# Patient Record
Sex: MALE | Race: WHITE | Employment: UNEMPLOYED | URBAN - METROPOLITAN AREA
[De-identification: names, ages, dates, MRNs, and addresses within clinical notes are randomized per-mention and may not be internally consistent; named-entity substitution may affect disease eponyms.]

---

## 2017-01-03 PROBLEM — G89.29 CHRONIC LEFT-SIDED LOW BACK PAIN: Status: ACTIVE | Noted: 2017-01-03

## 2017-01-03 PROBLEM — M54.50 CHRONIC LEFT-SIDED LOW BACK PAIN: Status: ACTIVE | Noted: 2017-01-03

## 2017-01-09 ENCOUNTER — HOSPITAL ENCOUNTER (OUTPATIENT)
Dept: ULTRASOUND IMAGING | Age: 57
Discharge: HOME | End: 2017-01-09
Attending: INTERNAL MEDICINE
Payer: MEDICAID

## 2017-01-09 DIAGNOSIS — I73.9 CLAUDICATION (HCC): ICD-10-CM

## 2017-01-09 PROCEDURE — 93926 LOWER EXTREMITY STUDY: CPT

## 2017-03-03 PROBLEM — I70.0 OCCLUSION OF AORTA (HCC): Status: ACTIVE | Noted: 2017-03-03

## 2017-04-26 ENCOUNTER — HOSPITAL ENCOUNTER (OUTPATIENT)
Dept: CT IMAGING | Age: 57
Discharge: HOME | End: 2017-04-26
Attending: SURGERY
Payer: MEDICAID

## 2017-04-26 VITALS — HEIGHT: 66 IN | BODY MASS INDEX: 29.89 KG/M2 | WEIGHT: 186 LBS

## 2017-04-26 DIAGNOSIS — I73.9 PVD (PERIPHERAL VASCULAR DISEASE) WITH CLAUDICATION (HCC): ICD-10-CM

## 2017-04-26 LAB — CREAT BLD-MCNC: 1.8 MG/DL (ref 0.6–1)

## 2017-04-26 PROCEDURE — 82565 ASSAY OF CREATININE: CPT

## 2017-04-26 PROCEDURE — 74011000258 HC RX REV CODE- 258: Performed by: SURGERY

## 2017-04-26 PROCEDURE — 75635 CT ANGIO ABDOMINAL ARTERIES: CPT

## 2017-04-26 PROCEDURE — 74011636320 HC RX REV CODE- 636/320: Performed by: SURGERY

## 2017-04-26 PROCEDURE — 74011250636 HC RX REV CODE- 250/636: Performed by: SURGERY

## 2017-04-26 RX ORDER — SODIUM CHLORIDE 9 MG/ML
1 INJECTION, SOLUTION INTRAVENOUS ONCE
Status: COMPLETED | OUTPATIENT
Start: 2017-04-26 | End: 2017-04-26

## 2017-04-26 RX ORDER — SODIUM CHLORIDE 0.9 % (FLUSH) 0.9 %
10 SYRINGE (ML) INJECTION
Status: COMPLETED | OUTPATIENT
Start: 2017-04-26 | End: 2017-04-26

## 2017-04-26 RX ADMIN — SODIUM CHLORIDE 1 ML/KG/HR: 900 INJECTION, SOLUTION INTRAVENOUS at 08:35

## 2017-04-26 RX ADMIN — SODIUM CHLORIDE 100 ML: 900 INJECTION, SOLUTION INTRAVENOUS at 11:49

## 2017-04-26 RX ADMIN — IOPAMIDOL 119 ML: 755 INJECTION, SOLUTION INTRAVENOUS at 11:49

## 2017-04-26 RX ADMIN — Medication 10 ML: at 11:49

## 2017-04-26 NOTE — IP AVS SNAPSHOT
[address] 
[phone] Patient: Mena Jarquin MRN: [id number] You are allergic to the following No active allergies Recent Documentation Height Weight BMI Smoking Status 1.676 m 84.4 kg 30.02 kg/m2 Former Smoker Emergency Contacts Name Discharge Info Relation Home Work Mobile Elpidio Holt  Daughter [21] [phone] About your hospitalization You were admitted on:  April 26, 2017 You last received care in the:  D RAD[facility] CT SCAN You were discharged on:  April 26, 2017 Unit phone number:  [phone] Why you were hospitalized Your primary diagnosis was:  Not on File Providers Seen During Your Hospitalizations Provider Role Specialty Primary office phone Malia Parson MD Attending Provider Vascular Surgery [phone] Your Primary Care Physician (PCP) Primary Care Physician Office Phone Office Fax Patti Zabala [address] [place] [address] Follow-up Information None Your Appointments Friday April 28, 2017 10:00 AM EDT  
ESTABLISHED PATIENT with Malia Parson MD  
[facility] [address] [place] [id number] [phone] Friday May 05, 2017 10:00 AM EDT Office Visit with Sid Castañeda MD  
[facility] ([address] [place] [address]) [address] [place] [address] Leighann Friends [address] [phone] Current Discharge Medication List  
  
ASK your doctor about these medications Dose & Instructions Dispensing Information Comments Morning Noon Evening Bedtime  
 aspirin delayed-release 81 mg tablet Your last dose was: Your next dose is:    
   
   
 Dose:  81 mg Take 1 Tab by mouth daily. Refills:  0 clopidogrel 75 mg Tab Commonly known as:  PLAVIX Your last dose was: Your next dose is:    
   
   
 Dose:  75 mg Take 1 Tab by mouth daily. Quantity:  30 Tab Refills:  11  
     
   
   
   
  
 COREG 12.5 mg tablet Generic drug:  carvedilol Your last dose was: Your next dose is:    
   
   
 Dose:  6.25 mg Take 6.25 mg by mouth two (2) times daily (with meals). Refills:  0 HYDROcodone-acetaminophen  mg tablet Commonly known as:  Bebo Nigh Your last dose was: Your next dose is:    
   
   
 Dose:  1 Tab Take 1 Tab by mouth every eight (8) hours as needed for Pain. Max Daily Amount: 3 Tabs. Quantity:  90 Tab Refills:  0  
     
   
   
   
  
 lisinopril 20 mg tablet Commonly known as:  Miguel Leys Your last dose was: Your next dose is:    
   
   
 Dose:  20 mg Take 1 Tab by mouth daily. Quantity:  30 Tab Refills:  6  
     
   
   
   
  
 nitroglycerin 0.4 mg SL tablet Commonly known as:  NITROSTAT Your last dose was: Your next dose is:    
   
   
 Dose:  0.4 mg  
1 Tab by SubLINGual route every five (5) minutes as needed for Chest Pain. Quantity:  2 Bottle Refills:  4  
     
   
   
   
  
 raNITIdine 150 mg tablet Commonly known as:  ZANTAC Your last dose was: Your next dose is:    
   
   
 Dose:  150 mg Take 1 Tab by mouth two (2) times daily as needed for Indigestion. Quantity:  1 Tab Refills:  0 Discharge Instructions None Discharge Orders None Introducing [facility]! Adrianna Murray introduces [employer] patient portal. Now you can access parts of your medical record, email your doctor's office, and request medication refills online. 1. In your internet browser, go to [email or web address]. Work [employer] 2. Click on the First Time User? Click Here link in the Sign In box.  You will see the New Member Sign Up page. 3. Enter your [employer] Access Code exactly as it appears below. You will not need to use this code after youve completed the sign-up process. If you do not sign up before the expiration date, you must request a new code. · [employer] Access Code: [email or web address] Expires: 6/7/2017  1:43 PM 
 
4. Enter the last four digits of your Social Security Number (xxxx) and Date of Birth (mm/dd/yyyy) as indicated and click Submit. You will be taken to the next sign-up page. 5. Create a [employer] ID. This will be your [employer] login ID and cannot be changed, so think of one that is secure and easy to remember. 6. Create a [employer] password. You can change your password at any time. 7. Enter your Password Reset Question and Answer. This can be used at a later time if you forget your password. 8. Enter your e-mail address. You will receive e-mail notification when new information is available in [address] 9. Click Sign Up. You can now view and download portions of your medical record. 10. Click the Download Summary menu link to download a portable copy of your medical information. If you have questions, please visit the Frequently Asked Questions section of the [employer] website. Remember, [employer] is NOT to be used for urgent needs. For medical emergencies, dial 911. Now available from your iPhone and Android! General Information Please provide this summary of care documentation to your next provider. Patient Signature:  ____________________________________________________________ Date:  ____________________________________________________________  
  
Kay Tao Provider Signature:  ____________________________________________________________ Date:  ____________________________________________________________

## 2017-04-26 NOTE — PROGRESS NOTES
Patient tolerated IV fluids well. Ate lunch, appetite good. Ambulated several times to bathroom to void. Instructed to force oral fluids today and notify physician with any problems. Patient verbalizes understanding. Peripheral IV removed intact site clear. Discharged ambulatory in stable condition.

## 2017-04-26 NOTE — IP AVS SNAPSHOT
Current Discharge Medication List  
  
ASK your doctor about these medications Dose & Instructions Dispensing Information Comments Morning Noon Evening Bedtime  
 aspirin delayed-release 81 mg tablet Your last dose was: Your next dose is:    
   
   
 Dose:  81 mg Take 1 Tab by mouth daily. Refills:  0  
     
   
   
   
  
 clopidogrel 75 mg Tab Commonly known as:  PLAVIX Your last dose was: Your next dose is:    
   
   
 Dose:  75 mg Take 1 Tab by mouth daily. Quantity:  30 Tab Refills:  11  
     
   
   
   
  
 COREG 12.5 mg tablet Generic drug:  carvedilol Your last dose was: Your next dose is:    
   
   
 Dose:  6.25 mg Take 6.25 mg by mouth two (2) times daily (with meals). Refills:  0 HYDROcodone-acetaminophen  mg tablet Commonly known as:  Vikram Littler Your last dose was: Your next dose is:    
   
   
 Dose:  1 Tab Take 1 Tab by mouth every eight (8) hours as needed for Pain. Max Daily Amount: 3 Tabs. Quantity:  90 Tab Refills:  0  
     
   
   
   
  
 lisinopril 20 mg tablet Commonly known as:  Natalie Gaster Your last dose was: Your next dose is:    
   
   
 Dose:  20 mg Take 1 Tab by mouth daily. Quantity:  30 Tab Refills:  6  
     
   
   
   
  
 nitroglycerin 0.4 mg SL tablet Commonly known as:  NITROSTAT Your last dose was: Your next dose is:    
   
   
 Dose:  0.4 mg  
1 Tab by SubLINGual route every five (5) minutes as needed for Chest Pain. Quantity:  2 Bottle Refills:  4  
     
   
   
   
  
 raNITIdine 150 mg tablet Commonly known as:  ZANTAC Your last dose was: Your next dose is:    
   
   
 Dose:  150 mg Take 1 Tab by mouth two (2) times daily as needed for Indigestion. Quantity:  1 Tab Refills:  0

## 2017-05-11 ENCOUNTER — HOSPITAL ENCOUNTER (OUTPATIENT)
Dept: SURGERY | Age: 57
Discharge: HOME | End: 2017-05-11
Payer: MEDICAID

## 2017-05-11 VITALS
BODY MASS INDEX: 31.66 KG/M2 | HEART RATE: 97 BPM | DIASTOLIC BLOOD PRESSURE: 81 MMHG | RESPIRATION RATE: 16 BRPM | WEIGHT: 197 LBS | SYSTOLIC BLOOD PRESSURE: 160 MMHG | OXYGEN SATURATION: 71 % | HEIGHT: 66 IN | TEMPERATURE: 97.9 F

## 2017-05-11 LAB
ANION GAP BLD CALC-SCNC: 6 MMOL/L (ref 7–16)
BASOPHILS # BLD AUTO: 0 K/UL (ref 0–0.2)
BASOPHILS # BLD: 0 % (ref 0–2)
BUN SERPL-MCNC: 23 MG/DL (ref 6–23)
CALCIUM SERPL-MCNC: 9.1 MG/DL (ref 8.3–10.4)
CHLORIDE SERPL-SCNC: 110 MMOL/L (ref 98–107)
CO2 SERPL-SCNC: 27 MMOL/L (ref 21–32)
CREAT SERPL-MCNC: 1.87 MG/DL (ref 0.8–1.5)
DIFFERENTIAL METHOD BLD: ABNORMAL
EOSINOPHIL # BLD: 0.3 K/UL (ref 0–0.8)
EOSINOPHIL NFR BLD: 3 % (ref 0.5–7.8)
ERYTHROCYTE [DISTWIDTH] IN BLOOD BY AUTOMATED COUNT: 12.2 % (ref 11.9–14.6)
GLUCOSE SERPL-MCNC: 98 MG/DL (ref 65–100)
HCT VFR BLD AUTO: 41.8 % (ref 41.1–50.3)
HGB BLD-MCNC: 14.4 G/DL (ref 13.6–17.2)
IMM GRANULOCYTES # BLD: 0 K/UL (ref 0–0.5)
IMM GRANULOCYTES NFR BLD AUTO: 0.1 % (ref 0–5)
LYMPHOCYTES # BLD AUTO: 24 % (ref 13–44)
LYMPHOCYTES # BLD: 2 K/UL (ref 0.5–4.6)
MCH RBC QN AUTO: 31.9 PG (ref 26.1–32.9)
MCHC RBC AUTO-ENTMCNC: 34.4 G/DL (ref 31.4–35)
MCV RBC AUTO: 92.7 FL (ref 79.6–97.8)
MONOCYTES # BLD: 0.6 K/UL (ref 0.1–1.3)
MONOCYTES NFR BLD AUTO: 7 % (ref 4–12)
NEUTS SEG # BLD: 5.6 K/UL (ref 1.7–8.2)
NEUTS SEG NFR BLD AUTO: 66 % (ref 43–78)
PLATELET # BLD AUTO: 281 K/UL (ref 150–450)
PMV BLD AUTO: 10.3 FL (ref 10.8–14.1)
POTASSIUM SERPL-SCNC: 4.9 MMOL/L (ref 3.5–5.1)
RBC # BLD AUTO: 4.51 M/UL (ref 4.23–5.67)
SODIUM SERPL-SCNC: 143 MMOL/L (ref 136–145)
WBC # BLD AUTO: 8.5 K/UL (ref 4.3–11.1)

## 2017-05-11 PROCEDURE — 85025 COMPLETE CBC W/AUTO DIFF WBC: CPT | Performed by: SURGERY

## 2017-05-11 PROCEDURE — 80048 BASIC METABOLIC PNL TOTAL CA: CPT | Performed by: SURGERY

## 2017-05-11 NOTE — PERIOP NOTES
Permission from Dr Tilman Seip to hold plavix printed and placed on chart, per dr Darby Mcdaniel request.

## 2017-05-11 NOTE — PERIOP NOTES
Patient verified name, , and surgery as listed in [facility]. TYPE  CASE:lll  Orders per surgeon: were Received  Labs per surgeon:cbc, bmp  And T&S on arrival  Labs per anesthesia protocol: no additional   EKG  :  ekg from 5/3/2017 and echo from 2016 and cath from 2016 and office note from 5/3/2016 all reviewed and cleared by Dr Malcolm Duong. Dr Malcolm Duong and Dr Thomas Geronimo told patient to hold plavix per Dr Thomas Geronimo order, however Dr Malcolm Duong request clearance for patient to hold plavix from Dr Shawna Ortiz at [facility], awaiting a response (message left with his nurse)      Patient provided with handouts including guide to surgery , transfusions, pain management and hand hygiene for the family and community. Pt verbalizes understanding of all pre-op instructions . Instructed that family must be present in building at all times. Nothing to eat or drink after midnight the night prior to surgery. Hibiclens and instructions given per hospital policy. Instructed patient to continue  previous medications as prescribed prior to surgery and  to take the following medications the day of surgery according to anesthesia guidelines : asa 81 mg, carvedilol, hydrocodone, and zantac       Original medication prescription bottles were not  visualized during patient appointment. Continue all previous medications unless otherwise directed. Instructed patient to hold  the following medications prior to surgery: plavix unless called and told otherwise, patient verbalized understanding      Patient verbalized understanding of all instructions and provided all medical/health information to the best of their ability.

## 2017-06-21 ENCOUNTER — ANESTHESIA EVENT (OUTPATIENT)
Dept: ENDOSCOPY | Age: 57
End: 2017-06-21
Payer: MEDICAID

## 2017-06-21 RX ORDER — SODIUM CHLORIDE 0.9 % (FLUSH) 0.9 %
5-10 SYRINGE (ML) INJECTION AS NEEDED
Status: CANCELLED | OUTPATIENT
Start: 2017-06-21

## 2017-06-21 RX ORDER — SODIUM CHLORIDE, SODIUM LACTATE, POTASSIUM CHLORIDE, CALCIUM CHLORIDE 600; 310; 30; 20 MG/100ML; MG/100ML; MG/100ML; MG/100ML
100 INJECTION, SOLUTION INTRAVENOUS CONTINUOUS
Status: CANCELLED | OUTPATIENT
Start: 2017-06-21

## 2017-06-22 ENCOUNTER — ANESTHESIA (OUTPATIENT)
Dept: ENDOSCOPY | Age: 57
End: 2017-06-22
Payer: MEDICAID

## 2017-06-22 ENCOUNTER — HOSPITAL ENCOUNTER (OUTPATIENT)
Age: 57
Setting detail: OUTPATIENT SURGERY
Discharge: HOME | End: 2017-06-22
Attending: INTERNAL MEDICINE | Admitting: INTERNAL MEDICINE
Payer: MEDICAID

## 2017-06-22 VITALS
DIASTOLIC BLOOD PRESSURE: 60 MMHG | RESPIRATION RATE: 16 BRPM | OXYGEN SATURATION: 100 % | HEART RATE: 89 BPM | SYSTOLIC BLOOD PRESSURE: 142 MMHG

## 2017-06-22 LAB — GLUCOSE BLD STRIP.AUTO-MCNC: 161 MG/DL (ref 65–100)

## 2017-06-22 PROCEDURE — 82962 GLUCOSE BLOOD TEST: CPT

## 2017-06-22 PROCEDURE — 76060000032 HC ANESTHESIA 0.5 TO 1 HR: Performed by: INTERNAL MEDICINE

## 2017-06-22 PROCEDURE — 74011000250 HC RX REV CODE- 250

## 2017-06-22 PROCEDURE — 77030009426 HC FCPS BIOP ENDOSC BSC -B: Performed by: INTERNAL MEDICINE

## 2017-06-22 PROCEDURE — 74011250636 HC RX REV CODE- 250/636: Performed by: ANESTHESIOLOGY

## 2017-06-22 PROCEDURE — 74011250636 HC RX REV CODE- 250/636

## 2017-06-22 PROCEDURE — 88305 TISSUE EXAM BY PATHOLOGIST: CPT | Performed by: INTERNAL MEDICINE

## 2017-06-22 PROCEDURE — 76040000025: Performed by: INTERNAL MEDICINE

## 2017-06-22 RX ORDER — PROPOFOL 10 MG/ML
INJECTION, EMULSION INTRAVENOUS AS NEEDED
Status: DISCONTINUED | OUTPATIENT
Start: 2017-06-22 | End: 2017-06-22 | Stop reason: HOSPADM

## 2017-06-22 RX ORDER — PROPOFOL 10 MG/ML
INJECTION, EMULSION INTRAVENOUS
Status: DISCONTINUED | OUTPATIENT
Start: 2017-06-22 | End: 2017-06-22 | Stop reason: HOSPADM

## 2017-06-22 RX ORDER — ONDANSETRON 2 MG/ML
4 INJECTION INTRAMUSCULAR; INTRAVENOUS ONCE
Status: COMPLETED | OUTPATIENT
Start: 2017-06-22 | End: 2017-06-22

## 2017-06-22 RX ORDER — SODIUM CHLORIDE, SODIUM LACTATE, POTASSIUM CHLORIDE, CALCIUM CHLORIDE 600; 310; 30; 20 MG/100ML; MG/100ML; MG/100ML; MG/100ML
INJECTION, SOLUTION INTRAVENOUS
Status: DISCONTINUED | OUTPATIENT
Start: 2017-06-22 | End: 2017-06-22 | Stop reason: HOSPADM

## 2017-06-22 RX ADMIN — PROPOFOL 50 MG: 10 INJECTION, EMULSION INTRAVENOUS at 07:19

## 2017-06-22 RX ADMIN — ONDANSETRON 4 MG: 2 INJECTION, SOLUTION INTRAMUSCULAR; INTRAVENOUS at 07:00

## 2017-06-22 RX ADMIN — SODIUM CHLORIDE, SODIUM LACTATE, POTASSIUM CHLORIDE, CALCIUM CHLORIDE: 600; 310; 30; 20 INJECTION, SOLUTION INTRAVENOUS at 07:06

## 2017-06-22 RX ADMIN — PROPOFOL 100 MCG/KG/MIN: 10 INJECTION, EMULSION INTRAVENOUS at 07:22

## 2017-06-22 RX ADMIN — PROPOFOL 20 MG: 10 INJECTION, EMULSION INTRAVENOUS at 07:22

## 2017-06-22 NOTE — ANESTHESIA PREPROCEDURE EVALUATION
Anesthetic History               Review of Systems / Medical History  Patient summary reviewed, nursing notes reviewed and pertinent labs reviewed    Pulmonary          Smoker (Stopped 11/2016)         Neuro/Psych       CVA: no residual symptoms       Cardiovascular    Hypertension  Valvular problems/murmurs (mild-mod): mitral insufficiency        Past MI (12/2016), CAD and PAD (aortic occlusion)    Exercise tolerance: >4 METS  Comments: EF 50-55%, cath 12/2016, RCA stenosis with collaterals, medical therapy   GI/Hepatic/Renal     GERD: well controlled    Renal disease: CRI       Endo/Other        Obesity     Other Findings              Physical Exam    Airway  Mallampati: III    Neck ROM: normal range of motion, short neck   Mouth opening: Diminished (comment)     Cardiovascular  Regular rate and rhythm,  S1 and S2 normal,  no murmur, click, rub, or gallop             Dental    Dentition: Poor dentition     Pulmonary  Breath sounds clear to auscultation               Abdominal         Other Findings            Anesthetic Plan    ASA: 3  Anesthesia type: total IV anesthesia            Anesthetic plan and risks discussed with: Patient

## 2017-06-22 NOTE — H&P
[facility] Consult Note           Referring Physician:     Consult Date: 6/22/2017    Reason for Consult: Hematochezia    History of Present Illness:  Patient is a 61 y.o. male who is seen in consultation for hematochezia. Past Medical History:   Diagnosis Date    Acute diastolic (congestive) heart failure (HCC) 12/4/2016    LVEF 55-55% 12/4/16    Arthritis     OA    CAD (coronary artery disease)     in one vessel, unable to stent 12/2016- \"collateral vessels\" per heart cath    Chronic kidney disease     stage lll, last creatinine 2.2 in 3/2016    Chronic pain     low back and bilateral lower extremeties    Ex-smoker     QUIT 11/2016    1 pk/day x 40 yrs    GERD (gastroesophageal reflux disease)     takes an occassional zantac    Hypercholesteremia     Hypertension     managed with meds    Stroke ([facility] [address]     2 mild strokes, patient states it was \"yrs ago\" , no residual weakness      Past Surgical History:   Procedure Laterality Date    HX HEART CATHETERIZATION  12/2016    unable to stent- 1 ves blockage    HX ORTHOPAEDIC Left     L knee surgery x 2      Family History   Problem Relation Age of Onset    Stroke Mother      multiple    No Known Problems Father     Diabetes Sister     Hypertension Brother     Hypertension Sister     Heart Disease Sister      Social History     Occupational History    Not on file. Social History Main Topics    Smoking status: Former Smoker     Packs/day: 0.50     Years: 42.00     Quit date: 11/25/2016    Smokeless tobacco: Never Used    Alcohol use No    Drug use: No      Comment: none since prior to 2007    Sexual activity: Not Currently       Hospital Medications:  No current facility-administered medications for this encounter. Allergies:  No Known Allergies    Review of Systems:  A comprehensive review of systems was negative except for that written in the History of Present Illness.     Objective:     Physical Exam:  Vitals:  Visit Vitals    /51    Pulse 67    Resp 18    SpO2 96%       General: No acute distress. Skin:  Extremities and face reveal no rashes. No estrella erythema. No telangiectasias on the chest wall. HEENT: Sclerae anicteric. No oral ulcers. No abnormal pigmentation of the lips. The neck is supple. Cardiovascular: Regular rate and rhythm. No murmurs, gallops, or rubs. Respiratory:  Comfortable breathing  With no accessory muscle use. Clear breath sounds with no wheezes, rales, or rhonchi. GI:  Abdomen nondistended, soft, and nontender. Normal active bowel sounds. No enlargement of the liver or spleen. No masses palpable. Musculoskeletal:  No pitting edema of the lower legs. Extremities have good range of motion. Neurological:  Gross memory appears intact. Patient is alert and oriented. Psychiatric:  Mood appears appropriate with judgement intact. Lymphatic:  No cervical or supraclavicular adenopathy. Laboratory:    No results for input(s): WBC, RBC, HGB, HCT, PLT, HGBEXT, HCTEXT, PLTEXT in the last 72 hours. No results for input(s): GLU, NA, K, CL, CO2, BUN, CREA, CA in the last 72 hours. No results for input(s): PTP, INR, APTT in the last 72 hours. No lab exists for component: INREXT  No results for input(s): TBIL, CBIL, SGOT, GPT, AP, ALB, TP, AML, LPSE in the last 72 hours.     Assessment:       A 61 y.o. male with hematochezia      Plan:       EGD and colonoscopy      Signed By: Sudeep Morley MD     June 22, 2017

## 2017-06-22 NOTE — DISCHARGE INSTRUCTIONS
Gastrointestinal Esophagogastroduodenoscopy (EGD) - Upper Exam Discharge Instructions    1. Call Dr. Merlin Harman at [phone] for any problems or questions. 2. Contact the doctor's office for follow up appointment as directed. 3. Medication may cause drowsiness for several hours, therefore, do not drive or                  operate machinery for remainder of the day. 4. No alcohol today. 5. Ordinarily, you may resume regular diet and activity after exam unless otherwise              specified by your physician. 6. For mild soreness in your throat you may use Cepacol throat lozenges or warm               salt-water gargles as needed. Any additional instructions: Instructions given to Cora Brian and other family members. Instructions given by:  Mateo Isaac RN    Gastrointestinal Colonoscopy/Flexible Sigmoidoscopy - Lower Exam Discharge Instructions  1. Because of air put into your colon during exam, you may experience some abdominal distension, relieved by the passage of gas, for several hours. 2. Contact your physician if you have any of the following:  a. Excessive amount of bleeding - large amount when having a bowel movement. Occasional specks of blood with bowel movement would not be unusual.  b. Severe abdominal pain  c. Fever or Chills  Any additional instructions:  Repeat colon in 5 years      Instructions given to Cora Brian and other family members.   Instructions given by:  Mateo Isaac RN

## 2017-06-22 NOTE — PROCEDURES
Colonoscopy Procedure Note    Indications: Hematochezia    Anesthesia/Sedation: MAC IV     Pre-Procedure Physical:  No current facility-administered medications for this encounter. Facility-Administered Medications Ordered in Other Encounters   Medication Dose Route Frequency    lactated Ringers infusion    CONTINUOUS    propofol (DIPRIVAN) 10 mg/mL injection    PRN      Review of patient's allergies indicates no known allergies. Patient Vitals for the past 8 hrs:   BP Pulse Resp SpO2   06/22/17 0719 138/67 (!) 51 18 99 %   06/22/17 0657 111/51 67 18 96 %       Exam:    Airway: clear   Heart: normal S1and S2    Lungs: clear bilateral  Abdomen: soft, nontender, bowel sounds present and normal in all quads   Mental Status: awake, alert and oriented to person, place and time        Procedure Details      Informed consent was obtained for the procedure, including sedation. Risks of perforation, hemorrhage, adverse drug reaction and aspiration were discussed. The patient was placed in the left lateral decubitus position. Based on the pre-procedure assessment, including review of the patient's medical history, medications, allergies, and review of systems, he had been deemed to be an appropriate candidate for conscious sedation; he was therefore sedated with the medications listed below. The patient was monitored continuously with ECG tracing, pulse oximetry, blood pressure monitoring, and direct observations. A rectal examination was performed. The colonoscope was inserted into the rectum and advanced under direct vision to the cecum. The quality of the colonic preparation was good. A careful inspection was made as the colonoscope was withdrawn, including a retroflexed view of the rectum; findings and interventions are described below. Appropriate photodocumentation was obtained. Findings:   Small 5 mm sessile polyp removed by forceps from rectum.     Specimens: Polyp    Estimated Blood Loss: 0 cc Complications: None; patient tolerated the procedure well. Attending Attestation: I performed the procedure. Impression:    Small 5 mm sessile polyp removed by forceps from rectum. Recommendations:   Next colonoscopy in 5 years.

## 2017-06-22 NOTE — ANESTHESIA POSTPROCEDURE EVALUATION
Post-Anesthesia Evaluation and Assessment    Patient: Cora Brian MRN: [id number]  SSN: [id number]    YOB: 1960  Age: 61 y.o. Sex: male       Cardiovascular Function/Vital Signs  Visit Vitals    /47    Pulse 61    Resp 16    SpO2 99%       Patient is status post total IV anesthesia anesthesia for Procedure(s):  ESOPHAGOGASTRODUODENOSCOPY (EGD)  COLONOSCOPY  BMI 31  ENDOSCOPIC POLYPECTOMY. Nausea/Vomiting: None    Postoperative hydration reviewed and adequate. Pain:  Pain Scale 1: Numeric (0 - 10) (06/22/17 0803)  Pain Intensity 1: 9 (06/22/17 3580)   Managed    Neurological Status: At baseline    Mental Status and Level of Consciousness: Alert and oriented     Pulmonary Status:   O2 Device: Room air (06/22/17 0803)   Adequate oxygenation and airway patent    Complications related to anesthesia: None    Post-anesthesia assessment completed.  No concerns    Signed By: Trent Starr MD     June 22, 2017

## 2017-06-22 NOTE — PROGRESS NOTES
Tamika Cooney and Dr. Rosenda Batres notified on how pt was diaphoretic and feeling faint. Blood sugar obtained and was 161. Vitals were obtained and stable. IV was placed and fluids started. Zofran 4 mg IV was ordered and given. ekg strip obtained in prep and shown to Dr. Huber Treviño. Ok to proceed with procedure per both physicians.  500 ml of LR of bolus was given per Dr. Huber Treviño request.

## 2017-06-22 NOTE — PROCEDURES
Endoscopic Gastroduodenoscopy Procedure Note    Indications: GI bleeding    Anesthesia/Sedation: MAC IV     Pre-Procedure Physical:    No current facility-administered medications for this encounter. Review of patient's allergies indicates no known allergies. Patient Vitals for the past 8 hrs:   BP Pulse Resp SpO2   06/22/17 0657 111/51 67 18 96 %       Exam      Airway: clear   Heart: normal S1and S2    Lungs: clear bilateral  Abdomen: soft, nontender, bowel sounds present and normal in all quads   Mental Status: awake, alert and oriented to person, place and time          Procedure Details     Informed consent was obtained for the procedure, including conscious sedation. Risks of pancreatitis, infection, perforation, hemorrhage, adverse drug reaction and aspiration were discussed. The patient was placed in the left lateral decubitus position. Based on the pre-procedure assessment, including review of the patient's medical history, medications, allergies, and review of systems, he had been deemed to be an appropriate candidate for conscious sedation; he was therefore sedated with the medications listed below. He was monitored continuously with ECG tracing, pulse oximetry, blood pressure monitoring, and direct observation. The EGD gastroscope was inserted into the mouth and advanced under direct vision to the second portion of the duodenum. A careful inspection was made as the gastroscope was withdrawn, including a retroflexed view of the proximal stomach; findings and interventions are described below. Appropriate photodocumentation was obtained. Findings:   Esophagus- Normal.  Stomach- Normal.  Duodenum- Normal.    Therapies: None    Specimens: None    Estimated Blood Loss: 0 cc           Complications:   None; patient tolerated the procedure well. Attending Attestation:  I performed the procedure. Impression:    Normal EGD. Recommendations:  Colonoscopy to follow.

## 2017-06-22 NOTE — PROGRESS NOTES
Pt taking po fluids well. Passing flatus. c/o pain in right leg, this is chronic and was hurting before procedure. Discharged home via wheelchair with family.

## 2017-08-25 ENCOUNTER — ANESTHESIA EVENT (OUTPATIENT)
Dept: SURGERY | Age: 57
DRG: 169 | End: 2017-08-25
Payer: MEDICAID

## 2017-08-25 ENCOUNTER — HOSPITAL ENCOUNTER (OUTPATIENT)
Dept: SURGERY | Age: 57
Discharge: HOME | End: 2017-08-25
Payer: MEDICAID

## 2017-08-25 VITALS
OXYGEN SATURATION: 96 % | DIASTOLIC BLOOD PRESSURE: 75 MMHG | BODY MASS INDEX: 33.35 KG/M2 | RESPIRATION RATE: 16 BRPM | SYSTOLIC BLOOD PRESSURE: 162 MMHG | HEIGHT: 66 IN | TEMPERATURE: 97.9 F | HEART RATE: 70 BPM | WEIGHT: 207.5 LBS

## 2017-08-25 LAB
ANION GAP SERPL CALC-SCNC: 8 MMOL/L (ref 7–16)
BUN SERPL-MCNC: 25 MG/DL (ref 6–23)
CALCIUM SERPL-MCNC: 9.1 MG/DL (ref 8.3–10.4)
CHLORIDE SERPL-SCNC: 109 MMOL/L (ref 98–107)
CO2 SERPL-SCNC: 25 MMOL/L (ref 21–32)
CREAT SERPL-MCNC: 2.3 MG/DL (ref 0.8–1.5)
ERYTHROCYTE [DISTWIDTH] IN BLOOD BY AUTOMATED COUNT: 12.9 % (ref 11.9–14.6)
GLUCOSE SERPL-MCNC: 142 MG/DL (ref 65–100)
HCT VFR BLD AUTO: 38.8 % (ref 41.1–50.3)
HGB BLD-MCNC: 12.9 G/DL (ref 13.6–17.2)
MCH RBC QN AUTO: 30.5 PG (ref 26.1–32.9)
MCHC RBC AUTO-ENTMCNC: 33.2 G/DL (ref 31.4–35)
MCV RBC AUTO: 91.7 FL (ref 79.6–97.8)
PLATELET # BLD AUTO: 269 K/UL (ref 150–450)
PMV BLD AUTO: 10.2 FL (ref 10.8–14.1)
POTASSIUM SERPL-SCNC: 4.3 MMOL/L (ref 3.5–5.1)
RBC # BLD AUTO: 4.23 M/UL (ref 4.23–5.67)
SODIUM SERPL-SCNC: 142 MMOL/L (ref 136–145)
WBC # BLD AUTO: 7.2 K/UL (ref 4.3–11.1)

## 2017-08-25 PROCEDURE — 85027 COMPLETE CBC AUTOMATED: CPT | Performed by: ANESTHESIOLOGY

## 2017-08-25 PROCEDURE — 80048 BASIC METABOLIC PNL TOTAL CA: CPT | Performed by: ANESTHESIOLOGY

## 2017-08-25 RX ORDER — DOCUSATE SODIUM 100 MG/1
100 CAPSULE, LIQUID FILLED ORAL AS NEEDED
COMMUNITY
End: 2018-01-10

## 2017-08-25 RX ORDER — RANITIDINE 150 MG/1
150 TABLET, FILM COATED ORAL AS NEEDED
COMMUNITY
End: 2018-02-01

## 2017-08-25 NOTE — PERIOP NOTES
Patient verified name, , and surgery as listed in [facility]. Patient provided medical/health information and PTA medications to the best of their ability. TYPE  CASE:3  Orders per surgeon:  were Received  Labs per surgeon: none . Labs per anesthesia protocol: CBC,BMP and Type and Screen. EKG  :  EKG 17, Cath 16, Echo 16. Patient provided with and instructed on education handouts including Guide to Surgery, blood transfusions, pain management, and hand hygiene for the family and community, and [facility] brochure. Mariola Mist and Hibiclens and instructions given per hospital policy. Instructed patient to continue previous medications as prescribed prior to surgery unless otherwise directed and to take the following medications the day of surgery according to anesthesia guidelines : Aspirin, Carvediolol . Instructed patient to hold  the following medications: none. Original medication prescription bottles were visualized during patient appointment. Patient teach back successful and patient demonstrates knowledge of instruction.

## 2017-08-25 NOTE — ANESTHESIA PREPROCEDURE EVALUATION
Anesthetic History               Review of Systems / Medical History  Patient summary reviewed, nursing notes reviewed and pertinent labs reviewed    Pulmonary          Smoker (Stopped 11/2016)         Neuro/Psych       CVA: no residual symptoms       Cardiovascular    Hypertension  Valvular problems/murmurs (mild-mod): mitral insufficiency        Past MI (12/2016), CAD and PAD (aortic occlusion)    Exercise tolerance: <4 METS  Comments: EF 50-55%, cath 12/2016, RCA stenosis with collaterals, medical therapy  Denies changes in functional status   GI/Hepatic/Renal     GERD: well controlled    Renal disease: CRI       Endo/Other        Obesity and arthritis     Other Findings              Physical Exam    Airway  Mallampati: II  TM Distance: 4 - 6 cm  Neck ROM: normal range of motion, short neck   Mouth opening: Diminished (comment)     Cardiovascular  Regular rate and rhythm,  S1 and S2 normal,  no murmur, click, rub, or gallop  Rhythm: regular  Rate: normal         Dental    Dentition: Poor dentition     Pulmonary  Breath sounds clear to auscultation               Abdominal  GI exam deferred       Other Findings            Anesthetic Plan    ASA: 3  Anesthesia type: general    Monitoring Plan: Arterial line and CVP  Post-op pain plan if not by surgeon: indwelling epidural catheter  Post procedure ventilation   Induction: Intravenous  Anesthetic plan and risks discussed with: Patient

## 2017-09-07 ENCOUNTER — APPOINTMENT (OUTPATIENT)
Dept: GENERAL RADIOLOGY | Age: 57
DRG: 169 | End: 2017-09-07
Attending: SURGERY
Payer: MEDICAID

## 2017-09-07 ENCOUNTER — ANESTHESIA (OUTPATIENT)
Dept: SURGERY | Age: 57
DRG: 169 | End: 2017-09-07
Payer: MEDICAID

## 2017-09-07 ENCOUNTER — ANESTHESIA EVENT (OUTPATIENT)
Dept: SURGERY | Age: 57
DRG: 169 | End: 2017-09-07
Payer: MEDICAID

## 2017-09-07 ENCOUNTER — APPOINTMENT (OUTPATIENT)
Dept: ULTRASOUND IMAGING | Age: 57
DRG: 169 | End: 2017-09-07
Attending: SURGERY
Payer: MEDICAID

## 2017-09-07 ENCOUNTER — APPOINTMENT (OUTPATIENT)
Dept: CT IMAGING | Age: 57
DRG: 169 | End: 2017-09-07
Attending: SURGERY
Payer: MEDICAID

## 2017-09-07 ENCOUNTER — APPOINTMENT (OUTPATIENT)
Dept: INTERVENTIONAL RADIOLOGY/VASCULAR | Age: 57
DRG: 169 | End: 2017-09-07
Attending: SURGERY
Payer: MEDICAID

## 2017-09-07 ENCOUNTER — HOSPITAL ENCOUNTER (INPATIENT)
Age: 57
LOS: 22 days | Discharge: HOME HEALTH | DRG: 169 | End: 2017-09-29
Attending: SURGERY | Admitting: SURGERY
Payer: MEDICAID

## 2017-09-07 DIAGNOSIS — I99.8 ISCHEMIA OF LEFT LOWER EXTREMITY: ICD-10-CM

## 2017-09-07 DIAGNOSIS — I70.229 ATHEROSCLEROSIS OF ARTERY OF EXTREMITY WITH REST PAIN (HCC): ICD-10-CM

## 2017-09-07 DIAGNOSIS — I10 ESSENTIAL HYPERTENSION: ICD-10-CM

## 2017-09-07 DIAGNOSIS — G89.29 CHRONIC LEFT-SIDED LOW BACK PAIN WITH LEFT-SIDED SCIATICA: ICD-10-CM

## 2017-09-07 DIAGNOSIS — M54.42 CHRONIC LEFT-SIDED LOW BACK PAIN WITH LEFT-SIDED SCIATICA: ICD-10-CM

## 2017-09-07 LAB
ACT BLD: 109 SECS (ref 70–128)
ANION GAP SERPL CALC-SCNC: 9 MMOL/L (ref 7–16)
BASE DEFICIT BLD-SCNC: 3 MMOL/L
BASE DEFICIT BLD-SCNC: 5 MMOL/L
BASE DEFICIT BLD-SCNC: 5 MMOL/L
BASE DEFICIT BLD-SCNC: 6 MMOL/L
BUN SERPL-MCNC: 23 MG/DL (ref 6–23)
CA-I BLD-MCNC: 1.09 MMOL/L (ref 1.12–1.32)
CA-I BLD-MCNC: 1.1 MMOL/L (ref 1.12–1.32)
CA-I BLD-MCNC: 1.12 MMOL/L (ref 1.12–1.32)
CA-I BLD-MCNC: 1.18 MMOL/L (ref 1.12–1.32)
CALCIUM SERPL-MCNC: 7.1 MG/DL (ref 8.3–10.4)
CHLORIDE SERPL-SCNC: 113 MMOL/L (ref 98–107)
CO2 SERPL-SCNC: 21 MMOL/L (ref 21–32)
CREAT SERPL-MCNC: 2.02 MG/DL (ref 0.8–1.5)
ERYTHROCYTE [DISTWIDTH] IN BLOOD BY AUTOMATED COUNT: 13.4 % (ref 11.9–14.6)
GLUCOSE BLD STRIP.AUTO-MCNC: 118 MG/DL (ref 65–100)
GLUCOSE BLD STRIP.AUTO-MCNC: 131 MG/DL (ref 65–100)
GLUCOSE BLD STRIP.AUTO-MCNC: 134 MG/DL (ref 65–100)
GLUCOSE BLD STRIP.AUTO-MCNC: 138 MG/DL (ref 65–100)
GLUCOSE SERPL-MCNC: 142 MG/DL (ref 65–100)
HCO3 BLD-SCNC: 19.7 MMOL/L (ref 22–26)
HCO3 BLD-SCNC: 21.3 MMOL/L (ref 22–26)
HCO3 BLD-SCNC: 21.4 MMOL/L (ref 22–26)
HCO3 BLD-SCNC: 22.4 MMOL/L (ref 22–26)
HCT VFR BLD AUTO: 32.4 % (ref 41.1–50.3)
HGB BLD-MCNC: 11.2 G/DL (ref 13.6–17.2)
MCH RBC QN AUTO: 31.2 PG (ref 26.1–32.9)
MCHC RBC AUTO-ENTMCNC: 34.6 G/DL (ref 31.4–35)
MCV RBC AUTO: 90.3 FL (ref 79.6–97.8)
PCO2 BLD: 38.4 MMHG (ref 35–45)
PCO2 BLD: 39.2 MMHG (ref 35–45)
PCO2 BLD: 40.9 MMHG (ref 35–45)
PCO2 BLD: 45.3 MMHG (ref 35–45)
PH BLD: 7.28 [PH] (ref 7.35–7.45)
PH BLD: 7.32 [PH] (ref 7.35–7.45)
PH BLD: 7.32 [PH] (ref 7.35–7.45)
PH BLD: 7.36 [PH] (ref 7.35–7.45)
PLATELET # BLD AUTO: 202 K/UL (ref 150–450)
PMV BLD AUTO: 9.9 FL (ref 10.8–14.1)
PO2 BLD: 143 MMHG (ref 80–105)
PO2 BLD: 153 MMHG (ref 80–105)
PO2 BLD: 250 MMHG (ref 80–105)
PO2 BLD: 487 MMHG (ref 80–105)
POTASSIUM BLD-SCNC: 4.9 MMOL/L (ref 3.5–5.1)
POTASSIUM BLD-SCNC: 5.2 MMOL/L (ref 3.5–5.1)
POTASSIUM BLD-SCNC: 5.3 MMOL/L (ref 3.5–5.1)
POTASSIUM BLD-SCNC: 5.7 MMOL/L (ref 3.5–5.1)
POTASSIUM SERPL-SCNC: 5.4 MMOL/L (ref 3.5–5.1)
RBC # BLD AUTO: 3.59 M/UL (ref 4.23–5.67)
SAO2 % BLD: 100 % (ref 95–98)
SAO2 % BLD: 100 % (ref 95–98)
SAO2 % BLD: 99 % (ref 95–98)
SAO2 % BLD: 99 % (ref 95–98)
SODIUM BLD-SCNC: 137 MMOL/L (ref 136–145)
SODIUM BLD-SCNC: 139 MMOL/L (ref 136–145)
SODIUM BLD-SCNC: 139 MMOL/L (ref 136–145)
SODIUM BLD-SCNC: 140 MMOL/L (ref 136–145)
SODIUM SERPL-SCNC: 143 MMOL/L (ref 136–145)
WBC # BLD AUTO: 14.7 K/UL (ref 4.3–11.1)

## 2017-09-07 PROCEDURE — 85014 HEMATOCRIT: CPT

## 2017-09-07 PROCEDURE — 77030011264 HC ELECTRD BLD EXT COVD -A: Performed by: SURGERY

## 2017-09-07 PROCEDURE — 77030031642 HC BOOT FT ROOKE HFS OSBM -B

## 2017-09-07 PROCEDURE — 77030002933 HC SUT MCRYL J&J -A: Performed by: SURGERY

## 2017-09-07 PROCEDURE — 77030034850: Performed by: SURGERY

## 2017-09-07 PROCEDURE — C1894 INTRO/SHEATH, NON-LASER: HCPCS | Performed by: SURGERY

## 2017-09-07 PROCEDURE — 76000 FLUOROSCOPY <1 HR PHYS/QHP: CPT

## 2017-09-07 PROCEDURE — 77030012317 HC CATH URET INT COVD -A: Performed by: SURGERY

## 2017-09-07 PROCEDURE — 06LB0ZZ OCCLUSION OF LEFT RENAL VEIN, OPEN APPROACH: ICD-10-PCS | Performed by: SURGERY

## 2017-09-07 PROCEDURE — 77030008771 HC TU NG SALEM SUMP -A: Performed by: ANESTHESIOLOGY

## 2017-09-07 PROCEDURE — 77030020788: Performed by: SURGERY

## 2017-09-07 PROCEDURE — 74011000258 HC RX REV CODE- 258: Performed by: SURGERY

## 2017-09-07 PROCEDURE — 74011250636 HC RX REV CODE- 250/636: Performed by: SURGERY

## 2017-09-07 PROCEDURE — 71010 XR CHEST SNGL V: CPT

## 2017-09-07 PROCEDURE — 77030008477 HC STYL SATN SLP COVD -A: Performed by: ANESTHESIOLOGY

## 2017-09-07 PROCEDURE — 76060000046 HC ANESTHESIA 7.5 TO 8 HR: Performed by: SURGERY

## 2017-09-07 PROCEDURE — 85347 COAGULATION TIME ACTIVATED: CPT

## 2017-09-07 PROCEDURE — 77030020407 HC IV BLD WRMR ST 3M -A: Performed by: ANESTHESIOLOGY

## 2017-09-07 PROCEDURE — 77030013794 HC KT TRNSDUC BLD EDWD -B: Performed by: ANESTHESIOLOGY

## 2017-09-07 PROCEDURE — 74011000302 HC RX REV CODE- 302: Performed by: SURGERY

## 2017-09-07 PROCEDURE — 74011000250 HC RX REV CODE- 250

## 2017-09-07 PROCEDURE — 77030013292 HC BOWL MX PRSM J&J -A: Performed by: ANESTHESIOLOGY

## 2017-09-07 PROCEDURE — 86923 COMPATIBILITY TEST ELECTRIC: CPT | Performed by: ANESTHESIOLOGY

## 2017-09-07 PROCEDURE — 77030010507 HC ADH SKN DERMBND J&J -B: Performed by: SURGERY

## 2017-09-07 PROCEDURE — 74011636320 HC RX REV CODE- 636/320: Performed by: SURGERY

## 2017-09-07 PROCEDURE — 04100JK BYPASS ABDOMINAL AORTA TO BILATERAL FEMORAL ARTERIES WITH SYNTHETIC SUBSTITUTE, OPEN APPROACH: ICD-10-PCS | Performed by: SURGERY

## 2017-09-07 PROCEDURE — 74011250636 HC RX REV CODE- 250/636

## 2017-09-07 PROCEDURE — 74011250636 HC RX REV CODE- 250/636: Performed by: ANESTHESIOLOGY

## 2017-09-07 PROCEDURE — 75635 CT ANGIO ABDOMINAL ARTERIES: CPT

## 2017-09-07 PROCEDURE — 76010000162 HC OR TIME 1.5 TO 2 HR INTENSV-TIER 1: Performed by: SURGERY

## 2017-09-07 PROCEDURE — C1751 CATH, INF, PER/CENT/MIDLINE: HCPCS | Performed by: ANESTHESIOLOGY

## 2017-09-07 PROCEDURE — 77030008703 HC TU ET UNCUF COVD -A: Performed by: ANESTHESIOLOGY

## 2017-09-07 PROCEDURE — 77030020782 HC GWN BAIR PAWS FLX 3M -B: Performed by: ANESTHESIOLOGY

## 2017-09-07 PROCEDURE — 77030011640 HC PAD GRND REM COVD -A: Performed by: SURGERY

## 2017-09-07 PROCEDURE — 77030019908 HC STETH ESOPH SIMS -A: Performed by: ANESTHESIOLOGY

## 2017-09-07 PROCEDURE — 77030008467 HC STPLR SKN COVD -B: Performed by: SURGERY

## 2017-09-07 PROCEDURE — 36600 WITHDRAWAL OF ARTERIAL BLOOD: CPT

## 2017-09-07 PROCEDURE — 77030010514 HC APPL CLP LIG COVD -B: Performed by: SURGERY

## 2017-09-07 PROCEDURE — 77030018673: Performed by: SURGERY

## 2017-09-07 PROCEDURE — 77030003212: Performed by: SURGERY

## 2017-09-07 PROCEDURE — 76010000223 HC CV SURG 7 TO 7.5 HR INTENSV-TIER 1: Performed by: SURGERY

## 2017-09-07 PROCEDURE — 86580 TB INTRADERMAL TEST: CPT | Performed by: SURGERY

## 2017-09-07 PROCEDURE — 77030031139 HC SUT VCRL2 J&J -A: Performed by: SURGERY

## 2017-09-07 PROCEDURE — 77030027138 HC INCENT SPIROMETER -A

## 2017-09-07 PROCEDURE — 77030002996 HC SUT SLK J&J -A: Performed by: SURGERY

## 2017-09-07 PROCEDURE — 77030032490 HC SLV COMPR SCD KNE COVD -B

## 2017-09-07 PROCEDURE — 82947 ASSAY GLUCOSE BLOOD QUANT: CPT

## 2017-09-07 PROCEDURE — C1769 GUIDE WIRE: HCPCS | Performed by: SURGERY

## 2017-09-07 PROCEDURE — 77030002970 HC SUT PLEDG TELE -A: Performed by: SURGERY

## 2017-09-07 PROCEDURE — C1757 CATH, THROMBECTOMY/EMBOLECT: HCPCS | Performed by: SURGERY

## 2017-09-07 PROCEDURE — 77030014008 HC SPNG HEMSTAT J&J -C: Performed by: SURGERY

## 2017-09-07 PROCEDURE — 77030002987 HC SUT PROL J&J -B: Performed by: SURGERY

## 2017-09-07 PROCEDURE — 93926 LOWER EXTREMITY STUDY: CPT

## 2017-09-07 PROCEDURE — 77030005401 HC CATH RAD ARRO -A: Performed by: ANESTHESIOLOGY

## 2017-09-07 PROCEDURE — 80048 BASIC METABOLIC PNL TOTAL CA: CPT | Performed by: SURGERY

## 2017-09-07 PROCEDURE — 77030022198 HC TU ASPIR ASSMB TERU -A: Performed by: SURGERY

## 2017-09-07 PROCEDURE — 65610000006 HC RM INTENSIVE CARE

## 2017-09-07 PROCEDURE — 77030011943: Performed by: SURGERY

## 2017-09-07 PROCEDURE — 77030002986 HC SUT PROL J&J -A: Performed by: SURGERY

## 2017-09-07 PROCEDURE — 77030008542 HC TBNG MON PRSS EDWD -A: Performed by: ANESTHESIOLOGY

## 2017-09-07 PROCEDURE — 85027 COMPLETE CBC AUTOMATED: CPT | Performed by: SURGERY

## 2017-09-07 PROCEDURE — 77030005515 HC CATH URETH FOL14 BARD -B: Performed by: SURGERY

## 2017-09-07 PROCEDURE — 76060000035 HC ANESTHESIA 2 TO 2.5 HR: Performed by: SURGERY

## 2017-09-07 PROCEDURE — 86900 BLOOD TYPING SEROLOGIC ABO: CPT | Performed by: ANESTHESIOLOGY

## 2017-09-07 PROCEDURE — 74011250637 HC RX REV CODE- 250/637: Performed by: ANESTHESIOLOGY

## 2017-09-07 PROCEDURE — 36415 COLL VENOUS BLD VENIPUNCTURE: CPT | Performed by: SURGERY

## 2017-09-07 PROCEDURE — 82803 BLOOD GASES ANY COMBINATION: CPT

## 2017-09-07 PROCEDURE — 77030014125 HC TY EPDRL BBMI -B: Performed by: ANESTHESIOLOGY

## 2017-09-07 PROCEDURE — 77030002966 HC SUT PDS J&J -A: Performed by: SURGERY

## 2017-09-07 PROCEDURE — 77030019940 HC BLNKT HYPOTHRM STRY -B: Performed by: ANESTHESIOLOGY

## 2017-09-07 PROCEDURE — P9045 ALBUMIN (HUMAN), 5%, 250 ML: HCPCS

## 2017-09-07 PROCEDURE — C1768 GRAFT, VASCULAR: HCPCS | Performed by: SURGERY

## 2017-09-07 DEVICE — IMPLANTABLE DEVICE: Type: IMPLANTABLE DEVICE | Site: AORTA | Status: FUNCTIONAL

## 2017-09-07 RX ORDER — SODIUM CHLORIDE 0.9 % (FLUSH) 0.9 %
5-10 SYRINGE (ML) INJECTION EVERY 8 HOURS
Status: DISCONTINUED | OUTPATIENT
Start: 2017-09-07 | End: 2017-09-29 | Stop reason: HOSPADM

## 2017-09-07 RX ORDER — SODIUM CHLORIDE 0.9 % (FLUSH) 0.9 %
10 SYRINGE (ML) INJECTION
Status: COMPLETED | OUTPATIENT
Start: 2017-09-07 | End: 2017-09-07

## 2017-09-07 RX ORDER — NEOSTIGMINE METHYLSULFATE 1 MG/ML
INJECTION INTRAVENOUS AS NEEDED
Status: DISCONTINUED | OUTPATIENT
Start: 2017-09-07 | End: 2017-09-07 | Stop reason: HOSPADM

## 2017-09-07 RX ORDER — DIPHENHYDRAMINE HYDROCHLORIDE 50 MG/ML
12.5 INJECTION, SOLUTION INTRAMUSCULAR; INTRAVENOUS
Status: DISCONTINUED | OUTPATIENT
Start: 2017-09-07 | End: 2017-09-29 | Stop reason: HOSPADM

## 2017-09-07 RX ORDER — ONDANSETRON 2 MG/ML
4 INJECTION INTRAMUSCULAR; INTRAVENOUS
Status: DISCONTINUED | OUTPATIENT
Start: 2017-09-07 | End: 2017-09-29 | Stop reason: HOSPADM

## 2017-09-07 RX ORDER — ALBUMIN HUMAN 50 G/1000ML
SOLUTION INTRAVENOUS AS NEEDED
Status: DISCONTINUED | OUTPATIENT
Start: 2017-09-07 | End: 2017-09-07 | Stop reason: HOSPADM

## 2017-09-07 RX ORDER — HEPARIN SODIUM 200 [USP'U]/100ML
INJECTION, SOLUTION INTRAVENOUS AS NEEDED
Status: DISCONTINUED | OUTPATIENT
Start: 2017-09-07 | End: 2017-09-08 | Stop reason: HOSPADM

## 2017-09-07 RX ORDER — FENTANYL CITRATE 50 UG/ML
100 INJECTION, SOLUTION INTRAMUSCULAR; INTRAVENOUS ONCE
Status: COMPLETED | OUTPATIENT
Start: 2017-09-07 | End: 2017-09-07

## 2017-09-07 RX ORDER — SODIUM CHLORIDE, SODIUM LACTATE, POTASSIUM CHLORIDE, CALCIUM CHLORIDE 600; 310; 30; 20 MG/100ML; MG/100ML; MG/100ML; MG/100ML
INJECTION, SOLUTION INTRAVENOUS
Status: DISCONTINUED | OUTPATIENT
Start: 2017-09-07 | End: 2017-09-07 | Stop reason: HOSPADM

## 2017-09-07 RX ORDER — SODIUM CHLORIDE 0.9 % (FLUSH) 0.9 %
5-10 SYRINGE (ML) INJECTION AS NEEDED
Status: DISCONTINUED | OUTPATIENT
Start: 2017-09-07 | End: 2017-09-07 | Stop reason: HOSPADM

## 2017-09-07 RX ORDER — LIDOCAINE HYDROCHLORIDE 20 MG/ML
INJECTION, SOLUTION EPIDURAL; INFILTRATION; INTRACAUDAL; PERINEURAL AS NEEDED
Status: DISCONTINUED | OUTPATIENT
Start: 2017-09-07 | End: 2017-09-07 | Stop reason: HOSPADM

## 2017-09-07 RX ORDER — HEPARIN SODIUM 1000 [USP'U]/ML
INJECTION, SOLUTION INTRAVENOUS; SUBCUTANEOUS AS NEEDED
Status: DISCONTINUED | OUTPATIENT
Start: 2017-09-07 | End: 2017-09-07 | Stop reason: HOSPADM

## 2017-09-07 RX ORDER — OXYCODONE HYDROCHLORIDE 5 MG/1
5 TABLET ORAL
Status: ACTIVE | OUTPATIENT
Start: 2017-09-07 | End: 2017-09-08

## 2017-09-07 RX ORDER — FENTANYL CITRATE 50 UG/ML
INJECTION, SOLUTION INTRAMUSCULAR; INTRAVENOUS AS NEEDED
Status: DISCONTINUED | OUTPATIENT
Start: 2017-09-07 | End: 2017-09-08 | Stop reason: HOSPADM

## 2017-09-07 RX ORDER — ASPIRIN 81 MG/1
81 TABLET ORAL
Status: DISCONTINUED | OUTPATIENT
Start: 2017-09-08 | End: 2017-09-29 | Stop reason: HOSPADM

## 2017-09-07 RX ORDER — SODIUM CHLORIDE 9 MG/ML
INJECTION, SOLUTION INTRAVENOUS
Status: DISCONTINUED | OUTPATIENT
Start: 2017-09-07 | End: 2017-09-07 | Stop reason: HOSPADM

## 2017-09-07 RX ORDER — HYDROCODONE BITARTRATE AND ACETAMINOPHEN 5; 325 MG/1; MG/1
1 TABLET ORAL AS NEEDED
Status: DISCONTINUED | OUTPATIENT
Start: 2017-09-07 | End: 2017-09-07

## 2017-09-07 RX ORDER — NITROGLYCERIN 20 MG/100ML
INJECTION INTRAVENOUS
Status: DISCONTINUED | OUTPATIENT
Start: 2017-09-07 | End: 2017-09-07 | Stop reason: HOSPADM

## 2017-09-07 RX ORDER — LIDOCAINE HYDROCHLORIDE 10 MG/ML
0.1 INJECTION INFILTRATION; PERINEURAL AS NEEDED
Status: DISCONTINUED | OUTPATIENT
Start: 2017-09-07 | End: 2017-09-07 | Stop reason: HOSPADM

## 2017-09-07 RX ORDER — ROCURONIUM BROMIDE 10 MG/ML
INJECTION, SOLUTION INTRAVENOUS AS NEEDED
Status: DISCONTINUED | OUTPATIENT
Start: 2017-09-07 | End: 2017-09-07 | Stop reason: HOSPADM

## 2017-09-07 RX ORDER — ACETAMINOPHEN 500 MG
1000 TABLET ORAL
Status: DISCONTINUED | OUTPATIENT
Start: 2017-09-07 | End: 2017-09-08

## 2017-09-07 RX ORDER — PROPOFOL 10 MG/ML
INJECTION, EMULSION INTRAVENOUS AS NEEDED
Status: DISCONTINUED | OUTPATIENT
Start: 2017-09-07 | End: 2017-09-08 | Stop reason: HOSPADM

## 2017-09-07 RX ORDER — HYDROMORPHONE HYDROCHLORIDE 1 MG/ML
1 INJECTION, SOLUTION INTRAMUSCULAR; INTRAVENOUS; SUBCUTANEOUS
Status: ACTIVE | OUTPATIENT
Start: 2017-09-07 | End: 2017-09-08

## 2017-09-07 RX ORDER — CEFAZOLIN SODIUM IN 0.9 % NACL 2 G/50 ML
2 INTRAVENOUS SOLUTION, PIGGYBACK (ML) INTRAVENOUS ONCE
Status: COMPLETED | OUTPATIENT
Start: 2017-09-07 | End: 2017-09-07

## 2017-09-07 RX ORDER — CHLORHEXIDINE GLUCONATE 1.2 MG/ML
15 RINSE ORAL AS NEEDED
Status: DISCONTINUED | OUTPATIENT
Start: 2017-09-07 | End: 2017-09-29 | Stop reason: HOSPADM

## 2017-09-07 RX ORDER — NALBUPHINE HYDROCHLORIDE 10 MG/ML
5 INJECTION, SOLUTION INTRAMUSCULAR; INTRAVENOUS; SUBCUTANEOUS
Status: DISCONTINUED | OUTPATIENT
Start: 2017-09-07 | End: 2017-09-29 | Stop reason: HOSPADM

## 2017-09-07 RX ORDER — MIDAZOLAM HYDROCHLORIDE 1 MG/ML
2 INJECTION, SOLUTION INTRAMUSCULAR; INTRAVENOUS
Status: DISCONTINUED | OUTPATIENT
Start: 2017-09-07 | End: 2017-09-07 | Stop reason: HOSPADM

## 2017-09-07 RX ORDER — GLYCOPYRROLATE 0.2 MG/ML
INJECTION INTRAMUSCULAR; INTRAVENOUS AS NEEDED
Status: DISCONTINUED | OUTPATIENT
Start: 2017-09-07 | End: 2017-09-07 | Stop reason: HOSPADM

## 2017-09-07 RX ORDER — CEFAZOLIN SODIUM IN 0.9 % NACL 2 G/50 ML
2 INTRAVENOUS SOLUTION, PIGGYBACK (ML) INTRAVENOUS EVERY 8 HOURS
Status: COMPLETED | OUTPATIENT
Start: 2017-09-07 | End: 2017-09-08

## 2017-09-07 RX ORDER — SODIUM CHLORIDE 0.9 % (FLUSH) 0.9 %
5-10 SYRINGE (ML) INJECTION EVERY 8 HOURS
Status: DISCONTINUED | OUTPATIENT
Start: 2017-09-07 | End: 2017-09-07 | Stop reason: HOSPADM

## 2017-09-07 RX ORDER — NITROGLYCERIN 20 MG/100ML
INJECTION INTRAVENOUS
Status: DISCONTINUED | OUTPATIENT
Start: 2017-09-07 | End: 2017-09-07

## 2017-09-07 RX ORDER — PROTAMINE SULFATE 10 MG/ML
INJECTION, SOLUTION INTRAVENOUS AS NEEDED
Status: DISCONTINUED | OUTPATIENT
Start: 2017-09-07 | End: 2017-09-07 | Stop reason: HOSPADM

## 2017-09-07 RX ORDER — OXYCODONE HYDROCHLORIDE 5 MG/1
10 TABLET ORAL
Status: ACTIVE | OUTPATIENT
Start: 2017-09-07 | End: 2017-09-08

## 2017-09-07 RX ORDER — ROCURONIUM BROMIDE 10 MG/ML
INJECTION, SOLUTION INTRAVENOUS AS NEEDED
Status: DISCONTINUED | OUTPATIENT
Start: 2017-09-07 | End: 2017-09-08 | Stop reason: HOSPADM

## 2017-09-07 RX ORDER — FENTANYL CITRATE 50 UG/ML
INJECTION, SOLUTION INTRAMUSCULAR; INTRAVENOUS AS NEEDED
Status: DISCONTINUED | OUTPATIENT
Start: 2017-09-07 | End: 2017-09-07 | Stop reason: HOSPADM

## 2017-09-07 RX ORDER — SODIUM CHLORIDE 9 MG/ML
50 INJECTION, SOLUTION INTRAVENOUS CONTINUOUS
Status: DISCONTINUED | OUTPATIENT
Start: 2017-09-07 | End: 2017-09-07

## 2017-09-07 RX ORDER — HEPARIN SODIUM 5000 [USP'U]/ML
INJECTION, SOLUTION INTRAVENOUS; SUBCUTANEOUS AS NEEDED
Status: DISCONTINUED | OUTPATIENT
Start: 2017-09-07 | End: 2017-09-08 | Stop reason: HOSPADM

## 2017-09-07 RX ORDER — SODIUM CHLORIDE, SODIUM LACTATE, POTASSIUM CHLORIDE, CALCIUM CHLORIDE 600; 310; 30; 20 MG/100ML; MG/100ML; MG/100ML; MG/100ML
150 INJECTION, SOLUTION INTRAVENOUS CONTINUOUS
Status: DISCONTINUED | OUTPATIENT
Start: 2017-09-07 | End: 2017-09-07 | Stop reason: HOSPADM

## 2017-09-07 RX ORDER — SODIUM CHLORIDE 0.9 % (FLUSH) 0.9 %
5-10 SYRINGE (ML) INJECTION AS NEEDED
Status: DISCONTINUED | OUTPATIENT
Start: 2017-09-07 | End: 2017-09-08

## 2017-09-07 RX ORDER — MORPHINE SULFATE 2 MG/ML
2 INJECTION, SOLUTION INTRAMUSCULAR; INTRAVENOUS
Status: DISCONTINUED | OUTPATIENT
Start: 2017-09-07 | End: 2017-09-25

## 2017-09-07 RX ORDER — LIDOCAINE HYDROCHLORIDE 20 MG/ML
INJECTION, SOLUTION EPIDURAL; INFILTRATION; INTRACAUDAL; PERINEURAL AS NEEDED
Status: DISCONTINUED | OUTPATIENT
Start: 2017-09-07 | End: 2017-09-08 | Stop reason: HOSPADM

## 2017-09-07 RX ORDER — SODIUM CHLORIDE 9 MG/ML
250 INJECTION, SOLUTION INTRAVENOUS AS NEEDED
Status: DISCONTINUED | OUTPATIENT
Start: 2017-09-07 | End: 2017-09-07 | Stop reason: HOSPADM

## 2017-09-07 RX ORDER — MANNITOL 250 MG/ML
INJECTION, SOLUTION INTRAVENOUS AS NEEDED
Status: DISCONTINUED | OUTPATIENT
Start: 2017-09-07 | End: 2017-09-07 | Stop reason: HOSPADM

## 2017-09-07 RX ORDER — HYDROMORPHONE HYDROCHLORIDE 2 MG/ML
0.5 INJECTION, SOLUTION INTRAMUSCULAR; INTRAVENOUS; SUBCUTANEOUS
Status: DISCONTINUED | OUTPATIENT
Start: 2017-09-07 | End: 2017-09-07

## 2017-09-07 RX ORDER — SODIUM CHLORIDE 0.9 % (FLUSH) 0.9 %
5-10 SYRINGE (ML) INJECTION EVERY 8 HOURS
Status: DISCONTINUED | OUTPATIENT
Start: 2017-09-07 | End: 2017-09-07

## 2017-09-07 RX ORDER — SODIUM BICARBONATE 1 MEQ/ML
SYRINGE (ML) INTRAVENOUS AS NEEDED
Status: DISCONTINUED | OUTPATIENT
Start: 2017-09-07 | End: 2017-09-07 | Stop reason: HOSPADM

## 2017-09-07 RX ORDER — HEPARIN SODIUM 1000 [USP'U]/ML
INJECTION, SOLUTION INTRAVENOUS; SUBCUTANEOUS AS NEEDED
Status: DISCONTINUED | OUTPATIENT
Start: 2017-09-07 | End: 2017-09-08 | Stop reason: HOSPADM

## 2017-09-07 RX ORDER — SODIUM CHLORIDE 0.9 % (FLUSH) 0.9 %
5-10 SYRINGE (ML) INJECTION AS NEEDED
Status: DISCONTINUED | OUTPATIENT
Start: 2017-09-07 | End: 2017-09-29 | Stop reason: HOSPADM

## 2017-09-07 RX ORDER — HEPARIN SODIUM 5000 [USP'U]/ML
6000 INJECTION, SOLUTION INTRAVENOUS; SUBCUTANEOUS ONCE
Status: COMPLETED | OUTPATIENT
Start: 2017-09-07 | End: 2017-09-07

## 2017-09-07 RX ORDER — PROPOFOL 10 MG/ML
INJECTION, EMULSION INTRAVENOUS AS NEEDED
Status: DISCONTINUED | OUTPATIENT
Start: 2017-09-07 | End: 2017-09-07 | Stop reason: HOSPADM

## 2017-09-07 RX ORDER — SODIUM CHLORIDE, SODIUM LACTATE, POTASSIUM CHLORIDE, CALCIUM CHLORIDE 600; 310; 30; 20 MG/100ML; MG/100ML; MG/100ML; MG/100ML
150 INJECTION, SOLUTION INTRAVENOUS CONTINUOUS
Status: DISCONTINUED | OUTPATIENT
Start: 2017-09-07 | End: 2017-09-07

## 2017-09-07 RX ORDER — FAMOTIDINE 20 MG/1
20 TABLET, FILM COATED ORAL ONCE
Status: COMPLETED | OUTPATIENT
Start: 2017-09-07 | End: 2017-09-07

## 2017-09-07 RX ORDER — NALOXONE HYDROCHLORIDE 0.4 MG/ML
0.1 INJECTION, SOLUTION INTRAMUSCULAR; INTRAVENOUS; SUBCUTANEOUS
Status: ACTIVE | OUTPATIENT
Start: 2017-09-07 | End: 2017-09-08

## 2017-09-07 RX ORDER — ONDANSETRON 2 MG/ML
4 INJECTION INTRAMUSCULAR; INTRAVENOUS AS NEEDED
Status: DISCONTINUED | OUTPATIENT
Start: 2017-09-07 | End: 2017-09-25

## 2017-09-07 RX ORDER — SODIUM CHLORIDE 9 MG/ML
150 INJECTION, SOLUTION INTRAVENOUS CONTINUOUS
Status: DISCONTINUED | OUTPATIENT
Start: 2017-09-07 | End: 2017-09-10

## 2017-09-07 RX ORDER — NITROGLYCERIN 0.4 MG/1
0.4 TABLET SUBLINGUAL
Status: DISCONTINUED | OUTPATIENT
Start: 2017-09-07 | End: 2017-09-29 | Stop reason: HOSPADM

## 2017-09-07 RX ORDER — VECURONIUM BROMIDE FOR INJECTION 1 MG/ML
INJECTION, POWDER, LYOPHILIZED, FOR SOLUTION INTRAVENOUS AS NEEDED
Status: DISCONTINUED | OUTPATIENT
Start: 2017-09-07 | End: 2017-09-07 | Stop reason: HOSPADM

## 2017-09-07 RX ORDER — ONDANSETRON 2 MG/ML
INJECTION INTRAMUSCULAR; INTRAVENOUS AS NEEDED
Status: DISCONTINUED | OUTPATIENT
Start: 2017-09-07 | End: 2017-09-07 | Stop reason: HOSPADM

## 2017-09-07 RX ADMIN — ALBUMIN HUMAN 250 ML: 50 SOLUTION INTRAVENOUS at 12:25

## 2017-09-07 RX ADMIN — FENTANYL CITRATE 100 MCG: 50 INJECTION, SOLUTION INTRAMUSCULAR; INTRAVENOUS at 07:28

## 2017-09-07 RX ADMIN — ROCURONIUM BROMIDE 20 MG: 10 INJECTION, SOLUTION INTRAVENOUS at 08:48

## 2017-09-07 RX ADMIN — MIDAZOLAM HYDROCHLORIDE 2 MG: 1 INJECTION, SOLUTION INTRAMUSCULAR; INTRAVENOUS at 07:28

## 2017-09-07 RX ADMIN — FENTANYL CITRATE 100 MCG: 50 INJECTION, SOLUTION INTRAMUSCULAR; INTRAVENOUS at 09:18

## 2017-09-07 RX ADMIN — IOPAMIDOL 75 ML: 755 INJECTION, SOLUTION INTRAVENOUS at 22:10

## 2017-09-07 RX ADMIN — VECURONIUM BROMIDE FOR INJECTION 1 MG: 1 INJECTION, POWDER, LYOPHILIZED, FOR SOLUTION INTRAVENOUS at 10:15

## 2017-09-07 RX ADMIN — MANNITOL 12.5 G: 250 INJECTION, SOLUTION INTRAVENOUS at 11:00

## 2017-09-07 RX ADMIN — LIDOCAINE HYDROCHLORIDE 100 MG: 20 INJECTION, SOLUTION EPIDURAL; INFILTRATION; INTRACAUDAL; PERINEURAL at 23:03

## 2017-09-07 RX ADMIN — MANNITOL 12.5 G: 250 INJECTION, SOLUTION INTRAVENOUS at 09:25

## 2017-09-07 RX ADMIN — PROTAMINE SULFATE 50 MG: 10 INJECTION, SOLUTION INTRAVENOUS at 13:10

## 2017-09-07 RX ADMIN — Medication 25 MEQ: at 12:43

## 2017-09-07 RX ADMIN — ALBUMIN HUMAN 250 ML: 50 SOLUTION INTRAVENOUS at 12:32

## 2017-09-07 RX ADMIN — VECURONIUM BROMIDE FOR INJECTION 2 MG: 1 INJECTION, POWDER, LYOPHILIZED, FOR SOLUTION INTRAVENOUS at 12:25

## 2017-09-07 RX ADMIN — VECURONIUM BROMIDE FOR INJECTION 2 MG: 1 INJECTION, POWDER, LYOPHILIZED, FOR SOLUTION INTRAVENOUS at 11:15

## 2017-09-07 RX ADMIN — SODIUM CHLORIDE, SODIUM LACTATE, POTASSIUM CHLORIDE, CALCIUM CHLORIDE: 600; 310; 30; 20 INJECTION, SOLUTION INTRAVENOUS at 13:53

## 2017-09-07 RX ADMIN — PROPOFOL 200 MG: 10 INJECTION, EMULSION INTRAVENOUS at 07:50

## 2017-09-07 RX ADMIN — Medication 25 MEQ: at 12:29

## 2017-09-07 RX ADMIN — SODIUM CHLORIDE, SODIUM LACTATE, POTASSIUM CHLORIDE, CALCIUM CHLORIDE: 600; 310; 30; 20 INJECTION, SOLUTION INTRAVENOUS at 08:15

## 2017-09-07 RX ADMIN — VECURONIUM BROMIDE FOR INJECTION 2 MG: 1 INJECTION, POWDER, LYOPHILIZED, FOR SOLUTION INTRAVENOUS at 10:45

## 2017-09-07 RX ADMIN — ONDANSETRON 4 MG: 2 INJECTION INTRAMUSCULAR; INTRAVENOUS at 13:56

## 2017-09-07 RX ADMIN — VECURONIUM BROMIDE FOR INJECTION 1 MG: 1 INJECTION, POWDER, LYOPHILIZED, FOR SOLUTION INTRAVENOUS at 09:43

## 2017-09-07 RX ADMIN — CEFAZOLIN 2 G: 1 INJECTION, POWDER, FOR SOLUTION INTRAMUSCULAR; INTRAVENOUS; PARENTERAL at 20:24

## 2017-09-07 RX ADMIN — CEFAZOLIN 2 G: 1 INJECTION, POWDER, FOR SOLUTION INTRAMUSCULAR; INTRAVENOUS; PARENTERAL at 11:58

## 2017-09-07 RX ADMIN — SODIUM CHLORIDE, SODIUM LACTATE, POTASSIUM CHLORIDE, AND CALCIUM CHLORIDE: 600; 310; 30; 20 INJECTION, SOLUTION INTRAVENOUS at 12:00

## 2017-09-07 RX ADMIN — VECURONIUM BROMIDE FOR INJECTION 2 MG: 1 INJECTION, POWDER, LYOPHILIZED, FOR SOLUTION INTRAVENOUS at 12:03

## 2017-09-07 RX ADMIN — CALCIUM GLUCONATE 2 G: 94 INJECTION, SOLUTION INTRAVENOUS at 18:15

## 2017-09-07 RX ADMIN — ROCURONIUM BROMIDE 30 MG: 10 INJECTION, SOLUTION INTRAVENOUS at 23:03

## 2017-09-07 RX ADMIN — FAMOTIDINE 20 MG: 20 TABLET ORAL at 05:58

## 2017-09-07 RX ADMIN — ROCURONIUM BROMIDE 10 MG: 10 INJECTION, SOLUTION INTRAVENOUS at 13:40

## 2017-09-07 RX ADMIN — HEPARIN SODIUM 4000 UNITS: 1000 INJECTION, SOLUTION INTRAVENOUS; SUBCUTANEOUS at 23:36

## 2017-09-07 RX ADMIN — FENTANYL CITRATE 50 MCG: 50 INJECTION, SOLUTION INTRAMUSCULAR; INTRAVENOUS at 08:58

## 2017-09-07 RX ADMIN — Medication 10 ML: at 21:20

## 2017-09-07 RX ADMIN — Medication 10 ML: at 17:01

## 2017-09-07 RX ADMIN — LIDOCAINE HYDROCHLORIDE 100 MG: 20 INJECTION, SOLUTION EPIDURAL; INFILTRATION; INTRACAUDAL; PERINEURAL at 07:50

## 2017-09-07 RX ADMIN — SODIUM CHLORIDE 100 ML/HR: 900 INJECTION, SOLUTION INTRAVENOUS at 16:09

## 2017-09-07 RX ADMIN — SODIUM CHLORIDE 100 ML/HR: 900 INJECTION, SOLUTION INTRAVENOUS at 19:00

## 2017-09-07 RX ADMIN — SODIUM CHLORIDE 100 ML: 900 INJECTION, SOLUTION INTRAVENOUS at 22:11

## 2017-09-07 RX ADMIN — FENTANYL CITRATE 100 MCG: 50 INJECTION, SOLUTION INTRAMUSCULAR; INTRAVENOUS at 23:03

## 2017-09-07 RX ADMIN — PHENYLEPHRINE HYDROCHLORIDE 30 MCG/MIN: 10 INJECTION INTRAVENOUS at 21:20

## 2017-09-07 RX ADMIN — HEPARIN SODIUM 6000 UNITS: 5000 INJECTION INTRAVENOUS; SUBCUTANEOUS at 23:05

## 2017-09-07 RX ADMIN — NITROGLYCERIN 10 MCG/MIN: 20 INJECTION INTRAVENOUS at 10:18

## 2017-09-07 RX ADMIN — Medication 10 ML: at 22:11

## 2017-09-07 RX ADMIN — NEOSTIGMINE METHYLSULFATE 3 MG: 1 INJECTION INTRAVENOUS at 14:26

## 2017-09-07 RX ADMIN — SODIUM CHLORIDE: 9 INJECTION, SOLUTION INTRAVENOUS at 12:00

## 2017-09-07 RX ADMIN — SODIUM CHLORIDE: 9 INJECTION, SOLUTION INTRAVENOUS at 14:21

## 2017-09-07 RX ADMIN — HEPARIN SODIUM 9200 UNITS: 1000 INJECTION, SOLUTION INTRAVENOUS; SUBCUTANEOUS at 11:03

## 2017-09-07 RX ADMIN — FENTANYL CITRATE 100 MCG: 50 INJECTION, SOLUTION INTRAMUSCULAR; INTRAVENOUS at 12:51

## 2017-09-07 RX ADMIN — ROCURONIUM BROMIDE 50 MG: 10 INJECTION, SOLUTION INTRAVENOUS at 07:51

## 2017-09-07 RX ADMIN — SODIUM CHLORIDE: 9 INJECTION, SOLUTION INTRAVENOUS at 08:15

## 2017-09-07 RX ADMIN — GLYCOPYRROLATE 0.4 MG: 0.2 INJECTION INTRAMUSCULAR; INTRAVENOUS at 14:26

## 2017-09-07 RX ADMIN — SODIUM CHLORIDE 100 ML/HR: 900 INJECTION, SOLUTION INTRAVENOUS at 18:59

## 2017-09-07 RX ADMIN — HEPARIN SODIUM 5000 UNITS: 1000 INJECTION, SOLUTION INTRAVENOUS; SUBCUTANEOUS at 12:47

## 2017-09-07 RX ADMIN — HEPARIN SODIUM 4600 UNITS: 1000 INJECTION, SOLUTION INTRAVENOUS; SUBCUTANEOUS at 11:49

## 2017-09-07 RX ADMIN — CEFAZOLIN 2 G: 1 INJECTION, POWDER, FOR SOLUTION INTRAMUSCULAR; INTRAVENOUS; PARENTERAL at 08:00

## 2017-09-07 RX ADMIN — Medication 25 MEQ: at 14:24

## 2017-09-07 RX ADMIN — FENTANYL CITRATE 50 MCG: 50 INJECTION, SOLUTION INTRAMUSCULAR; INTRAVENOUS at 09:00

## 2017-09-07 RX ADMIN — ONDANSETRON 4 MG: 2 INJECTION INTRAMUSCULAR; INTRAVENOUS at 16:13

## 2017-09-07 RX ADMIN — ROPIVACAINE HYDROCHLORIDE 1 MCG: 10 INJECTION, SOLUTION EPIDURAL at 12:58

## 2017-09-07 RX ADMIN — TUBERCULIN PURIFIED PROTEIN DERIVATIVE 5 UNITS: 5 INJECTION INTRADERMAL at 21:19

## 2017-09-07 RX ADMIN — SODIUM CHLORIDE, SODIUM LACTATE, POTASSIUM CHLORIDE, AND CALCIUM CHLORIDE 150 ML/HR: 600; 310; 30; 20 INJECTION, SOLUTION INTRAVENOUS at 05:58

## 2017-09-07 RX ADMIN — PROPOFOL 100 MG: 10 INJECTION, EMULSION INTRAVENOUS at 23:03

## 2017-09-07 NOTE — ANESTHESIA PROCEDURE NOTES
Central Line Placement    Start time: 9/7/2017 7:45 AM  End time: 9/7/2017 8:00 AM  Performed by: Lucita Jay  Authorized by: Bryan Staples     Indications: vascular access, central pressure monitoring and need for vasopressors  Preanesthetic Checklist: patient identified, risks and benefits discussed, anesthesia consent, site marked, patient being monitored and timeout performed    Timeout Time: 07:45       Pre-procedure: All elements of maximal sterile barrier technique followed?  Yes    Maximal barrier precautions followed, 2% Chlorhexidine for cutaneous antisepsis, Hand hygiene performed prior to catheter insertion and Ultrasound guidance    Sterile Ultrasound Technique followed?: Yes          Procedure:   Prep:  Chlorhexidine and alcohol  Location:  Internal jugular  Orientation:  Right  Patient position:  Trendelenburg  Catheter type:  Quad lumen      Number of attempts:  1  Successful placement: Yes      Assessment:   Post-procedure:  Catheter secured and sterile dressing with CHG applied  Assessment:  Blood return through all ports, free fluid flow and guidewire removal verified  Insertion:  Uncomplicated  Patient tolerance:  Patient tolerated the procedure well with no immediate complications

## 2017-09-07 NOTE — PROGRESS NOTES
TRANSFER - IN REPORT:    Verbal report received from [facility] (name) on Cesario Canal  being received from OR(unit) for routine post - op      Report consisted of patients Situation, Background, Assessment and   Recommendations(SBAR). Information from the following report(s) SBAR, Kardex, OR Summary, Procedure Summary, Intake/Output, MAR, Recent Results, Med Rec Status and Cardiac Rhythm SR was reviewed with the receiving nurse. Opportunity for questions and clarification was provided. Assessment completed upon patients arrival to unit and care assumed.

## 2017-09-07 NOTE — ANESTHESIA PROCEDURE NOTES
Central Line Placement    Start time: 9/7/2017 8:06 AM  End time: 9/7/2017 8:17 AM  Performed by: Govind Mora  Authorized by: Govind Mora     Indications: central pressure monitoring and vascular access  Preanesthetic Checklist: patient identified, risks and benefits discussed, anesthesia consent, site marked, patient being monitored and timeout performed    Timeout Time: 08:06       Pre-procedure: All elements of maximal sterile barrier technique followed?  Yes    Maximal barrier precautions followed, 2% Chlorhexidine for cutaneous antisepsis, Hand hygiene performed prior to catheter insertion and Ultrasound guidance    Sterile Ultrasound Technique followed?: Yes          Procedure:   Prep:  ChloraPrep  Location:  Internal jugular  Orientation:  Right  Patient position:  Trendelenburg  Catheter type:  Quad lumen  Catheter size:  8.5 Fr    Number of attempts:  1  Successful placement: Yes      Assessment:   Post-procedure:  Catheter secured, sterile dressing applied and sterile dressing with CHG applied  Assessment:  Blood return through all ports, free fluid flow and guidewire removal verified  Insertion:  Uncomplicated  Patient tolerance:  Patient tolerated the procedure well with no immediate complications  All needles out intact, procedure tolerated well without problems

## 2017-09-07 NOTE — ANESTHESIA PROCEDURE NOTES
Arterial Line Placement    Start time: 9/7/2017 6:45 AM  End time: 9/7/2017 7:00 AM  Performed by: Sree Sham by: Govind Mora     Pre-Procedure  Indications:  Arterial pressure monitoring and blood sampling  Preanesthetic Checklist: patient identified, risks and benefits discussed, anesthesia consent, site marked, patient being monitored, timeout performed and patient being monitored    Timeout Time: 06:45        Procedure:   Prep:  Alcohol and ChloraPrep  Seldinger Technique?: No    Orientation:  Left  Location:  Radial artery  Catheter size:  20 G  Number of attempts:  1  Cont Cardiac Output Sensor: No      Assessment:   Post-procedure:  Sterile dressing applied  Patient Tolerance:  Patient tolerated the procedure well with no immediate complications

## 2017-09-07 NOTE — PROGRESS NOTES
[address], [address] [place] [address]. [address] FAX: [phone]         VASCULAR SURGERY FLOOR PROGRESS NOTE    Admit Date: 2017  POD: Day of Surgery    Procedure:  Procedure(s):  AORTA BI FEMORAL BYPASS/LEFT RENAL  ARTERY BYPASS  CELL SAVER    Subjective:     Patient has no new complaints. Objective:     Vitals:  Blood pressure 120/55, pulse 70, temperature 97.8 °F (36.6 °C), resp. rate 18, height 5' 6\" (1.676 m), weight 204 lb 3 oz (92.6 kg), SpO2 98 %. Temp (24hrs), Av.8 °F (36.6 °C), Min:97.8 °F (36.6 °C), Max:97.8 °F (36.6 °C)      Intake / Output:  No intake or output data in the 24 hours ending 17 0708    Physical Exam:    Constitutional: he appears well-developed. No distress. HENT:   Head: Atraumatic. Eyes: Pupils are equal, round, and reactive to light. Neck: Normal range of motion. Cardiovascular: Regular rhythm. Pulmonary/Chest: Effort normal and breath sounds normal. No respiratory distress. Abdominal: Soft. Bowel sounds are normal. he exhibits no distension. There is no tenderness. There is no guarding. No hernia. Musculoskeletal: Normal range of motion. Neurological: He is alert. CN II- XII grossly intact  Vascular: Weakly palpable femoral pulses    Labs: No results for input(s): HGB, WBC, K, GLU, HGBEXT in the last 72 hours. 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 We will plan for aortobifem had a long discussion with patient and family in detail about the surgery.   Discussed the risks and benefits of the procedure including worsening kidney function possibly dialysis, infection, loss of blood loss approximately 5-7% chance 30 day mortality. We had also long discussion about alternatives being an axillary bifemoral with the patency of 5 years being about 50-60%, and a decrease risks of renal failure and cardiac failure. We decided to electively undergo aortobifem patient been marked and consented. Elements of this note have been dictated using speech recognition software. As a result, errors of speech recognition may have occurred.

## 2017-09-07 NOTE — PROGRESS NOTES
's Pre-op visit requested by patient. Conveyed care and concern for patient and family. Offered prayer as requested for patient, family, and staff.     Ben Arrington MDiv, BS  Board Certified

## 2017-09-07 NOTE — IP AVS SNAPSHOT
[address] 
[phone] Patient: Ethan Glover MRN: [id number] Current Discharge Medication List  
  
START taking these medications Dose & Instructions Dispensing Information Comments Morning Noon Evening Bedtime  
 amLODIPine 5 mg tablet Commonly known as:  Chetan Alfaro Your next dose is:  Tomorrow Dose:  5 mg Take 1 Tab by mouth daily for 30 days. Quantity:  30 Tab Refills:  0  
     
  
   
   
   
  
 fluconazole 200 mg tablet Commonly known as:  DIFLUCAN Your next dose is:  Tomorrow Dose:  200 mg Take 1 Tab by mouth daily for 14 days. FDA advises cautious prescribing of oral fluconazole in pregnancy. Quantity:  14 Tab Refills:  0  
     
  
   
   
   
  
 oxyCODONE-acetaminophen 5-325 mg per tablet Commonly known as:  PERCOCET Dose:  1 Tab Take 1 Tab by mouth every four (4) hours as needed for Pain. Max Daily Amount: 6 Tabs. Quantity:  40 Tab Refills:  0  
     
   
   
   
  
 trimethoprim-sulfamethoxazole 160-800 mg per tablet Commonly known as:  BACTRIM DS, SEPTRA DS Your next dose is: Today Dose:  1 Tab Take 1 Tab by mouth every twelve (12) hours for 14 days. Quantity:  28 Tab Refills:  0 CONTINUE these medications which have CHANGED Dose & Instructions Dispensing Information Comments Morning Noon Evening Bedtime  
 carvedilol 25 mg tablet Commonly known as:  Jules Ferro What changed:   
- medication strength 
- how much to take Your next dose is: Today Dose:  25 mg Take 1 Tab by mouth two (2) times daily (with meals) for 30 days. Quantity:  60 Tab Refills:  0  
     
   
   
  
   
  
 nitroglycerin 0.4 mg SL tablet Commonly known as:  NITROSTAT What changed:  reasons to take this  Dose:  0.4 mg  
 1 Tab by SubLINGual route every five (5) minutes as needed for Chest Pain. Quantity:  2 Bottle Refills:  4 CONTINUE these medications which have NOT CHANGED Dose & Instructions Dispensing Information Comments Morning Noon Evening Bedtime  
 aspirin delayed-release 81 mg tablet Your next dose is:  Tomorrow Dose:  81 mg Take 81 mg by mouth every morning. Refills:  0  
     
  
   
   
   
  
 pravastatin 20 mg tablet Commonly known as:  PRAVACHOL Your next dose is: Today Dose:  20 mg Take 1 Tab by mouth nightly. Indications: hyperlipidemia Quantity:  30 Tab Refills:  5 STOOL SOFTENER 100 mg capsule Generic drug:  docusate sodium Dose:  100 mg Take 100 mg by mouth as needed for Constipation. Refills:  0  
     
   
   
   
  
 ZANTAC 150 mg tablet Generic drug:  raNITIdine Dose:  150 mg Take 150 mg by mouth as needed for Indigestion. Refills:  0 STOP taking these medications HYDROcodone-acetaminophen  mg tablet Commonly known as:  Cindy President Where to Get Your Medications Information on where to get these meds will be given to you by the nurse or doctor. ! Ask your nurse or doctor about these medications  
  amLODIPine 5 mg tablet  
 carvedilol 25 mg tablet  
 fluconazole 200 mg tablet  
 oxyCODONE-acetaminophen 5-325 mg per tablet  
 trimethoprim-sulfamethoxazole 160-800 mg per tablet

## 2017-09-07 NOTE — BRIEF OP NOTE
[address]   [address] FAX: [phone]    Brief Op Note Template Note    Pre-Op Diagnosis: Aortic occlusion ([address] [I74.10]    Post-Op Diagnosis:  Aortic occlusion ([address] [I74.10]    Procedures: Procedure(s):  AORTA BI FEMORAL BYPASS/LEFT RENAL  ARTERY BYPASS  CELL SAVER    Surgeon: Nelly Bernstein MD    Assistants: Surgeon(s):  Monia Sima, MD Olam Buerger, MD      Anesthesia:  General     Findings: Juxtarenal aortic occlusion, left renal vein ligated proximally to the adrenal renal vein, aortic  transected endarterectomize in the end anastomosis using a 14 x 7 bifurcated graft down to the common femorals Doppler signals and pedal postprocedure    Tourniquet Time:  * No tourniquets in log *    Estimated Blood Loss:   700         Specimens:            Implants:    Implant Name Type Inv. Item Serial No.  Lot No. LRB No. Used Action   GRAFT VASC BIFUR [id number] -- Manjinder Villalobos   GRAFT VASC BIFUR [id number] -- Sudha Helms [id number] [facility] [place] [facility] [id number] N/A 1 Implanted       Complications: None               Signed: Nelly Bernstein MD      Elements of this note have been dictated using speech recognition software. As a result, errors of speech recognition may have occurred.

## 2017-09-07 NOTE — PROGRESS NOTES
Dual skin assessment completed - no skin breakdown. Incisions with dressings c/d/i except where borders marked on dressings.

## 2017-09-07 NOTE — ROUTINE PROCESS
TRANSFER - OUT REPORT:    Verbal report given to Rosi(name) on Brandy Sommers  being transferred to [facility](routine progression of care       Report consisted of patients Situation, Background, Assessment and   Recommendations(SBAR). Information from the following report(s) OR Summary was reviewed with the receiving nurse. Lines:   Quad Lumen 09/07/17 Right Internal jugular (Active)       Peripheral IV 06/22/17 Left Hand (Active)       Peripheral IV 09/07/17 Left; Lower Arm (Active)   Site Assessment Clean, dry, & intact 9/7/2017  6:11 AM   Phlebitis Assessment 0 9/7/2017  6:11 AM   Infiltration Assessment 0 9/7/2017  6:11 AM   Dressing Status Clean, dry, & intact 9/7/2017  6:11 AM   Dressing Type Tape;Transparent 9/7/2017  6:11 AM   Hub Color/Line Status Infusing 9/7/2017  6:11 AM       Arterial Line 09/07/17 Left Radial artery (Active)        Opportunity for questions and clarification was provided.       Patient transported with:   15 liters O2,monitor,RN,CRNA

## 2017-09-07 NOTE — ANESTHESIA POSTPROCEDURE EVALUATION
Post-Anesthesia Evaluation and Assessment    Patient: Victoria Quesdaa MRN: [id number]  SSN: [id number]    YOB: 1960  Age: 61 y.o. Sex: male       Cardiovascular Function/Vital Signs  Visit Vitals    /41    Pulse 84    Temp 36.6 °C (97.9 °F)    Resp 29    Ht 5' 6\" (1.676 m)    Wt 92.6 kg (204 lb 3 oz)    SpO2 99%    BMI 32.96 kg/m2       Patient is status post general anesthesia for Procedure(s):  AORTA BI FEMORAL BYPASS/LEFT RENAL  ARTERY BYPASS  CELL SAVER. Nausea/Vomiting: Mild    Postoperative hydration reviewed and adequate. Pain:  Pain Scale 1: Visual (09/07/17 1600)  Pain Intensity 1: 0 (09/07/17 1600)   Managed    Neurological Status:   Neuro (WDL): Within Defined Limits (hx of mini strokes several years ago) (09/07/17 0533)  Neuro  Neurologic State: Drowsy; Eyes open to voice (09/07/17 1503)  Orientation Level: Oriented to person;Oriented to place;Oriented to situation;Disoriented to time (09/07/17 1503)  Cognition: Decreased attention/concentration; Follows commands;Poor safety awareness; Impaired decision making (09/07/17 1503)  Speech: Clear;Delayed responses (09/07/17 1503)  Assessment L Pupil: Round;Brisk (09/07/17 1503)  Size L Pupil (mm): 2 (09/07/17 1503)  Assessment R Pupil: Brisk;Round (09/07/17 1503)  Size R Pupil (mm): 2 (09/07/17 1503)  LUE Motor Response: Weak;Purposeful (09/07/17 1503)  LLE Motor Response: Weak;Purposeful (09/07/17 1503)  RUE Motor Response: Weak;Purposeful (09/07/17 1503)  RLE Motor Response: Weak;Purposeful (09/07/17 1503)   At baseline    Mental Status and Level of Consciousness: Arousable    Pulmonary Status:   O2 Device: Nasal cannula (09/07/17 1515)   Adequate oxygenation and airway patent    Complications related to anesthesia: None    Post-anesthesia assessment completed. No concerns. Good pain control with epidural ...complaining of right arm pain ?position during case.     Signed By: Tylor Aviles MD     September 7, 2017

## 2017-09-07 NOTE — ANESTHESIA PROCEDURE NOTES
Epidural Block    Start time: 9/7/2017 7:29 AM  End time: 9/7/2017 7:41 AM  Performed by: Janet Shoemaker by: Manan Holliday     Pre-Procedure  Indication: at surgeon's request and post-op pain management    Preanesthetic Checklist: patient identified, risks and benefits discussed, anesthesia consent, patient being monitored, timeout performed and anesthesia consent    Timeout Time: 07:29        Epidural:   Patient position:  Seated  Prep region:  Lumbar  Prep: Patient draped and Chlorhexidine    Location:  L2-3    Needle and Epidural Catheter:   Needle Type:  Tuohy  Needle Gauge:  17 G  Injection Technique:  Loss of resistance using air  Attempts:  1  Catheter Size:  19 G  Events: no blood with aspiration, no cerebrospinal fluid with aspiration, no paresthesia and negative aspiration test    Test Dose:  Lidocaine 1.5% w/ epi and negative    Assessment:   Catheter Secured:  Tegaderm and tape  Insertion:  Uncomplicated  Patient tolerance:  Patient tolerated the procedure well with no immediate complications  All needles out intact, procedure tolerated well without problems

## 2017-09-07 NOTE — IP AVS SNAPSHOT
Andrew Shed 
 
 
 [facility] [address] [facility] 
[phone] Patient: Ethan Glover MRN: [id number] You are allergic to the following No active allergies Immunizations Administered for This Admission Name Date Influenza Vaccine (Quad) PF  Deferred () TB Skin Test (PPD) Intradermal  Deferred (), 9/7/2017 Recent Documentation Height Weight BMI Smoking Status 1.676 m 86.4 kg 30.75 kg/m2 Former Smoker Unresulted Labs Order Current Status PLEASE READ & DOCUMENT PPD TEST IN 72 HRS Preliminary result Emergency Contacts Name Discharge Info Relation Home Work Mobile Sagrario Bueno  Daughter [21]   [phone] About your hospitalization You were admitted on:  September 7, 2017 You last received care in the:  [facility] 2 CV STEPDOWN You were discharged on:  September 29, 2017 Unit phone number:  [phone] Why you were hospitalized 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 Follow-up Information Follow up With Details Comments Contact Info [address]  someone with home care will contact you within 48 hours of discharge to arrange appointment date and time. [place] [address] [address] [facility] [address] 
[phone] Qasim Johnson MD On 10/10/2017 Tuesday, October 10 @ 9:15 for [facility] [address] [facility] [address] 
[phone] Macrina Bates MD On 10/30/2017 On Monday at 11:15 am [address] [facility] [place] [address] 
[phone] Manulea Juarez MD In 2 weeks For wound re-check [place] [address] [facility] [address] 
[phone] Your Appointments Tuesday October 10, 2017  9:15 AM EDT Global Post Op with Manuela Juarez MD  
[facility]) [address] [place] [id number]  
[phone] Monday October 30, 2017 11:15 AM EDT Office Visit with Macrina Bates MD  
[facility] ([address] [place] [address]) [address] [place] [address] Jair Bowen [address]  
[phone] Current Discharge Medication List  
  
START taking these medications Dose & Instructions Dispensing Information Comments Morning Noon Evening Bedtime  
 amLODIPine 5 mg tablet Commonly known as:  [place] Shiraz Your next dose is:  Tomorrow Dose:  5 mg Take 1 Tab by mouth daily for 30 days. Quantity:  30 Tab Refills:  0  
     
  
   
   
   
  
 fluconazole 200 mg tablet Commonly known as:  DIFLUCAN Your next dose is:  Tomorrow Dose:  200 mg Take 1 Tab by mouth daily for 14 days. FDA advises cautious prescribing of oral fluconazole in pregnancy. Quantity:  14 Tab Refills:  0  
     
  
   
   
   
  
 oxyCODONE-acetaminophen 5-325 mg per tablet Commonly known as:  PERCOCET Dose:  1 Tab Take 1 Tab by mouth every four (4) hours as needed for Pain. Max Daily Amount: 6 Tabs. Quantity:  40 Tab Refills:  0  
     
   
   
   
  
 trimethoprim-sulfamethoxazole 160-800 mg per tablet Commonly known as:  BACTRIM DS, SEPTRA DS Your next dose is: Today Dose:  1 Tab Take 1 Tab by mouth every twelve (12) hours for 14 days. Quantity:  28 Tab Refills:  0 CONTINUE these medications which have CHANGED Dose & Instructions Dispensing Information Comments Morning Noon Evening Bedtime  
 carvedilol 25 mg tablet Commonly known as:  Sirena Laura What changed:   
- medication strength 
- how much to take Your next dose is: Today Dose:  25 mg Take 1 Tab by mouth two (2) times daily (with meals) for 30 days. Quantity:  60 Tab Refills:  0  
     
   
   
  
   
  
 nitroglycerin 0.4 mg SL tablet Commonly known as:  NITROSTAT What changed:  reasons to take this Dose:  0.4 mg  
1 Tab by SubLINGual route every five (5) minutes as needed for Chest Pain. Quantity:  2 Bottle Refills:  4 CONTINUE these medications which have NOT CHANGED Dose & Instructions Dispensing Information Comments Morning Noon Evening Bedtime  
 aspirin delayed-release 81 mg tablet Your next dose is:  Tomorrow Dose:  81 mg Take 81 mg by mouth every morning. Refills:  0  
     
  
   
   
   
  
 pravastatin 20 mg tablet Commonly known as:  PRAVACHOL Your next dose is: Today Dose:  20 mg Take 1 Tab by mouth nightly. Indications: hyperlipidemia Quantity:  30 Tab Refills:  5 STOOL SOFTENER 100 mg capsule Generic drug:  docusate sodium Dose:  100 mg Take 100 mg by mouth as needed for Constipation. Refills:  0  
     
   
   
   
  
 ZANTAC 150 mg tablet Generic drug:  raNITIdine Dose:  150 mg Take 150 mg by mouth as needed for Indigestion. Refills:  0 STOP taking these medications HYDROcodone-acetaminophen  mg tablet Commonly known as:  Jacob Bennett Where to Get Your Medications Information on where to get these meds will be given to you by the nurse or doctor. ! Ask your nurse or doctor about these medications  
  amLODIPine 5 mg tablet  
 carvedilol 25 mg tablet  
 fluconazole 200 mg tablet  
 oxyCODONE-acetaminophen 5-325 mg per tablet trimethoprim-sulfamethoxazole 160-800 mg per tablet Discharge Instructions DISCHARGE SUMMARY from Nurse The following personal items are in your possession at time of discharge: 
 
Dental Appliances: None Visual Aid: None PATIENT INSTRUCTIONS: 
 
 
F-face looks uneven A-arms unable to move or move unevenly S-speech slurred or non-existent T-time-call 911 as soon as signs and symptoms begin-DO NOT go Back to bed or wait to see if you get better-TIME IS BRAIN. Warning Signs of HEART ATTACK Call 911 if you have these symptoms: 
? Chest discomfort. Most heart attacks involve discomfort in the center of the chest that lasts more than a few minutes, or that goes away and comes back. It can feel like uncomfortable pressure, squeezing, fullness, or pain. ? Discomfort in other areas of the upper body. Symptoms can include pain or discomfort in one or both arms, the back, neck, jaw, or stomach. ? Shortness of breath with or without chest discomfort. ? Other signs may include breaking out in a cold sweat, nausea, or lightheadedness. Don't wait more than five minutes to call [address]! Fast action can save your life. Calling 911 is almost always the fastest way to get lifesaving treatment. Emergency Medical Services staff can begin treatment when they arrive  up to an hour sooner than if someone gets to the hospital by car. The discharge information has been reviewed with the patient. The patient verbalized understanding. Discharge medications reviewed with the patient and appropriate educational materials and side effects teaching were provided. Discharge Instructions Attachments/References AORTOBIFEMORAL BYPASS: POST-OP (ENGLISH) FEMORAL ENDARTERECTOMY: POST-OP (ENGLISH) HEALTHY DIET: HEART (ENGLISH) HEART ATTACK: MEDICINE TO REDUCE RISK (ENGLISH) SECONDHAND SMOKE (ENGLISH) SMOKING: STOPPING (ENGLISH) WOUND: VAC (VACUUM-ASSISTED CLOSURE) (ENGLISH) Discharge Orders None MyChart Announcement We are excited to announce that we are making your provider's discharge notes available to you in [facility]. You will see these notes when they are completed and signed by the physician that discharged you from your recent hospital stay. If you have any questions or concerns about any information you see in [employer], please call the Health Information Department where you were seen or reach out to your Primary Care Provider for more information about your plan of care. Introducing [facility]! Slick Limon introduces [facility] patient portal. Now you can access parts of your medical record, email your doctor's office, and request medication refills online. 1. In your internet browser, go to [email or web address] 2. Click on the First Time User? Click Here link in the Sign In box. You will see the New Member Sign Up page. 3. Enter your [facility] Access Code exactly as it appears below. You will not need to use this code after youve completed the sign-up process. If you do not sign up before the expiration date, you must request a new code. · [facility] Access Code: [email or web address] Expires: 12/10/2017 11:54 AM 
 
4. Enter the last four digits of your Social Security Number (xxxx) and Date of Birth (mm/dd/yyyy) as indicated and click Submit. You will be taken to the next sign-up page. 5. Create a SMARTECH MFGt ID. This will be your [facility] login ID and cannot be changed, so think of one that is secure and easy to remember. 6. Create a [facility] password. You can change your password at any time. 7. Enter your Password Reset Question and Answer. This can be used at a later time if you forget your password. 8. Enter your e-mail address.  You will receive e-mail notification when new information is available in [facility]. 9. Click Sign Up. You can now view and download portions of your medical record. 10. Click the Download Summary menu link to download a portable copy of your medical information. If you have questions, please visit the Frequently Asked Questions section of the [facility] website. Remember, [facility] is NOT to be used for urgent needs. For medical emergencies, dial 911. Now available from your iPhone and Android! General Information Please provide this summary of care documentation to your next provider. Patient Signature:  ____________________________________________________________ Date:  ____________________________________________________________  
  
Earnstine Mark Provider Signature:  ____________________________________________________________ Date:  ____________________________________________________________ More Information Aortobifemoral Bypass: What to Expect at [facility] Your Recovery An aortobifemoral bypass is surgery to redirect blood around narrowed or blocked blood vessels in your belly or groin. The surgery is done to increase blood flow to the legs. This may allow you to walk farther. The doctor used a man-made blood vessel, called a graft, to bypass the narrowed or blocked blood vessels. The graft will carry blood from the aorta to the femoral artery in each thigh. The aorta is the large blood vessel that carries blood from the heart to the blood vessels in the belly. The femoral arteries are large blood vessels that carry blood from the blood vessels in the belly to the legs. You can expect your belly and groin to be sore for several weeks. You will probably feel more tired than usual for several weeks after surgery. You may be able to do many of your usual activities after 4 to 6 weeks.  But you will probably need 2 to 3 months to fully recover, especially if you usually do a lot of physical activities. This care sheet gives you a general idea about how long it will take for you to recover. But each person recovers at a different pace. Follow the steps below to feel better as quickly as possible. How can you care for yourself at home? Activity · Rest when you feel tired. Getting enough sleep will help you recover. · Try to walk each day. Start by walking a little more than you did the day before. Bit by bit, increase the amount you walk. Walking boosts blood flow and helps prevent pneumonia and constipation. · Avoid strenuous activities, such as bicycle riding, jogging, weight lifting, or aerobic exercise, for 6 weeks or until your doctor says it is okay. · For 6 weeks, avoid lifting anything that would make you strain. This may include a child, heavy grocery bags and milk containers, a heavy briefcase or backpack, cat litter or dog food bags, or a vacuum . · Hold a pillow over your belly incision when you cough or take deep breaths. This will support your belly and decrease your pain. · Do breathing exercises at home as instructed by your doctor. This will help prevent pneumonia. · Ask your doctor when you can drive again. · You will probably need to take at least 4 to 6 weeks off from work. It depends on the type of work you do and how you feel. · You may shower as usual. Pat the incisions dry. Do not take a bath for the first 2 weeks, or until your doctor tells you it is okay. · Ask your doctor when it is okay for you to have sex. Diet · You can eat your normal diet. If your stomach is upset, try bland, low-fat foods like plain rice, broiled chicken, toast, and yogurt. · Drink plenty of fluids (unless your doctor tells you not to). · You may notice that your bowel movements are not regular right after your surgery. This is common.  Try to avoid constipation and straining with bowel movements. You may want to take a fiber supplement every day. If you have not had a bowel movement after a couple of days, ask your doctor about taking a mild laxative. Medicines · Your doctor will tell you if and when you can restart your medicines. He or she will also give you instructions about taking any new medicines. · If you take blood thinners, such as warfarin (Coumadin), clopidogrel (Plavix), or aspirin, be sure to talk to your doctor. He or she will tell you if and when to start taking those medicines again. Make sure that you understand exactly what your doctor wants you to do. · Be safe with medicines. Take your medicines exactly as prescribed. Call your doctor if you think you are having a problem with your medicine. · Take pain medicines exactly as directed. ¨ If the doctor gave you a prescription medicine for pain, take it as prescribed. ¨ If you are not taking a prescription pain medicine, ask your doctor if you can take an over-the-counter medicine. · If you think your pain medicine is making you sick to your stomach: 
¨ Take your medicine after meals (unless your doctor has told you not to). ¨ Ask your doctor for a different pain medicine. · If your doctor prescribed antibiotics, take them as directed. Do not stop taking them just because you feel better. You need to take the full course of antibiotics. · Your doctor may prescribe a blood thinner when you go home. This helps prevent blood clots. Be sure you get instructions about how to take your medicine safely. Blood thinners can cause serious bleeding problems. Incision care · If you have strips of tape on the incisions, leave the tape on for a week or until it falls off. · Wash the area daily with warm, soapy water, and pat it dry. Don't use hydrogen peroxide or alcohol, which can slow healing. You may cover the area with a gauze bandage if it weeps or rubs against clothing. Change the bandage every day. · Keep the area clean and dry. Follow-up care is a key part of your treatment and safety. Be sure to make and go to all appointments, and call your doctor if you are having problems. It's also a good idea to know your test results and keep a list of the medicines you take. When should you call for help? Call 911 anytime you think you may need emergency care. For example, call if: 
· You passed out (lost consciousness). · You have severe trouble breathing. · You have sudden chest pain and shortness of breath, or you cough up blood. · You have severe pain in your belly. · You have chest pain or pressure. This may occur with: ¨ Sweating. ¨ Shortness of breath. ¨ Nausea or vomiting. ¨ Pain that spreads from the chest to the neck, jaw, or one or both shoulders or arms. ¨ Dizziness or lightheadedness. ¨ A fast or uneven pulse. After calling 911, chew 1 adult-strength aspirin. Wait for an ambulance. Do not try to drive yourself. Call your doctor now or seek immediate medical care if: 
· You have severe pain in your leg, or it becomes cold, pale, blue, tingly, or numb. · You are sick to your stomach or cannot keep fluids down. · You have pain that does not get better after you take pain medicine. · You have a fever over 100°F. 
· You have loose stitches, or your incisions come open. · Bright red blood has soaked through the bandage over any of your incisions. · You have signs of infection, such as: 
¨ Increased pain, swelling, warmth, or redness. ¨ Red streaks leading from the incision. ¨ Pus draining from the incision. ¨ Swollen lymph nodes in your neck, armpits, or groin. ¨ A fever. · You have signs of a blood clot, such as: 
¨ Pain in your calf, back of the knee, thigh, or groin. ¨ Redness and swelling in your leg or groin. Watch closely for any changes in your health, and be sure to contact your doctor if: 
· You do not have a bowel movement after taking a laxative. Where can you learn more? Go to http://jamarcus-luis.info/. Enter H610 in the search box to learn more about \"Aortobifemoral Bypass: What to Expect at Home. \" Current as of: March 20, 2017 Content Version: 11.3 © 0341-1017 [employer]. Care instructions adapted under license by [employer] (which disclaims liability or warranty for this information). If you have questions about a medical condition or this instruction, always ask your healthcare professional. [facility] any warranty or liability for your use of this information. Femoral Endarterectomy: What to Expect at [facility] Your Recovery You will have some pain from the cut (incision) the doctor made. This usually gets better after a couple of days. Your doctor will give you pain medicine for this. Your leg may be swollen at first. This may last 2 to 3 months. You will have stitches or staples in the incision. If you have stitches, they may dissolve on their own. Or your doctor may take them out 7 to 14 days after your surgery. After surgery, blood may flow better throughout your leg, which can decrease leg pain, numbness, and cramping. You may be able to walk longer distances without leg pain. This care sheet gives you a general idea about how long it will take for you to recover. But each person recovers at a different pace. Follow the steps below to get better as quickly as possible. How can you care for yourself at home? Activity · Rest when you feel tired. Getting enough sleep will help you recover. · Try to walk every day or as often as your doctor tells you. Start by walking a little more than you did the day before. Bit by bit, increase the amount you walk. Walking boosts blood flow and helps prevent pneumonia and constipation. · Avoid strenuous activities, such as bicycle riding, jogging, weight lifting, or aerobic exercise, until your doctor says it is okay. · Ask your doctor when you can drive again. · You will probably need to take off 1 to 4 weeks from work. It depends on the type of work you do and how you feel. · You may shower as usual. Do not take a bath for the first 2 weeks, or until your doctor tells you it is okay. Diet · You can eat your normal diet. If your stomach is upset, try bland, low-fat foods like plain rice, broiled chicken, toast, and yogurt. · Drink plenty of fluids (unless your doctor tells you not to). · You may notice that your bowel movements are not regular right after your surgery. This is common. You may want to take a fiber supplement every day. If you have not had a bowel movement after a couple of days, ask your doctor about taking a mild laxative. Medicines · Your doctor will tell you if and when you can restart your medicines. He or she will also give you instructions about taking any new medicines. · If you take blood thinners, such as warfarin (Coumadin), clopidogrel (Plavix), or aspirin, be sure to talk to your doctor. He or she will tell you if and when to start taking those medicines again. Make sure that you understand exactly what your doctor wants you to do. · Be safe with medicines. Take your medicines exactly as prescribed. Call your doctor if you think you are having a problem with your medicine. · Take pain medicines exactly as directed. ¨ If the doctor gave you a prescription medicine for pain, take it as prescribed. ¨ If you are not taking a prescription pain medicine, ask your doctor if you can take an over-the-counter medicine. · If you think your pain medicine is making you sick to your stomach: 
¨ Take your medicine after meals (unless your doctor has told you not to). ¨ Ask your doctor for a different pain medicine. · If your doctor prescribed antibiotics, take them as directed. Do not stop taking them just because you feel better. You need to take the full course of antibiotics. · Your doctor may prescribe a blood thinner when you go home. This helps prevent blood clots. Be sure you get instructions about how to take your medicine safely. Blood thinners can cause serious bleeding problems. Incision care · If you have strips of tape on the cut (incision) the doctor made, leave the tape on for a week or until it falls off. Or follow your doctor's instructions for removing the tape. · Wash the area daily with warm, soapy water, and pat it dry. Don't use hydrogen peroxide or alcohol, which can slow healing. You may cover the area with a gauze bandage if it weeps or rubs against clothing. Change the bandage every day. · Keep the area clean and dry. Follow-up care is a key part of your treatment and safety. Be sure to make and go to all appointments, and call your doctor if you are having problems. It's also a good idea to know your test results and keep a list of the medicines you take. When should you call for help? Call 911 anytime you think you may need emergency care. For example, call if: 
· You passed out (lost consciousness). · You have severe trouble breathing. · You have sudden chest pain and shortness of breath, or you cough up blood. · You have symptoms of a heart attack. These may include: ¨ Chest pain or pressure, or a strange feeling in the chest. 
¨ Sweating. ¨ Shortness of breath. ¨ Nausea or vomiting. ¨ Pain, pressure, or a strange feeling in the back, neck, jaw, or upper belly or in one or both shoulders or arms. ¨ Lightheadedness or sudden weakness. ¨ A fast or irregular heartbeat. After you call 911, the  may tell you to chew 1 adult-strength or 2 to 4 low-dose aspirin. Wait for an ambulance. Do not try to drive yourself. Call your doctor now or seek immediate medical care if: 
· You have severe pain in your leg, or it becomes cold, pale, blue, tingly, or numb. · You have pain that does not get better after you take pain medicine. · You have loose stitches, or your incision comes open. · You are bleeding a lot from the incision. · You have signs of infection, such as: 
¨ Increased pain, swelling, warmth, or redness. ¨ Red streaks leading from the incision. ¨ Pus draining from the incision. ¨ A fever. · You are sick to your stomach or cannot keep fluids down. Watch closely for any changes in your health, and be sure to contact your doctor if: 
· You are not getting better as expected. Where can you learn more? Go to http://jamarcus-luis.info/. Enter P886 in the search box to learn more about \"Femoral Endarterectomy: What to Expect at Home. \" Current as of: March 20, 2017 Content Version: 11.3 © 9355-3062 [employer]. Care instructions adapted under license by [employer] (which disclaims liability or warranty for this information). If you have questions about a medical condition or this instruction, always ask your healthcare professional. [place] [address] any warranty or liability for your use of this information. Heart-Healthy Diet: Care Instructions Your Care Instructions A heart-healthy diet has lots of vegetables, fruits, nuts, beans, and whole grains, and is low in salt. It limits foods that are high in saturated fat, such as meats, cheeses, and fried foods. It may be hard to change your diet, but even small changes can lower your risk of heart attack and heart disease. Follow-up care is a key part of your treatment and safety. Be sure to make and go to all appointments, and call your doctor if you are having problems. It's also a good idea to know your test results and keep a list of the medicines you take. How can you care for yourself at home? Watch your portions · Learn what a serving is. A \"serving\" and a \"portion\" are not always the same thing.  Make sure that you are not eating larger portions than are recommended. For example, a serving of pasta is ½ cup. A serving size of meat is 2 to 3 ounces. A 3-ounce serving is about the size of a deck of cards. Measure serving sizes until you are good at [place]" them. Keep in mind that restaurants often serve portions that are 2 or 3 times the size of one serving. · To keep your energy level up and keep you from feeling hungry, eat often but in smaller portions. · Eat only the number of calories you need to stay at a healthy weight. If you need to lose weight, eat fewer calories than your body burns (through exercise and other physical activity). Eat more fruits and vegetables · Eat a variety of fruit and vegetables every day. Dark green, deep orange, red, or yellow fruits and vegetables are especially good for you. Examples include spinach, carrots, peaches, and berries. · Keep carrots, celery, and other veggies handy for snacks. Buy fruit that is in season and store it where you can see it so that you will be tempted to eat it. · Cook dishes that have a lot of veggies in them, such as stir-fries and soups. Limit saturated and trans fat · Read food labels, and try to avoid saturated and trans fats. They increase your risk of heart disease. Trans fat is found in many processed foods such as cookies and crackers. · Use olive or canola oil when you cook. Try cholesterol-lowering spreads, such as Benecol or Take Control. · Bake, broil, grill, or steam foods instead of frying them. · Choose lean meats instead of high-fat meats such as hot dogs and sausages. Cut off all visible fat when you prepare meat. · Eat fish, skinless poultry, and meat alternatives such as soy products instead of high-fat meats. Soy products, such as tofu, may be especially good for your heart. · Choose low-fat or fat-free milk and dairy products. Eat fish · Eat at least two servings of fish a week.  Certain fish, such as salmon and tuna, contain omega-3 fatty acids, which may help reduce your risk of heart attack. Eat foods high in fiber · Eat a variety of grain products every day. Include whole-grain foods that have lots of fiber and nutrients. Examples of whole-grain foods include oats, whole wheat bread, and brown rice. · Buy whole-grain breads and cereals, instead of white bread or pastries. Limit salt and sodium · Limit how much salt and sodium you eat to help lower your blood pressure. · Taste food before you salt it. Add only a little salt when you think you need it. With time, your taste buds will adjust to less salt. · Eat fewer snack items, fast foods, and other high-salt, processed foods. Check food labels for the amount of sodium in packaged foods. · Choose low-sodium versions of canned goods (such as soups, vegetables, and beans). Limit sugar · Limit drinks and foods with added sugar. These include candy, desserts, and soda pop. Limit alcohol · Limit alcohol to no more than 2 drinks a day for men and 1 drink a day for women. Too much alcohol can cause health problems. When should you call for help? Watch closely for changes in your health, and be sure to contact your doctor if: 
· You would like help planning heart-healthy meals. Where can you learn more? Go to http://jamarcus-luis.info/. Enter V137 in the search box to learn more about \"Heart-Healthy Diet: Care Instructions. \" Current as of: April 3, 2017 Content Version: 11.3 © 4421-0708 [employer]. Care instructions adapted under license by [employer] (which disclaims liability or warranty for this information). If you have questions about a medical condition or this instruction, always ask your healthcare professional. [place] [address] any warranty or liability for your use of this information. Reducing Heart Attack Risk With Daily Medicine: Care Instructions Your Care Instructions Heart disease is the number one cause of death. If you are at risk for heart disease, there are many medicines that can reduce your risk. These include: · ACE inhibitors. These are a type of blood pressure medicine. They can reduce the risk of heart attacks and strokes if you are at high risk. · Statin medicines. These lower cholesterol. They can also reduce the risk of heart disease and strokes. · Aspirin. It can help certain people lower their risk of a heart attack or stroke. · Beta-blocker medicines. These are a type of blood pressure and heart medicine. They can reduce the chance of early death if you have had a heart attack. All medicines can cause side effects. So it is important to understand the pros and cons of any medicine you take. It is also important to take your medicines exactly as your doctor tells you to. Follow-up care is a key part of your treatment and safety. Be sure to make and go to all appointments, and call your doctor if you are having problems. It's also a good idea to know your test results and keep a list of the medicines you take. ACE inhibitors ACE (angiotensin-converting enzyme) inhibitors are used for three main reasons. They lower blood pressure, protect the kidneys, and prevent heart attacks and strokes. Examples include benazepril (Lotensin), lisinopril (Prinivil, Zestril), and ramipril (Altace). Before you start taking an ACE inhibitor, make sure your doctor knows if: 
· You are taking a water pill (diuretic). · You are taking potassium pills or using salt substitutes. · You are pregnant or breastfeeding. · You have had a kidney transplant or other kidney problems. ACE inhibitors can cause side effects. Call your doctor right away if you have: · Trouble breathing. · Swelling in your face, head, neck, or tongue. · Dizziness or lightheadedness. · A dry cough. Statins Statins lower cholesterol.  Examples include atorvastatin (Lipitor), lovastatin (Mevacor), pravastatin (Pravachol), and simvastatin (Zocor). Before you start taking a statin, make sure your doctor knows if: 
· You have had a kidney transplant or other kidney problems. · You have liver disease. · You take any other prescription medicine, over-the-counter medicine, vitamins, supplements, or herbal remedies. · You are pregnant or breastfeeding. Statins can cause side effects. Call your doctor right away if you have: · New, severe muscle aches. · Brown urine. Aspirin Taking an aspirin every day can lower your risk for a heart attack. A heart attack occurs when a blood vessel in the heart gets blocked. When this happens, oxygen can't get to the heart muscle, and part of the heart dies. Aspirin can help prevent blood clots that can block the blood vessels. Talk to your doctor before you start taking aspirin every day. He or she may recommend that you take one low-dose aspirin (81 mg) tablet each day, with a meal and a full glass of water. Taking aspirin isn't right for everyone, because it can cause serious bleeding. And you may not be able to use aspirin if you: 
· Have asthma. · Have an ulcer or other stomach problem. · Take some other medicine (called a blood thinner) that prevents blood clots. · Are allergic to aspirin. Before having a surgery or procedure, tell your doctor or dentist that you take aspirin. He or she will tell you if you should stop taking aspirin beforehand. Make sure that you understand exactly what your doctor wants you to do. Aspirin can cause side effects. Call your doctor right away if you have: · Unusual bleeding or bruising. · Nausea, vomiting, or heartburn. · Black or bloody stools. Beta-blockers Beta-blockers are used for three main reasons. They lower blood pressure, relieve angina symptoms (such as chest pain or pressure), and reduce the chances of a second heart attack.  They include atenolol (Tenormin), carvedilol (Coreg), and metoprolol (Lopressor). Before you start taking a beta-blocker, make sure your doctor knows if you have: · Severe asthma or frequent asthma attacks. · A very slow pulse (less than 55 beats a minute). Beta-blockers can cause side effects. Call your doctor right away if you have: · Wheezing or trouble breathing. · Dizziness or lightheadedness. · Asthma that gets worse. When should you call for help? Call 911 anytime you think you may need emergency care. For example, call if: 
· You passed out (lost consciousness). Call your doctor now or seek immediate medical care if: 
· You are wheezing or have trouble breathing. · You have swelling in your face, head, neck, or tongue. · You are dizzy or lightheaded, or you feel like you may faint. · You have severe muscle pain, weakness, or brown urine. · You have vision problems. · You have new bruises or blood spots under your skin. · Your stools are black and tarlike or have streaks of blood. Watch closely for changes in your health, and be sure to contact your doctor if: 
· You have ringing in your ears. · You feel very tired. · You have gas, constipation, or an upset stomach. Where can you learn more? Go to http://jamarcus-luis.info/. Enter R428 in the search box to learn more about \"Reducing Heart Attack Risk With Daily Medicine: Care Instructions. \" Current as of: September 21, 2016 Content Version: 11.3 © 4497-6104 [employer]. Care instructions adapted under license by [employer] (which disclaims liability or warranty for this information). If you have questions about a medical condition or this instruction, always ask your healthcare professional. [place] [address] any warranty or liability for your use of this information. Secondhand Smoke: Care Instructions Your Care Instructions Secondhand smoke comes from the burning end of a cigarette, cigar, or pipe and the smoke that a smoker exhales. The smoke contains nicotine and many other harmful chemicals. Breathing secondhand smoke can cause or worsen health problems including cancer, asthma, coronary artery disease, and respiratory infections. It can make your eyes and nose burn and cause a sore throat. Secondhand smoke is especially bad for babies and young children whose lungs are still developing. Babies whose parents smoke are more likely to have ear infections, pneumonia, and bronchitis in the first few years of their lives. Secondhand smoke can make asthma symptoms worse in children. If you are pregnant, it is important that you not smoke and that you avoid secondhand smoke. You are more likely to give birth to a baby who weighs less than expected (low birth weight) if you smoke. And your baby may have a greater risk for sudden infant death syndrome (SIDS). Babies whose mothers are exposed to secondhand smoke during pregnancy have a higher risk for health problems. Follow-up care is a key part of your treatment and safety. Be sure to make and go to all appointments, and call your doctor if you are having problems. It's also a good idea to know your test results and keep a list of the medicines you take. How can you care for yourself at home? · Do not smoke or let anyone else smoke in your home. If people must smoke, ask them to go outside. · If people do smoke in your home, choose a room where you can open a window or use a fan to get the smoke outside. · Do not let anyone smoke in your car. If someone must smoke, pull over in a safe place and let him or her smoke away from the car. · Ask your employer to make sure that you have a smoke-free work area. · Make sure that your children are not exposed to secondhand smoke at day care, school, and after-school programs. · Try to choose nonsmoking bars, restaurants, and other public places when you go out. · Help your family and friends who smoke to quit by encouraging them to try. Tell them about treatment resources. Having support from others often helps. · If you smoke, quit. Quitting is hard, but there are ways to boost your chance of quitting tobacco for good. ¨ Use nicotine gum, patches, or lozenges. Call a quitline. Ask your doctor about stop-smoking programs and medicines. ¨ Keep trying. When should you call for help? Watch closely for changes in your health, and be sure to contact your doctor if you have any problems. Where can you learn more? Go to http://jamarcusezCaterluis.info/. Enter L004 in the search box to learn more about \"Secondhand Smoke: Care Instructions. \" Current as of: March 20, 2017 Content Version: 11.3 © 5535-7280 [employer]. Care instructions adapted under license by [employer] (which disclaims liability or warranty for this information). If you have questions about a medical condition or this instruction, always ask your healthcare professional. [address] any warranty or liability for your use of this information. Stopping Smoking: Care Instructions Your Care Instructions Cigarette smokers crave the nicotine in cigarettes. Giving it up is much harder than simply changing a habit. Your body has to stop craving the nicotine. It is hard to quit, but you can do it. There are many tools that people use to quit smoking. You may find that combining tools works best for you. There are several steps to quitting. First you get ready to quit. Then you get support to help you. After that, you learn new skills and behaviors to become a nonsmoker. For many people, a necessary step is getting and using medicine. Your doctor will help you set up the plan that best meets your needs.  You may want to attend a smoking cessation program to help you quit smoking. When you choose a program, look for one that has proven success. Ask your doctor for ideas. You will greatly increase your chances of success if you take medicine as well as get counseling or join a cessation program. 
Some of the changes you feel when you first quit tobacco are uncomfortable. Your body will miss the nicotine at first, and you may feel short-tempered and grumpy. You may have trouble sleeping or concentrating. Medicine can help you deal with these symptoms. You may struggle with changing your smoking habits and rituals. The last step is the tricky one: Be prepared for the smoking urge to continue for a time. This is a lot to deal with, but keep at it. You will feel better. Follow-up care is a key part of your treatment and safety. Be sure to make and go to all appointments, and call your doctor if you are having problems. Its also a good idea to know your test results and keep a list of the medicines you take. How can you care for yourself at home? · Ask your family, friends, and coworkers for support. You have a better chance of quitting if you have help and support. · Join a support group, such as Nicotine Anonymous, for people who are trying to quit smoking. · Consider signing up for a smoking cessation program, such as the American Lung Association's Freedom from Smoking program. 
· Set a quit date. Pick your date carefully so that it is not right in the middle of a big deadline or stressful time. Once you quit, do not even take a puff. Get rid of all ashtrays and lighters after your last cigarette. Clean your house and your clothes so that they do not smell of smoke. · Learn how to be a nonsmoker. Think about ways you can avoid those things that make you reach for a cigarette. ¨ Avoid situations that put you at greatest risk for smoking. For some people, it is hard to have a drink with friends without smoking.  For others, they might skip a coffee break with coworkers who smoke. ¨ Change your daily routine. Take a different route to work or eat a meal in a different place. · Cut down on stress. Calm yourself or release tension by doing an activity you enjoy, such as reading a book, taking a hot bath, or gardening. · Talk to your doctor or pharmacist about nicotine replacement therapy, which replaces the nicotine in your body. You still get nicotine but you do not use tobacco. Nicotine replacement products help you slowly reduce the amount of nicotine you need. These products come in several forms, many of them available over-the-counter: ¨ Nicotine patches ¨ Nicotine gum and lozenges ¨ Nicotine inhaler · Ask your doctor about bupropion (Wellbutrin) or varenicline (Chantix), which are prescription medicines. They do not contain nicotine. They help you by reducing withdrawal symptoms, such as stress and anxiety. · Some people find hypnosis, acupuncture, and massage helpful for ending the smoking habit. · Eat a healthy diet and get regular exercise. Having healthy habits will help your body move past its craving for nicotine. · Be prepared to keep trying. Most people are not successful the first few times they try to quit. Do not get mad at yourself if you smoke again. Make a list of things you learned and think about when you want to try again, such as next week, next month, or next year. Where can you learn more? Go to http://jamarcus-luis.info/. Enter T966 in the search box to learn more about \"Stopping Smoking: Care Instructions. \" Current as of: March 20, 2017 Content Version: 11.3 © 9204-2782 [employer]. Care instructions adapted under license by [employer] (which disclaims liability or warranty for this information).  If you have questions about a medical condition or this instruction, always ask your healthcare professional. [employer] disclaims any warranty or liability for your use of this information. Vacuum-Assisted Closure for Wound Healing: Care Instructions Your Care Instructions When you have a wound that is hard to close your doctor may treat it with vacuum-assisted closure (VAC). VAC uses negative pressure (suction) to help bring the edges of your wound together. It also removes fluid and dead tissue from the wound area. In VAC: 
· A special piece of foam or cotton gauze fits over your wound. This covers and protects the wound. A clear bandage (film dressing) goes several inches beyond the foam or gauze dressing to create a seal for the vacuum. · A tube connects the foam to a small machine called the therapy unit. The therapy unit creates the suction. · The [facility] system may be carried around (portable) or may stay in one place (stationary). VAC does not hurt. You may feel a mild pulling on the wound when treatment first starts. How long you need VAC depends on the size and type of wound you have and how well the [facility] works. You will be limited in what you can do while the wound heals. You will use VAC 24 hours a day. Follow-up care is a key part of your treatment and safety. Be sure to make and go to all appointments, and call your doctor if you are having problems. It's also a good idea to know your test results and keep a list of the medicines you take. How can you care for yourself at home? · A home health care worker will come to your home a few times a week to change the bandage and check the machine. You may need it changed more often if there is a lot of drainage. · Your doctor will give you information on what you can and can't do. This depends on where your wound is located. Your activities may be limited during the time you're using VAC. · You will be able to take sponge baths. Don't shower or take baths unless your doctor says it is okay. · Take pain medicines exactly as directed. ¨ If the doctor gave you a prescription medicine for pain, take it as prescribed. ¨ If you are not taking a prescription pain medicine, ask your doctor if you can take an over-the-counter medicine. · If your doctor prescribed antibiotics, take them as directed. Do not stop taking them just because you feel better. You need to take the full course of antibiotics. When should you call for help? Call 911 anytime you think you may need emergency care. For example, call if: 
· You have a lot of bleeding or see a sudden change in the color or texture of the drainage. · The wound splits open and organs under the skin can be seen (evisceration). Call your doctor now or seek immediate medical care if: · The wound starts bleeding. · The bandage comes off. Cover the area with a sterile bandage until you can see your doctor or your home health care worker comes by. · You have signs of infection, such as: 
¨ Increased pain, swelling, warmth, or redness around the wound. ¨ Red streaks leading from the wound. ¨ Pus draining from the wound. ¨ A fever. Watch closely for changes in your health, and be sure to contact your doctor if: · The noise the machine makes changes or gets very loud. This may mean the seal is broken or the machine is not producing enough suction. Where can you learn more? Go to http://jamarcus-luis.info/. Enter P464 in the search box to learn more about \"Vacuum-Assisted Closure for Wound Healing: Care Instructions. \" Current as of: March 20, 2017 Content Version: 11.3 © 1658-6552 [employer]. Care instructions adapted under license by [employer] (which disclaims liability or warranty for this information). If you have questions about a medical condition or this instruction, always ask your healthcare professional. [address] any warranty or liability for your use of this information.

## 2017-09-08 PROBLEM — I99.8 ISCHEMIA OF LEFT LOWER EXTREMITY: Status: ACTIVE | Noted: 2017-09-08

## 2017-09-08 PROBLEM — I73.9 CLAUDICATION (HCC): Status: ACTIVE | Noted: 2017-09-08

## 2017-09-08 LAB
ANION GAP SERPL CALC-SCNC: 11 MMOL/L (ref 7–16)
ANION GAP SERPL CALC-SCNC: 11 MMOL/L (ref 7–16)
APTT PPP: 66.8 SEC (ref 23.5–31.7)
APTT PPP: 94.5 SEC (ref 23.5–31.7)
APTT PPP: >110 SEC (ref 23.5–31.7)
APTT PPP: >110 SEC (ref 23.5–31.7)
BUN SERPL-MCNC: 25 MG/DL (ref 6–23)
BUN SERPL-MCNC: 28 MG/DL (ref 6–23)
CALCIUM SERPL-MCNC: 7 MG/DL (ref 8.3–10.4)
CALCIUM SERPL-MCNC: 7.3 MG/DL (ref 8.3–10.4)
CHLORIDE SERPL-SCNC: 113 MMOL/L (ref 98–107)
CHLORIDE SERPL-SCNC: 117 MMOL/L (ref 98–107)
CO2 SERPL-SCNC: 18 MMOL/L (ref 21–32)
CO2 SERPL-SCNC: 19 MMOL/L (ref 21–32)
CREAT SERPL-MCNC: 2.39 MG/DL (ref 0.8–1.5)
CREAT SERPL-MCNC: 2.96 MG/DL (ref 0.8–1.5)
ERYTHROCYTE [DISTWIDTH] IN BLOOD BY AUTOMATED COUNT: 13.8 % (ref 11.9–14.6)
GLUCOSE SERPL-MCNC: 145 MG/DL (ref 65–100)
GLUCOSE SERPL-MCNC: 180 MG/DL (ref 65–100)
HCT VFR BLD AUTO: 23.7 % (ref 41.1–50.3)
HCT VFR BLD AUTO: 30 % (ref 41.1–50.3)
HGB BLD-MCNC: 10.4 G/DL (ref 13.6–17.2)
HGB BLD-MCNC: 8.1 G/DL (ref 13.6–17.2)
LACTATE SERPL-SCNC: 1.8 MMOL/L (ref 0.4–2)
LACTATE SERPL-SCNC: 2.6 MMOL/L (ref 0.4–2)
MAGNESIUM SERPL-MCNC: 1.5 MG/DL (ref 1.8–2.4)
MCH RBC QN AUTO: 31.2 PG (ref 26.1–32.9)
MCHC RBC AUTO-ENTMCNC: 34.7 G/DL (ref 31.4–35)
MCV RBC AUTO: 90.1 FL (ref 79.6–97.8)
MM INDURATION POC: 0 MM (ref 0–5)
PLATELET # BLD AUTO: 180 K/UL (ref 150–450)
PMV BLD AUTO: 10.1 FL (ref 10.8–14.1)
POTASSIUM SERPL-SCNC: 4.7 MMOL/L (ref 3.5–5.1)
POTASSIUM SERPL-SCNC: 4.7 MMOL/L (ref 3.5–5.1)
POTASSIUM SERPL-SCNC: 5.5 MMOL/L (ref 3.5–5.1)
PPD POC: NORMAL NEGATIVE
RBC # BLD AUTO: 3.33 M/UL (ref 4.23–5.67)
SODIUM SERPL-SCNC: 143 MMOL/L (ref 136–145)
SODIUM SERPL-SCNC: 146 MMOL/L (ref 136–145)
WBC # BLD AUTO: 15.3 K/UL (ref 4.3–11.1)

## 2017-09-08 PROCEDURE — 77030032490 HC SLV COMPR SCD KNE COVD -B

## 2017-09-08 PROCEDURE — 85027 COMPLETE CBC AUTOMATED: CPT | Performed by: SURGERY

## 2017-09-08 PROCEDURE — 74011250637 HC RX REV CODE- 250/637: Performed by: SURGERY

## 2017-09-08 PROCEDURE — 85730 THROMBOPLASTIN TIME PARTIAL: CPT | Performed by: SURGERY

## 2017-09-08 PROCEDURE — 83735 ASSAY OF MAGNESIUM: CPT | Performed by: SURGERY

## 2017-09-08 PROCEDURE — 84132 ASSAY OF SERUM POTASSIUM: CPT | Performed by: SURGERY

## 2017-09-08 PROCEDURE — 74011250636 HC RX REV CODE- 250/636: Performed by: SURGERY

## 2017-09-08 PROCEDURE — C9113 INJ PANTOPRAZOLE SODIUM, VIA: HCPCS | Performed by: SURGERY

## 2017-09-08 PROCEDURE — 83605 ASSAY OF LACTIC ACID: CPT | Performed by: SURGERY

## 2017-09-08 PROCEDURE — 65610000006 HC RM INTENSIVE CARE

## 2017-09-08 PROCEDURE — 74011000250 HC RX REV CODE- 250: Performed by: SURGERY

## 2017-09-08 PROCEDURE — 74011636637 HC RX REV CODE- 636/637: Performed by: SURGERY

## 2017-09-08 PROCEDURE — 80048 BASIC METABOLIC PNL TOTAL CA: CPT | Performed by: SURGERY

## 2017-09-08 PROCEDURE — 36600 WITHDRAWAL OF ARTERIAL BLOOD: CPT

## 2017-09-08 PROCEDURE — 85018 HEMOGLOBIN: CPT | Performed by: SURGERY

## 2017-09-08 PROCEDURE — 04CL0ZZ EXTIRPATION OF MATTER FROM LEFT FEMORAL ARTERY, OPEN APPROACH: ICD-10-PCS | Performed by: SURGERY

## 2017-09-08 PROCEDURE — 77030019605

## 2017-09-08 PROCEDURE — 77010033678 HC OXYGEN DAILY

## 2017-09-08 PROCEDURE — 36591 DRAW BLOOD OFF VENOUS DEVICE: CPT

## 2017-09-08 PROCEDURE — C8929 TTE W OR WO FOL WCON,DOPPLER: HCPCS

## 2017-09-08 PROCEDURE — 97162 PT EVAL MOD COMPLEX 30 MIN: CPT

## 2017-09-08 PROCEDURE — 74011250636 HC RX REV CODE- 250/636: Performed by: ANESTHESIOLOGY

## 2017-09-08 PROCEDURE — 36430 TRANSFUSION BLD/BLD COMPNT: CPT

## 2017-09-08 PROCEDURE — P9016 RBC LEUKOCYTES REDUCED: HCPCS | Performed by: ANESTHESIOLOGY

## 2017-09-08 PROCEDURE — 74011636320 HC RX REV CODE- 636/320: Performed by: SURGERY

## 2017-09-08 PROCEDURE — 77030013064 HC TRNSF BLD FENW -A

## 2017-09-08 RX ORDER — MAGNESIUM SULFATE HEPTAHYDRATE 40 MG/ML
2 INJECTION, SOLUTION INTRAVENOUS ONCE
Status: COMPLETED | OUTPATIENT
Start: 2017-09-08 | End: 2017-09-13

## 2017-09-08 RX ORDER — HEPARIN SODIUM 5000 [USP'U]/100ML
18-36 INJECTION, SOLUTION INTRAVENOUS
Status: DISCONTINUED | OUTPATIENT
Start: 2017-09-08 | End: 2017-09-09

## 2017-09-08 RX ORDER — SODIUM BICARBONATE 1 MEQ/ML
50 SYRINGE (ML) INTRAVENOUS ONCE
Status: COMPLETED | OUTPATIENT
Start: 2017-09-08 | End: 2017-09-08

## 2017-09-08 RX ORDER — FENTANYL CITRATE 50 UG/ML
30 INJECTION, SOLUTION INTRAMUSCULAR; INTRAVENOUS
Status: DISCONTINUED | OUTPATIENT
Start: 2017-09-08 | End: 2017-09-08

## 2017-09-08 RX ORDER — SODIUM CHLORIDE 9 MG/ML
250 INJECTION, SOLUTION INTRAVENOUS AS NEEDED
Status: DISCONTINUED | OUTPATIENT
Start: 2017-09-08 | End: 2017-09-29 | Stop reason: HOSPADM

## 2017-09-08 RX ORDER — NEOSTIGMINE METHYLSULFATE 1 MG/ML
INJECTION INTRAVENOUS AS NEEDED
Status: DISCONTINUED | OUTPATIENT
Start: 2017-09-08 | End: 2017-09-08 | Stop reason: HOSPADM

## 2017-09-08 RX ORDER — DEXTROSE 50 % IN WATER (D50W) INTRAVENOUS SYRINGE
25 ONCE
Status: COMPLETED | OUTPATIENT
Start: 2017-09-08 | End: 2017-09-08

## 2017-09-08 RX ORDER — GLYCOPYRROLATE 0.2 MG/ML
INJECTION INTRAMUSCULAR; INTRAVENOUS AS NEEDED
Status: DISCONTINUED | OUTPATIENT
Start: 2017-09-08 | End: 2017-09-08 | Stop reason: HOSPADM

## 2017-09-08 RX ORDER — FENTANYL CITRATE 50 UG/ML
50 INJECTION, SOLUTION INTRAMUSCULAR; INTRAVENOUS
Status: DISCONTINUED | OUTPATIENT
Start: 2017-09-08 | End: 2017-09-26 | Stop reason: HOSPADM

## 2017-09-08 RX ORDER — CEFAZOLIN SODIUM IN 0.9 % NACL 2 G/50 ML
2 INTRAVENOUS SOLUTION, PIGGYBACK (ML) INTRAVENOUS EVERY 8 HOURS
Status: DISCONTINUED | OUTPATIENT
Start: 2017-09-08 | End: 2017-09-09

## 2017-09-08 RX ADMIN — FENTANYL CITRATE 30 MCG: 50 INJECTION, SOLUTION INTRAMUSCULAR; INTRAVENOUS at 16:49

## 2017-09-08 RX ADMIN — CALCIUM GLUCONATE 2 G: 94 INJECTION, SOLUTION INTRAVENOUS at 07:27

## 2017-09-08 RX ADMIN — FENTANYL CITRATE 50 MCG: 50 INJECTION, SOLUTION INTRAMUSCULAR; INTRAVENOUS at 19:30

## 2017-09-08 RX ADMIN — SODIUM BICARBONATE 50 MEQ: 84 INJECTION INTRAVENOUS at 06:42

## 2017-09-08 RX ADMIN — PHENYLEPHRINE HYDROCHLORIDE 50 MCG/MIN: 10 INJECTION INTRAVENOUS at 17:26

## 2017-09-08 RX ADMIN — MAGNESIUM SULFATE HEPTAHYDRATE 2 G: 40 INJECTION, SOLUTION INTRAVENOUS at 06:34

## 2017-09-08 RX ADMIN — SODIUM CHLORIDE 40 MG: 9 INJECTION INTRAMUSCULAR; INTRAVENOUS; SUBCUTANEOUS at 10:27

## 2017-09-08 RX ADMIN — NALBUPHINE HYDROCHLORIDE 5 MG: 10 INJECTION, SOLUTION INTRAMUSCULAR; INTRAVENOUS; SUBCUTANEOUS at 15:09

## 2017-09-08 RX ADMIN — HEPARIN SODIUM AND DEXTROSE 18 UNITS/KG/HR: 5000; 5 INJECTION INTRAVENOUS at 01:29

## 2017-09-08 RX ADMIN — INSULIN HUMAN 5 UNITS: 100 INJECTION, SOLUTION PARENTERAL at 06:43

## 2017-09-08 RX ADMIN — GLYCOPYRROLATE 0.4 MG: 0.2 INJECTION INTRAMUSCULAR; INTRAVENOUS at 00:26

## 2017-09-08 RX ADMIN — CEFAZOLIN 2 G: 1 INJECTION, POWDER, FOR SOLUTION INTRAMUSCULAR; INTRAVENOUS; PARENTERAL at 22:11

## 2017-09-08 RX ADMIN — Medication 10 ML: at 21:02

## 2017-09-08 RX ADMIN — CHLORHEXIDINE GLUCONATE 15 ML: 1.2 RINSE ORAL at 01:37

## 2017-09-08 RX ADMIN — CEFAZOLIN 2 G: 1 INJECTION, POWDER, FOR SOLUTION INTRAMUSCULAR; INTRAVENOUS; PARENTERAL at 06:32

## 2017-09-08 RX ADMIN — FENTANYL CITRATE 30 MCG: 50 INJECTION, SOLUTION INTRAMUSCULAR; INTRAVENOUS at 17:50

## 2017-09-08 RX ADMIN — FENTANYL CITRATE 30 MCG: 50 INJECTION, SOLUTION INTRAMUSCULAR; INTRAVENOUS at 18:41

## 2017-09-08 RX ADMIN — Medication 10 ML: at 14:27

## 2017-09-08 RX ADMIN — SODIUM CHLORIDE 100 ML/HR: 900 INJECTION, SOLUTION INTRAVENOUS at 10:04

## 2017-09-08 RX ADMIN — Medication 10 ML: at 07:05

## 2017-09-08 RX ADMIN — MORPHINE SULFATE 2 MG: 2 INJECTION, SOLUTION INTRAMUSCULAR; INTRAVENOUS at 16:11

## 2017-09-08 RX ADMIN — MORPHINE SULFATE 2 MG: 2 INJECTION, SOLUTION INTRAMUSCULAR; INTRAVENOUS at 15:20

## 2017-09-08 RX ADMIN — SODIUM CHLORIDE 150 ML/HR: 900 INJECTION, SOLUTION INTRAVENOUS at 19:31

## 2017-09-08 RX ADMIN — SODIUM CHLORIDE 100 ML/HR: 900 INJECTION, SOLUTION INTRAVENOUS at 12:52

## 2017-09-08 RX ADMIN — HEPARIN SODIUM AND DEXTROSE 10 UNITS/KG/HR: 5000; 5 INJECTION INTRAVENOUS at 19:31

## 2017-09-08 RX ADMIN — FENTANYL CITRATE 50 MCG: 50 INJECTION, SOLUTION INTRAMUSCULAR; INTRAVENOUS at 20:24

## 2017-09-08 RX ADMIN — SODIUM CHLORIDE 100 ML/HR: 900 INJECTION, SOLUTION INTRAVENOUS at 05:54

## 2017-09-08 RX ADMIN — PHENYLEPHRINE HYDROCHLORIDE 50 MCG/MIN: 10 INJECTION INTRAVENOUS at 10:23

## 2017-09-08 RX ADMIN — DEXTROSE MONOHYDRATE 25 G: 25 INJECTION, SOLUTION INTRAVENOUS at 06:43

## 2017-09-08 RX ADMIN — NEOSTIGMINE METHYLSULFATE 3 MG: 1 INJECTION INTRAVENOUS at 00:26

## 2017-09-08 RX ADMIN — PERFLUTREN 1 ML: 6.52 INJECTION, SUSPENSION INTRAVENOUS at 08:00

## 2017-09-08 RX ADMIN — CEFAZOLIN 2 G: 1 INJECTION, POWDER, FOR SOLUTION INTRAMUSCULAR; INTRAVENOUS; PARENTERAL at 14:27

## 2017-09-08 NOTE — PROGRESS NOTES
Anticoagulant parameters placed in nursing order reviewed by Dr. Brendan Quiñones. Orders received for heparin gtt without bolus, see MAR for details.

## 2017-09-08 NOTE — PROGRESS NOTES
Unable to obtain pedal/PT pulses to LLE, although has (+)popliteal pulses by doppler. LLE cool compared to RLE, capillary refill to L foot >3 seconds. Pt denies significant pain, although states left leg feels \"heavier\" than right. Dr. Delonte Vaughan notified, orders received for US.

## 2017-09-08 NOTE — PROGRESS NOTES
Dr. Enedelia Zabala notified of [address] results. States will re-evaluate pt shortly.

## 2017-09-08 NOTE — PROGRESS NOTES
Bedside report from [address]. Dr. Cordova Ritchie rounding. Doppler signal to left DP and right DP/PT. Extremities warm. Equal sensation bilaterally.

## 2017-09-08 NOTE — PROGRESS NOTES
Initially called by nursing staff with loss of distal signals in the left foot. Ultrasound performed showed monophasic flow below the popliteal artery. 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 Clinically patient has motor and sensory intact left foot is cool. A long discussion with patient about the potential surgical options. One being a redo aortobifem of the proximal anastomosis and to reattempt to remove some of the remaining plaque, or left common femoral embolectomy and anticoagulate, or potential ligation of graft and asked bifemoral to alleviate risk of thrombosis distal emboli. Patient also received 100 cc of contrast for CTA and potential redo aorta with suprarenal clamps could potentially worsen his baseline kidney function and carry a real risks for dialysis. Will attempt left femoral embolectomy and anticoagulation first as it is carries less morbidity and mortality. I attempted to call contact patient's brother and sister on the phone left messages. Will contact him once the surgery is over.       Ashleigh Keen

## 2017-09-08 NOTE — BRIEF OP NOTE
[address] [place] [address]. [id number]  [phone] FAX: [phone]    Brief Op Note Template Note    Pre-Op Diagnosis: ischemic left leg    Post-Op Diagnosis:  ischemic left leg    Procedures: Procedure(s):  left femoral embolectomy/left lower extremity arteriogram    Surgeon: Sandor Davila MD    Assistants: Surgeon(s):  Sandor Davila MD      Anesthesia:  General     Findings: Thrombus in the distal femoral anastomosis thrombectomized completion arteriogram showed a patent SFA and popliteal posterior tibial and dominant runoff to the foot    Tourniquet Time:  * No tourniquets in log *    Estimated Blood Loss:               Specimens: None           Implants:  * No implants in log *    Complications: None               Signed: Sandor Davila MD      Elements of this note have been dictated using speech recognition software. As a result, errors of speech recognition may have occurred.

## 2017-09-08 NOTE — ANESTHESIA POSTPROCEDURE EVALUATION
Post-Anesthesia Evaluation and Assessment    Patient: Daniella Delcid MRN: [id number]  SSN: [id number]    YOB: 1960  Age: 61 y.o. Sex: male       Cardiovascular Function/Vital Signs  Visit Vitals    /46    Pulse (!) 109    Temp 36.5 °C (97.7 °F)    Resp 25    Ht 5' 6\" (1.676 m)    Wt 92.6 kg (204 lb 3 oz)    SpO2 95%    BMI 32.96 kg/m2       Patient is status post general anesthesia for Procedure(s):  left femoral embolectomy/left lower extremity arteriogram.    Nausea/Vomiting: None    Postoperative hydration reviewed and adequate. Pain:  Pain Scale 1: Numeric (0 - 10) (09/07/17 2211)  Pain Intensity 1: 2 (\"I feel achy all over\") (09/07/17 2211)   Managed    Neurological Status:   Neuro (WDL): Within Defined Limits (hx of mini strokes several years ago) (09/07/17 0533)  Neuro  Neurologic State: Drowsy; Eyes open to voice (09/07/17 1900)  Orientation Level: Oriented to person;Oriented to place;Oriented to situation;Disoriented to time (09/07/17 1900)  Cognition: Follows commands; Appropriate safety awareness; Appropriate decision making (09/07/17 1900)  Speech: Clear (09/07/17 1900)  Assessment L Pupil: Brisk;Round (09/07/17 1900)  Size L Pupil (mm): 3 (09/07/17 1900)  Assessment R Pupil: Brisk;Round (09/07/17 1900)  Size R Pupil (mm): 3 (09/07/17 1900)  LUE Motor Response: Purposeful;Spontaneous  (09/07/17 1900)  LLE Motor Response: Purposeful;Spontaneous  (09/07/17 1900)  RUE Motor Response: Purposeful;Spontaneous  (09/07/17 1900)  RLE Motor Response: Purposeful;Spontaneous  (09/07/17 1900)   At baseline    Mental Status and Level of Consciousness: Arousable    Pulmonary Status:   O2 Device: Nasal cannula (09/08/17 0055)   Adequate oxygenation and airway patent    Complications related to anesthesia: None    Post-anesthesia assessment completed.  No concerns    Signed By: Carissa Bolivar MD     September 8, 2017

## 2017-09-08 NOTE — PROGRESS NOTES
Bedside shift report received from Carlos Galvez RN, for continued care. Lines, gtts, and wound verified.

## 2017-09-08 NOTE — PROGRESS NOTES
Attempted to call multiple family members via phone numbers left by family on information sheet, as well as additional phone numbers provided by pt now. No responses at this time, but will attempt to continue to contact family to update regarding pt status.

## 2017-09-08 NOTE — PERIOP NOTES
TRANSFER - OUT REPORT:    [facility] Mckayla Mari  being transferred to CVICU for routine progression of care       Report consisted of patients Situation, Background, Assessment and   Recommendations(SBAR). Information from the following report(s) SBAR, OR Summary and Procedure Summary was reviewed with the receiving nurse. Lines:   Quad Lumen 09/07/17 Right Internal jugular (Active)   Central Line Being Utilized Yes 9/7/2017  7:00 PM   Criteria for Appropriate Use Hemodynamically unstable, requiring monitoring lines, vasopressors, or volume resuscitation 9/7/2017  7:00 PM   Site Assessment Clean, dry, & intact 9/7/2017  7:00 PM   Infiltration Assessment 0 9/7/2017  7:00 PM   Affected Extremity/Extremities Color distal to insertion site pink (or appropriate for race); Pulses palpable 9/7/2017  7:00 PM   Date of Last Dressing Change 09/07/17 9/7/2017  7:00 PM   Dressing Status Clean, dry, & intact 9/7/2017  7:00 PM   Dressing Type Disk with Chlorhexadine gluconate (CHG); Transparent 9/7/2017  7:00 PM   Action Taken Dressing changed 9/7/2017  5:00 PM   Proximal Hub Color/Line Status White;Patent; Infusing 9/7/2017  7:00 PM   Positive Blood Return (Medial Site) Yes 9/7/2017  7:00 PM   Medial 1 Hub Color/Line Status Blue;Patent 9/7/2017  7:00 PM   Positive Blood Return (Lateral Site) Yes 9/7/2017  7:00 PM   Medial 2 Hub Color/Line Status Gray;Patent 9/7/2017  7:00 PM   Positive Blood Return (Site #3) Yes 9/7/2017  7:00 PM   Distal Hub Color/Line Status Brown;Patent 9/7/2017  7:00 PM   Positive Blood Return (Site #4) Yes 9/7/2017  7:00 PM       Peripheral IV 06/22/17 Left Hand (Active)       Peripheral IV 09/07/17 Left; Lower Arm (Active)   Site Assessment Clean, dry, & intact 9/7/2017  7:00 PM   Phlebitis Assessment 0 9/7/2017  7:00 PM   Infiltration Assessment 0 9/7/2017  7:00 PM   Dressing Status Clean, dry, & intact 9/7/2017  7:00 PM   Dressing Type Transparent;Tape 9/7/2017  7:00 PM Hub Color/Line Status Green;Patent; Flushed 9/7/2017  7:00 PM   Action Taken Open ports on tubing capped 9/7/2017  3:03 PM       Arterial Line 09/07/17 Left Radial artery (Active)   Site Assessment Clean, dry, & intact 9/7/2017  7:00 PM   Dressing Status Clean, dry, & intact 9/7/2017  7:00 PM   Dressing Type Transparent;Tape 9/7/2017  7:00 PM   Line Status Intact and in place 9/7/2017  7:00 PM   Treatment Arm board off;Zeroed or re-zeroed 9/7/2017  5:00 PM   Affected Extremity/Extremities Color distal to insertion site pink (or appropriate for race); Pulses palpable 9/7/2017  7:00 PM        Opportunity for questions and clarification was provided.       Patient transported with:   O2 @ 4 liters

## 2017-09-08 NOTE — PROGRESS NOTES
Asked by nurse to assess patient's pain control. Currently patient is alert and states he feels tired but claims to be pain free. . Will continue current epidural settings.

## 2017-09-08 NOTE — PROGRESS NOTES
Pt increasingly hypotensive, MAP 58-62, despite titration of neosynephrine gtt per MAR and 250 mL 0.9%NS bolus. UOP marginal. Abdomen increasingly distended and tender to palpation although continues with (+)BS. Respirations tachypnic and shallow. Pt c/o unrelieved abdominal pain despite epidural analgesic. Dr. Isabella May notified, orders received for echocardiogram, lactic acid, and medications for hyperkalemia, see MAR.

## 2017-09-08 NOTE — PROGRESS NOTES
[address] FAX: [phone]         VASCULAR SURGERY FLOOR PROGRESS NOTE    Admit Date: 2017  POD: 1 Day Post-Op    Procedure:  Procedure(s):  left femoral embolectomy/left lower extremity arteriogram    Subjective:     Patient has no new complaints. Objective:     Vitals:  Blood pressure 106/56, pulse 91, temperature 99.8 °F (37.7 °C), resp. rate 20, height 5' 6\" (1.676 m), weight 216 lb 1.6 oz (98 kg), SpO2 95 %. Temp (24hrs), Av.1 °F (36.7 °C), Min:97 °F (36.1 °C), Max:99.8 °F (37.7 °C)      Intake / Output:    Intake/Output Summary (Last 24 hours) at 17 0729  Last data filed at 17 0701   Gross per 24 hour   Intake          7433.57 ml   Output             2355 ml   Net          5078.57 ml       Physical Exam:    Constitutional: he appears well-developed. No distress. HENT:   Head: Atraumatic. Eyes: Pupils are equal, round, and reactive to light. Neck: Normal range of motion. Cardiovascular: Regular rhythm. Pulmonary/Chest: Effort normal and breath sounds normal. No respiratory distress. Abdominal: Soft. Bowel sounds are normal. he exhibits no distension. There is no tenderness. There is no guarding. No hernia. Musculoskeletal: Normal range of motion. Neurological: He is alert. CN II- XII grossly intact  Vascular: Abdomen soft no appropriate tenderness no peritoneal signs dopplerable pedal pulse    Labs:   Recent Labs      17   0310  17   1512   HGB  10.4*  11.2*   WBC  15.3*  14.7*   K  5.5*  5.4*   GLU  180*  142*       Data Review     Assessment:     Patient Active Problem List    Diagnosis Date Noted    Occlusion of aorta ([address] 2017    Chronic left-sided low back pain 2017    Essential hypertension 2016    Renal insufficiency 2016    Dyslipidemia 2016       Plan/Recommendations/Medical Decision Making:      We will continue n.p.o. with NG tube, abdomen is soft can take ice chips if needed  -We will continue IV heparin for anticoagulation status post thrombus in his left distal graft will plan to remove heparin drip for 4 hours tomorrow to remove epidural catheter if patient continues to be perfused  -Creatinine and GFR slightly elevated most likely ATN, with contrast bolus from CTA yesterday we will continue gentle hydration urine output 20 cc an hour   -We will also get echocardiogram to evaluate postop EF to see the etiology for hypoperfusion but most likely,  -This but will continue in ICU today serial labs up and out of bed    Elements of this note have been dictated using speech recognition software. As a result, errors of speech recognition may have occurred.

## 2017-09-08 NOTE — PROGRESS NOTES
Now with systolic BP in 50'A and requiring volume and blood products to support BP. Will discontinue epidural infusion for now and treat pain with systemic analgesics.

## 2017-09-08 NOTE — OP NOTES
[address]   OPERATIVE REPORT       Name:  Estrada Monroy   MR#:  [id number]   :  1960   Account #:  [de-identified]   Date of Adm:  2017       DATE OF OPERATION: 2017    CLINICAL SERVICE: Vascular Surgery. PREOPERATIVE DIAGNOSIS: Thrombosed left limb of aortobifemoral   graft. POSTOPERATIVE DIAGNOSIS: Thrombosed left limb of aortobifemoral   graft. SURGICAL PROCEDURE   1. Left common femoral embolectomy. 2. Left lower extremity arteriogram.    SURGEON: Tamra Griffin MD    ANESTHESIA: General.    COMPLICATIONS: None. INDICATION FOR PROCEDURE: This is a 18-year-old male who is   status post aortobifemoral. Postoperatively, the nurses noticed   a loss of signal in his left foot. This was confirmed with   ultrasound. A CTA was also confirmed showing that he had some   mild thrombus, above his proximal anastomosis and thrombus is   healed in his graft. Secondary to the morbidity and mortality   associated with a redo aortobifemoral, we decided to   anticoagulate and do a thrombolectomy of the left common   femoral, and put the patient on heparin. PROCEDURE IN DETAIL: After giving informed consent, the patient   was brought to the operating room. General anesthesia was   induced. Preop antibiotics were given for skin incision. The   patient's abdomen and leg were then prepped and draped in normal   sterile fashion. The left groin incision was then reopened from   staples and Vicryl was then removed. Gillermina Mac was in place. We   dissected down further until we got to the SFA, profunda. The   proximal, common and the graft all controlled. The patient had   already but systemically heparinized with 6000 units hour prior;   4000 units of heparin was then given again. We clamped and did a   transverse arteriotomy just above the distal anastomosis. We did   see fresh thrombus. We then placed a #4 Lacey catheter up the   graft limb.  I was able to get organized thrombus back after 2   passes, with good pulsatile flow. We reclamped that. Initially   we passed a #4 Lacey catheter down the leg until . I was   able to on the first pass get some thrombus. On the second   passage there was no evidence of any embolus. There was   backbleeding. The profunda was not backbleeding. Initially,   we passed a Lacey catheter back and got back bleeding after   the second pass. We heparinized the solution, fore flushed and   backflushed. Then we closed our arteriotomy with interrupted 5-0   Prolene. We then attempted to stick to the common femoral artery   antegrade, but there was difficulty with the vision. We stuck   the SFA with a micropuncture technique and did a left lower   extremity arteriogram which showed a patent SFA, popliteal   artery and posterior tibial artery being definitely runoff down   to the ankle. We then removed the catheter out and then a 6-0   Prolene to close the puncture site. We then access the graft with the   micropuncture technique and did a groin shot, showing the common   femoral, the anastomosis and the proximal SFA and profunda were   all patent. We were happy with the procedure. We then closed the   wound with 2-0 Vicryl, 3-0 and staples. The patient was then   extubated and taken to the ICU in critical condition.         MD ELISABETH Alvarado / JOSELYN   D:  09/08/2017   00:42   T:  09/08/2017   08:36   Job #:  [id number]

## 2017-09-08 NOTE — PROGRESS NOTES
Problem: Mobility Impaired (Adult and Pediatric)  Goal: *Acute Goals and Plan of Care (Insert Text)  STG:  (1.)Mr. Tran will move from supine to sit and sit to supine , scoot up and down and roll side to side with MINIMAL ASSIST within 5 day(s). (2.)Mr. Tran will transfer from bed to chair and chair to bed with MINIMAL ASSIST using the least restrictive device within 5 day(s). (3.)Mr. Tran will ambulate with MINIMAL ASSIST for 50 feet with the least restrictive device within 5 day(s). (4.)Mr. Tran will maintain stable vital signs throughout all functional mobility within 5 days. (5.)Mr. Tran will perform standing static and dynamic balance activities x 10 minutes with MINIMAL ASSIST to improve safety within 5 day(s). LTG:  (1.)Mr. Tran will move from supine to sit and sit to supine , scoot up and down and roll side to side in bed with CONTACT GUARD ASSIST within 10 day(s). (2.)Mr. Tran will transfer from bed to chair and chair to bed with CONTACT GUARD ASSIST using the least restrictive device within 10 day(s). (3.)Mr. Tran will ambulate with CONTACT GUARD ASSIST for 100 feet with the least restrictive device within 10 day(s). ________________________________________________________________________________________________      PHYSICAL THERAPY: INITIAL ASSESSMENT, AM 9/8/2017  INPATIENT: Hospital Day: 2  Payor: Veronica Burnett / Plan: [facility] % / Product Type: Madie Neumann /      NAME/AGE/GENDER: Mckayla Mari is a 61 y.o. male     PRIMARY DIAGNOSIS: Aortic occlusion ([address] [I74.10] Occlusion of aorta (HCC) Occlusion of aorta (HCC)  Procedure(s) (LRB):  left femoral embolectomy/left lower extremity arteriogram (Left)  1 Day Post-Op  ICD-10: Treatment Diagnosis:       · Difficulty in walking, Not elsewhere classified (R26.2)   Precaution/Allergies:  Review of patient's allergies indicates no known allergies.        ASSESSMENT:      Mr. Julio César Milligan is a 61 y.o. male s/p above surgery following aortic occlusion. He lives alone and has been ambulating with a straight cane since he began to have numbness in L foot a few months ago. He is supine on contact and agreeable to therapy. RN present for evaluation due to patient's BP fluctuating. He required maximal assistance x2 to transfer to sitting and was not able to maintain sitting balance independently. He did attempt to sit with prop sitting, however c/o severe pain. Patient's BP noted to decrease significantly despite patient maintaining same level of alertness throughout session. Returned to supine with maximal/total assist x2 and BP improved within 2 minutes. Nurse aware. Evaluation limited by multiple lines and leads as well as pain and patient's decreased activity tolerance. Yamini Torres is currently functioning below his baseline and would benefit from skilled PT during acute care stay to maximize safety and independence with functional mobility. This section established at most recent assessment   PROBLEM LIST (Impairments causing functional limitations):  1. Decreased Strength  2. Decreased ADL/Functional Activities  3. Decreased Transfer Abilities  4. Decreased Ambulation Ability/Technique  5. Decreased Balance  6. Increased Pain  7. Decreased Activity Tolerance  8. Decreased Knowledge of Precautions  9. Decreased [place] with Home Exercise Program    INTERVENTIONS PLANNED: (Benefits and precautions of physical therapy have been discussed with the patient.)  1. Balance Exercise  2. Bed Mobility  3. Family Education  4. Gait Training  5. Home Exercise Program (HEP)  6. Therapeutic Activites  7. Therapeutic Exercise/Strengthening  8. Transfer Training  9. Patient Education  10.  Group Therapy      TREATMENT PLAN: Frequency/Duration: 3 times a week for duration of hospital stay  Rehabilitation Potential For Stated Goals: EXCELLENT      RECOMMENDED REHABILITATION/EQUIPMENT: (at time of discharge pending progress): Due to the probability of continued deficits (see above) this patient will likely need continued skilled physical therapy after discharge. Equipment:   · None at this time                   HISTORY:   History of Present Injury/Illness (Reason for Referral): Aortic occlusion (HCC) [I74.10] Occlusion of aorta (HCC) Occlusion of aorta (HCC)  Procedure(s) (LRB):  left femoral embolectomy/left lower extremity arteriogram (Left)  1 Day Post-Op  Past Medical History/Comorbidities:   Mr. Dawit Grove  has a past medical history of Acute diastolic (congestive) heart failure ([facility] [address] (12/4/2016); CAD (coronary artery disease); Chronic kidney disease, stage 3; Chronic pain; Ex-smoker; GERD (gastroesophageal reflux disease); TIA (transient ischemic attack); Hypercholesteremia; Hypertension; Old MI (myocardial infarction) (11/2016); and Osteoarthritis. Mr. Dawit Grove  has a past surgical history that includes heart catheterization (12/2016); colonoscopy (N/A, 6/22/2017); and knee arthroscopy (Left). Social History/Living Environment:   Home Environment: Private residence  # Steps to Enter: 6  Rails to Enter: Yes  One/Two Story Residence: One story  Living Alone: Yes  Support Systems: Child(tisha)  Patient Expects to be Discharged to[de-identified] Unknown  Current DME Used/Available at Home: Cane, straight  Prior Level of Function/Work/Activity:  Modified independent with straight cane for ambulation secondary to decreased sensation LLE for a few months, he reports independence with ADLs and driving at baseline.       Number of Personal Factors/Comorbidities that affect the Plan of Care: 3+: HIGH COMPLEXITY   EXAMINATION:   Most Recent Physical Functioning:   Gross Assessment:  Strength: Generally decreased, functional  Coordination: Generally decreased, functional  Tone: Normal  Sensation: Impaired (LLE)               Posture:  Posture (WDL): Exceptions to WDL  Balance:  Sitting: Impaired  Sitting - Static: Poor (constant support)  Sitting - Dynamic: Poor (constant support) Bed Mobility:  Rolling: Maximum assistance;Assist x2  Supine to Sit: Maximum assistance;Assist x2  Sit to Supine: Maximum assistance;Assist x2  Scooting: Total assistance  Wheelchair Mobility:     Transfers:     Gait:             Body Structures Involved:  1. Nerves  2. Heart  3. Muscles Body Functions Affected:  1. Sensory/Pain  2. Cardio  3. Hematological  4. Neuromusculoskeletal  5. Movement Related Activities and Participation Affected:  1. Mobility  2. Self Care  3. Domestic Life  4. Interpersonal Interactions and Relationships  5. Community, Social and [place]   Number of elements that affect the Plan of Care: 4+: HIGH COMPLEXITY   CLINICAL PRESENTATION:   Presentation: Evolving clinical presentation with unstable and unpredictable characteristics: HIGH COMPLEXITY   CLINICAL DECISION MAKIN [facility] Inpatient Short Form  How much difficulty does the patient currently have. .. Unable A Lot A Little None   1. Turning over in bed (including adjusting bedclothes, sheets and blankets)? [ ] 1   [X] 2   [ ] 3   [ ] 4   2. Sitting down on and standing up from a chair with arms ( e.g., wheelchair, bedside commode, etc.)   [X] 1   [ ] 2   [ ] 3   [ ] 4   3. Moving from lying on back to sitting on the side of the bed? [ ] 1   [X] 2   [ ] 3   [ ] 4   How much help from another person does the patient currently need. .. Total A Lot A Little None   4. Moving to and from a bed to a chair (including a wheelchair)? [X] 1   [ ] 2   [ ] 3   [ ] 4   5. Need to walk in hospital room? [X] 1   [ ] 2   [ ] 3   [ ] 4   6. Climbing 3-5 steps with a railing? [X] 1   [ ] 2   [ ] 3   [ ] 4   © , Trustees of [address] [place] [address] [address] [id number], under license to [employer]. All rights reserved    Score:  Initial: 8 Most Recent: X (Date: -- )     Interpretation of Tool:  Represents activities that are increasingly more difficult (i.e. Bed mobility, Transfers, Gait). Score 24 23 22-20 19-15 14-10 9-7 6       Modifier CH CI CJ CK CL CM CN         · Mobility - Walking and Moving Around:               - CURRENT STATUS:    CM - 80%-99% impaired, limited or restricted               - GOAL STATUS:           CK - 40%-59% impaired, limited or restricted               - D/C STATUS:                       ---------------To be determined---------------  Payor: LEISA / Plan: [facility] % / Product Type: Leisa /       Medical Necessity:     · Patient demonstrates good rehab potential due to higher previous functional level. Reason for Services/Other Comments:  · Patient continues to require modification of therapeutic interventions to increase complexity of exercises. Use of outcome tool(s) and clinical judgement create a POC that gives a: Questionable prediction of patient's progress: MODERATE COMPLEXITY                 TREATMENT:   (In addition to Assessment/Re-Assessment sessions the following treatments were rendered)   Pre-treatment Symptoms/Complaints:  Pain. Pain: Initial:   Pain Intensity 1: 7  Post Session:  8/10 RN aware      Assessment/Reassessment only, no treatment provided today     Braces/Orthotics/Lines/Etc:   · IV  · lopez catheter  · nasogastric tube  · PCA  · Room Air  Treatment/Session Assessment:    · Response to Treatment:  Patient tolerated treatment poorly with decreased blood pressure. · Interdisciplinary Collaboration:  · Physical Therapist  · Registered Nurse  · SPT  · After treatment position/precautions:  · Supine in bed  · Bed/Chair-wheels locked  · Bed in low position  · Call light within reach  · RN notified  · Nurse at bedside  · Compliance with Program/Exercises: Will assess as treatment progresses. · Recommendations/Intent for next treatment session: \"Next visit will focus on advancements to more challenging activities and reduction in assistance provided\".   Total Treatment Duration:  PT Patient Time In/Time Out  Time In: 1000  Time Out: [address], DPT

## 2017-09-08 NOTE — PROGRESS NOTES
Chart reviewed. Consult for possible STR needs. Pt is geovany and has no insurance. PPD placed and PT/OT to see. Currently in CVICU. CM will continue to follow for d/c needs.

## 2017-09-08 NOTE — OP NOTES
[address] 64   OPERATIVE REPORT       Name:  Jere Menjivar   MR#:  [id number]   :  1960   Account #:  [de-identified]   Date of Adm:  2017       DATE OF SURGERY: 2017    CLINICAL SERVICE: Vascular Surgery. PREOPERATIVE DIAGNOSIS: Junctional renal aortic occlusion. POSTOPERATIVE DIAGNOSIS: Junctional renal aortic occlusion. SURGICAL PROCEDURE: Aortobifemoral bypass graft with a 14 x 7   Dacron graft. ATTENDING: Manuela Juarez MD    ASSISTANT: Finesse Parikh MD    ANESTHESIA: General.     ESTIMATED BLOOD LOSS: 700 mL. URINE OUTPUT: 50 mL. SUPRARENAL CLAMP TIME: Was 23 minutes. INDICATIONS FOR PROCEDURE: This is a 13-year-old male with   history of debilitating peripheral vascular disease and tobacco   use who we have been following in clinic for surveillance   ultrasounds. He had worsening debilitating claudication. This was   confirmed with a CT scan, which showed he had junctional renal   occlusion with occlusion of the common iliacs and external iliac   arteries that reconstitutes of his common femoral arteries by his   epigastrics. Of note, the patient did also have occlusion of his   left renal artery, and his left renal artery was also small in   nature. Had a long discussion with patient discussing the risks   and benefits of the procedure, including bleeding, infection,   worsening kidney function, potentially even dialysis. After the   patient long discussion with his aids, we decided to go for an   aortobifemoral.    PROCEDURE IN DETAIL: After giving informed consent, the patient   was brought back to the operating room, and general anesthesia   was then induced. Preop antibiotics were given before skin   incision. The patient's chest, abdomen and bilateral legs were   all prepped and draped in a normal sterile fashion. A central   line was then placed. An arterial line was placed by Anesthesiology.    A vertical incision was started in both groins for approximately   5 cm. The incision was then deepened through subcutaneous   tissue through electrocautery. The common femoral artery was then   exposed and was sharply dissected proximal to the inguinal   ligament and distal to the bifurcation of the superficial   femoral and profunda artery. Both arteries were soft and without   any significant amount of plaque, which was confirmed on the CT scan. Once we got proximal and distal control, we were able   to poke normal saline soaked 4 x 4's into the groin. Attention was   then focused on abdominal portion of the operation. A skin   incision was made from the subxiphoid down to the suprapubic   region. The subcutaneous tissue was then divided with   electrocautery. Linea alba was exposed and excised. The peritoneum   was then evaluated and entered. Abdominal wall was then extended,   incision was then extended to the full length of incision. There   was no evidence of any large amount of light adhesions. The   transverse colon was then elevated superiorly out the wound and   wrapped with a moist towel. A moist towel was rolled, and a lap pad   was then placed into the splenic flexure of the colon, which was   retracted posterior and laterally. The remainder of the small   bowel was reflected to the right exposing the infrarenal aorta. Sharp dissection of the ligament of Treitz and the distal   portion of the 4th duodenum was allowed to expose the aorta,   retracting small bowel to the right. Omni retractor was then   placed and was able to help exposure. Now, the retroperitoneum   overlying the aorta was incised. The incision was then continued   proximally to the level of the left renal vein.  Secondary to the   plaque being in the superior junctional renal, and the left kidney was   most likely nonfunctional. We decided to transect and ligate the   left renal vein leaving the large adrenal vein collateral. This   was ligated after getting control with clamps, and a double layer   of 4-0 Prolene suture was then used to close both limb. The IMV   vein was not encountered during dissection. Lymphatics over the   aortic neck were then ligated. Sharp dissection was made to   identify the left renal artery was occluded and the right renal,   which had a small on CT scan 2 right renal arteries, the more   distal one and the proximal one. We were able to get a suprarenal   dissection above both renal arteries and get essentially a good clamp,   which we did. This was adequate to be clamped. The suprarenal   aorta was adequate to be clamped through dissection. We   dissected down to the aortic bifurcation. A tunnel from each   common femoral artery to the aortic bifurcation was then created   with the index fingers staying posterior to the . The patient   was given 100 units per kilogram of heparin and also 25 grams of   mannitol. After heparin was then used to circulate, we were able   to make an arteriotomy about 4 cm distal to the left renal   artery with an 11 blade. The artery was occluded. With our clamp   in place, we transected aorta and removed the plaque with a long   clamp. On multiple passes. This was performed for about 3 or 4   minutes. Once we were able to get a majority of the plaque at   the orifice, we flushed it multiple times, and there was good   pulsatile flow. Once we felt that the aorta was clean, we then   removed the clamps from suprarenal to infrarenal This took   approximately 5 minutes. Then we brought into the field a 14 x 7   Dacron graft, which we trimmed, and we did an end-to-end   anastomosis using 3-0 Prolene sutures. Hemostasis was adequate. Once we released the clamps, there was a posterior bleeder that   required a couple 3-0 Prolene sutures, which stopped the   bleeding. We were happy with our proximal anastomosis, and we   packed it. Total suprarenal clamp time was 23 minutes.  Attention   was then focused on the femoral anastomosis. Both limbs were   then tunneled, and the common femoral, profunda and SFA were all   clamped. An 11 blade was then used to do an arteriotomy longitudinally   on the artery. We spatulated the end of the graft and formed the   end-to-side anastomosis using 5-0 Prolene suture. Prior to   completion of anastomosis, the distal clamp was released,   allowing for backbleeding of the superficial and profunda   arteries. The aortic clamp was released before flushing the graft. The anastomosis was irrigated with heparinized saline. The   clamps were then released, allowing flow first down into the   profunda, then to the superficial. There were Doppler signals in   the SFA and profunda, both sides. There was concern about high   pitched flow into the left common femoral artery. We brought into   the field an intraoperative duplex study, which confirmed   patency of the graft, no evidence of any thrombosis with good   Doppler signals, biphasic and SFA and the profunda. It was also   augmented on clamping proximal control. We then irrigated out   the patient and then reversed the patient with 50 mg of   protamine. Held pressure for 5 minutes. We then closed the   periaortic tissue over the aortic graft with 3-0 Vicryl sutures. The bowel was then placed back into the anatomical position. The   abdominal wall was then closed with a running 0 Prolene suture. The groins were then reapproximated and was closed with 3-0   Vicryls, 4-0 and staples. The patient was then extubated and sent   back to the PACU in stable condition.         MD oRnan Sevilla [facility] / Anat Baravento   D:  09/07/2017   14:52   T:  09/07/2017   17:05   Job #:  [id number]

## 2017-09-08 NOTE — ANESTHESIA PREPROCEDURE EVALUATION
Anesthetic History               Review of Systems / Medical History  Patient summary reviewed, nursing notes reviewed and pertinent labs reviewed    Pulmonary          Smoker (Stopped 11/2016)         Neuro/Psych       CVA: no residual symptoms       Cardiovascular    Hypertension  Valvular problems/murmurs (mild-mod): mitral insufficiency        Past MI (12/2016), CAD and PAD (aortic occlusion)    Exercise tolerance: <4 METS  Comments: EF 50-55%, cath 12/2016, RCA stenosis with collaterals, medical therapy  Denies changes in functional status   GI/Hepatic/Renal     GERD: well controlled    Renal disease: CRI       Endo/Other        Obesity and arthritis     Other Findings              Physical Exam    Airway  Mallampati: II  TM Distance: 4 - 6 cm  Neck ROM: normal range of motion, short neck   Mouth opening: Diminished (comment)     Cardiovascular  Regular rate and rhythm,  S1 and S2 normal,  no murmur, click, rub, or gallop  Rhythm: regular  Rate: normal         Dental    Dentition: Poor dentition     Pulmonary  Breath sounds clear to auscultation               Abdominal  GI exam deferred       Other Findings            Anesthetic Plan    ASA: 3, emergent  Anesthesia type: general    Monitoring Plan: Arterial line and CVP  Post-op pain plan if not by surgeon: indwelling epidural catheter  Post procedure ventilation   Induction: Intravenous  Anesthetic plan and risks discussed with: Patient      Re-exploration for ischemic leg

## 2017-09-08 NOTE — PROGRESS NOTES
Bedside shift report given to Su Hein RN, for continued care. Lines, gtts, and wounds verified. BP improving with increased neosynephrine. Continues with doppler pulses to both feet, skin warm, capillary refill <3 seconds. Assessment as charted.     Dr. Nohemy Parra at bedside for re-eval.

## 2017-09-08 NOTE — PROGRESS NOTES
Dr. Jerrod Bonilla updated - order received for Fentanyl for pain management, BMP with next labs tonight, increased NS rate to 150 mL/hr, clamp NGT and may dc if no nausea and less than 150 mL output after clamped for one hour. 2nd unit PRBCs transfusing currently. Pt updated on POC.

## 2017-09-08 NOTE — PROGRESS NOTES
Anesthesiology  Post-op Note    Post-op day 1 s/p  via epidural with neuraxial opioids for post-op pain management. Visit Vitals    /56 (BP 1 Location: Left arm, BP Patient Position: At rest)    Pulse 91    Temp 37.7 °C (99.8 °F)    Resp 20    Ht 5' 6\" (1.676 m)    Wt 98 kg (216 lb 1.6 oz)    SpO2 95%    BMI 34.88 kg/m2     Airway patent, patient appropriately hydrated and appears euvolemic. Patient is Alert and oriented. Pain is well controlled. Pruritus is well controlled. Nausea is well controlled. No complaints about back or site of injection. Motor and sensory function has returned to baseline in lower extremities. Patient is satisfied with anesthetic and reports no complications. Continue current treatment. Will check ptt 4 hours heparin drip stopped. Follow up per surgeon.

## 2017-09-08 NOTE — PROGRESS NOTES
Dual skin assessment completed with Pratibha Humphries, skin benign no signs of breakdown noted. Allevyn applied to sacrum at this time.

## 2017-09-08 NOTE — PROGRESS NOTES
TRANSFER - IN REPORT:    Verbal report received from OR Team(name) on Jayjay Aguiar  being received from 101(unit) for routine post - op      Report consisted of patients Situation, Background, Assessment and   Recommendations(SBAR). Information from the following report(s) SBAR, OR Summary, MAR, Recent Results and Med Rec Status was reviewed with the receiving nurse. Opportunity for questions and clarification was provided. Assessment completed upon patients arrival to unit and care assumed.

## 2017-09-08 NOTE — PROGRESS NOTES
Epidural infusion stopped by Dr. Kacy Smith. Pt aware of need to notify staff of need for pain medication.

## 2017-09-08 NOTE — PROGRESS NOTES
TRANSFER - OUT REPORT:    Verbal report given to OR team(name) on Janessa Stone  being transferred to OR(unit) for ordered procedure       Report consisted of patients Situation, Background, Assessment and   Recommendations(SBAR). Information from the following report(s) SBAR, Intake/Output, MAR, Recent Results and Cardiac Rhythm NSR was reviewed with the receiving nurse. Lines:   Quad Lumen 09/07/17 Right Internal jugular (Active)   Central Line Being Utilized Yes 9/7/2017  7:00 PM   Criteria for Appropriate Use Hemodynamically unstable, requiring monitoring lines, vasopressors, or volume resuscitation 9/7/2017  7:00 PM   Site Assessment Clean, dry, & intact 9/7/2017  7:00 PM   Infiltration Assessment 0 9/7/2017  7:00 PM   Affected Extremity/Extremities Color distal to insertion site pink (or appropriate for race); Pulses palpable 9/7/2017  7:00 PM   Date of Last Dressing Change 09/07/17 9/7/2017  7:00 PM   Dressing Status Clean, dry, & intact 9/7/2017  7:00 PM   Dressing Type Disk with Chlorhexadine gluconate (CHG); Transparent 9/7/2017  7:00 PM   Action Taken Dressing changed 9/7/2017  5:00 PM   Proximal Hub Color/Line Status White;Patent; Infusing 9/7/2017  7:00 PM   Positive Blood Return (Medial Site) Yes 9/7/2017  7:00 PM   Medial 1 Hub Color/Line Status Blue;Patent 9/7/2017  7:00 PM   Positive Blood Return (Lateral Site) Yes 9/7/2017  7:00 PM   Medial 2 Hub Color/Line Status Gray;Patent 9/7/2017  7:00 PM   Positive Blood Return (Site #3) Yes 9/7/2017  7:00 PM   Distal Hub Color/Line Status Brown;Patent 9/7/2017  7:00 PM   Positive Blood Return (Site #4) Yes 9/7/2017  7:00 PM       Peripheral IV 06/22/17 Left Hand (Active)       Peripheral IV 09/07/17 Left; Lower Arm (Active)   Site Assessment Clean, dry, & intact 9/7/2017  7:00 PM   Phlebitis Assessment 0 9/7/2017  7:00 PM   Infiltration Assessment 0 9/7/2017  7:00 PM   Dressing Status Clean, dry, & intact 9/7/2017  7:00 PM   Dressing Type Transparent;Tape 9/7/2017  7:00 PM   Hub Color/Line Status Green;Patent; Flushed 9/7/2017  7:00 PM   Action Taken Open ports on tubing capped 9/7/2017  3:03 PM       Arterial Line 09/07/17 Left Radial artery (Active)   Site Assessment Clean, dry, & intact 9/7/2017  7:00 PM   Dressing Status Clean, dry, & intact 9/7/2017  7:00 PM   Dressing Type Transparent;Tape 9/7/2017  7:00 PM   Line Status Intact and in place 9/7/2017  7:00 PM   Treatment Arm board off;Zeroed or re-zeroed 9/7/2017  5:00 PM   Affected Extremity/Extremities Color distal to insertion site pink (or appropriate for race); Pulses palpable 9/7/2017  7:00 PM        Opportunity for questions and clarification was provided.       Patient transported with:   Monitor  O2 @ 2 liters  Registered Nurse

## 2017-09-09 ENCOUNTER — APPOINTMENT (OUTPATIENT)
Dept: GENERAL RADIOLOGY | Age: 57
DRG: 169 | End: 2017-09-09
Attending: NURSE PRACTITIONER
Payer: MEDICAID

## 2017-09-09 LAB
ALBUMIN SERPL-MCNC: 1.9 G/DL (ref 3.5–5)
ALBUMIN/GLOB SERPL: 0.7 {RATIO} (ref 1.2–3.5)
ALP SERPL-CCNC: 36 U/L (ref 50–136)
ALT SERPL-CCNC: 11 U/L (ref 12–65)
AMORPH CRY URNS QL MICRO: ABNORMAL
ANION GAP SERPL CALC-SCNC: 11 MMOL/L (ref 7–16)
APPEARANCE UR: ABNORMAL
APTT PPP: 37.1 SEC (ref 23.5–31.7)
APTT PPP: 52.7 SEC (ref 23.5–31.7)
AST SERPL-CCNC: 44 U/L (ref 15–37)
BACTERIA URNS QL MICRO: 0 /HPF
BILIRUB DIRECT SERPL-MCNC: 0.2 MG/DL
BILIRUB SERPL-MCNC: 0.5 MG/DL (ref 0.2–1.1)
BILIRUB UR QL: NEGATIVE
BUN SERPL-MCNC: 31 MG/DL (ref 6–23)
CALCIUM SERPL-MCNC: 6.8 MG/DL (ref 8.3–10.4)
CHLORIDE SERPL-SCNC: 119 MMOL/L (ref 98–107)
CK SERPL-CCNC: 1610 U/L (ref 21–215)
CO2 SERPL-SCNC: 17 MMOL/L (ref 21–32)
COLOR UR: YELLOW
CREAT SERPL-MCNC: 3.11 MG/DL (ref 0.8–1.5)
CREAT UR-MCNC: 197 MG/DL
EPI CELLS #/AREA URNS HPF: ABNORMAL /HPF
ERYTHROCYTE [DISTWIDTH] IN BLOOD BY AUTOMATED COUNT: 16.2 % (ref 11.9–14.6)
ERYTHROCYTE [DISTWIDTH] IN BLOOD BY AUTOMATED COUNT: 16.2 % (ref 11.9–14.6)
ERYTHROCYTE [DISTWIDTH] IN BLOOD BY AUTOMATED COUNT: 17.6 % (ref 11.9–14.6)
GLOBULIN SER CALC-MCNC: 2.8 G/DL (ref 2.3–3.5)
GLUCOSE SERPL-MCNC: 135 MG/DL (ref 65–100)
GLUCOSE UR STRIP.AUTO-MCNC: NEGATIVE MG/DL
HCT VFR BLD AUTO: 21.9 % (ref 41.1–50.3)
HCT VFR BLD AUTO: 26.6 % (ref 41.1–50.3)
HCT VFR BLD AUTO: 27.1 % (ref 41.1–50.3)
HGB BLD-MCNC: 7.3 G/DL (ref 13.6–17.2)
HGB BLD-MCNC: 9 G/DL (ref 13.6–17.2)
HGB BLD-MCNC: 9.1 G/DL (ref 13.6–17.2)
HGB UR QL STRIP: ABNORMAL
KETONES UR QL STRIP.AUTO: NEGATIVE MG/DL
LACTATE SERPL-SCNC: 1.4 MMOL/L (ref 0.4–2)
LEUKOCYTE ESTERASE UR QL STRIP.AUTO: NEGATIVE
MAGNESIUM SERPL-MCNC: 1.9 MG/DL (ref 1.8–2.4)
MCH RBC QN AUTO: 29.2 PG (ref 26.1–32.9)
MCH RBC QN AUTO: 30.2 PG (ref 26.1–32.9)
MCH RBC QN AUTO: 30.3 PG (ref 26.1–32.9)
MCHC RBC AUTO-ENTMCNC: 33.2 G/DL (ref 31.4–35)
MCHC RBC AUTO-ENTMCNC: 33.3 G/DL (ref 31.4–35)
MCHC RBC AUTO-ENTMCNC: 34.2 G/DL (ref 31.4–35)
MCV RBC AUTO: 88 FL (ref 79.6–97.8)
MCV RBC AUTO: 88.4 FL (ref 79.6–97.8)
MCV RBC AUTO: 90.9 FL (ref 79.6–97.8)
MM INDURATION POC: 0 MM (ref 0–5)
NITRITE UR QL STRIP.AUTO: NEGATIVE
PH UR STRIP: 5 [PH] (ref 5–9)
PLATELET # BLD AUTO: 105 K/UL (ref 150–450)
PLATELET # BLD AUTO: 137 K/UL (ref 150–450)
PLATELET # BLD AUTO: 99 K/UL (ref 150–450)
PMV BLD AUTO: 10.1 FL (ref 10.8–14.1)
PMV BLD AUTO: 10.2 FL (ref 10.8–14.1)
PMV BLD AUTO: 10.4 FL (ref 10.8–14.1)
POTASSIUM SERPL-SCNC: 4.7 MMOL/L (ref 3.5–5.1)
PPD POC: NEGATIVE NEGATIVE
PROT SERPL-MCNC: 4.7 G/DL (ref 6.3–8.2)
PROT UR STRIP-MCNC: ABNORMAL MG/DL
PROT UR-MCNC: 55 MG/DL
PROT/CREAT UR-RTO: 0.3
RBC # BLD AUTO: 2.41 M/UL (ref 4.23–5.67)
RBC # BLD AUTO: 3.01 M/UL (ref 4.23–5.67)
RBC # BLD AUTO: 3.08 M/UL (ref 4.23–5.67)
RBC #/AREA URNS HPF: ABNORMAL /HPF
SODIUM SERPL-SCNC: 147 MMOL/L (ref 136–145)
SP GR UR REFRACTOMETRY: 1.02 (ref 1–1.02)
UROBILINOGEN UR QL STRIP.AUTO: 0.2 EU/DL (ref 0.2–1)
WBC # BLD AUTO: 10.6 K/UL (ref 4.3–11.1)
WBC # BLD AUTO: 17.6 K/UL (ref 4.3–11.1)
WBC # BLD AUTO: 8.6 K/UL (ref 4.3–11.1)
WBC URNS QL MICRO: ABNORMAL /HPF
YEAST URNS QL MICRO: ABNORMAL

## 2017-09-09 PROCEDURE — 74011000250 HC RX REV CODE- 250: Performed by: SURGERY

## 2017-09-09 PROCEDURE — 77030013064 HC TRNSF BLD FENW -A

## 2017-09-09 PROCEDURE — C9113 INJ PANTOPRAZOLE SODIUM, VIA: HCPCS | Performed by: SURGERY

## 2017-09-09 PROCEDURE — 77030019605

## 2017-09-09 PROCEDURE — 74011000258 HC RX REV CODE- 258: Performed by: SURGERY

## 2017-09-09 PROCEDURE — 74011250636 HC RX REV CODE- 250/636: Performed by: SURGERY

## 2017-09-09 PROCEDURE — 77030032994 HC SOL INJ SOD CL 0.9% LFCR 500ML

## 2017-09-09 PROCEDURE — P9016 RBC LEUKOCYTES REDUCED: HCPCS | Performed by: ANESTHESIOLOGY

## 2017-09-09 PROCEDURE — 77030021907 HC KT URIN FOL O&M -A

## 2017-09-09 PROCEDURE — 65610000006 HC RM INTENSIVE CARE

## 2017-09-09 PROCEDURE — 76040000025: Performed by: INTERNAL MEDICINE

## 2017-09-09 PROCEDURE — 99153 MOD SED SAME PHYS/QHP EA: CPT | Performed by: INTERNAL MEDICINE

## 2017-09-09 PROCEDURE — 74011250637 HC RX REV CODE- 250/637: Performed by: SURGERY

## 2017-09-09 PROCEDURE — 85027 COMPLETE CBC AUTOMATED: CPT | Performed by: SURGERY

## 2017-09-09 PROCEDURE — 94760 N-INVAS EAR/PLS OXIMETRY 1: CPT

## 2017-09-09 PROCEDURE — 36600 WITHDRAWAL OF ARTERIAL BLOOD: CPT

## 2017-09-09 PROCEDURE — 85730 THROMBOPLASTIN TIME PARTIAL: CPT | Performed by: SURGERY

## 2017-09-09 PROCEDURE — 74000 XR ABD (KUB): CPT

## 2017-09-09 PROCEDURE — 83605 ASSAY OF LACTIC ACID: CPT | Performed by: SURGERY

## 2017-09-09 PROCEDURE — 83735 ASSAY OF MAGNESIUM: CPT | Performed by: SURGERY

## 2017-09-09 PROCEDURE — 77030019938 HC TBNG IV PCA ICUM -A

## 2017-09-09 PROCEDURE — 0DJD8ZZ INSPECTION OF LOWER INTESTINAL TRACT, VIA NATURAL OR ARTIFICIAL OPENING ENDOSCOPIC: ICD-10-PCS | Performed by: INTERNAL MEDICINE

## 2017-09-09 PROCEDURE — 36592 COLLECT BLOOD FROM PICC: CPT

## 2017-09-09 PROCEDURE — 84156 ASSAY OF PROTEIN URINE: CPT | Performed by: INTERNAL MEDICINE

## 2017-09-09 PROCEDURE — 74011250636 HC RX REV CODE- 250/636

## 2017-09-09 PROCEDURE — 81001 URINALYSIS AUTO W/SCOPE: CPT | Performed by: INTERNAL MEDICINE

## 2017-09-09 PROCEDURE — 80048 BASIC METABOLIC PNL TOTAL CA: CPT | Performed by: SURGERY

## 2017-09-09 PROCEDURE — 82550 ASSAY OF CK (CPK): CPT | Performed by: SURGERY

## 2017-09-09 PROCEDURE — G0500 MOD SEDAT ENDO SERVICE >5YRS: HCPCS | Performed by: INTERNAL MEDICINE

## 2017-09-09 PROCEDURE — 36430 TRANSFUSION BLD/BLD COMPNT: CPT

## 2017-09-09 PROCEDURE — 80076 HEPATIC FUNCTION PANEL: CPT | Performed by: SURGERY

## 2017-09-09 RX ORDER — FLUMAZENIL 0.1 MG/ML
0.2 INJECTION INTRAVENOUS
Status: DISCONTINUED | OUTPATIENT
Start: 2017-09-09 | End: 2017-09-25

## 2017-09-09 RX ORDER — MIDAZOLAM HYDROCHLORIDE 1 MG/ML
.25-5 INJECTION, SOLUTION INTRAMUSCULAR; INTRAVENOUS
Status: DISCONTINUED | OUTPATIENT
Start: 2017-09-09 | End: 2017-09-25

## 2017-09-09 RX ORDER — MORPHINE SULFATE 5 MG/ML
INJECTION, SOLUTION INTRAVENOUS CONTINUOUS
Status: DISCONTINUED | OUTPATIENT
Start: 2017-09-09 | End: 2017-09-12

## 2017-09-09 RX ORDER — DIPHENHYDRAMINE HYDROCHLORIDE 50 MG/ML
50 INJECTION, SOLUTION INTRAMUSCULAR; INTRAVENOUS ONCE
Status: ACTIVE | OUTPATIENT
Start: 2017-09-09 | End: 2017-09-09

## 2017-09-09 RX ORDER — HEPARIN SODIUM 5000 [USP'U]/ML
35 INJECTION, SOLUTION INTRAVENOUS; SUBCUTANEOUS ONCE
Status: COMPLETED | OUTPATIENT
Start: 2017-09-09 | End: 2017-09-09

## 2017-09-09 RX ORDER — SODIUM CHLORIDE 9 MG/ML
250 INJECTION, SOLUTION INTRAVENOUS AS NEEDED
Status: DISCONTINUED | OUTPATIENT
Start: 2017-09-09 | End: 2017-09-29 | Stop reason: HOSPADM

## 2017-09-09 RX ORDER — FENTANYL CITRATE 50 UG/ML
50 INJECTION, SOLUTION INTRAMUSCULAR; INTRAVENOUS
Status: DISCONTINUED | OUTPATIENT
Start: 2017-09-09 | End: 2017-09-25

## 2017-09-09 RX ORDER — NALOXONE HYDROCHLORIDE 0.4 MG/ML
0.4 INJECTION, SOLUTION INTRAMUSCULAR; INTRAVENOUS; SUBCUTANEOUS
Status: DISCONTINUED | OUTPATIENT
Start: 2017-09-09 | End: 2017-09-29 | Stop reason: HOSPADM

## 2017-09-09 RX ORDER — SODIUM CHLORIDE 9 MG/ML
75 INJECTION, SOLUTION INTRAVENOUS CONTINUOUS
Status: DISCONTINUED | OUTPATIENT
Start: 2017-09-09 | End: 2017-09-09

## 2017-09-09 RX ADMIN — PIPERACILLIN SODIUM,TAZOBACTAM SODIUM 3.38 G: 3; .375 INJECTION, POWDER, FOR SOLUTION INTRAVENOUS at 15:23

## 2017-09-09 RX ADMIN — Medication 10 ML: at 22:21

## 2017-09-09 RX ADMIN — FENTANYL CITRATE 50 MCG: 50 INJECTION, SOLUTION INTRAMUSCULAR; INTRAVENOUS at 11:01

## 2017-09-09 RX ADMIN — CALCIUM GLUCONATE 3 G: 94 INJECTION, SOLUTION INTRAVENOUS at 09:31

## 2017-09-09 RX ADMIN — SODIUM CHLORIDE 40 MG: 9 INJECTION INTRAMUSCULAR; INTRAVENOUS; SUBCUTANEOUS at 09:39

## 2017-09-09 RX ADMIN — MIDAZOLAM HYDROCHLORIDE 2 MG: 1 INJECTION, SOLUTION INTRAMUSCULAR; INTRAVENOUS at 11:00

## 2017-09-09 RX ADMIN — CEFAZOLIN 2 G: 1 INJECTION, POWDER, FOR SOLUTION INTRAMUSCULAR; INTRAVENOUS; PARENTERAL at 05:57

## 2017-09-09 RX ADMIN — SODIUM CHLORIDE 150 ML/HR: 900 INJECTION, SOLUTION INTRAVENOUS at 21:17

## 2017-09-09 RX ADMIN — SODIUM CHLORIDE 250 ML: 900 INJECTION, SOLUTION INTRAVENOUS at 10:00

## 2017-09-09 RX ADMIN — MORPHINE SULFATE: 5 INJECTION, SOLUTION INTRAVENOUS at 13:56

## 2017-09-09 RX ADMIN — HEPARIN SODIUM 3550 UNITS: 5000 INJECTION, SOLUTION INTRAVENOUS; SUBCUTANEOUS at 05:57

## 2017-09-09 RX ADMIN — FENTANYL CITRATE 50 MCG: 50 INJECTION, SOLUTION INTRAMUSCULAR; INTRAVENOUS at 12:26

## 2017-09-09 RX ADMIN — MORPHINE SULFATE 2 MG: 2 INJECTION, SOLUTION INTRAMUSCULAR; INTRAVENOUS at 13:49

## 2017-09-09 RX ADMIN — PIPERACILLIN SODIUM,TAZOBACTAM SODIUM 3.38 G: 3; .375 INJECTION, POWDER, FOR SOLUTION INTRAVENOUS at 22:23

## 2017-09-09 RX ADMIN — ASPIRIN 81 MG: 81 TABLET, COATED ORAL at 09:39

## 2017-09-09 RX ADMIN — FENTANYL CITRATE 25 MCG: 50 INJECTION, SOLUTION INTRAMUSCULAR; INTRAVENOUS at 11:06

## 2017-09-09 RX ADMIN — Medication 10 ML: at 15:23

## 2017-09-09 RX ADMIN — CHLORHEXIDINE GLUCONATE 15 ML: 1.2 RINSE ORAL at 04:38

## 2017-09-09 RX ADMIN — Medication 10 ML: at 06:01

## 2017-09-09 RX ADMIN — SODIUM CHLORIDE 500 ML: 900 INJECTION, SOLUTION INTRAVENOUS at 11:37

## 2017-09-09 RX ADMIN — SODIUM CHLORIDE 150 ML/HR: 900 INJECTION, SOLUTION INTRAVENOUS at 01:49

## 2017-09-09 RX ADMIN — PHENYLEPHRINE HYDROCHLORIDE 60 MCG/MIN: 10 INJECTION INTRAVENOUS at 03:38

## 2017-09-09 RX ADMIN — SODIUM CHLORIDE 150 ML/HR: 900 INJECTION, SOLUTION INTRAVENOUS at 09:37

## 2017-09-09 NOTE — PROGRESS NOTES
Occupational Therapy Note: Orders received, chart reviewed, and eval attempted. RUBIN Pinto reported pt is undergoing a scope procedure right now and asked therapies to check back on pt after 2pm today as MD does want pt to attempt getting up to a chair later today. Will check back on pt as time and schedule allows.   Thank you for this referral.  Juan Alfaro MS, OTR/L - 1100

## 2017-09-09 NOTE — CONSULTS
Renal Consult    Subjective:     Yamini Torres is a 61 y.o.  male who is being seen for HARPER on CKD. He has a hx of CKD with estimated GFR of 30ml/min with long hx of vascular disease and HTN. Never seen nephrology. He was evaluated by Dr. Jerrod Bonilla with total aortic occlusion and underwent aortobifem bypass. At the time of surgery the left kidney was atrophic and for technical surgical reasons, the left renal vein ligated to explore more proximally. Subsequently he needed an additional procedure with left femoral embolectomy, during which he has 100mL of iv contrast.  There was some transient hypotension. He has been continually volume resusciatated. He was on doris for some vasopressor issues, but  Now off. Creatinine is 3.1 up from baseline 1.8-2.0. He is still making urine.  .     Past Medical History:   Diagnosis Date    Acute diastolic (congestive) heart failure (HCC) 12/4/2016    LVEF 55-55% 12/4/16    CAD (coronary artery disease)     in one vessel, unable to stent 12/2016- \"collateral vessels\" per heart cath    Chronic kidney disease, stage 3     Chronic pain     low back and bilateral lower extremeties    Ex-smoker     QUIT 11/2016    1 pk/day x 40 yrs    GERD (gastroesophageal reflux disease)     takes an occassional zantac, OTC    Hx-TIA (transient ischemic attack)     2 mild strokes, patient states it was \"yrs ago\" , no residual weakness    Hypercholesteremia     managed with medication     Hypertension     managed with meds    Old MI (myocardial infarction) 11/2016    Osteoarthritis     generalized      Past Surgical History:   Procedure Laterality Date    COLONOSCOPY N/A 6/22/2017    COLONOSCOPY  BMI 31 performed by Isabel Dai MD at [address] [place] [address]  12/2016    unable to stent- 1 ves blockage    HX KNEE ARTHROSCOPY Left     L knee surgery x 2     Family History   Problem Relation Age of Onset    Stroke Mother      multiple    No Known Problems Father     Diabetes Sister     Hypertension Brother     Hypertension Sister     Heart Disease Sister       Social History   Substance Use Topics    Smoking status: Former Smoker     Packs/day: 1.00     Years: 42.00     Quit date: 11/25/2016    Smokeless tobacco: Never Used    Alcohol use No       Current Facility-Administered Medications   Medication Dose Route Frequency Provider Last Rate Last Dose    piperacillin-tazobactam (ZOSYN) 3.375 g in 0.9% sodium chloride (MBP/ADV) 100 mL  3.375 g IntraVENous Q8H Qasim Johnson MD        0.9% sodium chloride infusion  75 mL/hr IntraVENous CONTINUOUS Preston Fletcher MD        midazolam (VERSED) injection 0.25-5 mg  0.25-5 mg IntraVENous Multiple Preston Fletcher MD   2 mg at 09/09/17 1100    fentaNYL citrate (PF) injection 50 mcg  50 mcg IntraVENous Multiple Preston Fletcher MD   25 mcg at 09/09/17 1106    naloxone (NARCAN) injection 0.4 mg  0.4 mg IntraVENous Multiple Preston Fletcher MD        flumazenil (ROMAZICON) 0.1 mg/mL injection 0.2 mg  0.2 mg IntraVENous Multiple Preston Fletcher MD        diphenhydrAMINE (BENADRYL) injection 50 mg  50 mg IntraVENous ONCE Preston Fletcher MD        heparin 25,000 units in dextrose 500 mL infusion  18-36 Units/kg/hr IntraVENous TITRATE Qasim Johnson MD   Stopped at 09/09/17 0729    0.9% sodium chloride infusion 250 mL  250 mL IntraVENous PRN Qasim Johnson MD        influenza vaccine 2015-16 (36mos+)(PF) (FLUZONE/FLUARIX QUAD) injection 0.5 mL  0.5 mL IntraMUSCular PRIOR TO DISCHARGE Qasim Johnson MD   Stopped at 09/08/17 1816    fentaNYL citrate (PF) injection 50 mcg  50 mcg IntraVENous Q1H PRN Qasim Johnson MD   50 mcg at 09/08/17 2024    sodium chloride (NS) flush 5-10 mL  5-10 mL IntraVENous PRN Julián Ferguson MD        [facility]ron [facility]) injection 4 mg  4 mg IntraVENous PRN Julián Ferguson MD        nalbuphine (NUBAIN) injection 5 mg  5 mg IntraVENous Q6H PRN Julián Ferguson MD   5 mg at 09/08/17 7588    diphenhydrAMINE (BENADRYL) injection 12.5 mg  12.5 mg IntraVENous Q6H PRN Jake Amaro MD        ropivacaine (PF) (NAROPIN) 0.1 %, fentaNYL citrate (PF) 5 mcg/mL in 0.9% sodium chloride 500 mL epidural infusion   Epidural CONTINUOUS Manuela Juarez MD 6 mL/hr at 09/09/17 0715      aspirin delayed-release tablet 81 mg  81 mg Oral 7am Manuela Juarez MD   81 mg at 09/09/17 0939    nitroglycerin (NITROSTAT) tablet 0.4 mg  0.4 mg SubLINGual Q5MIN PRN Manuela Juarez MD        sodium chloride (NS) flush 5-10 mL  5-10 mL IntraVENous Q8H Manuela Juarez MD   10 mL at 09/09/17 0601    sodium chloride (NS) flush 5-10 mL  5-10 mL IntraVENous PRN Manuela Juarez MD        ondansetron [facility]) injection 4 mg  4 mg IntraVENous Q4H PRN Manuela Juarez MD   4 mg at 09/07/17 1613    0.9% sodium chloride infusion  150 mL/hr IntraVENous CONTINUOUS Manuela Juarez  mL/hr at 09/09/17 0937 150 mL/hr at 09/09/17 0937    pantoprazole (PROTONIX) 40 mg in sodium chloride 0.9 % 10 mL injection  40 mg IntraVENous DAILY Manuela Juarez MD   40 mg at 09/09/17 9177    morphine injection 2 mg  2 mg IntraVENous Q1H PRN Manuela Juarez MD   2 mg at 09/08/17 1611    PHENYLephrine (NEOSYNEPHRINE) 30,000 mcg in 0.9% sodium chloride 250 mL infusion   mcg/min IntraVENous TITRATE Manuela Juarez MD   Stopped at 09/09/17 0830    chlorhexidine (PERIDEX) 0.12 % mouthwash 15 mL  15 mL Oral PRN Manuela Juarez MD   15 mL at 09/09/17 0438        No Known Allergies     Review of Systems:  Review of systems not obtained due to patient factors. Objective: Intake and Output:       09/07 1901 - 09/09 0700  In: 27693 [I.V.:9831.3]  Out: 1402 [RQKIF:293]    Physical Exam:   General appearance: no distress,some sedation but nods to quesitons. Head: atraumatic  Eyes: conjunctivae/corneas clear.   Nose: no discharge  Throat: Lips, mucosa, and tongue dry  Neck: supple, symmetrical, trachea midline and no JVD  Lungs: coarse to auscultation bilaterally  Heart: regular rate and rhythm, S1, S2, no pericardial friction rub  Abdomen: distended.    Extremities: 2+ edema, feet warm  Skin:  No rashes or lesions          Data Review:   Recent Results (from the past 24 hour(s))   METABOLIC PANEL, BASIC    Collection Time: 09/08/17  5:28 PM   Result Value Ref Range    Sodium 146 (H) 136 - 145 mmol/L    Potassium 4.7 3.5 - 5.1 mmol/L    Chloride 117 (H) 98 - 107 mmol/L    CO2 18 (L) 21 - 32 mmol/L    Anion gap 11 7 - 16 mmol/L    Glucose 145 (H) 65 - 100 mg/dL    BUN 28 (H) 6 - 23 MG/DL    Creatinine 2.96 (H) 0.8 - 1.5 MG/DL    GFR est AA 28 (L) >60 ml/min/1.73m2    GFR est non-AA 23 (L) >60 ml/min/1.73m2    Calcium 7.0 (L) 8.3 - 10.4 MG/DL   PTT    Collection Time: 09/08/17  5:28 PM   Result Value Ref Range    aPTT 94.5 (HH) 23.5 - 31.7 SEC   LACTIC ACID    Collection Time: 09/08/17  5:28 PM   Result Value Ref Range    Lactic acid 1.8 0.4 - 2.0 MMOL/L   PLEASE READ & DOCUMENT PPD TEST IN 24 HRS    Collection Time: 09/08/17  9:01 PM   Result Value Ref Range    PPD NEG Negative    mm Induration 0 mm   PTT    Collection Time: 09/08/17 11:14 PM   Result Value Ref Range    aPTT 66.8 (H) 23.5 - 31.7 SEC   CBC W/O DIFF    Collection Time: 09/09/17  5:09 AM   Result Value Ref Range    WBC 17.6 (H) 4.3 - 11.1 K/uL    RBC 3.01 (L) 4.23 - 5.67 M/uL    HGB 9.1 (L) 13.6 - 17.2 g/dL    HCT 26.6 (L) 41.1 - 50.3 %    MCV 88.4 79.6 - 97.8 FL    MCH 30.2 26.1 - 32.9 PG    MCHC 34.2 31.4 - 35.0 g/dL    RDW 16.2 (H) 11.9 - 14.6 %    PLATELET 528 (L) 052 - 450 K/uL    MPV 10.1 (L) 10.8 - 02.4 FL   METABOLIC PANEL, BASIC    Collection Time: 09/09/17  5:09 AM   Result Value Ref Range    Sodium 147 (H) 136 - 145 mmol/L    Potassium 4.7 3.5 - 5.1 mmol/L    Chloride 119 (H) 98 - 107 mmol/L    CO2 17 (L) 21 - 32 mmol/L    Anion gap 11 7 - 16 mmol/L    Glucose 135 (H) 65 - 100 mg/dL    BUN 31 (H) 6 - 23 MG/DL    Creatinine 3.11 (H) 0.8 - 1.5 MG/DL    GFR est AA 27 (L) >60 ml/min/1.73m2    GFR est non-AA 22 (L) >60 ml/min/1.73m2    Calcium 6.8 (L) 8.3 - 10.4 MG/DL   MAGNESIUM    Collection Time: 09/09/17  5:09 AM   Result Value Ref Range    Magnesium 1.9 1.8 - 2.4 mg/dL   PTT    Collection Time: 09/09/17  5:09 AM   Result Value Ref Range    aPTT 52.7 (H) 23.5 - 31.7 SEC   HEPATIC FUNCTION PANEL    Collection Time: 09/09/17  5:09 AM   Result Value Ref Range    Protein, total 4.7 (L) 6.3 - 8.2 g/dL    Albumin 1.9 (L) 3.5 - 5.0 g/dL    Globulin 2.8 2.3 - 3.5 g/dL    A-G Ratio 0.7 (L) 1.2 - 3.5      Bilirubin, total 0.5 0.2 - 1.1 MG/DL    Bilirubin, direct 0.2 <0.4 MG/DL    Alk. phosphatase 36 (L) 50 - 136 U/L    AST (SGOT) 44 (H) 15 - 37 U/L    ALT (SGPT) 11 (L) 12 - 65 U/L   LACTIC ACID    Collection Time: 09/09/17  8:11 AM   Result Value Ref Range    Lactic acid 1.4 0.4 - 2.0 MMOL/L   PTT    Collection Time: 09/09/17 11:03 AM   Result Value Ref Range    aPTT 37.1 (H) 23.5 - 31.7 SEC           Assessment:     Principal Problem:    Occlusion of aorta (HCC) (3/3/2017)    Active Problems:    Ischemia of left lower extremity (9/8/2017)      Claudication ([address] (9/8/2017)        Plan:     Acute Kidney Injury on CKD. Multiple insults with vascular surgery, hypotension, and contrast.   Currently non-oliguric and hemodynamic are ok. Loss of the atrophic kidney is not contributing to the HARPER. Will be following closely for volume and electrolyte issues. Certainly he is at high risk for possible dialysis but no need currently. Will check urine studies. I appreciate Dr. Maddy Carrera allowing me to participate in his care.          Signed By: Jane Jin MD     September 9, 2017

## 2017-09-09 NOTE — PROGRESS NOTES
Daughter updated that pt received 2 mg Morphine IV push, PCA is set up and within 10 min pt administered another dose. He's more calm and hopeful that he will get some sleep. Daughter will call for updates.

## 2017-09-09 NOTE — CONSULTS
[facility] Consult Note    Lamine Eagle 1960       Primary GI Physician:     Referring Physician:  Jarek Cárdenas    Consult Date:  9/9/2017    Admit Date:  9/7/2017    Chief Complaint:  Anemia, abd pain    Subjective:     History of Present Illness:  Patient is a 61 y.o. male seen in consultation at the request of Dr. Cristina Flood for evaluation of anemia and abdominal pain. He was admitted for aortic bifemoral bypass and left renal artery bypass on 9/7/17. He underwent initial procedure that day, with return to OR 9/8/17 for left femoral embolectomy. He has had a drop in hgb from 11.2 to 9.1 since admission, and has complained of new onset abdominal pain. He has been placed on heparin gtt for anti-coagulation; he has been noted hypotensive intermittently in the last 24 hours. Consideration for flex sig is requested by Dr Cristina Flood for concerns of ischemic bowel. Patient reports diffuse abdominal pain, worse in the LLQ, pressure-like in sensation. He has had no stool since admission 9/7/17. He has an epidural for pain control, which is to be removed this morning. Lactic acid today is 1.4, down from 2.6 yesterday morning. He has had no imaging this morning. Abd is firm and distended but nursing does not feel this has changed since initial admission exam.  Review of record reveals EGD and colonoscopy in June, 2017, with Dr Ayan Acevedo with negative EGD and hyperplastic polyp removed on colonoscopy.            PMH:  Past Medical History:   Diagnosis Date    Acute diastolic (congestive) heart failure ([address] 12/4/2016    LVEF 55-55% 12/4/16    CAD (coronary artery disease)     in one vessel, unable to stent 12/2016- \"collateral vessels\" per heart cath    Chronic kidney disease, stage 3     Chronic pain     low back and bilateral lower extremeties    Ex-smoker     QUIT 11/2016    1 pk/day x 40 yrs    GERD (gastroesophageal reflux disease)     takes an occassional zantac, OTC    Hx-TIA (transient ischemic attack)     2 mild strokes, patient states it was \"yrs ago\" , no residual weakness    Hypercholesteremia     managed with medication     Hypertension     managed with meds    Old MI (myocardial infarction) 11/2016    Osteoarthritis     generalized       PSH:  Past Surgical History:   Procedure Laterality Date    COLONOSCOPY N/A 6/22/2017    COLONOSCOPY  BMI 31 performed by Olga Brito MD at [address]  12/2016    unable to stent- 1 ves blockage    HX KNEE ARTHROSCOPY Left     L knee surgery x 2       Allergies:  No Known Allergies    Home Medications:  Prior to Admission medications    Medication Sig Start Date End Date Taking? Authorizing Provider   HYDROcodone-acetaminophen (NORCO)  mg tablet Take 1 Tab by mouth every eight (8) hours as needed for Pain. Max Daily Amount: 3 Tabs. Indications: Pain 9/5/17  Yes Bandar Rogel MD   carvedilol (COREG) 12.5 mg tablet Take 1 Tab by mouth two (2) times daily (with meals). 8/30/17  Yes Addis Ruby MD   docusate sodium (STOOL SOFTENER) 100 mg capsule Take 100 mg by mouth as needed for Constipation. Yes Historical Provider   raNITIdine (ZANTAC) 150 mg tablet Take 150 mg by mouth as needed for Indigestion. Yes Historical Provider   aspirin delayed-release 81 mg tablet Take 81 mg by mouth every morning. 12/6/16  Yes SANGEETA Nichole   pravastatin (PRAVACHOL) 20 mg tablet Take 1 Tab by mouth nightly. Indications: hyperlipidemia 8/30/17   Addis Ruby MD   nitroglycerin (NITROSTAT) 0.4 mg SL tablet 1 Tab by SubLINGual route every five (5) minutes as needed for Chest Pain. Patient taking differently: 0.4 mg by SubLINGual route every five (5) minutes as needed for Chest Pain (\"I took it one time back in March\" per pt 6/21/17).  12/6/16   Seymour Turner, [address] Medications:  Current Facility-Administered Medications   Medication Dose Route Frequency    calcium gluconate 3 g in 0.9% sodium chloride 250 mL  3 g IntraVENous ONCE    heparin 25,000 units in dextrose 500 mL infusion  18-36 Units/kg/hr IntraVENous TITRATE    ceFAZolin in 0.9% NS (ANCEF) IVPB soln 2 g  2 g IntraVENous Q8H    0.9% sodium chloride infusion 250 mL  250 mL IntraVENous PRN    influenza vaccine 2015-16 (36mos+)(PF) (FLUZONE/FLUARIX QUAD) injection 0.5 mL  0.5 mL IntraMUSCular PRIOR TO DISCHARGE    fentaNYL citrate (PF) injection 50 mcg  50 mcg IntraVENous Q1H PRN    sodium chloride (NS) flush 5-10 mL  5-10 mL IntraVENous PRN    ondansetron (ZOFRAN) injection 4 mg  4 mg IntraVENous PRN    nalbuphine (NUBAIN) injection 5 mg  5 mg IntraVENous Q6H PRN    diphenhydrAMINE (BENADRYL) injection 12.5 mg  12.5 mg IntraVENous Q6H PRN    ropivacaine (PF) (NAROPIN) 0.1 %, fentaNYL citrate (PF) 5 mcg/mL in 0.9% sodium chloride 500 mL epidural infusion   Epidural CONTINUOUS    aspirin delayed-release tablet 81 mg  81 mg Oral 7am    nitroglycerin (NITROSTAT) tablet 0.4 mg  0.4 mg SubLINGual Q5MIN PRN    sodium chloride (NS) flush 5-10 mL  5-10 mL IntraVENous Q8H    sodium chloride (NS) flush 5-10 mL  5-10 mL IntraVENous PRN    ondansetron (ZOFRAN) injection 4 mg  4 mg IntraVENous Q4H PRN    0.9% sodium chloride infusion  150 mL/hr IntraVENous CONTINUOUS    pantoprazole (PROTONIX) 40 mg in sodium chloride 0.9 % 10 mL injection  40 mg IntraVENous DAILY    morphine injection 2 mg  2 mg IntraVENous Q1H PRN    PHENYLephrine (NEOSYNEPHRINE) 30,000 mcg in 0.9% sodium chloride 250 mL infusion   mcg/min IntraVENous TITRATE    chlorhexidine (PERIDEX) 0.12 % mouthwash 15 mL  15 mL Oral PRN       Social History:  Social History   Substance Use Topics    Smoking status: Former Smoker     Packs/day: 1.00     Years: 42.00     Quit date: 11/25/2016    Smokeless tobacco: Never Used    Alcohol use No       Pt denies any history of drug use, blood transfusions, or tattoos.     Family History:  Family History Problem Relation Age of Onset    Stroke Mother      multiple    No Known Problems Father     Diabetes Sister     Hypertension Brother     Hypertension Sister     Heart Disease Sister        Review of Systems:  A detailed 10 system ROS is obtained, with pertinent positives as listed above. All others are negative. Diet:  npo    Objective:     Physical Exam:  Vitals:  Visit Vitals    /58    Pulse (!) 112    Temp 100.4 °F (38 °C)    Resp 27    Ht 5' 6\" (1.676 m)    Wt 102 kg (224 lb 14.4 oz)    SpO2 93%    BMI 36.3 kg/m2     Gen:  Pt is alert, appears mildly uncomfortable  Skin:  Extremities and face reveal no rashes. Sweaty/clammy to touch. HEENT: Sclerae anicteric. Extra-occular muscles are intact. No oral ulcers. No abnormal pigmentation of the lips. The neck is supple. Cardiovascular: Regular rate and rhythm. No murmurs, gallops, or rubs. Respiratory:  Comfortable breathing with no accessory muscle use. Clear breath sounds anteriorly with no wheezes, rales, or rhonchi. GI:  Abdomen distended, firm with generalized tenderness, worse to the LLQ. Faint bowel sounds. No enlargement of the liver or spleen. No masses palpable. Rectal:  Deferred  Neurological:  Gross memory appears intact. Patient is alert and oriented. Psychiatric:  Mood appears appropriate with judgement intact. Lymphatic:  No cervical or supraclavicular adenopathy.     Laboratory:    Recent Labs      09/09/17   0509  09/08/17   2314  09/08/17   1728  09/08/17   1131  09/08/17   0635  09/08/17   0310  09/07/17   1512   WBC  17.6*   --    --    --    --   15.3*  14.7*   HGB  9.1*   --    --   8.1*   --   10.4*  11.2*   HCT  26.6*   --    --   23.7*   --   30.0*  32.4*   PLT  137*   --    --    --    --   180  202   MCV  88.4   --    --    --    --   90.1  90.3   NA  147*   --   146*   --    --   143  143   K  4.7   --   4.7  4.7   --   5.5*  5.4*   CL  119*   --   117*   --    --   113*  113*   CO2  17*   --   18* --    --   19*  21   BUN  31*   --   28*   --    --   25*  23   CREA  3.11*   --   2.96*   --    --   2.39*  2.02*   CA  6.8*   --   7.0*   --    --   7.3*  7.1*   MG  1.9   --    --    --    --   1.5*   --    GLU  135*   --   145*   --    --   180*  142*   APTT  52.7*  66.8*  94.5*  >110.0*  >110.0*   --    --           Assessment:     Principal Problem:    Occlusion of aorta (HCC) (3/3/2017)    Active Problems:    Ischemia of left lower extremity (9/8/2017)      Claudication ([address] (9/8/2017)    Abdominal distention    Anemia    Plan:     60 yo male is seen in consultation for evaluation of anemia and abdominal pain with distention, admitted 9/7 for aortic fem bypass with return to surgery 9/8 for embolectomy. He has had hypotension intermittently since that time with concern for bowel ischemia per vascular surgery. Abd is distended on exam with discomfort generally, worse in the LLQ. Hgb 9.1 today. Heparin gtt is on hold for epidural extraction later this morning. Etiology of pain may be related to ischemia, ileus or even possibly bowel perforation. 1.  KUB now  2. Plan flex sig at bedside this morning, pending KUB results, prior to epidural extraction  3. Further recommendations to be based on findings of above    Patient is seen and examined in collaboration with Dr. Carol Marshall. Assessment and plan as per Dr. Carol Marshall.   Oly Lim NP

## 2017-09-09 NOTE — PROGRESS NOTES
Dr. Nacho Veronica called r/t patients pain control. MD at bedside and turned patients epidural back on. Will continue to monitor patient closely.

## 2017-09-09 NOTE — PROGRESS NOTES
Occupational Therapy Note:  Returned after 2 pm and RN reported pt was not ready to participate in therapy yet. Will check back tomorrow as time and schedule allows.   Conrado Fair MS, OTR/L - [id number]

## 2017-09-09 NOTE — PROGRESS NOTES
PT NOTE    Reviewed chart and discussed treatment w/ RN who stated pt was not appropriate for therapy at this time. Will attempt to treat at a later date/time as schedule allows.     Kristine Patricia PTA  9/9/2017

## 2017-09-09 NOTE — PROGRESS NOTES
Dr. Nohemy Parra called regarding patient's continued complaint of pain 9 out of 10. Orders received to add basal dose of PCA morphine, see EMAR. Also informed of current -180. Orders received to draw CBC at 2000.

## 2017-09-09 NOTE — PROGRESS NOTES
Bedside and Verbal shift change report given to self (oncoming nurse) by Jimmy Caraballo RN (offgoing nurse). Report included the following information SBAR, Kardex, MAR, Recent Results and Cardiac Rhythm NSR.      Gtts verified with off going RN

## 2017-09-09 NOTE — PROGRESS NOTES
Epidural rounding report    60 yo male POD #2 who underwent aorto-bifem with subsequent thrombectomy. Epidural infusion has been turned off due to hypotension. Surgeon requests catheter to be removed. Patient off heparin for > 4hrs. PTT <40. Catheter removed with tip intact. Alerted staff that heparin should not be restarted for 2 more hrs.       Visit Vitals    /59    Pulse (!) 116    Temp 38 °C (100.4 °F)    Resp 24    Ht 5' 6\" (1.676 m)    Wt 102 kg (224 lb 14.4 oz)    SpO2 94%    BMI 36.3 kg/m2

## 2017-09-09 NOTE — PROGRESS NOTES
With persistent hypotension, and unchanging abdominal pain will consult GI for possible flex sig evaluate distal colon. Patient had a recent colonoscopy in June 2017 as he was having hematochezia by Dr. Jorge Raman, which just showed polyps. Will recheck lactate and increased will consult general surgery for evaluation. I have again talked with patient in detail about the severity of the situation and the possible need for reoperation and the mortality and morbidity associated with that.       Sahu Pain

## 2017-09-09 NOTE — PROCEDURES
COLONOSCOPY    DATE of PROCEDURE: 9/9/2017    INDICATION: abd pain, recent vascular surgery, check for gangrene/ischemia    POSTPROCEDURE DIAGNOSIS: endoscopically mild ischemic colitis    MEDICATION:   Fentanyl 75 mcg, Versed 2 mg iv    INSTRUMENT: CF    PROCEDURE: After obtaining informed consent, the patient was placed in the left lateral position and sedated. The endoscope was advanced to the cecum where the appendiceal orifice and ileocecal valve were identified. On withdrawal, the colon was carefully visualized in a 360 degree fashion, looking between folds and proximal and distal aspect of the folds. The patient was taken to the recovery area in stable condition. FINDINGS:  Rectum: normal  Sigmoid: a few small < = 1 cm shallow ulcers with minimal surrounding erythema, but most of the surrounding mucosa normal appearing with No bluish or purplish hue to suggest necrosis; no luminal edema; no luminal narrowing.   Descending Colon: a few small < = 1 cm shallow ulcers with minimal surrounding erythema, but most of the surrounding mucosa normal appearing with No bluish or purplish hue to suggest necrosis; no luminal edema; no luminal narrowing  Splenic flexure: normal  Transverse Colon: a few small < = 1 cm shallow ulcers with minimal surrounding erythema, but most of the surrounding mucosa normal appearing with No bluish or purplish hue to suggest necrosis; no luminal edema; no luminal narrowing  Ascending Colon: a few small < = 1 cm shallow ulcers with minimal surrounding erythema, but most of the surrounding mucosa normal appearing with No bluish or purplish hue to suggest necrosis; no luminal edema; no luminal narrowing  Cecum: normal  Terminal ileum: no ulcers    Bowel Prep: despite not undergoing a bowel prep, the colon was clear to visualization  Estimated blood loss: 0-minimal   Specimens obtained during procedure: none    ASSESSMENT/PLAN:  Overall, endoscopically mild diffuse ischemic colitis, but no endoscopic evidence of necrosis. In my experience, based on what I see endoscopically, most of the patients I have had with this do not have the severity of abd pain he reports. Evaluate for other sources. I understand he has chronic back pains. Also, the epidural is being removed. PCA pump to be started for his surgical/incisional pain. Will sign off, but if GI symptoms emerge, please call us so we can re-evaluate. Thank you.

## 2017-09-09 NOTE — PROGRESS NOTES
Bedside report given to Augustine Parker, primary RN at this time. Procedure findings discussed. She will monitor patient during recovery phase.

## 2017-09-09 NOTE — PROGRESS NOTES
Dr. Ever Dick at bedside to remove epidural. Epidural removed without incident. Will continue to monitor site and patient hemodynamics closely.

## 2017-09-09 NOTE — PROGRESS NOTES
[address]   [address] FAX: [phone]         VASCULAR SURGERY FLOOR PROGRESS NOTE    Admit Date: 2017  POD: 2 Days Post-Op    Procedure:  Procedure(s):  left femoral embolectomy/left lower extremity arteriogram    Subjective:     Patient has no new complaints. Objective:     Vitals:  Blood pressure 126/58, pulse (!) 113, temperature 99.4 °F (37.4 °C), resp. rate (!) 32, height 5' 6\" (1.676 m), weight 224 lb 14.4 oz (102 kg), SpO2 90 %. Temp (24hrs), Av.5 °F (37.5 °C), Min:98.1 °F (36.7 °C), Max:100.1 °F (37.8 °C)      Intake / Output:    Intake/Output Summary (Last 24 hours) at 17 0805  Last data filed at 17 0600   Gross per 24 hour   Intake          7890.94 ml   Output              613 ml   Net          7277.94 ml       Physical Exam:    Constitutional: he appears well-developed. No distress. HENT:   Head: Atraumatic. Eyes: Pupils are equal, round, and reactive to light. Neck: Normal range of motion. Cardiovascular: Regular rhythm. Pulmonary/Chest: Effort normal and breath sounds normal. No respiratory distress. Abdominal: Soft. Bowel sounds are normal. he exhibits no distension. There is no tenderness. There is no guarding. No hernia. Musculoskeletal: Normal range of motion. Neurological: He is alert.  CN II- XII grossly intact  Vascular: Bilateral feet are warm with dopplerable pedal pulses    Labs:   Recent Labs      17   0509  17   1728  17   1131  17   0310   HGB  9.1*   --   8.1*  10.4*   WBC  17.6*   --    --   15.3*   K  4.7  4.7  4.7  5.5*   GLU  135*  145*   --   180*       Data Review     Assessment:     Patient Active Problem List    Diagnosis Date Noted    Ischemia of left lower extremity 2017    Claudication ([facility] [address] 2017    Occlusion of aorta ([facility] [address] 2017    Chronic left-sided low back pain 2017    Essential hypertension 2016    Renal insufficiency 12/16/2016    Dyslipidemia 12/04/2016       Plan/Recommendations/Medical Decision Making:     -Renal function slightly worsened from baseline GFR of 36 to now 22 with creatinine of 3.1. Patient making minimal  adequate urine output 25 cc an hour-we will continue normal saline at 150 an hour consult nephrology for further assistance, patient has a chronically occluded left renal artery and atrophic left kidney    -Lower extremity well-perfused, will stop heparin for 4 hours to remove epidural  -Persistent hypotensive now on 50 mics of doris-, abdominal pain tender will continue to trend lactate, if pain worsening we will consult general surgery for concern of ischemic bowel, patient has a chronically occluded left GILBERT with collaterals from his SMA which are patent on last CT scan  -Dispo-patient still critically ill requiring ICU admission on pressors. We will continue fluid resuscitation. Again had a long discussion with patient about his critical state with concerns of worsening kidney function and if persistent hypotension concerns of possible ischemic gut with patient high risk for morbidity and mortality. Elements of this note have been dictated using speech recognition software. As a result, errors of speech recognition may have occurred.

## 2017-09-10 LAB
ANION GAP SERPL CALC-SCNC: 11 MMOL/L (ref 7–16)
ANION GAP SERPL CALC-SCNC: 8 MMOL/L (ref 7–16)
BUN SERPL-MCNC: 30 MG/DL (ref 6–23)
BUN SERPL-MCNC: 34 MG/DL (ref 6–23)
CALCIUM SERPL-MCNC: 7.6 MG/DL (ref 8.3–10.4)
CALCIUM SERPL-MCNC: 7.7 MG/DL (ref 8.3–10.4)
CHLORIDE SERPL-SCNC: 118 MMOL/L (ref 98–107)
CHLORIDE SERPL-SCNC: 121 MMOL/L (ref 98–107)
CO2 SERPL-SCNC: 18 MMOL/L (ref 21–32)
CO2 SERPL-SCNC: 20 MMOL/L (ref 21–32)
CREAT SERPL-MCNC: 2.48 MG/DL (ref 0.8–1.5)
CREAT SERPL-MCNC: 2.74 MG/DL (ref 0.8–1.5)
ERYTHROCYTE [DISTWIDTH] IN BLOOD BY AUTOMATED COUNT: 17.5 % (ref 11.9–14.6)
ERYTHROCYTE [DISTWIDTH] IN BLOOD BY AUTOMATED COUNT: 17.7 % (ref 11.9–14.6)
GLUCOSE SERPL-MCNC: 116 MG/DL (ref 65–100)
GLUCOSE SERPL-MCNC: 122 MG/DL (ref 65–100)
HCT VFR BLD AUTO: 23.8 % (ref 41.1–50.3)
HCT VFR BLD AUTO: 27.6 % (ref 41.1–50.3)
HGB BLD-MCNC: 8 G/DL (ref 13.6–17.2)
HGB BLD-MCNC: 9.2 G/DL (ref 13.6–17.2)
MCH RBC QN AUTO: 29.1 PG (ref 26.1–32.9)
MCH RBC QN AUTO: 29.4 PG (ref 26.1–32.9)
MCHC RBC AUTO-ENTMCNC: 33.3 G/DL (ref 31.4–35)
MCHC RBC AUTO-ENTMCNC: 33.6 G/DL (ref 31.4–35)
MCV RBC AUTO: 87.3 FL (ref 79.6–97.8)
MCV RBC AUTO: 87.5 FL (ref 79.6–97.8)
PLATELET # BLD AUTO: 107 K/UL (ref 150–450)
PLATELET # BLD AUTO: 95 K/UL (ref 150–450)
PMV BLD AUTO: 10 FL (ref 10.8–14.1)
PMV BLD AUTO: 9.9 FL (ref 10.8–14.1)
POTASSIUM SERPL-SCNC: 4.7 MMOL/L (ref 3.5–5.1)
POTASSIUM SERPL-SCNC: 5 MMOL/L (ref 3.5–5.1)
RBC # BLD AUTO: 2.72 M/UL (ref 4.23–5.67)
RBC # BLD AUTO: 3.16 M/UL (ref 4.23–5.67)
SODIUM SERPL-SCNC: 146 MMOL/L (ref 136–145)
SODIUM SERPL-SCNC: 150 MMOL/L (ref 136–145)
WBC # BLD AUTO: 6.3 K/UL (ref 4.3–11.1)
WBC # BLD AUTO: 6.8 K/UL (ref 4.3–11.1)

## 2017-09-10 PROCEDURE — 74011000250 HC RX REV CODE- 250: Performed by: SURGERY

## 2017-09-10 PROCEDURE — 65610000006 HC RM INTENSIVE CARE

## 2017-09-10 PROCEDURE — 77010033678 HC OXYGEN DAILY

## 2017-09-10 PROCEDURE — C9113 INJ PANTOPRAZOLE SODIUM, VIA: HCPCS | Performed by: SURGERY

## 2017-09-10 PROCEDURE — 87340 HEPATITIS B SURFACE AG IA: CPT | Performed by: INTERNAL MEDICINE

## 2017-09-10 PROCEDURE — 85027 COMPLETE CBC AUTOMATED: CPT | Performed by: INTERNAL MEDICINE

## 2017-09-10 PROCEDURE — 94760 N-INVAS EAR/PLS OXIMETRY 1: CPT

## 2017-09-10 PROCEDURE — 97530 THERAPEUTIC ACTIVITIES: CPT

## 2017-09-10 PROCEDURE — 74011000258 HC RX REV CODE- 258: Performed by: SURGERY

## 2017-09-10 PROCEDURE — 74011250636 HC RX REV CODE- 250/636: Performed by: SURGERY

## 2017-09-10 PROCEDURE — 80048 BASIC METABOLIC PNL TOTAL CA: CPT | Performed by: SURGERY

## 2017-09-10 PROCEDURE — 80048 BASIC METABOLIC PNL TOTAL CA: CPT | Performed by: INTERNAL MEDICINE

## 2017-09-10 PROCEDURE — 94640 AIRWAY INHALATION TREATMENT: CPT

## 2017-09-10 PROCEDURE — 36592 COLLECT BLOOD FROM PICC: CPT

## 2017-09-10 PROCEDURE — 74011250637 HC RX REV CODE- 250/637: Performed by: SURGERY

## 2017-09-10 PROCEDURE — 97167 OT EVAL HIGH COMPLEX 60 MIN: CPT

## 2017-09-10 RX ORDER — OXYCODONE HCL 10 MG/1
10 TABLET, FILM COATED, EXTENDED RELEASE ORAL EVERY 12 HOURS
Status: DISCONTINUED | OUTPATIENT
Start: 2017-09-10 | End: 2017-09-11

## 2017-09-10 RX ORDER — LEVALBUTEROL INHALATION SOLUTION 1.25 MG/3ML
0.63 SOLUTION RESPIRATORY (INHALATION)
Status: DISCONTINUED | OUTPATIENT
Start: 2017-09-10 | End: 2017-09-13

## 2017-09-10 RX ORDER — CARVEDILOL 12.5 MG/1
12.5 TABLET ORAL 2 TIMES DAILY WITH MEALS
Status: DISCONTINUED | OUTPATIENT
Start: 2017-09-10 | End: 2017-09-13

## 2017-09-10 RX ORDER — HEPARIN SODIUM 5000 [USP'U]/ML
5000 INJECTION, SOLUTION INTRAVENOUS; SUBCUTANEOUS EVERY 8 HOURS
Status: DISCONTINUED | OUTPATIENT
Start: 2017-09-10 | End: 2017-09-29 | Stop reason: HOSPADM

## 2017-09-10 RX ORDER — DEXTROSE MONOHYDRATE AND SODIUM CHLORIDE 5; .45 G/100ML; G/100ML
75 INJECTION, SOLUTION INTRAVENOUS CONTINUOUS
Status: DISCONTINUED | OUTPATIENT
Start: 2017-09-10 | End: 2017-09-12

## 2017-09-10 RX ADMIN — ASPIRIN 81 MG: 81 TABLET, COATED ORAL at 07:18

## 2017-09-10 RX ADMIN — Medication 10 ML: at 21:21

## 2017-09-10 RX ADMIN — LEVALBUTEROL HYDROCHLORIDE 0.63 MG: 1.25 SOLUTION RESPIRATORY (INHALATION) at 15:30

## 2017-09-10 RX ADMIN — DEXTROSE MONOHYDRATE AND SODIUM CHLORIDE 75 ML/HR: 5; .45 INJECTION, SOLUTION INTRAVENOUS at 07:08

## 2017-09-10 RX ADMIN — LEVALBUTEROL HYDROCHLORIDE 0.63 MG: 1.25 SOLUTION RESPIRATORY (INHALATION) at 04:14

## 2017-09-10 RX ADMIN — LEVALBUTEROL HYDROCHLORIDE 0.63 MG: 1.25 SOLUTION RESPIRATORY (INHALATION) at 11:00

## 2017-09-10 RX ADMIN — CALCIUM GLUCONATE 2 G: 94 INJECTION, SOLUTION INTRAVENOUS at 07:19

## 2017-09-10 RX ADMIN — CARVEDILOL 12.5 MG: 12.5 TABLET, FILM COATED ORAL at 07:18

## 2017-09-10 RX ADMIN — PIPERACILLIN SODIUM,TAZOBACTAM SODIUM 3.38 G: 3; .375 INJECTION, POWDER, FOR SOLUTION INTRAVENOUS at 05:51

## 2017-09-10 RX ADMIN — CARVEDILOL 12.5 MG: 12.5 TABLET, FILM COATED ORAL at 17:45

## 2017-09-10 RX ADMIN — Medication 10 ML: at 14:13

## 2017-09-10 RX ADMIN — OXYCODONE HYDROCHLORIDE 10 MG: 10 TABLET, FILM COATED, EXTENDED RELEASE ORAL at 21:21

## 2017-09-10 RX ADMIN — OXYCODONE HYDROCHLORIDE 10 MG: 10 TABLET, FILM COATED, EXTENDED RELEASE ORAL at 08:24

## 2017-09-10 RX ADMIN — LEVALBUTEROL HYDROCHLORIDE 0.63 MG: 1.25 SOLUTION RESPIRATORY (INHALATION) at 19:27

## 2017-09-10 RX ADMIN — SODIUM CHLORIDE 40 MG: 9 INJECTION INTRAMUSCULAR; INTRAVENOUS; SUBCUTANEOUS at 08:24

## 2017-09-10 RX ADMIN — Medication 10 ML: at 05:50

## 2017-09-10 RX ADMIN — HEPARIN SODIUM 5000 UNITS: 5000 INJECTION, SOLUTION INTRAVENOUS; SUBCUTANEOUS at 07:18

## 2017-09-10 RX ADMIN — PIPERACILLIN SODIUM,TAZOBACTAM SODIUM 3.38 G: 3; .375 INJECTION, POWDER, FOR SOLUTION INTRAVENOUS at 14:15

## 2017-09-10 RX ADMIN — HEPARIN SODIUM 5000 UNITS: 5000 INJECTION, SOLUTION INTRAVENOUS; SUBCUTANEOUS at 23:10

## 2017-09-10 RX ADMIN — HEPARIN SODIUM 5000 UNITS: 5000 INJECTION, SOLUTION INTRAVENOUS; SUBCUTANEOUS at 14:14

## 2017-09-10 RX ADMIN — PIPERACILLIN SODIUM,TAZOBACTAM SODIUM 3.38 G: 3; .375 INJECTION, POWDER, FOR SOLUTION INTRAVENOUS at 21:21

## 2017-09-10 RX ADMIN — LEVALBUTEROL HYDROCHLORIDE 0.63 MG: 1.25 SOLUTION RESPIRATORY (INHALATION) at 07:42

## 2017-09-10 NOTE — PROGRESS NOTES
Bedside shift report given to Bobo Carpenter RN, for continued care. Lines, gtts, and wounds verified. PCA pump verified and cleared. New orders communicated.

## 2017-09-10 NOTE — PROGRESS NOTES
Full assessment completed as documented. Dr. Apple Morales notified of findings below:    - Temperature trending up, now 101.4 temporal. Orders to continue zosyn as scheduled. - Coarse rhonchi throughout, bibasilar expiratory wheezing, cough effort weak and non-productive. Orders to begin chest physiotherapy tomorrow. - Abdomen and BLE taut, blisters developing, some opening. Orders to continue to monitor as UOP now trending up. - Arterial line waveform remains dampened, not correlating with NIBP, unable to aspirate blood. Orders to remove. - Heparin remains on hold. Orders for CBC and BMP in a.m.

## 2017-09-10 NOTE — PROGRESS NOTES
Dr. Hakan May at bedside for rounds. Dressings removed by MD. Foster Goes of incisions well approximated, blisters with serous drainage. Xeroform applied to excoriation to left groin blisters, abd pads to bilateral groins. Will obtain telfa pads from OR for further dressing. See MD orders for further details.

## 2017-09-10 NOTE — PROGRESS NOTES
Visit with patient to build rapport with . Patient is calm with loving family at bedside. Family thankful that we are mindful of their presence.     Beni Ahuja,  Staff   C: [phone]  /  [email or web address]

## 2017-09-10 NOTE — PROGRESS NOTES
Problem: Self Care Deficits Care Plan (Adult)  Goal: *Acute Goals and Plan of Care (Insert Text)  GOALS:    1: Pt will perform toileting with maximal assistance and adaptive equipment as needed. 2: Pt will perform grooming with minimal assistance and adaptive equipment as needed. 3: Pt will tolerate sitting edge of bed for 5 minutes for ADL prep.  4: Pt will perform toilet transfers with moderate assistance and the least restrictive device to promote quality of life. 5: Pt will tolerate 15 minutes of therapeutic activity with minimal rest breaks. Time Frame: 7 visits      OCCUPATIONAL THERAPY: Initial Assessment 9/10/2017  INPATIENT: Hospital Day: 4  Payor: Conchis Shay / Plan: [facility] % / Product Type: [id number] /      NAME/AGE/GENDER: Joe Kee is a 61 y.o. male     PRIMARY DIAGNOSIS:  Aortic occlusion ([address] [I74.10]  Ischemic colitis ([facility] [address] [K55.9] Occlusion of aorta (HCC) Occlusion of aorta (HCC)  Procedure(s) (LRB):  SIGMOIDOSCOPY FLEXIBLE (N/A)  1 Day Post-Op  ICD-10: Treatment Diagnosis:        · Generalized Muscle Weakness (M62.81)  · Dizziness and Giddiness (R42)   Precautions/Allergies:         Review of patient's allergies indicates no known allergies. ASSESSMENT:      Mr. Arelis Ocampo presents in ICU with bed in chair position, RN x 2 and PT present, pt agreeable to OT assessment. Pt is oriented x 4 and is s/p the above; presents with severe decrease in activity tolerance and increased overall weakness; decreased independence for self care, functional mobility, and ADL's. Mr. Inez Donis requires maximal assistance x 2 to stand for >4 minutes while RNs assist in brief and pad change. He attempts to assist with standing. Pt returned to sitting with continued verbal cueing for breathing techniques. Mr. Inez Donis requires skilled OT to maximize independence with self care tasks and functional transfers, educate on energy conservation.   Pt left with RNs x 2 and all needs in reach, pt instructed to call for assistance; RN aware. Pt may benefit from OT/PT co-tx secondary to decreased activity tolerance and decreased functional performance. Recommend rehab post acute setting. This section established at most recent assessment   PROBLEM LIST (Impairments causing functional limitations):  1. Decreased Strength  2. Decreased ADL/Functional Activities  3. Decreased Transfer Abilities  4. Decreased Ambulation Ability/Technique  5. Decreased Balance  6. Increased Pain  7. Decreased Activity Tolerance  8. Decreased Work Simplification/Energy Conservation Techniques  9. Increased Fatigue  10. Increased Shortness of Breath  11. Decreased Flexibility/Joint Mobility    INTERVENTIONS PLANNED: (Benefits and precautions of occupational therapy have been discussed with the patient.)  1. Activities of daily living training  2. Adaptive equipment training  3. Clothing management  4. Donning&doffing training  5. Group therapy  6. Therapeutic activity  7. Therapeutic exercise  8. Energy conservation      TREATMENT PLAN: Frequency/Duration: Follow patient 3x/week to address above goals. Rehabilitation Potential For Stated Goals: GOOD      RECOMMENDED REHABILITATION/EQUIPMENT: (at time of discharge pending progress): Due to the probability of continued deficits (see above) this patient will likely need continued skilled occupational therapy after discharge. Equipment:   · to be determined                      OCCUPATIONAL PROFILE AND HISTORY:   History of Present Injury/Illness (Reason for Referral):  See H&P  Past Medical History/Comorbidities:   Mr. Anaya Kwong  has a past medical history of Acute diastolic (congestive) heart failure ([facility] [address] (12/4/2016); CAD (coronary artery disease); Chronic kidney disease, stage 3; Chronic pain; Ex-smoker; GERD (gastroesophageal reflux disease); TIA (transient ischemic attack); Hypercholesteremia; Hypertension; Old MI (myocardial infarction) (11/2016); and Osteoarthritis.   Mr. Anaya Kwong has a past surgical history that includes heart catheterization (12/2016); colonoscopy (N/A, 6/22/2017); knee arthroscopy (Left); and flexible sigmoidoscopy (N/A, 9/9/2017). Social History/Living Environment:   Home Environment: Private residence  # Steps to Enter: 6  Rails to Enter: Yes  One/Two Story Residence: One story  Living Alone: Yes  Support Systems: Child(tisha)  Patient Expects to be Discharged to[de-identified] Unknown  Current DME Used/Available at Home: Cane, straight  Tub or Shower Type: Shower  Prior Level of Function/Work/Activity:  Works, drives, Independent with self care. Number of Personal Factors/Comorbidities that affect the Plan of Care: Expanded review of therapy/medical records (1-2):  MODERATE COMPLEXITY   ASSESSMENT OF OCCUPATIONAL PERFORMANCE[de-identified]   Activities of Daily Living:           Basic ADLs (From Assessment) Complex ADLs (From Assessment)   Basic ADL  Feeding: Minimum assistance  Oral Facial Hygiene/Grooming: Maximum assistance  Bathing: Total assistance  Upper Body Dressing: Maximum assistance  Lower Body Dressing: Total assistance  Toileting: Maximum assistance     Grooming/Bathing/Dressing Activities of Daily Living     Cognitive Retraining  Safety/Judgement: Fall prevention                       Bed/Mat Mobility  Sit to Stand: Maximum assistance;Assist x2; Additional time          Most Recent Physical Functioning:   Gross Assessment:  Strength: Generally decreased, functional               Posture:  Posture (WDL): Exceptions to WDL  Balance:  Sitting: Impaired  Sitting - Static: Poor (constant support)  Sitting - Dynamic: Poor (constant support)  Standing: Impaired  Standing - Static: Poor Bed Mobility:     Wheelchair Mobility:     Transfers:  Sit to Stand: Maximum assistance;Assist x2; Additional time  Stand to Sit: Maximum assistance;Assist x2; Additional time                    Patient Vitals for the past 6 hrs:       BP BP Patient Position SpO2 O2 Flow Rate (L/min) Pulse   09/10/17 1400 - - 95 % - (!) 109   09/10/17 1408 132/60 - - - (!) 102   09/10/17 1500 - At rest 99 % - 91   09/10/17 1600 - - 99 % - 86   09/10/17 1641 155/65 - 97 % - 96   09/10/17 1659 129/60 - 98 % - 90   09/10/17 1700 - - 98 % - 89   09/10/17 1800 172/81 - 96 % - 96   09/10/17 1801 - - 99 % 3 l/min -   09/10/17 1929 - - 98 % 3 l/min -        Mental Status  Neurologic State: Alert  Orientation Level: Oriented X4  Cognition: Appropriate for age attention/concentration  Perception: Cues to maintain midline in standing  Perseveration: No perseveration noted  Safety/Judgement: Fall prevention                               Physical Skills Involved:  1. Range of Motion  2. Balance  3. Strength  4. Activity Tolerance  5. Pain (acute) Cognitive Skills Affected (resulting in the inability to perform in a timely and safe manner):  1. WFLs Psychosocial Skills Affected:  1. Habits/Routines  2. Environmental Adaptation  3. Self-Awareness   Number of elements that affect the Plan of Care: 5+:  HIGH COMPLEXITY   CLINICAL DECISION MAKIN[address] [place] [id number] AM-PAC 6 Clicks   Daily Activity Inpatient Short Form  How much help from another person does the patient currently need. .. Total A Lot A Little None   1. Putting on and taking off regular lower body clothing? [X] 1   [ ] 2   [ ] 3   [ ] 4   2. Bathing (including washing, rinsing, drying)? [X] 1   [ ] 2   [ ] 3   [ ] 4   3. Toileting, which includes using toilet, bedpan or urinal?   [X] 1   [ ] 2   [ ] 3   [ ] 4   4. Putting on and taking off regular upper body clothing? [X] 1   [ ] 2   [ ] 3   [ ] 4   5. Taking care of personal grooming such as brushing teeth? [ ] 1   [X] 2   [ ] 3   [ ] 4   6. Eating meals? [ ] 1   [X] 2   [ ] 3   [ ] 4   © , Trustees of [address] [place] [address] [address] [address], under license to [employer].  All rights reserved    Score:  Initial: 8 Most Recent: X (Date: -- )     Interpretation of Tool:  Represents activities that are increasingly more difficult (i.e. Bed mobility, Transfers, Gait). Score 24 23 22-20 19-15 14-10 9-7 6       Modifier CH CI CJ CK CL CM CN         · Self Care:               - CURRENT STATUS:    CM - 80%-99% impaired, limited or restricted               - GOAL STATUS:           CK - 40%-59% impaired, limited or restricted               - D/C STATUS:                       ---------------To be determined---------------  Payor: LEISA / Plan: [facility] % / Product Type: Leisa /       Medical Necessity:     · Patient demonstrates good rehab potential due to higher previous functional level. Reason for Services/Other Comments:  · Patient continues to require skilled intervention due to medical complications and patient unable to attend/participate in therapy as expected. Use of outcome tool(s) and clinical judgement create a POC that gives a: HIGH COMPLEXITY             TREATMENT:   (In addition to Assessment/Re-Assessment sessions the following treatments were rendered)      Pre-treatment Symptoms/Complaints:  Decreased ability to perform ADLs, self care, and functional mobility; decreased tolerance for activities  Pain: Initial:   Pain Intensity 1: 5  Post Session:  Appears to be in less pain, too tired to answer      Assessment/Reassessment only, no treatment provided today     Braces/Orthotics/Lines/Etc:   · IV  · lopez catheter  · arterial line  · O2 Device: Nasal cannula  Treatment/Session Assessment:    · Response to Treatment:  Agrees to therapy  · Interdisciplinary Collaboration:  · Physical Therapist  · Occupational Therapist  · Registered Nurse  · After treatment position/precautions:  · Up in chair  · Bed/Chair-wheels locked  · Nurse at bedside  · Compliance with Program/Exercises: Will assess as treatment progresses. · Recommendations/Intent for next treatment session: \"Next visit will focus on advancements to more challenging activities and reduction in assistance provided\".   Total Treatment Duration:  OT Patient Time In/Time Out  Time In: 1346  Time Out: [address], OTR/L

## 2017-09-10 NOTE — PROGRESS NOTES
Renal Progress Note    Admission Date: 9/7/2017   Subjective:      More awake and alert    Objective:     Physical Exam:    Patient Vitals for the past 8 hrs:   BP Temp Pulse Resp SpO2 Weight   09/10/17 0742 - - - - 96 % -   09/10/17 0729 144/75 - (!) 106 17 96 % -   09/10/17 0714 144/72 - (!) 108 16 98 % -   09/10/17 0700 - 99.3 °F (37.4 °C) (!) 108 17 97 % -   09/10/17 0659 152/64 - (!) 109 16 98 % -   09/10/17 0559 138/65 - (!) 108 15 96 % -   09/10/17 0544 150/73 - (!) 109 17 94 % -   09/10/17 0529 153/73 - (!) 110 17 95 % -   09/10/17 0514 164/71 - (!) 109 19 97 % -   09/10/17 0459 149/68 99 °F (37.2 °C) (!) 108 17 96 % -   09/10/17 0444 159/71 - (!) 108 18 96 % -   09/10/17 0429 141/70 - (!) 112 16 95 % -   09/10/17 0415 - - - - 95 % -   09/10/17 0414 173/82 - (!) 111 19 95 % -   09/10/17 0359 168/79 - (!) 109 18 95 % -   09/10/17 0344 172/74 - (!) 111 19 94 % -   09/10/17 0329 171/82 - (!) 111 18 94 % -   09/10/17 0314 172/78 - (!) 112 27 93 % -   09/10/17 0259 174/72 99.5 °F (37.5 °C) (!) 111 24 93 % 107.6 kg (237 lb 3.2 oz)   09/10/17 0244 182/85 - (!) 107 18 94 % -   09/10/17 0229 169/79 - (!) 106 18 94 % -   09/10/17 0214 167/79 - (!) 108 19 94 % -   09/10/17 0159 159/78 - (!) 108 21 94 % -   09/10/17 0144 157/74 - (!) 108 25 94 % -   09/10/17 0129 175/81 - (!) 108 21 94 % -   09/10/17 0114 169/78 - (!) 107 19 95 % -   09/10/17 0100 170/79 99 °F (37.2 °C) (!) 108 19 95 % -   09/10/17 0044 163/70 - (!) 108 18 93 % -   09/10/17 0029 170/79 - (!) 109 27 91 % -      Gen: comfortable , NAD  CV: S1, S2  Lungs: Coarse bilaterally  Extem: 2+ edema     Current Facility-Administered Medications   Medication Dose Route Frequency    levalbuterol (XOPENEX) nebulizer soln 1.25 mg/3 mL  0.63 mg Nebulization Q4H RT    carvedilol (COREG) tablet 12.5 mg  12.5 mg Oral BID WITH MEALS    heparin (porcine) injection 5,000 Units  5,000 Units SubCUTAneous Q8H    dextrose 5 % - 0.45% NaCl infusion  75 mL/hr IntraVENous CONTINUOUS    oxyCODONE ER (OxyCONTIN) tablet 10 mg  10 mg Oral Q12H    midazolam (VERSED) injection 0.25-5 mg  0.25-5 mg IntraVENous Multiple    fentaNYL citrate (PF) injection 50 mcg  50 mcg IntraVENous Multiple    naloxone (NARCAN) injection 0.4 mg  0.4 mg IntraVENous Multiple    flumazenil (ROMAZICON) 0.1 mg/mL injection 0.2 mg  0.2 mg IntraVENous Multiple    morphine (PF)  mg/30 ml   IntraVENous CONTINUOUS    0.9% sodium chloride infusion 250 mL  250 mL IntraVENous PRN    piperacillin-tazobactam (ZOSYN) 3.375 g in 0.9% sodium chloride (MBP/ADV) 100 mL MBP  3.375 g IntraVENous Q8H    0.9% sodium chloride infusion 250 mL  250 mL IntraVENous PRN    influenza vaccine 2015-16 (36mos+)(PF) (FLUZONE/FLUARIX QUAD) injection 0.5 mL  0.5 mL IntraMUSCular PRIOR TO DISCHARGE    fentaNYL citrate (PF) injection 50 mcg  50 mcg IntraVENous Q1H PRN    sodium chloride (NS) flush 5-10 mL  5-10 mL IntraVENous PRN    ondansetron (ZOFRAN) injection 4 mg  4 mg IntraVENous PRN    nalbuphine (NUBAIN) injection 5 mg  5 mg IntraVENous Q6H PRN    diphenhydrAMINE (BENADRYL) injection 12.5 mg  12.5 mg IntraVENous Q6H PRN    aspirin delayed-release tablet 81 mg  81 mg Oral 7am    nitroglycerin (NITROSTAT) tablet 0.4 mg  0.4 mg SubLINGual Q5MIN PRN    sodium chloride (NS) flush 5-10 mL  5-10 mL IntraVENous Q8H    sodium chloride (NS) flush 5-10 mL  5-10 mL IntraVENous PRN    ondansetron (ZOFRAN) injection 4 mg  4 mg IntraVENous Q4H PRN    pantoprazole (PROTONIX) 40 mg in sodium chloride 0.9 % 10 mL injection  40 mg IntraVENous DAILY    morphine injection 2 mg  2 mg IntraVENous Q1H PRN    chlorhexidine (PERIDEX) 0.12 % mouthwash 15 mL  15 mL Oral PRN            Data Review:     LABS:   Recent Results (from the past 12 hour(s))   PLEASE READ & DOCUMENT PPD TEST IN 48 HRS    Collection Time: 09/09/17  9:00 PM   Result Value Ref Range    PPD Negative Negative    mm Induration 0 mm   CBC W/O DIFF    Collection Time: 09/10/17  3:18 AM   Result Value Ref Range    WBC 6.8 4.3 - 11.1 K/uL    RBC 3.16 (L) 4.23 - 5.67 M/uL    HGB 9.2 (L) 13.6 - 17.2 g/dL    HCT 27.6 (L) 41.1 - 50.3 %    MCV 87.3 79.6 - 97.8 FL    MCH 29.1 26.1 - 32.9 PG    MCHC 33.3 31.4 - 35.0 g/dL    RDW 17.7 (H) 11.9 - 14.6 %    PLATELET 230 (L) 001 - 450 K/uL    MPV 10.0 (L) 10.8 - 79.5 FL   METABOLIC PANEL, BASIC    Collection Time: 09/10/17  3:18 AM   Result Value Ref Range    Sodium 150 (H) 136 - 145 mmol/L    Potassium 5.0 3.5 - 5.1 mmol/L    Chloride 121 (H) 98 - 107 mmol/L    CO2 18 (L) 21 - 32 mmol/L    Anion gap 11 7 - 16 mmol/L    Glucose 116 (H) 65 - 100 mg/dL    BUN 34 (H) 6 - 23 MG/DL    Creatinine 2.74 (H) 0.8 - 1.5 MG/DL    GFR est AA 31 (L) >60 ml/min/1.73m2    GFR est non-AA 25 (L) >60 ml/min/1.73m2    Calcium 7.6 (L) 8.3 - 10.4 MG/DL         Plan:     Principal Problem:    Occlusion of aorta ([facility]) (3/3/2017)    Active Problems:    Ischemia of left lower extremity (9/8/2017)      Claudication ([address] (9/8/2017)      HARPER with vascular bypass and contrast  Nonoliguric and some better today  Agree with changing ivf to hypotonic with the hyponatremia

## 2017-09-10 NOTE — PROGRESS NOTES
Bedside shift report received from Tulio Wakefield RN, for continued care. Lines, gtts, and wounds verified.

## 2017-09-10 NOTE — PROGRESS NOTES
[address] FAX: [phone]         VASCULAR SURGERY FLOOR PROGRESS NOTE    Admit Date: 2017  POD: 1 Day Post-Op    Procedure:  Procedure(s):  SIGMOIDOSCOPY FLEXIBLE    Subjective:     Patient has no new complaints. Objective:     Vitals:  Blood pressure 138/65, pulse (!) 108, temperature 99 °F (37.2 °C), resp. rate 15, height 5' 6\" (1.676 m), weight 237 lb 3.2 oz (107.6 kg), SpO2 96 %. Temp (24hrs), Av.7 °F (37.6 °C), Min:98.4 °F (36.9 °C), Max:101.4 °F (38.6 °C)      Intake / Output:    Intake/Output Summary (Last 24 hours) at 09/10/17 0644  Last data filed at 09/10/17 0500   Gross per 24 hour   Intake            387.5 ml   Output             1344 ml   Net           -956.5 ml       Physical Exam:    Constitutional: he appears well-developed. No distress. HENT:   Head: Atraumatic. Eyes: Pupils are equal, round, and reactive to light. Neck: Normal range of motion. Cardiovascular: Regular rhythm. Pulmonary/Chest: Effort normal and breath sounds normal. No respiratory distress. Abdominal: Soft. Bowel sounds are normal. he exhibits no distension. There is no tenderness. There is no guarding. No hernia. Musculoskeletal: Normal range of motion. Neurological: He is alert. CN II- XII grossly intact  Vascular: Dopplerable pedal pulses    Labs:   Recent Labs      09/10/17   0318  17   0509   HGB  9.2*  9.0*   < >  9.1*   WBC  6.8  8.6   < >  17.6*   K  5.0   --    --   4.7   GLU  116*   --    --   135*    < > = values in this interval not displayed.        Data Review     Assessment:     Patient Active Problem List    Diagnosis Date Noted    Ischemia of left lower extremity 2017    Claudication ([facility] [address] 2017    Occlusion of aorta ([facility] [address] 2017    Chronic left-sided low back pain 2017    Essential hypertension 2016    Renal insufficiency 2016    Dyslipidemia 2016 Plan/Recommendations/Medical Decision Making:     Patient clinically improved since yesterday underwent colonoscopy showed no evidence of any necrosis  -CV-all pressors systolic blood pressure 482U heart rate slightly tachycardic will start p.o. Coreg today if no improvement will add low-dose beta-blocker  -Renal-urine output has increased over 60 cc an hour last several hours creatinine is down to 2.7 down from 3.11 today, he is hypernatremic, I feel he is resuscitated fully will switch to D5 1/2 NS w/o K, will defer to nephrology if further recommendations are needed keep Benavides in today patient has scrotal swelling  -GI abdomen less tender less distended positive for flatus and bowel movement start patient on clear liquid diet continue to keep Nexium  -ID-slight fever post colonoscopy yesterday white count is down to 5, incision stable no signs of infection we will keep Zosyn for 2 more doses and will stop on Monday  -RESp-mild liters nasal cannula, will do chest PT, incentive spirometer  -HEmE-hemoglobin stable 9.0 today we will add DVT prophylactics will hold off on full anticoagulation for now  -Disco continue ICU care advance diet hopefully patient can start self diuresing without Lasix,(chronic kidney disease)    LABS-repeat labs midday today CBC and basic    Elements of this note have been dictated using speech recognition software. As a result, errors of speech recognition may have occurred.

## 2017-09-10 NOTE — PROGRESS NOTES
Bedside and verbal report taken from Emil Aceves, [address] [facility] for continuation of care.

## 2017-09-10 NOTE — PROGRESS NOTES
Problem: Mobility Impaired (Adult and Pediatric)  Goal: *Acute Goals and Plan of Care (Insert Text)  STG:  (1.)Mr. Tran will move from supine to sit and sit to supine , scoot up and down and roll side to side with MINIMAL ASSIST within 5 day(s). (2.)Mr. Tran will transfer from bed to chair and chair to bed with MINIMAL ASSIST using the least restrictive device within 5 day(s). (3.)Mr. Tran will ambulate with MINIMAL ASSIST for 50 feet with the least restrictive device within 5 day(s). (4.)Mr. Tran will maintain stable vital signs throughout all functional mobility within 5 days. (5.)Mr. Tran will perform standing static and dynamic balance activities x 10 minutes with MINIMAL ASSIST to improve safety within 5 day(s). LTG:  (1.)Mr. Tran will move from supine to sit and sit to supine , scoot up and down and roll side to side in bed with CONTACT GUARD ASSIST within 10 day(s). (2.)Mr. Tran will transfer from bed to chair and chair to bed with CONTACT GUARD ASSIST using the least restrictive device within 10 day(s). (3.)Mr. Tran will ambulate with CONTACT GUARD ASSIST for 100 feet with the least restrictive device within 10 day(s). ________________________________________________________________________________________________      PHYSICAL THERAPY: Daily Note, Treatment Day: 1st and PM 9/10/2017  INPATIENT: Hospital Day: 4  Payor: Amrit Myers / Plan: [facility] % / Product Type: Althia Post /      NAME/AGE/GENDER: Jonn Rashid is a 61 y.o. male     PRIMARY DIAGNOSIS: Aortic occlusion ([facility] [address] [I74.10]  Ischemic colitis ([facility] [address] [K55.9] Occlusion of aorta (HCC) Occlusion of aorta (HCC)  Procedure(s) (LRB):  SIGMOIDOSCOPY FLEXIBLE (N/A)  1 Day Post-Op  ICD-10: Treatment Diagnosis:       · Difficulty in walking, Not elsewhere classified (R26.2)   Precaution/Allergies:  Review of patient's allergies indicates no known allergies.        ASSESSMENT:      Mr. Isra Purdy is a 61 y.o. male s/p above surgery following aortic occlusion. He lives alone and has been ambulating with a straight cane since he began to have numbness in L foot a few months ago. Mr. Kofi Holder is  Supine and nursing states that Dr. Carmen Ward wants him out of bed. Mr. Marjan Cole is in pain and tight with fluid. It was decided by nursing and PT to put the bed in chair position and try standing off the end. Once in the chair position the bed was too high to risk him sliding off so used a step stool. Required max assist of 2 for sit to stand. He stood so nursing could clean him up about 3 minutes. Prior to this therex in the seated position was performed. Active knee extension on right and active assistive knee extension on left. Df performed and appeared equal.  Active on left and right. Tolerated increased activity well. This is a very challenging situation in a man who was independent prior to hospitalization. Mr. Marjan Cole really needs to increase frequency of PT to 5 x per week. This section established at most recent assessment   PROBLEM LIST (Impairments causing functional limitations):  1. Decreased Strength  2. Decreased ADL/Functional Activities  3. Decreased Transfer Abilities  4. Decreased Ambulation Ability/Technique  5. Decreased Balance  6. Increased Pain  7. Decreased Activity Tolerance  8. Decreased Knowledge of Precautions  9. Decreased [place] with Home Exercise Program    INTERVENTIONS PLANNED: (Benefits and precautions of physical therapy have been discussed with the patient.)  1. Balance Exercise  2. Bed Mobility  3. Family Education  4. Gait Training  5. Home Exercise Program (HEP)  6. Therapeutic Activites  7. Therapeutic Exercise/Strengthening  8. Transfer Training  9. Patient Education  10.  Group Therapy      TREATMENT PLAN: Frequency/Duration: 5 times a week for duration of hospital stay  Rehabilitation Potential For Stated Goals: EXCELLENT      RECOMMENDED REHABILITATION/EQUIPMENT: (at time of discharge pending progress): Due to the probability of continued deficits (see above) this patient will likely need continued skilled physical therapy after discharge. Equipment:   · None at this time                   HISTORY:   History of Present Injury/Illness (Reason for Referral): Aortic occlusion (HCC) [I74.10] Occlusion of aorta (HCC) Occlusion of aorta (HCC)  Procedure(s) (LRB):  left femoral embolectomy/left lower extremity arteriogram (Left)  1 Day Post-Op  Past Medical History/Comorbidities:   Mr. Marla Aleman  has a past medical history of Acute diastolic (congestive) heart failure ([facility] [address] (12/4/2016); CAD (coronary artery disease); Chronic kidney disease, stage 3; Chronic pain; Ex-smoker; GERD (gastroesophageal reflux disease); TIA (transient ischemic attack); Hypercholesteremia; Hypertension; Old MI (myocardial infarction) (11/2016); and Osteoarthritis. Mr. Marla Aleman  has a past surgical history that includes heart catheterization (12/2016); colonoscopy (N/A, 6/22/2017); and knee arthroscopy (Left). Social History/Living Environment:   Home Environment: Private residence  # Steps to Enter: 6  Rails to Enter: Yes  One/Two Story Residence: One story  Living Alone: Yes  Support Systems: Child(tisha)  Patient Expects to be Discharged to[de-identified] Unknown  Current DME Used/Available at Home: Cane, straight  Prior Level of Function/Work/Activity:  Modified independent with straight cane for ambulation secondary to decreased sensation LLE for a few months, he reports independence with ADLs and driving at baseline.       Number of Personal Factors/Comorbidities that affect the Plan of Care: 3+: HIGH COMPLEXITY   EXAMINATION:   Most Recent Physical Functioning:   Gross Assessment:                  Posture:     Balance:  Sitting - Static: Poor (constant support) (pull to sit with bed in chair position.)  Sitting - Dynamic: Poor (constant support) Bed Mobility:     Wheelchair Mobility:     Transfers:     Gait:             Body Structures Involved:  1. Nerves  2. Heart  3. Muscles Body Functions Affected:  1. Sensory/Pain  2. Cardio  3. Hematological  4. Neuromusculoskeletal  5. Movement Related Activities and Participation Affected:  1. Mobility  2. Self Care  3. Domestic Life  4. Interpersonal Interactions and Relationships  5. Community, Social and [place]   Number of elements that affect the Plan of Care: 4+: HIGH COMPLEXITY   CLINICAL PRESENTATION:   Presentation: Evolving clinical presentation with unstable and unpredictable characteristics: HIGH COMPLEXITY   CLINICAL DECISION MAKIN [facility] Inpatient Short Form  How much difficulty does the patient currently have. .. Unable A Lot A Little None   1. Turning over in bed (including adjusting bedclothes, sheets and blankets)? [ ] 1   [X] 2   [ ] 3   [ ] 4   2. Sitting down on and standing up from a chair with arms ( e.g., wheelchair, bedside commode, etc.)   [X] 1   [ ] 2   [ ] 3   [ ] 4   3. Moving from lying on back to sitting on the side of the bed? [ ] 1   [X] 2   [ ] 3   [ ] 4   How much help from another person does the patient currently need. .. Total A Lot A Little None   4. Moving to and from a bed to a chair (including a wheelchair)? [X] 1   [ ] 2   [ ] 3   [ ] 4   5. Need to walk in hospital room? [X] 1   [ ] 2   [ ] 3   [ ] 4   6. Climbing 3-5 steps with a railing? [X] 1   [ ] 2   [ ] 3   [ ] 4   © , Trustees of [address] [place], [place] [id number], under license to [employer]. All rights reserved    Score:  Initial: 8 Most Recent: X (Date: -- )     Interpretation of Tool:  Represents activities that are increasingly more difficult (i.e. Bed mobility, Transfers, Gait).        Score 24 23 22-20 19-15 14-10 9-7 6       Modifier CH CI CJ CK CL CM CN         · Mobility - Walking and Moving Around:              Y34 - CURRENT STATUS:    CM - 80%-99% impaired, limited or restricted               - GOAL STATUS:           CK - 40%-59% impaired, limited or restricted               - D/C STATUS:                       ---------------To be determined---------------  Payor: LEISA / Plan: [facility] % / Product Type: Leisa /       Medical Necessity:     · Patient demonstrates good rehab potential due to higher previous functional level. Reason for Services/Other Comments:  · Patient continues to require modification of therapeutic interventions to increase complexity of exercises. Use of outcome tool(s) and clinical judgement create a POC that gives a: Questionable prediction of patient's progress: MODERATE COMPLEXITY                 TREATMENT:   (In addition to Assessment/Re-Assessment sessions the following treatments were rendered)   Pre-treatment Symptoms/Complaints:  Pain. Pain: Initial:   Pain Intensity 1: 5  Pain Location 1: Groin, Abdomen  Pain Intervention(s) 1: Emotional support, Encouraged PCA  Post Session:  5/10 RN aware      Therapeutic Activity: (    15): Therapeutic activities including Bed transfers to improve mobility. Required maximal   to promote motor control of bilateral, upper extremity(s), lower extremity(s). Braces/Orthotics/Lines/Etc:   · IV  · lopez catheter  · nasogastric tube  · PCA  · Room Air  Treatment/Session Assessment:    · Response to Treatment:  Patient tolerated treatment poorly with decreased blood pressure. · Interdisciplinary Collaboration:  · Physical Therapist  · Registered Nurse  · SPT  · After treatment position/precautions:  · Supine in bed  · Bed/Chair-wheels locked  · Bed in low position  · Call light within reach  · RN notified  · Nurse at bedside  · Compliance with Program/Exercises: making an effort. · Recommendations/Intent for next treatment session: \"Next visit will focus on advancements to more challenging activities and reduction in assistance provided\".   Total Treatment Duration:PT Patient Time In/Time Out  Time In: 1330  Time Out: [address], [address] [place], PT

## 2017-09-11 ENCOUNTER — APPOINTMENT (OUTPATIENT)
Dept: GENERAL RADIOLOGY | Age: 57
DRG: 169 | End: 2017-09-11
Attending: SURGERY
Payer: MEDICAID

## 2017-09-11 LAB
ABO + RH BLD: NORMAL
ANION GAP SERPL CALC-SCNC: 10 MMOL/L (ref 7–16)
BLD PROD TYP BPU: NORMAL
BLOOD BANK CMNT PATIENT-IMP: NORMAL
BLOOD GROUP ANTIBODIES SERPL: NORMAL
BPU ID: NORMAL
BUN SERPL-MCNC: 26 MG/DL (ref 6–23)
CALCIUM SERPL-MCNC: 7.7 MG/DL (ref 8.3–10.4)
CHLORIDE SERPL-SCNC: 115 MMOL/L (ref 98–107)
CO2 SERPL-SCNC: 20 MMOL/L (ref 21–32)
CREAT SERPL-MCNC: 2.19 MG/DL (ref 0.8–1.5)
CROSSMATCH RESULT,%XM: NORMAL
ERYTHROCYTE [DISTWIDTH] IN BLOOD BY AUTOMATED COUNT: 17.2 % (ref 11.9–14.6)
GLUCOSE SERPL-MCNC: 122 MG/DL (ref 65–100)
HCT VFR BLD AUTO: 24 % (ref 41.1–50.3)
HGB BLD-MCNC: 8 G/DL (ref 13.6–17.2)
MCH RBC QN AUTO: 29.4 PG (ref 26.1–32.9)
MCHC RBC AUTO-ENTMCNC: 33.3 G/DL (ref 31.4–35)
MCV RBC AUTO: 88.2 FL (ref 79.6–97.8)
MM INDURATION POC: 0 MM (ref 0–5)
PLATELET # BLD AUTO: 112 K/UL (ref 150–450)
PMV BLD AUTO: 9.9 FL (ref 10.8–14.1)
POTASSIUM SERPL-SCNC: 4.4 MMOL/L (ref 3.5–5.1)
PPD POC: NEGATIVE NEGATIVE
RBC # BLD AUTO: 2.72 M/UL (ref 4.23–5.67)
SODIUM SERPL-SCNC: 145 MMOL/L (ref 136–145)
SPECIMEN EXP DATE BLD: NORMAL
STATUS OF UNIT,%ST: NORMAL
UNIT DIVISION, %UDIV: 0
WBC # BLD AUTO: 7.4 K/UL (ref 4.3–11.1)

## 2017-09-11 PROCEDURE — 97605 NEG PRS WND THER DME<=50SQCM: CPT

## 2017-09-11 PROCEDURE — 74011000258 HC RX REV CODE- 258: Performed by: SURGERY

## 2017-09-11 PROCEDURE — 74011000250 HC RX REV CODE- 250: Performed by: SURGERY

## 2017-09-11 PROCEDURE — 77030019934 HC DRSG VAC ASST KCON -B

## 2017-09-11 PROCEDURE — 74011250637 HC RX REV CODE- 250/637: Performed by: PHYSICIAN ASSISTANT

## 2017-09-11 PROCEDURE — C9113 INJ PANTOPRAZOLE SODIUM, VIA: HCPCS | Performed by: SURGERY

## 2017-09-11 PROCEDURE — 97530 THERAPEUTIC ACTIVITIES: CPT

## 2017-09-11 PROCEDURE — 74011250637 HC RX REV CODE- 250/637: Performed by: SURGERY

## 2017-09-11 PROCEDURE — 80048 BASIC METABOLIC PNL TOTAL CA: CPT | Performed by: SURGERY

## 2017-09-11 PROCEDURE — 74011250636 HC RX REV CODE- 250/636: Performed by: INTERNAL MEDICINE

## 2017-09-11 PROCEDURE — 85027 COMPLETE CBC AUTOMATED: CPT | Performed by: SURGERY

## 2017-09-11 PROCEDURE — 94640 AIRWAY INHALATION TREATMENT: CPT

## 2017-09-11 PROCEDURE — 36592 COLLECT BLOOD FROM PICC: CPT

## 2017-09-11 PROCEDURE — 74750000023 HC WOUND THERAPY

## 2017-09-11 PROCEDURE — 74011250636 HC RX REV CODE- 250/636: Performed by: SURGERY

## 2017-09-11 PROCEDURE — 77030018836 HC SOL IRR NACL ICUM -A

## 2017-09-11 PROCEDURE — 65610000006 HC RM INTENSIVE CARE

## 2017-09-11 PROCEDURE — 77010033678 HC OXYGEN DAILY

## 2017-09-11 PROCEDURE — 71010 XR CHEST PORT: CPT

## 2017-09-11 RX ORDER — PANTOPRAZOLE SODIUM 40 MG/1
40 TABLET, DELAYED RELEASE ORAL
Status: DISCONTINUED | OUTPATIENT
Start: 2017-09-12 | End: 2017-09-29 | Stop reason: HOSPADM

## 2017-09-11 RX ORDER — OXYCODONE HCL 10 MG/1
20 TABLET, FILM COATED, EXTENDED RELEASE ORAL EVERY 12 HOURS
Status: DISCONTINUED | OUTPATIENT
Start: 2017-09-11 | End: 2017-09-13

## 2017-09-11 RX ORDER — FUROSEMIDE 10 MG/ML
40 INJECTION INTRAMUSCULAR; INTRAVENOUS ONCE
Status: COMPLETED | OUTPATIENT
Start: 2017-09-11 | End: 2017-09-11

## 2017-09-11 RX ORDER — PETROLATUM 42 G/100G
OINTMENT TOPICAL AS NEEDED
Status: DISCONTINUED | OUTPATIENT
Start: 2017-09-11 | End: 2017-09-29 | Stop reason: HOSPADM

## 2017-09-11 RX ADMIN — HEPARIN SODIUM 5000 UNITS: 5000 INJECTION, SOLUTION INTRAVENOUS; SUBCUTANEOUS at 08:10

## 2017-09-11 RX ADMIN — DEXTROSE MONOHYDRATE AND SODIUM CHLORIDE 75 ML/HR: 5; .45 INJECTION, SOLUTION INTRAVENOUS at 21:49

## 2017-09-11 RX ADMIN — LEVALBUTEROL HYDROCHLORIDE 1.25 MG: 1.25 SOLUTION RESPIRATORY (INHALATION) at 11:36

## 2017-09-11 RX ADMIN — HEPARIN SODIUM 5000 UNITS: 5000 INJECTION, SOLUTION INTRAVENOUS; SUBCUTANEOUS at 23:30

## 2017-09-11 RX ADMIN — LEVALBUTEROL HYDROCHLORIDE 1.25 MG: 1.25 SOLUTION RESPIRATORY (INHALATION) at 15:37

## 2017-09-11 RX ADMIN — MORPHINE SULFATE 2 MG: 2 INJECTION, SOLUTION INTRAMUSCULAR; INTRAVENOUS at 09:33

## 2017-09-11 RX ADMIN — Medication 10 ML: at 06:13

## 2017-09-11 RX ADMIN — Medication 10 ML: at 13:51

## 2017-09-11 RX ADMIN — LEVALBUTEROL HYDROCHLORIDE 0.63 MG: 1.25 SOLUTION RESPIRATORY (INHALATION) at 04:41

## 2017-09-11 RX ADMIN — SODIUM CHLORIDE 40 MG: 9 INJECTION INTRAMUSCULAR; INTRAVENOUS; SUBCUTANEOUS at 08:10

## 2017-09-11 RX ADMIN — PIPERACILLIN SODIUM,TAZOBACTAM SODIUM 3.38 G: 3; .375 INJECTION, POWDER, FOR SOLUTION INTRAVENOUS at 06:12

## 2017-09-11 RX ADMIN — OXYCODONE HYDROCHLORIDE 20 MG: 10 TABLET, FILM COATED, EXTENDED RELEASE ORAL at 21:10

## 2017-09-11 RX ADMIN — MORPHINE SULFATE: 5 INJECTION, SOLUTION INTRAVENOUS at 03:31

## 2017-09-11 RX ADMIN — CARVEDILOL 12.5 MG: 12.5 TABLET, FILM COATED ORAL at 08:10

## 2017-09-11 RX ADMIN — Medication 10 ML: at 21:11

## 2017-09-11 RX ADMIN — SODIUM CHLORIDE 250 ML: 900 INJECTION, SOLUTION INTRAVENOUS at 21:47

## 2017-09-11 RX ADMIN — LEVALBUTEROL HYDROCHLORIDE 0.63 MG: 1.25 SOLUTION RESPIRATORY (INHALATION) at 08:41

## 2017-09-11 RX ADMIN — DEXTROSE MONOHYDRATE AND SODIUM CHLORIDE 75 ML/HR: 5; .45 INJECTION, SOLUTION INTRAVENOUS at 03:31

## 2017-09-11 RX ADMIN — CARVEDILOL 12.5 MG: 12.5 TABLET, FILM COATED ORAL at 17:33

## 2017-09-11 RX ADMIN — OXYCODONE HYDROCHLORIDE 10 MG: 10 TABLET, FILM COATED, EXTENDED RELEASE ORAL at 09:34

## 2017-09-11 RX ADMIN — ASPIRIN 81 MG: 81 TABLET, COATED ORAL at 08:10

## 2017-09-11 RX ADMIN — LEVALBUTEROL HYDROCHLORIDE 1.25 MG: 1.25 SOLUTION RESPIRATORY (INHALATION) at 20:09

## 2017-09-11 RX ADMIN — OXYCODONE HYDROCHLORIDE 10 MG: 10 TABLET, FILM COATED, EXTENDED RELEASE ORAL at 08:10

## 2017-09-11 RX ADMIN — LEVALBUTEROL HYDROCHLORIDE 1.25 MG: 1.25 SOLUTION RESPIRATORY (INHALATION) at 23:31

## 2017-09-11 RX ADMIN — HEPARIN SODIUM 5000 UNITS: 5000 INJECTION, SOLUTION INTRAVENOUS; SUBCUTANEOUS at 15:40

## 2017-09-11 RX ADMIN — LEVALBUTEROL HYDROCHLORIDE 0.63 MG: 1.25 SOLUTION RESPIRATORY (INHALATION) at 00:14

## 2017-09-11 RX ADMIN — FUROSEMIDE 40 MG: 10 INJECTION, SOLUTION INTRAMUSCULAR; INTRAVENOUS at 13:50

## 2017-09-11 NOTE — PROGRESS NOTES
Problem: Mobility Impaired (Adult and Pediatric)  Goal: *Acute Goals and Plan of Care (Insert Text)  STG:  (1.)Mr. Tran will move from supine to sit and sit to supine , scoot up and down and roll side to side with MINIMAL ASSIST within 5 day(s). (2.)Mr. Tran will transfer from bed to chair and chair to bed with MINIMAL ASSIST using the least restrictive device within 5 day(s). (3.)Mr. Tran will ambulate with MINIMAL ASSIST for 50 feet with the least restrictive device within 5 day(s). (4.)Mr. Tran will maintain stable vital signs throughout all functional mobility within 5 days. (5.)Mr. Tran will perform standing static and dynamic balance activities x 10 minutes with MINIMAL ASSIST to improve safety within 5 day(s). LTG:  (1.)Mr. Tran will move from supine to sit and sit to supine , scoot up and down and roll side to side in bed with CONTACT GUARD ASSIST within 10 day(s). (2.)Mr. Tran will transfer from bed to chair and chair to bed with CONTACT GUARD ASSIST using the least restrictive device within 10 day(s). (3.)Mr. Tran will ambulate with CONTACT GUARD ASSIST for 100 feet with the least restrictive device within 10 day(s). ________________________________________________________________________________________________      PHYSICAL THERAPY: Daily Note, Treatment Day: 2nd and AM 9/11/2017  INPATIENT: Hospital Day: 5  Payor: [id number] / Plan: [place] / Product Type: Medicaid /      NAME/AGE/GENDER: Lauren Chen is a 61 y.o. male     PRIMARY DIAGNOSIS: Aortic occlusion ([facility] [address] [I74.10]  Ischemic colitis ([facility] [address] [K55.9] Occlusion of aorta (HCC) Occlusion of aorta (HCC)  Procedure(s) (LRB):  SIGMOIDOSCOPY FLEXIBLE (N/A)  2 Days Post-Op  ICD-10: Treatment Diagnosis:       · Difficulty in walking, Not elsewhere classified (R26.2)   Precaution/Allergies:  Review of patient's allergies indicates no known allergies.        ASSESSMENT: Mr. Shakila Garcia presents sitting up in bed. He was agreeable to treatment with encouragement. He continues to be in pain throughout treatment. He used PCA pump throughout treatment also. He stood with max assist x 3 with egress assistance from bed in chair position. He stood on long step stool. He stood for approximately 3 minutes before needing to return to sitting position. He needed a seated rest break prior to standing again for another 3 minutes. He became fatigued with minimal activity. He needed verbal cues for erect posture. Good progress with activity tolerance noted this treatment. Will continue PT efforts. This section established at most recent assessment   PROBLEM LIST (Impairments causing functional limitations):  1. Decreased Strength  2. Decreased ADL/Functional Activities  3. Decreased Transfer Abilities  4. Decreased Ambulation Ability/Technique  5. Decreased Balance  6. Increased Pain  7. Decreased Activity Tolerance  8. Decreased Knowledge of Precautions  9. Decreased [place] with Home Exercise Program    INTERVENTIONS PLANNED: (Benefits and precautions of physical therapy have been discussed with the patient.)  1. Balance Exercise  2. Bed Mobility  3. Family Education  4. Gait Training  5. Home Exercise Program (HEP)  6. Therapeutic Activites  7. Therapeutic Exercise/Strengthening  8. Transfer Training  9. Patient Education  10. Group Therapy      TREATMENT PLAN: Frequency/Duration: 5 times a week for duration of hospital stay  Rehabilitation Potential For Stated Goals: EXCELLENT      RECOMMENDED REHABILITATION/EQUIPMENT: (at time of discharge pending progress): Due to the probability of continued deficits (see above) this patient will likely need continued skilled physical therapy after discharge. Equipment:   · None at this time                   HISTORY:   History of Present Injury/Illness (Reason for Referral):   Aortic occlusion (HCC) [I74.10] Occlusion of aorta (HCC) Occlusion of aorta (HCC)  Procedure(s) (LRB):  left femoral embolectomy/left lower extremity arteriogram (Left)  1 Day Post-Op  Past Medical History/Comorbidities:   Mr. Cristina Villasenor  has a past medical history of Acute diastolic (congestive) heart failure ([facility] [address] (12/4/2016); CAD (coronary artery disease); Chronic kidney disease, stage 3; Chronic pain; Ex-smoker; GERD (gastroesophageal reflux disease); TIA (transient ischemic attack); Hypercholesteremia; Hypertension; Old MI (myocardial infarction) (11/2016); and Osteoarthritis. Mr. Cristina Villasenor  has a past surgical history that includes heart catheterization (12/2016); colonoscopy (N/A, 6/22/2017); knee arthroscopy (Left); and flexible sigmoidoscopy (N/A, 9/9/2017). Social History/Living Environment:   Home Environment: Private residence  # Steps to Enter: 6  Rails to Enter: Yes  One/Two Story Residence: One story  Living Alone: Yes  Support Systems: Child(tisha)  Patient Expects to be Discharged to[de-identified] Unknown  Current DME Used/Available at Home: Cane, straight  Tub or Shower Type: Shower  Prior Level of Function/Work/Activity:  Modified independent with straight cane for ambulation secondary to decreased sensation LLE for a few months, he reports independence with ADLs and driving at baseline. Number of Personal Factors/Comorbidities that affect the Plan of Care: 3+: HIGH COMPLEXITY   EXAMINATION:   Most Recent Physical Functioning:   Gross Assessment:                  Posture:  Posture (WDL): Exceptions to WDL  Posture Assessment: Forward head, Rounded shoulders  Balance:  Sitting: Impaired  Sitting - Static: Poor (constant support)  Sitting - Dynamic: Poor (constant support)  Standing: Impaired  Standing - Static: Poor Bed Mobility:     Wheelchair Mobility:     Transfers:  Sit to Stand: Maximum assistance (Assist x 3)  Stand to Sit: Maximum assistance (Assist x3)  Gait:             Body Structures Involved:  1. Nerves  2. Heart  3.  Muscles Body Functions Affected:  1. Sensory/Pain  2. Cardio  3. Hematological  4. Neuromusculoskeletal  5. Movement Related Activities and Participation Affected:  1. Mobility  2. Self Care  3. Domestic Life  4. Interpersonal Interactions and Relationships  5. Community, Social and [place]   Number of elements that affect the Plan of Care: 4+: HIGH COMPLEXITY   CLINICAL PRESENTATION:   Presentation: Evolving clinical presentation with unstable and unpredictable characteristics: HIGH COMPLEXITY   CLINICAL DECISION MAKIN [facility] Inpatient Short Form  How much difficulty does the patient currently have. .. Unable A Lot A Little None   1. Turning over in bed (including adjusting bedclothes, sheets and blankets)? [ ] 1   [X] 2   [ ] 3   [ ] 4   2. Sitting down on and standing up from a chair with arms ( e.g., wheelchair, bedside commode, etc.)   [X] 1   [ ] 2   [ ] 3   [ ] 4   3. Moving from lying on back to sitting on the side of the bed? [ ] 1   [X] 2   [ ] 3   [ ] 4   How much help from another person does the patient currently need. .. Total A Lot A Little None   4. Moving to and from a bed to a chair (including a wheelchair)? [X] 1   [ ] 2   [ ] 3   [ ] 4   5. Need to walk in hospital room? [X] 1   [ ] 2   [ ] 3   [ ] 4   6. Climbing 3-5 steps with a railing? [X] 1   [ ] 2   [ ] 3   [ ] 4   © , Trustees of [address] [place] [address], under license to [employer] All rights reserved    Score:  Initial: 8 Most Recent: X (Date: -- )     Interpretation of Tool:  Represents activities that are increasingly more difficult (i.e. Bed mobility, Transfers, Gait).        Score 24 23 22-20 19-15 14-10 9-7 6       Modifier CH CI CJ CK CL CM CN         · Mobility - Walking and Moving Around:               - CURRENT STATUS:    CM - 80%-99% impaired, limited or restricted               - GOAL STATUS:           CK - 40%-59% impaired, limited or restricted               - D/C STATUS:                       ---------------To be determined---------------  Payor: MEDICAID [facility] / Plan: [place] / Product Type: Medicaid /       Medical Necessity:     · Patient demonstrates good rehab potential due to higher previous functional level. Reason for Services/Other Comments:  · Patient continues to require modification of therapeutic interventions to increase complexity of exercises. Use of outcome tool(s) and clinical judgement create a POC that gives a: Questionable prediction of patient's progress: MODERATE COMPLEXITY                 TREATMENT:   (In addition to Assessment/Re-Assessment sessions the following treatments were rendered)   Pre-treatment Symptoms/Complaints: \"It hurts. It is burning. \"  Pain: Initial:   Pain Intensity 1:  (pain throughout treatment but did not rate used PCA button)  Post Session:  Pain not rated - verbalized pain and requested to use his PCA button. Therapeutic Activity: (    28 Minutes): Therapeutic activities including Bed transfers to improve mobility. Required maximal verbal, tactile cues   to promote motor control of bilateral, upper extremity(s), lower extremity(s). Braces/Orthotics/Lines/Etc:   · IV  · lopez catheter  · nasogastric tube  · PCA  · Room Air  Treatment/Session Assessment:    · Response to Treatment:  Patient tolerated treatment poorly with fatigue and increased pain  · Interdisciplinary Collaboration:  · Physical Therapy Assistant, Registered Nurse and SPTA  · After treatment position/precautions:  · Supine in bed  · Bed/Chair-wheels locked  · Bed in low position  · Call light within reach  · RN notified  · Nurse at bedside  · Compliance with Program/Exercises: making an effort. · Recommendations/Intent for next treatment session: \"Next visit will focus on advancements to more challenging activities and reduction in assistance provided\".   Total Treatment Duration:PT Patient Time In/Time Out  Time In: 0902  Time Out: Abbi Green 60, PTA

## 2017-09-11 NOTE — PROGRESS NOTES
[facility] progress note    Follow-Up on: 9/11/2017     Patient seen and examined. Doing ok. Hasn't eaten lunch. ROS:  Gen - no fever, no chills, appetite so so   CV - no chest pain, no orthopnea  Lung - noted shortness of breath, no cough  Abd - no tenderness, no nausea, no vomiting  Ext - noted edema    Exam:  Vitals:    09/11/17 0939 09/11/17 0959 09/11/17 1137 09/11/17 1229   BP: 147/73 146/71  124/74   Pulse: 100 93  79   Resp: 24 13  19   Temp:    99.2 °F (37.3 °C)   SpO2: 94% 94% 96% 96%   Weight:       Height:             Intake/Output Summary (Last 24 hours) at 09/11/17 1317  Last data filed at 09/11/17 1053   Gross per 24 hour   Intake             2935 ml   Output             1403 ml   Net             1532 ml       GEN - in no distress, some conversational dyspnea  CV - S1, S2, no rub  Lung - diminished at bases  Abd - soft, nontender  Ext - dependent edema    Recent Labs      09/11/17   0421  09/10/17   1621  09/10/17   0318   WBC  7.4  6.3  6.8   HGB  8.0*  8.0*  9.2*   HCT  24.0*  23.8*  27.6*   PLT  112*  95*  107*        Recent Labs      09/11/17   0421  09/10/17   1621  09/10/17   0318  09/09/17   0509   NA  145  146*  150*  147*   K  4.4  4.7  5.0  4.7   CL  115*  118*  121*  119*   CO2  20*  20*  18*  17*   BUN  26*  30*  34*  31*   CREA  2.19*  2.48*  2.74*  3.11*   CA  7.7*  7.7*  7.6*  6.8*   GLU  122*  122*  116*  135*   MG   --    --    --   1.9       Assessment / Plan:    1. HARPER on Stage IIIB CKD - renal recovery in progress. Not far from his baseline. 2.  HTN - stable    3. Solitary functioning kidney    4. Anemia - hemodynamically stable    Overall, very pleased with renal function. Will give a dose of Lasix today to try and help mobilize fluid. Discussed with nursing - appreciate.

## 2017-09-11 NOTE — PROGRESS NOTES
Progress Note    Patient: Satish Snell MRN: [id number]  SSN: [id number]    YOB: 1960  Age: 61 y.o. Sex: male      Admit Date: 9/7/2017    LOS: 4 days      PROCEDURE(S);  9/7/2017  Aortobifemoral bypass graft with a 14 x 7 Dacron graft    9/8/2017  1. Left common femoral embolectomy  2. Left lower extremity arteriogram    9/9/2017  Colonoscopy Erica Brian MD)      Subjective:     Patient reports pain control is \"so-so,\" states he cannot remember to press PCA button. Admits to flatus, had loose stool post colo. Tolerating soft diet, denies nausea, admits to anorexia. Has stood with assistance of physical therapy. Pulling 1000 on IS, receiving neb tx. On ASA, heparin SQ. UOP >60ml/h, Cr coming down.     Objective:     Vitals:    09/11/17 0700 09/11/17 0729 09/11/17 0808 09/11/17 0841   BP:  156/80 168/84    Pulse: 91 92 95    Resp: 15 18 19    Temp: 99.4 °F (37.4 °C)      SpO2: 98% 99%  96%   Weight:       Height:            Intake and Output:  Current Shift: 09/11 0701 - 09/11 1900  In: -   Out: 95 [Urine:95]  Last three shifts: 09/09 1901 - 09/11 0700  In: 3195 [I.V.:3195]  Out: 2772 [Urine:2772]    Physical Exam:   GENERAL: dozing, alerts to voice  LUNG: clear to auscultation bilaterally  HEART: regular rate and rhythm  ABDOMEN: soft but taut, protuberant but not distended, tender to palpation globally, normoactive bowel sounds  EXTREMITIES: palpable right DP, other distal pulses auscultated with Doppler  : scrotal edema  SKIN: ruptured blisters on proximal thighs covered with Xeroform, bilateral groin wounds with serous drainage  NEUROLOGIC: no focal deficits  PSYCHIATRIC: appropriate mood and affect    Drains / Dressings:  Midline incision clean / dry / intact with staples  Benavides with urine is yellow and clear      Lab/Data Review:  CMP:   Lab Results   Component Value Date/Time     09/11/2017 04:21 AM    K 4.4 09/11/2017 04:21 AM     (H) 09/11/2017 04:21 AM    CO2 20 (L) 09/11/2017 04:21 AM    AGAP 10 09/11/2017 04:21 AM     (H) 09/11/2017 04:21 AM    BUN 26 (H) 09/11/2017 04:21 AM    CREA 2.19 (H) 09/11/2017 04:21 AM    GFRAA 40 (L) 09/11/2017 04:21 AM    GFRNA 33 (L) 09/11/2017 04:21 AM    CA 7.7 (L) 09/11/2017 04:21 AM     CBC:   Lab Results   Component Value Date/Time    WBC 7.4 09/11/2017 04:21 AM    HGB 8.0 (L) 09/11/2017 04:21 AM    HCT 24.0 (L) 09/11/2017 04:21 AM     (L) 09/11/2017 04:21 AM          Assessment / Plan:     Principal Problem:    Occlusion of aorta ([address] (3/3/2017)    Active Problems:    Ischemia of left lower extremity (9/8/2017)      Claudication ([address] (9/8/2017)      GI prophylaxis - change PPI to oral  VTE prophylaxis - ASA, heparin SQ  Pain / nausea control - increase OxyContin to 20mg q12h  Keep lopez  Mobilize - appreciate PT  D/C Zosyn per Dr. Mark Anthony Choudhury notes  Bilateral groin wound vacs per Dr. Mark Anthony Choudhury  Further plan per Dr. Alyx Servin By: Bigg Alarcon PA-C    September 11, 2017      Physician Assistant with Vascular Surgery Sendy Barragan MD / Lisa James.  Mark Anthony Choudhury MD / Evangelina Lee MD

## 2017-09-11 NOTE — PROGRESS NOTES
Call to admission to confirm that pt has CHARLEE. He does after confirmation with Hannah Perea. Hopefully this will open door for STR at SNF. LOC paperwork started, but not completed, as he will need a 10 day stay before can start and will need complex paperwork in addition. Current LOS is 4 days. Spoke with Ace Morejon, liaison with [facility] to see which facilities will accept CHARLEE. [facility], which is close to where pt lives, and [facility] recommended due to wound vac and care. CM to follow and discuss with pt's daughter.

## 2017-09-11 NOTE — WOUND CARE
Bilateral groin incisions well approximated with staples, adjacent to stapled incisions on groins is patches of partial thickness skin loss, consistent with  tape shear wounds. Right anterior thigh with 3 popped blisters. Adjacent to abdominal wounds is patches of partial thickness skin loss with, consistent with tape shear wounds. Incisional wound vacs applied to bilateral groins and y connected to 1 machine, adjacent tape wounds covered with xeroform and gauze under drape. Stoma paste strip used at fold of bilateral groin to improve seal.  Shear wounds right thigh and adjacent to abdominal wounds covered with xeroform and 4x4. Ok to use aquaphor over shear wounds as well. Will monitor.

## 2017-09-12 LAB
ANION GAP SERPL CALC-SCNC: 9 MMOL/L (ref 7–16)
BUN SERPL-MCNC: 23 MG/DL (ref 6–23)
CALCIUM SERPL-MCNC: 7.9 MG/DL (ref 8.3–10.4)
CHLORIDE SERPL-SCNC: 111 MMOL/L (ref 98–107)
CO2 SERPL-SCNC: 22 MMOL/L (ref 21–32)
CREAT SERPL-MCNC: 2.05 MG/DL (ref 0.8–1.5)
ERYTHROCYTE [DISTWIDTH] IN BLOOD BY AUTOMATED COUNT: 16.2 % (ref 11.9–14.6)
GLUCOSE SERPL-MCNC: 109 MG/DL (ref 65–100)
HBV SURFACE AG SERPL QL IA: NEGATIVE
HCT VFR BLD AUTO: 22.6 % (ref 41.1–50.3)
HCT VFR BLD AUTO: 28.1 % (ref 41.1–50.3)
HGB BLD-MCNC: 7.4 G/DL (ref 13.6–17.2)
HGB BLD-MCNC: 9.6 G/DL (ref 13.6–17.2)
MCH RBC QN AUTO: 29.1 PG (ref 26.1–32.9)
MCHC RBC AUTO-ENTMCNC: 32.7 G/DL (ref 31.4–35)
MCV RBC AUTO: 89 FL (ref 79.6–97.8)
PLATELET # BLD AUTO: 132 K/UL (ref 150–450)
PMV BLD AUTO: 10.3 FL (ref 10.8–14.1)
POTASSIUM SERPL-SCNC: 3.7 MMOL/L (ref 3.5–5.1)
RBC # BLD AUTO: 2.54 M/UL (ref 4.23–5.67)
SODIUM SERPL-SCNC: 142 MMOL/L (ref 136–145)
WBC # BLD AUTO: 7.6 K/UL (ref 4.3–11.1)

## 2017-09-12 PROCEDURE — 86900 BLOOD TYPING SEROLOGIC ABO: CPT | Performed by: SURGERY

## 2017-09-12 PROCEDURE — 77030018667 ADMN ST IV BLD FENW -A

## 2017-09-12 PROCEDURE — 36592 COLLECT BLOOD FROM PICC: CPT

## 2017-09-12 PROCEDURE — 86923 COMPATIBILITY TEST ELECTRIC: CPT | Performed by: SURGERY

## 2017-09-12 PROCEDURE — A9272 DISP WOUND SUCT, DRSG/ACCESS: HCPCS

## 2017-09-12 PROCEDURE — 85027 COMPLETE CBC AUTOMATED: CPT | Performed by: INTERNAL MEDICINE

## 2017-09-12 PROCEDURE — 97110 THERAPEUTIC EXERCISES: CPT

## 2017-09-12 PROCEDURE — 94640 AIRWAY INHALATION TREATMENT: CPT

## 2017-09-12 PROCEDURE — P9016 RBC LEUKOCYTES REDUCED: HCPCS | Performed by: SURGERY

## 2017-09-12 PROCEDURE — 74011000250 HC RX REV CODE- 250: Performed by: SURGERY

## 2017-09-12 PROCEDURE — 74750000023 HC WOUND THERAPY

## 2017-09-12 PROCEDURE — 77030032490 HC SLV COMPR SCD KNE COVD -B

## 2017-09-12 PROCEDURE — 74011250636 HC RX REV CODE- 250/636: Performed by: SURGERY

## 2017-09-12 PROCEDURE — 77010033678 HC OXYGEN DAILY

## 2017-09-12 PROCEDURE — 77030019605

## 2017-09-12 PROCEDURE — 85018 HEMOGLOBIN: CPT | Performed by: SURGERY

## 2017-09-12 PROCEDURE — 77030036554

## 2017-09-12 PROCEDURE — 65610000006 HC RM INTENSIVE CARE

## 2017-09-12 PROCEDURE — 94760 N-INVAS EAR/PLS OXIMETRY 1: CPT

## 2017-09-12 PROCEDURE — 97530 THERAPEUTIC ACTIVITIES: CPT

## 2017-09-12 PROCEDURE — 80048 BASIC METABOLIC PNL TOTAL CA: CPT | Performed by: INTERNAL MEDICINE

## 2017-09-12 PROCEDURE — 74011250636 HC RX REV CODE- 250/636: Performed by: INTERNAL MEDICINE

## 2017-09-12 PROCEDURE — 36430 TRANSFUSION BLD/BLD COMPNT: CPT

## 2017-09-12 PROCEDURE — 74011250637 HC RX REV CODE- 250/637: Performed by: PHYSICIAN ASSISTANT

## 2017-09-12 PROCEDURE — 74011250637 HC RX REV CODE- 250/637: Performed by: SURGERY

## 2017-09-12 RX ORDER — HYDROMORPHONE HYDROCHLORIDE 2 MG/1
2 TABLET ORAL
Status: DISCONTINUED | OUTPATIENT
Start: 2017-09-12 | End: 2017-09-29 | Stop reason: HOSPADM

## 2017-09-12 RX ORDER — MORPHINE SULFATE 2 MG/ML
2 INJECTION, SOLUTION INTRAMUSCULAR; INTRAVENOUS
Status: DISCONTINUED | OUTPATIENT
Start: 2017-09-12 | End: 2017-09-29 | Stop reason: HOSPADM

## 2017-09-12 RX ORDER — FUROSEMIDE 10 MG/ML
40 INJECTION INTRAMUSCULAR; INTRAVENOUS DAILY
Status: DISCONTINUED | OUTPATIENT
Start: 2017-09-12 | End: 2017-09-18

## 2017-09-12 RX ORDER — DOCUSATE SODIUM 100 MG/1
100 CAPSULE, LIQUID FILLED ORAL DAILY
Status: DISCONTINUED | OUTPATIENT
Start: 2017-09-12 | End: 2017-09-29 | Stop reason: HOSPADM

## 2017-09-12 RX ORDER — SODIUM CHLORIDE 9 MG/ML
250 INJECTION, SOLUTION INTRAVENOUS AS NEEDED
Status: DISCONTINUED | OUTPATIENT
Start: 2017-09-12 | End: 2017-09-29 | Stop reason: HOSPADM

## 2017-09-12 RX ADMIN — Medication 10 ML: at 15:32

## 2017-09-12 RX ADMIN — CALCIUM GLUCONATE 2 G: 94 INJECTION, SOLUTION INTRAVENOUS at 10:17

## 2017-09-12 RX ADMIN — LEVALBUTEROL HYDROCHLORIDE: 1.25 SOLUTION RESPIRATORY (INHALATION) at 07:29

## 2017-09-12 RX ADMIN — HEPARIN SODIUM 5000 UNITS: 5000 INJECTION, SOLUTION INTRAVENOUS; SUBCUTANEOUS at 22:08

## 2017-09-12 RX ADMIN — PANTOPRAZOLE SODIUM 40 MG: 40 TABLET, DELAYED RELEASE ORAL at 06:51

## 2017-09-12 RX ADMIN — CARVEDILOL 12.5 MG: 12.5 TABLET, FILM COATED ORAL at 09:01

## 2017-09-12 RX ADMIN — HEPARIN SODIUM 5000 UNITS: 5000 INJECTION, SOLUTION INTRAVENOUS; SUBCUTANEOUS at 06:51

## 2017-09-12 RX ADMIN — HYDROMORPHONE HYDROCHLORIDE 2 MG: 2 TABLET ORAL at 11:39

## 2017-09-12 RX ADMIN — LEVALBUTEROL HYDROCHLORIDE 0.63 MG: 1.25 SOLUTION RESPIRATORY (INHALATION) at 12:12

## 2017-09-12 RX ADMIN — FUROSEMIDE 40 MG: 10 INJECTION, SOLUTION INTRAMUSCULAR; INTRAVENOUS at 14:41

## 2017-09-12 RX ADMIN — OXYCODONE HYDROCHLORIDE 20 MG: 10 TABLET, FILM COATED, EXTENDED RELEASE ORAL at 09:01

## 2017-09-12 RX ADMIN — MORPHINE SULFATE 2 MG: 2 INJECTION, SOLUTION INTRAMUSCULAR; INTRAVENOUS at 14:10

## 2017-09-12 RX ADMIN — ASPIRIN 81 MG: 81 TABLET, COATED ORAL at 06:51

## 2017-09-12 RX ADMIN — LEVALBUTEROL HYDROCHLORIDE: 1.25 SOLUTION RESPIRATORY (INHALATION) at 04:05

## 2017-09-12 RX ADMIN — LEVALBUTEROL HYDROCHLORIDE 0.63 MG: 1.25 SOLUTION RESPIRATORY (INHALATION) at 23:50

## 2017-09-12 RX ADMIN — DOCUSATE SODIUM 100 MG: 100 CAPSULE, LIQUID FILLED ORAL at 09:01

## 2017-09-12 RX ADMIN — Medication 10 ML: at 22:16

## 2017-09-12 RX ADMIN — Medication 10 ML: at 06:22

## 2017-09-12 RX ADMIN — OXYCODONE HYDROCHLORIDE 20 MG: 10 TABLET, FILM COATED, EXTENDED RELEASE ORAL at 20:02

## 2017-09-12 RX ADMIN — LEVALBUTEROL HYDROCHLORIDE 0.63 MG: 1.25 SOLUTION RESPIRATORY (INHALATION) at 20:16

## 2017-09-12 RX ADMIN — LEVALBUTEROL HYDROCHLORIDE 0.63 MG: 1.25 SOLUTION RESPIRATORY (INHALATION) at 15:07

## 2017-09-12 RX ADMIN — HYDROMORPHONE HYDROCHLORIDE 2 MG: 2 TABLET ORAL at 22:11

## 2017-09-12 RX ADMIN — MORPHINE SULFATE 2 MG: 2 INJECTION, SOLUTION INTRAMUSCULAR; INTRAVENOUS at 15:34

## 2017-09-12 RX ADMIN — CARVEDILOL 12.5 MG: 12.5 TABLET, FILM COATED ORAL at 16:20

## 2017-09-12 RX ADMIN — HEPARIN SODIUM 5000 UNITS: 5000 INJECTION, SOLUTION INTRAVENOUS; SUBCUTANEOUS at 14:41

## 2017-09-12 RX ADMIN — HYDROMORPHONE HYDROCHLORIDE 2 MG: 2 TABLET ORAL at 16:20

## 2017-09-12 NOTE — PROGRESS NOTES
[address]   [address] FAX: [phone]         VASCULAR SURGERY FLOOR PROGRESS NOTE    Admit Date: 2017  POD: 3 Days Post-Op    Procedure:  Procedure(s):  SIGMOIDOSCOPY FLEXIBLE    Subjective:     Patient has no new complaints. Objective:     Vitals:  Blood pressure 150/67, pulse 90, temperature 98.8 °F (37.1 °C), resp. rate 25, height 5' 6\" (1.676 m), weight 228 lb (103.4 kg), SpO2 100 %. Temp (24hrs), Av.9 °F (37.2 °C), Min:98.4 °F (36.9 °C), Max:99.2 °F (37.3 °C)      Intake / Output:    Intake/Output Summary (Last 24 hours) at 17 0725  Last data filed at 17 0600   Gross per 24 hour   Intake             1255 ml   Output             3774 ml   Net            -2519 ml       Physical Exam:    Constitutional: he appears well-developed. No distress. HENT:   Head: Atraumatic. Eyes: Pupils are equal, round, and reactive to light. Neck: Normal range of motion. Cardiovascular: Regular rhythm. Pulmonary/Chest: Effort normal and breath sounds normal. No respiratory distress. Abdominal: Soft. Bowel sounds are normal. he exhibits no distension. There is no tenderness. There is no guarding. No hernia. Musculoskeletal: Normal range of motion. Neurological: He is alert.  CN II- XII grossly intact  Vascular: Wound VAC on groins feet warm    Labs:   Recent Labs      17   0421  09/10/17   1621   HGB  8.0*  8.0*   WBC  7.4  6.3   K  4.4  4.7   GLU  122*  122*       Data Review     Assessment:     Patient Active Problem List    Diagnosis Date Noted    Ischemia of left lower extremity 2017    Claudication ([facility] 2017    Occlusion of aorta ([facility] 2017    Chronic left-sided low back pain 2017    Essential hypertension 2016    Renal insufficiency 2016    Dyslipidemia 2016       Plan/Recommendations/Medical Decision Making:     Advance diet to general diet  -DC IV fluids and PCA  -We will evaluate labs for continue gently diuresis with IV Lasix  -Up and out of bed PT OT  SCD  Elements of this note have been dictated using speech recognition software. As a result, errors of speech recognition may have occurred.

## 2017-09-12 NOTE — PROGRESS NOTES
Admit Date: 9/7/2017      Subjective:          Review of Systems  Cardio-vascular: no chest pain, no SOB  GI: no N/V/D  : no dysuria, no hematuria    Objective:     Patient Vitals for the past 8 hrs:   BP Temp Pulse Resp SpO2 Weight   09/12/17 0729 - - - - 99 % -   09/12/17 0700 150/67 98.8 °F (37.1 °C) 90 25 100 % -   09/12/17 0600 155/69 - 90 28 96 % -   09/12/17 0500 148/66 - 85 16 96 % 103.4 kg (228 lb)   09/12/17 0405 - - - - 95 % -   09/12/17 0400 151/68 - 84 15 97 % -   09/12/17 0300 131/58 99.1 °F (37.3 °C) 93 29 96 % -   09/12/17 0200 148/71 - 84 17 97 % -   09/12/17 0100 120/56 - 82 15 97 % -            Physical Exam:   Lungs: decreased BS  CV: RR,   Abdomen:tender,   Ext: 3+ edema        Data Review   Recent Results (from the past 8 hour(s))   CBC W/O DIFF    Collection Time: 09/12/17  6:51 AM   Result Value Ref Range    WBC 7.6 4.3 - 11.1 K/uL    RBC 2.54 (L) 4.23 - 5.67 M/uL    HGB 7.4 (L) 13.6 - 17.2 g/dL    HCT 22.6 (L) 41.1 - 50.3 %    MCV 89.0 79.6 - 97.8 FL    MCH 29.1 26.1 - 32.9 PG    MCHC 32.7 31.4 - 35.0 g/dL    RDW 16.2 (H) 11.9 - 14.6 %    PLATELET 081 (L) 351 - 450 K/uL    MPV 10.3 (L) 10.8 - 03.4 FL   METABOLIC PANEL, BASIC    Collection Time: 09/12/17  6:51 AM   Result Value Ref Range    Sodium 142 136 - 145 mmol/L    Potassium 3.7 3.5 - 5.1 mmol/L    Chloride 111 (H) 98 - 107 mmol/L    CO2 22 21 - 32 mmol/L    Anion gap 9 7 - 16 mmol/L    Glucose 109 (H) 65 - 100 mg/dL    BUN 23 6 - 23 MG/DL    Creatinine 2.05 (H) 0.8 - 1.5 MG/DL    GFR est AA 43 (L) >60 ml/min/1.73m2    GFR est non-AA 36 (L) >60 ml/min/1.73m2    Calcium 7.9 (L) 8.3 - 10.4 MG/DL           Assessment:     Principal Problem:    Occlusion of aorta ([facility]) (3/3/2017)    Active Problems:    Ischemia of left lower extremity (9/8/2017)      Claudication ([address] (9/8/2017)    CKD    Plan:     Creat slightly better with good UOP  Continue with diuresis    Araceli Yi MD

## 2017-09-12 NOTE — PROGRESS NOTES
Problem: Self Care Deficits Care Plan (Adult)  Goal: *Acute Goals and Plan of Care (Insert Text)  GOALS:    1: Pt will perform toileting with maximal assistance and adaptive equipment as needed. 2: Pt will perform grooming with minimal assistance and adaptive equipment as needed. 3: Pt will tolerate sitting edge of bed for 5 minutes for ADL prep.  4: Pt will perform toilet transfers with moderate assistance and the least restrictive device to promote quality of life. 5: Pt will tolerate 15 minutes of therapeutic activity with minimal rest breaks. Time Frame: 7 visits      OCCUPATIONAL THERAPY: Daily Note, Treatment Day: 1st and AM 9/12/2017  INPATIENT: Hospital Day: 6  Payor: [place] / Plan: [place] MEDICAID [facility] / Product Type: Medicaid /      NAME/AGE/GENDER: Дмитрий Mosqueda is a 61 y.o. male     PRIMARY DIAGNOSIS:  Aortic occlusion ([facility] [I74.10]  Ischemic colitis ([facility] [K55.9] Occlusion of aorta (HCC) Occlusion of aorta (HCC)  Procedure(s) (LRB):  SIGMOIDOSCOPY FLEXIBLE (N/A)  3 Days Post-Op  ICD-10: Treatment Diagnosis:        · Generalized Muscle Weakness (M62.81)  · Dizziness and Giddiness (R42)   Precautions/Allergies:         Review of patient's allergies indicates no known allergies. ASSESSMENT:   Mr. Bev Spear was admitted for the above diagnosis. Pt presents sitting up in chair for beginning of session. Pt performed some light UE exercises (in grid below) to increase strength and activity tolerance. Pt required brief resting breaks in between sets due to some fatigue. Pt gave good effort today and seem to be making overall progress with mobility and activity tolerance. Pt left with all needs in reach. Will continue to benefit from skilled OT during stay. This section established at most recent assessment   PROBLEM LIST (Impairments causing functional limitations):  1. Decreased Strength  2. Decreased ADL/Functional Activities  3.  Decreased Transfer Abilities  4. Decreased Ambulation Ability/Technique  5. Decreased Balance  6. Increased Pain  7. Decreased Activity Tolerance  8. Decreased Work Simplification/Energy Conservation Techniques  9. Increased Fatigue  10. Increased Shortness of Breath  11. Decreased Flexibility/Joint Mobility    INTERVENTIONS PLANNED: (Benefits and precautions of occupational therapy have been discussed with the patient.)  1. Activities of daily living training  2. Adaptive equipment training  3. Clothing management  4. Donning&doffing training  5. Group therapy  6. Therapeutic activity  7. Therapeutic exercise  8. Energy conservation      TREATMENT PLAN: Frequency/Duration: Follow patient 3x/week to address above goals. Rehabilitation Potential For Stated Goals: GOOD      RECOMMENDED REHABILITATION/EQUIPMENT: (at time of discharge pending progress): Due to the probability of continued deficits (see above) this patient will likely need continued skilled occupational therapy after discharge. Equipment:   · to be determined               OCCUPATIONAL PROFILE AND HISTORY:   History of Present Injury/Illness (Reason for Referral):  See H&P  Past Medical History/Comorbidities:   Mr. Vida Mancia  has a past medical history of Acute diastolic (congestive) heart failure ([facility] (12/4/2016); CAD (coronary artery disease); Chronic kidney disease, stage 3; Chronic pain; Ex-smoker; GERD (gastroesophageal reflux disease); TIA (transient ischemic attack); Hypercholesteremia; Hypertension; Old MI (myocardial infarction) (11/2016); and Osteoarthritis. Mr. Vida Mancia  has a past surgical history that includes heart catheterization (12/2016); colonoscopy (N/A, 6/22/2017); knee arthroscopy (Left); and flexible sigmoidoscopy (N/A, 9/9/2017).   Social History/Living Environment:   Home Environment: Private residence  # Steps to Enter: 6  Rails to Enter: Yes  One/Two Story Residence: One story  Living Alone: Yes  Support Systems: Child(tisha)  Patient Expects to be Discharged to[de-identified] Unknown  Current DME Used/Available at Home: Cane, straight  Tub or Shower Type: Shower  Prior Level of Function/Work/Activity:  Works, drives, Independent with self care. Number of Personal Factors/Comorbidities that affect the Plan of Care: Expanded review of therapy/medical records (1-2):  MODERATE COMPLEXITY   ASSESSMENT OF OCCUPATIONAL PERFORMANCE[de-identified]   Activities of Daily Living:           Basic ADLs (From Assessment) Complex ADLs (From Assessment)   Basic ADL  Feeding: Minimum assistance  Oral Facial Hygiene/Grooming: Maximum assistance  Bathing: Total assistance  Upper Body Dressing: Maximum assistance  Lower Body Dressing: Total assistance  Toileting: Maximum assistance     Grooming/Bathing/Dressing Activities of Daily Living     Cognitive Retraining  Safety/Judgement: Awareness of environment                       Bed/Mat Mobility  Sit to Stand: Minimum assistance;Assist x2  Bed to Chair: Minimum assistance;Assist x2          Most Recent Physical Functioning:   Gross Assessment:                  Posture:  Posture (WDL): Exceptions to WDL  Posture Assessment:  Forward head, Rounded shoulders  Balance:  Sitting: Impaired  Sitting - Static: Fair (occasional)  Sitting - Dynamic: Fair (occasional)  Standing: Impaired  Standing - Static: Poor  Standing - Dynamic : Poor Bed Mobility:     Wheelchair Mobility:     Transfers:  Sit to Stand: Minimum assistance;Assist x2  Stand to Sit: Minimum assistance;Assist x2  Bed to Chair: Minimum assistance;Assist x2                    Patient Vitals for the past 6 hrs:   BP SpO2 O2 Flow Rate (L/min) Pulse   09/12/17 0700 150/67 100 % 1 l/min 90   09/12/17 0729 143/64 98 % 1 l/min 86   09/12/17 0759 130/61 98 % - 85   09/12/17 0829 160/71 99 % - 89   09/12/17 0859 148/68 98 % - 90   09/12/17 0934 172/79 - - 96   09/12/17 1032 120/61 97 % - 84   09/12/17 1101 143/80 97 % - 84   09/12/17 1200 137/60 98 % - 84   09/12/17 1218 - 96 % 1 l/min -        Mental Status  Neurologic State: Alert, Appropriate for age  Orientation Level: Oriented X4  Cognition: Appropriate decision making, Follows commands  Perception: Appears intact  Perseveration: No perseveration noted  Safety/Judgement: Awareness of environment                               Physical Skills Involved:  1. Range of Motion  2. Balance  3. Strength  4. Activity Tolerance  5. Pain (acute) Cognitive Skills Affected (resulting in the inability to perform in a timely and safe manner):  1. WFLs Psychosocial Skills Affected:  1. Habits/Routines  2. Environmental Adaptation  3. Self-Awareness   Number of elements that affect the Plan of Care: 5+:  HIGH COMPLEXITY   CLINICAL DECISION MAKIN[address] [place] [address] AM-PAC 6 Clicks   Daily Activity Inpatient Short Form  How much help from another person does the patient currently need. .. Total A Lot A Little None   1. Putting on and taking off regular lower body clothing? [X] 1   [ ] 2   [ ] 3   [ ] 4   2. Bathing (including washing, rinsing, drying)? [X] 1   [ ] 2   [ ] 3   [ ] 4   3. Toileting, which includes using toilet, bedpan or urinal?   [X] 1   [ ] 2   [ ] 3   [ ] 4   4. Putting on and taking off regular upper body clothing? [X] 1   [ ] 2   [ ] 3   [ ] 4   5. Taking care of personal grooming such as brushing teeth? [ ] 1   [X] 2   [ ] 3   [ ] 4   6. Eating meals? [ ] 1   [X] 2   [ ] 3   [ ] 4   © , Trustees of [address] [place] [address], under license to [facility]. All rights reserved    Score:  Initial: 8 Most Recent: X (Date: -- )     Interpretation of Tool:  Represents activities that are increasingly more difficult (i.e. Bed mobility, Transfers, Gait).        Score 24 23 22-20 19-15 14-10 9-7 6       Modifier CH CI CJ CK CL CM CN         · Self Care:               - CURRENT STATUS:    CM - 80%-99% impaired, limited or restricted               - GOAL STATUS:           CK - 40%-59% impaired, limited or restricted               - D/C STATUS:                       ---------------To be determined---------------  Payor: MEDICAID [facility] / Plan: [place] / Product Type: Medicaid /       Medical Necessity:     · Patient demonstrates good rehab potential due to higher previous functional level. Reason for Services/Other Comments:  · Patient continues to require skilled intervention due to medical complications and patient unable to attend/participate in therapy as expected. Use of outcome tool(s) and clinical judgement create a POC that gives a: HIGH COMPLEXITY             TREATMENT:   (In addition to Assessment/Re-Assessment sessions the following treatments were rendered)      Pre-treatment Symptoms/Complaints:  Decreased ability to perform ADLs, self care, and functional mobility; decreased tolerance for activities  Pain: Initial:   Pain Intensity 1: 0  Post Session:  Appears to be in less pain, too tired to answer      Therapeutic Exercise: (10 minutes):  Exercises per grid below to improve mobility, strength and activity tolerance. Required minimal visual, verbal and tactile cues to promote proper body alignment and promote proper body mechanics. Progressed resistance and repetitions as indicated. UE Exercises (with yellow and red theraband) Date:  9/12/2017 Date:   Date:     Activity/Exercise Parameters Parameters Parameters   Shoulder Abd/Adduction 10 reps     Shoulder Flexion 8 reps     Elbow Flexion 10 reps     Punches 10 reps                         Braces/Orthotics/Lines/Etc:   · IV  · lopez catheter  · arterial line  · O2 Device: Nasal cannula  Treatment/Session Assessment:    · Response to Treatment:  Agrees to therapy  · Interdisciplinary Collaboration:  · Certified Occupational Therapy Assistant and Registered Nurse  · After treatment position/precautions:  · Up in chair, Bed/Chair-wheels locked and Call light within reach  · Compliance with Program/Exercises:  Will assess as treatment progresses. · Recommendations/Intent for next treatment session: \"Next visit will focus on advancements to more challenging activities and reduction in assistance provided\".   Total Treatment Duration:OT Patient Time In/Time Out  Time In: 0945  Time Out: Monica Benson. Vickie Wilde

## 2017-09-12 NOTE — PROGRESS NOTES
Problem: Mobility Impaired (Adult and Pediatric)  Goal: *Acute Goals and Plan of Care (Insert Text)  STG:  (1.)Mr. Tran will move from supine to sit and sit to supine , scoot up and down and roll side to side with MINIMAL ASSIST within 5 day(s). (2.)Mr. Tran will transfer from bed to chair and chair to bed with MINIMAL ASSIST using the least restrictive device within 5 day(s). (3.)Mr. Tran will ambulate with MINIMAL ASSIST for 50 feet with the least restrictive device within 5 day(s). (4.)Mr. Tran will maintain stable vital signs throughout all functional mobility within 5 days. (5.)Mr. Tran will perform standing static and dynamic balance activities x 10 minutes with MINIMAL ASSIST to improve safety within 5 day(s). LTG:  (1.)Mr. Tran will move from supine to sit and sit to supine , scoot up and down and roll side to side in bed with CONTACT GUARD ASSIST within 10 day(s). (2.)Mr. Tran will transfer from bed to chair and chair to bed with CONTACT GUARD ASSIST using the least restrictive device within 10 day(s). (3.)Mr. Tran will ambulate with CONTACT GUARD ASSIST for 100 feet with the least restrictive device within 10 day(s). ________________________________________________________________________________________________      PHYSICAL THERAPY: Daily Note, Treatment Day: 3rd and AM 9/12/2017  INPATIENT: Hospital Day: 6  Payor[place] / Plan[place] / Product Type: Medicaid /      NAME/AGE/GENDER: Jeannie Ramírez is a 61 y.o. male     PRIMARY DIAGNOSIS: Aortic occlusion ([facility] [address] [I74.10]  Ischemic colitis ([facility] [address] [K55.9] Occlusion of aorta (HCC) Occlusion of aorta (HCC)  Procedure(s) (LRB):  SIGMOIDOSCOPY FLEXIBLE (N/A)  3 Days Post-Op  ICD-10: Treatment Diagnosis:       · Difficulty in walking, Not elsewhere classified (R26.2)   Precaution/Allergies:  Review of patient's allergies indicates no known allergies.        ASSESSMENT: Mr. Chika Shelton presents sitting up in bed, agreeable to treatment. He stated that he has pain everywhere but he showed less sign of pain throughout treatment this morning. He stood up from the bed in chair position with min assist x2 and rolling walker. He was able to take few steps to bedside chair and sat down with min assist x2. He required verbal cues for hand placement and upright posture during bed to chair transfer. After a seated rest break, he performed seated lower extremity exercises with fair technique. He required verbal cues for exercise technique. Great progress noted with mobility and activity tolerance this morning. Will continue PT efforts. This section established at most recent assessment   PROBLEM LIST (Impairments causing functional limitations):  1. Decreased Strength  2. Decreased ADL/Functional Activities  3. Decreased Transfer Abilities  4. Decreased Ambulation Ability/Technique  5. Decreased Balance  6. Increased Pain  7. Decreased Activity Tolerance  8. Decreased Knowledge of Precautions  9. Decreased [place] with Home Exercise Program    INTERVENTIONS PLANNED: (Benefits and precautions of physical therapy have been discussed with the patient.)  1. Balance Exercise  2. Bed Mobility  3. Family Education  4. Gait Training  5. Home Exercise Program (HEP)  6. Therapeutic Activites  7. Therapeutic Exercise/Strengthening  8. Transfer Training  9. Patient Education  10. Group Therapy      TREATMENT PLAN: Frequency/Duration: 5 times a week for duration of hospital stay  Rehabilitation Potential For Stated Goals: EXCELLENT      RECOMMENDED REHABILITATION/EQUIPMENT: (at time of discharge pending progress): Due to the probability of continued deficits (see above) this patient will likely need continued skilled physical therapy after discharge. Equipment:   · None at this time                   HISTORY:   History of Present Injury/Illness (Reason for Referral):   Aortic occlusion (HCC) [I74.10] Occlusion of aorta (HCC) Occlusion of aorta (HCC)  Procedure(s) (LRB):  left femoral embolectomy/left lower extremity arteriogram (Left)  1 Day Post-Op  Past Medical History/Comorbidities:   Mr. Chika Shelton  has a past medical history of Acute diastolic (congestive) heart failure ([facility] [address] (12/4/2016); CAD (coronary artery disease); Chronic kidney disease, stage 3; Chronic pain; Ex-smoker; GERD (gastroesophageal reflux disease); TIA (transient ischemic attack); Hypercholesteremia; Hypertension; Old MI (myocardial infarction) (11/2016); and Osteoarthritis. Mr. Chika Shelton  has a past surgical history that includes heart catheterization (12/2016); colonoscopy (N/A, 6/22/2017); knee arthroscopy (Left); and flexible sigmoidoscopy (N/A, 9/9/2017). Social History/Living Environment:   Home Environment: Private residence  # Steps to Enter: 6  Rails to Enter: Yes  One/Two Story Residence: One story  Living Alone: Yes  Support Systems: Child(tisha)  Patient Expects to be Discharged to[de-identified] Unknown  Current DME Used/Available at Home: Cane, straight  Tub or Shower Type: Shower  Prior Level of Function/Work/Activity:  Modified independent with straight cane for ambulation secondary to decreased sensation LLE for a few months, he reports independence with ADLs and driving at baseline. Number of Personal Factors/Comorbidities that affect the Plan of Care: 3+: HIGH COMPLEXITY   EXAMINATION:   Most Recent Physical Functioning:   Gross Assessment:                  Posture:  Posture (WDL): Exceptions to WDL  Posture Assessment:  Forward head, Rounded shoulders  Balance:  Sitting: Impaired  Sitting - Static: Poor (constant support)  Sitting - Dynamic: Poor (constant support)  Standing: Impaired  Standing - Static: Poor  Standing - Dynamic : Poor Bed Mobility:     Wheelchair Mobility:     Transfers:  Sit to Stand: Minimum assistance;Assist x2  Stand to Sit: Minimum assistance;Assist x2  Bed to Chair: Minimum assistance;Assist x2  Gait:             Body Structures Involved:  1. Nerves  2. Heart  3. Muscles Body Functions Affected:  1. Sensory/Pain  2. Cardio  3. Hematological  4. Neuromusculoskeletal  5. Movement Related Activities and Participation Affected:  1. Mobility  2. Self Care  3. Domestic Life  4. Interpersonal Interactions and Relationships  5. Community, Social and [place]   Number of elements that affect the Plan of Care: 4+: HIGH COMPLEXITY   CLINICAL PRESENTATION:   Presentation: Evolving clinical presentation with unstable and unpredictable characteristics: HIGH COMPLEXITY   CLINICAL DECISION MAKIN [facility] Mobility Inpatient Short Form  How much difficulty does the patient currently have. .. Unable A Lot A Little None   1. Turning over in bed (including adjusting bedclothes, sheets and blankets)? [ ] 1   [X] 2   [ ] 3   [ ] 4   2. Sitting down on and standing up from a chair with arms ( e.g., wheelchair, bedside commode, etc.)   [X] 1   [ ] 2   [ ] 3   [ ] 4   3. Moving from lying on back to sitting on the side of the bed? [ ] 1   [X] 2   [ ] 3   [ ] 4   How much help from another person does the patient currently need. .. Total A Lot A Little None   4. Moving to and from a bed to a chair (including a wheelchair)? [X] 1   [ ] 2   [ ] 3   [ ] 4   5. Need to walk in hospital room? [X] 1   [ ] 2   [ ] 3   [ ] 4   6. Climbing 3-5 steps with a railing? [X] 1   [ ] 2   [ ] 3   [ ] 4   © , Trustees of [address] [place] [address] [address] [address], under license to [employer] All rights reserved    Score:  Initial: 8 Most Recent: X (Date: -- )     Interpretation of Tool:  Represents activities that are increasingly more difficult (i.e. Bed mobility, Transfers, Gait).        Score 24 23 22-20 19-15 14-10 9-7 6       Modifier CH CI CJ CK CL CM CN         · Mobility - Walking and Moving Around:               - CURRENT STATUS:    CM - 80%-99% impaired, limited or restricted               - GOAL STATUS:           CK - 40%-59% impaired, limited or restricted               - D/C STATUS:                       ---------------To be determined---------------  Payor: [address] [place] [address] [address] / Plan: [place] MEDICA[place] [facility] / Product Type: Medicaid /       Medical Necessity:     · Patient demonstrates good rehab potential due to higher previous functional level. Reason for Services/Other Comments:  · Patient continues to require modification of therapeutic interventions to increase complexity of exercises. Use of outcome tool(s) and clinical judgement create a POC that gives a: Questionable prediction of patient's progress: MODERATE COMPLEXITY                 TREATMENT:   (In addition to Assessment/Re-Assessment sessions the following treatments were rendered)   Pre-treatment Symptoms/Complaints: \"It hurts everywhere. \"  Pain: Initial:   Pain Intensity 1:  (unrated pain)  Post Session:  Pain not rated      Therapeutic Activity: (    15 Minutes): Therapeutic activities including Bed to chair transfers and sit <>stand transfer to improve mobility, strength and activity tolerance. Required verbal cues   to promote dynamic balance in standing and promote motor control of bilateral, upper extremity(s), lower extremity(s). Therapeutic Exercise: (15 Minutes):  Exercises per grid below to improve mobility and strength. Required minimal verbal cues to promote proper body alignment, promote proper body posture and promote proper body breathing techniques. Progressed repetitions as indicated.      Date:  9/12/17 Date:   Date:     ACTIVITY/EXERCISE AM PM AM PM AM PM   Ambulation:           Distance  Device  Duration         Seated Heel Raises x20        Seated Toe Raises x20        Seated Long Arc Quads x20        Seated Marching x10        Seated Hip Abduction                  B = bilateral; AA = active assistive; A = active; P = passive      · Braces/Orthotics/Lines/Etc: IV, lopez catheter, PCA and nasal cannula   Treatment/Session Assessment:    · Response to Treatment:  Patient tolerated treatment poorly with fatigue and increased pain  · Interdisciplinary Collaboration:  · Physical Therapy Assistant, Registered Nurse and SPTA  · After treatment position/precautions:  · Up in chair, Bed/Chair-wheels locked, Call light within reach, RN notified and Nurse at bedside  · Compliance with Program/Exercises: making an effort. · Recommendations/Intent for next treatment session: \"Next visit will focus on advancements to more challenging activities and reduction in assistance provided\".   Total Treatment Duration:PT Patient Time In/Time Out  Time In: 2071  Time Out: 5744 [address]

## 2017-09-13 LAB
ABO + RH BLD: NORMAL
ANION GAP SERPL CALC-SCNC: 11 MMOL/L (ref 7–16)
ANION GAP SERPL CALC-SCNC: 12 MMOL/L (ref 7–16)
BLD PROD TYP BPU: NORMAL
BLOOD GROUP ANTIBODIES SERPL: NORMAL
BPU ID: NORMAL
BUN SERPL-MCNC: 20 MG/DL (ref 6–23)
BUN SERPL-MCNC: 21 MG/DL (ref 6–23)
CALCIUM SERPL-MCNC: 8 MG/DL (ref 8.3–10.4)
CALCIUM SERPL-MCNC: 8.3 MG/DL (ref 8.3–10.4)
CHLORIDE SERPL-SCNC: 104 MMOL/L (ref 98–107)
CHLORIDE SERPL-SCNC: 106 MMOL/L (ref 98–107)
CO2 SERPL-SCNC: 22 MMOL/L (ref 21–32)
CO2 SERPL-SCNC: 23 MMOL/L (ref 21–32)
CREAT SERPL-MCNC: 1.87 MG/DL (ref 0.8–1.5)
CREAT SERPL-MCNC: 1.96 MG/DL (ref 0.8–1.5)
CROSSMATCH RESULT,%XM: NORMAL
ERYTHROCYTE [DISTWIDTH] IN BLOOD BY AUTOMATED COUNT: 15.6 % (ref 11.9–14.6)
GLUCOSE SERPL-MCNC: 138 MG/DL (ref 65–100)
GLUCOSE SERPL-MCNC: 97 MG/DL (ref 65–100)
HCT VFR BLD AUTO: 26.6 % (ref 41.1–50.3)
HGB BLD-MCNC: 9.2 G/DL (ref 13.6–17.2)
MCH RBC QN AUTO: 29.5 PG (ref 26.1–32.9)
MCHC RBC AUTO-ENTMCNC: 34.6 G/DL (ref 31.4–35)
MCV RBC AUTO: 85.3 FL (ref 79.6–97.8)
PLATELET # BLD AUTO: 167 K/UL (ref 150–450)
PMV BLD AUTO: 10.2 FL (ref 10.8–14.1)
POTASSIUM SERPL-SCNC: 3.3 MMOL/L (ref 3.5–5.1)
POTASSIUM SERPL-SCNC: 3.5 MMOL/L (ref 3.5–5.1)
RBC # BLD AUTO: 3.12 M/UL (ref 4.23–5.67)
SODIUM SERPL-SCNC: 139 MMOL/L (ref 136–145)
SODIUM SERPL-SCNC: 139 MMOL/L (ref 136–145)
SPECIMEN EXP DATE BLD: NORMAL
STATUS OF UNIT,%ST: NORMAL
UNIT DIVISION, %UDIV: 0
WBC # BLD AUTO: 7.9 K/UL (ref 4.3–11.1)

## 2017-09-13 PROCEDURE — 94640 AIRWAY INHALATION TREATMENT: CPT

## 2017-09-13 PROCEDURE — 74011250636 HC RX REV CODE- 250/636: Performed by: SURGERY

## 2017-09-13 PROCEDURE — 97530 THERAPEUTIC ACTIVITIES: CPT

## 2017-09-13 PROCEDURE — 94760 N-INVAS EAR/PLS OXIMETRY 1: CPT

## 2017-09-13 PROCEDURE — 65660000004 HC RM CVT STEPDOWN

## 2017-09-13 PROCEDURE — 97605 NEG PRS WND THER DME<=50SQCM: CPT

## 2017-09-13 PROCEDURE — 97110 THERAPEUTIC EXERCISES: CPT

## 2017-09-13 PROCEDURE — 77010033678 HC OXYGEN DAILY

## 2017-09-13 PROCEDURE — 36592 COLLECT BLOOD FROM PICC: CPT

## 2017-09-13 PROCEDURE — 80048 BASIC METABOLIC PNL TOTAL CA: CPT | Performed by: SURGERY

## 2017-09-13 PROCEDURE — 97535 SELF CARE MNGMENT TRAINING: CPT

## 2017-09-13 PROCEDURE — 74750000023 HC WOUND THERAPY

## 2017-09-13 PROCEDURE — 74011000250 HC RX REV CODE- 250: Performed by: SURGERY

## 2017-09-13 PROCEDURE — 74011250636 HC RX REV CODE- 250/636: Performed by: ANESTHESIOLOGY

## 2017-09-13 PROCEDURE — 74011250636 HC RX REV CODE- 250/636: Performed by: INTERNAL MEDICINE

## 2017-09-13 PROCEDURE — 74011000258 HC RX REV CODE- 258: Performed by: SURGERY

## 2017-09-13 PROCEDURE — 85027 COMPLETE CBC AUTOMATED: CPT | Performed by: SURGERY

## 2017-09-13 PROCEDURE — 74011250637 HC RX REV CODE- 250/637: Performed by: SURGERY

## 2017-09-13 PROCEDURE — 74011250637 HC RX REV CODE- 250/637: Performed by: PHYSICIAN ASSISTANT

## 2017-09-13 RX ORDER — LEVALBUTEROL INHALATION SOLUTION 1.25 MG/3ML
0.63 SOLUTION RESPIRATORY (INHALATION)
Status: DISCONTINUED | OUTPATIENT
Start: 2017-09-13 | End: 2017-09-14

## 2017-09-13 RX ORDER — OXYCODONE AND ACETAMINOPHEN 7.5; 325 MG/1; MG/1
2 TABLET ORAL
Status: DISCONTINUED | OUTPATIENT
Start: 2017-09-13 | End: 2017-09-29 | Stop reason: HOSPADM

## 2017-09-13 RX ORDER — POTASSIUM CHLORIDE 14.9 MG/ML
20 INJECTION INTRAVENOUS
Status: COMPLETED | OUTPATIENT
Start: 2017-09-13 | End: 2017-09-13

## 2017-09-13 RX ORDER — ACETAMINOPHEN 325 MG/1
650 TABLET ORAL
Status: DISCONTINUED | OUTPATIENT
Start: 2017-09-13 | End: 2017-09-29 | Stop reason: HOSPADM

## 2017-09-13 RX ORDER — AMLODIPINE BESYLATE 5 MG/1
5 TABLET ORAL DAILY
Status: DISCONTINUED | OUTPATIENT
Start: 2017-09-13 | End: 2017-09-29 | Stop reason: HOSPADM

## 2017-09-13 RX ORDER — POTASSIUM CHLORIDE 20 MEQ/1
40 TABLET, EXTENDED RELEASE ORAL 2 TIMES DAILY
Status: DISCONTINUED | OUTPATIENT
Start: 2017-09-13 | End: 2017-09-20

## 2017-09-13 RX ORDER — CARVEDILOL 25 MG/1
25 TABLET ORAL 2 TIMES DAILY WITH MEALS
Status: DISCONTINUED | OUTPATIENT
Start: 2017-09-13 | End: 2017-09-29 | Stop reason: HOSPADM

## 2017-09-13 RX ORDER — NICARDIPINE HYDROCHLORIDE 0.1 MG/ML
5-15 INJECTION INTRAVENOUS
Status: DISCONTINUED | OUTPATIENT
Start: 2017-09-13 | End: 2017-09-13 | Stop reason: SDUPTHER

## 2017-09-13 RX ORDER — POTASSIUM CHLORIDE 1.5 G/1.77G
40 POWDER, FOR SOLUTION ORAL 2 TIMES DAILY WITH MEALS
Status: DISCONTINUED | OUTPATIENT
Start: 2017-09-13 | End: 2017-09-13

## 2017-09-13 RX ADMIN — POTASSIUM CHLORIDE 40 MEQ: 20 TABLET, EXTENDED RELEASE ORAL at 17:23

## 2017-09-13 RX ADMIN — MORPHINE SULFATE 2 MG: 2 INJECTION, SOLUTION INTRAMUSCULAR; INTRAVENOUS at 01:47

## 2017-09-13 RX ADMIN — Medication 10 ML: at 22:00

## 2017-09-13 RX ADMIN — POTASSIUM CHLORIDE 20 MEQ: 14.9 INJECTION, SOLUTION INTRAVENOUS at 06:26

## 2017-09-13 RX ADMIN — OXYCODONE HYDROCHLORIDE AND ACETAMINOPHEN 2 TABLET: 7.5; 325 TABLET ORAL at 09:37

## 2017-09-13 RX ADMIN — MORPHINE SULFATE 2 MG: 2 INJECTION, SOLUTION INTRAMUSCULAR; INTRAVENOUS at 11:12

## 2017-09-13 RX ADMIN — LEVALBUTEROL HYDROCHLORIDE 0.63 MG: 1.25 SOLUTION RESPIRATORY (INHALATION) at 20:12

## 2017-09-13 RX ADMIN — HEPARIN SODIUM 5000 UNITS: 5000 INJECTION, SOLUTION INTRAVENOUS; SUBCUTANEOUS at 09:37

## 2017-09-13 RX ADMIN — LEVALBUTEROL HYDROCHLORIDE: 1.25 SOLUTION RESPIRATORY (INHALATION) at 07:19

## 2017-09-13 RX ADMIN — HEPARIN SODIUM 5000 UNITS: 5000 INJECTION, SOLUTION INTRAVENOUS; SUBCUTANEOUS at 15:00

## 2017-09-13 RX ADMIN — CARVEDILOL 25 MG: 25 TABLET, FILM COATED ORAL at 17:23

## 2017-09-13 RX ADMIN — SODIUM CHLORIDE 3 MG/HR: 900 INJECTION, SOLUTION INTRAVENOUS at 04:29

## 2017-09-13 RX ADMIN — CARVEDILOL 25 MG: 25 TABLET, FILM COATED ORAL at 08:58

## 2017-09-13 RX ADMIN — PANTOPRAZOLE SODIUM 40 MG: 40 TABLET, DELAYED RELEASE ORAL at 09:37

## 2017-09-13 RX ADMIN — DIPHENHYDRAMINE HYDROCHLORIDE 12.5 MG: 50 INJECTION, SOLUTION INTRAMUSCULAR; INTRAVENOUS at 01:47

## 2017-09-13 RX ADMIN — HEPARIN SODIUM 5000 UNITS: 5000 INJECTION, SOLUTION INTRAVENOUS; SUBCUTANEOUS at 22:58

## 2017-09-13 RX ADMIN — POTASSIUM CHLORIDE 20 MEQ: 14.9 INJECTION, SOLUTION INTRAVENOUS at 04:33

## 2017-09-13 RX ADMIN — Medication 10 ML: at 15:00

## 2017-09-13 RX ADMIN — AMLODIPINE BESYLATE 5 MG: 10 TABLET ORAL at 10:23

## 2017-09-13 RX ADMIN — OXYCODONE HYDROCHLORIDE AND ACETAMINOPHEN 1 TABLET: 7.5; 325 TABLET ORAL at 17:22

## 2017-09-13 RX ADMIN — ACETAMINOPHEN 650 MG: 325 TABLET ORAL at 22:57

## 2017-09-13 RX ADMIN — DOCUSATE SODIUM 100 MG: 100 CAPSULE, LIQUID FILLED ORAL at 08:58

## 2017-09-13 RX ADMIN — ASPIRIN 81 MG: 81 TABLET, COATED ORAL at 08:58

## 2017-09-13 RX ADMIN — Medication 10 ML: at 04:42

## 2017-09-13 RX ADMIN — FUROSEMIDE 40 MG: 10 INJECTION, SOLUTION INTRAMUSCULAR; INTRAVENOUS at 09:37

## 2017-09-13 RX ADMIN — LEVALBUTEROL HYDROCHLORIDE 0.63 MG: 1.25 SOLUTION RESPIRATORY (INHALATION) at 03:40

## 2017-09-13 RX ADMIN — LEVALBUTEROL HYDROCHLORIDE 0.63 MG: 1.25 SOLUTION RESPIRATORY (INHALATION) at 11:00

## 2017-09-13 NOTE — PROGRESS NOTES
Problem: Mobility Impaired (Adult and Pediatric)  Goal: *Acute Goals and Plan of Care (Insert Text)  STG:  (1.)Mr. Tran will move from supine to sit and sit to supine , scoot up and down and roll side to side with MINIMAL ASSIST within 5 day(s). (2.)Mr. Tran will transfer from bed to chair and chair to bed with MINIMAL ASSIST using the least restrictive device within 5 day(s). (3.)Mr. Tran will ambulate with MINIMAL ASSIST for 50 feet with the least restrictive device within 5 day(s). (4.)Mr. Tran will maintain stable vital signs throughout all functional mobility within 5 days. (5.)Mr. Tran will perform standing static and dynamic balance activities x 10 minutes with MINIMAL ASSIST to improve safety within 5 day(s). LTG:  (1.)Mr. Tran will move from supine to sit and sit to supine , scoot up and down and roll side to side in bed with CONTACT GUARD ASSIST within 10 day(s). (2.)Mr. Tran will transfer from bed to chair and chair to bed with CONTACT GUARD ASSIST using the least restrictive device within 10 day(s). (3.)Mr. Tran will ambulate with CONTACT GUARD ASSIST for 100 feet with the least restrictive device within 10 day(s). ________________________________________________________________________________________________      PHYSICAL THERAPY: Daily Note, Treatment Day: 4th and AM 9/13/2017  INPATIENT: Hospital Day: 7  Payor: [id number] / Plan: [place] / Product Type: Medicaid /      NAME/AGE/GENDER: Janessa Stone is a 61 y.o. male     PRIMARY DIAGNOSIS: Aortic occlusion ([facility] [address] [I74.10]  Ischemic colitis ([facility] [address] [K55.9] Occlusion of aorta (HCC) Occlusion of aorta (HCC)  Procedure(s) (LRB):  SIGMOIDOSCOPY FLEXIBLE (N/A)  4 Days Post-Op  ICD-10: Treatment Diagnosis:       · Difficulty in walking, Not elsewhere classified (R26.2)   Precaution/Allergies:  Review of patient's allergies indicates no known allergies.        ASSESSMENT: Mr. Betty Benson presents sitting up on bedside chair, agreeable to treatment. He stated his pain is lower today, 5 or 6/10. He stood up from the chair with CGA x2 and wheelchair. He ambulated out into hallway with min assist x2 and pushing wheelchair. He required verbal cues for upright posture and to keep safe distance from wheelchair. He required one standing rest break during ambulation. After a seated rest break, he performed seated lower extremity exercises with fair technique. He required verbal cures for exercise technique. Great progress noted with mobility and activity tolerance this treatment. Will continue PT efforts. This section established at most recent assessment   PROBLEM LIST (Impairments causing functional limitations):  1. Decreased Strength  2. Decreased ADL/Functional Activities  3. Decreased Transfer Abilities  4. Decreased Ambulation Ability/Technique  5. Decreased Balance  6. Increased Pain  7. Decreased Activity Tolerance  8. Decreased Knowledge of Precautions  9. Decreased [place] with Home Exercise Program    INTERVENTIONS PLANNED: (Benefits and precautions of physical therapy have been discussed with the patient.)  1. Balance Exercise  2. Bed Mobility  3. Family Education  4. Gait Training  5. Home Exercise Program (HEP)  6. Therapeutic Activites  7. Therapeutic Exercise/Strengthening  8. Transfer Training  9. Patient Education  10. Group Therapy      TREATMENT PLAN: Frequency/Duration: 5 times a week for duration of hospital stay  Rehabilitation Potential For Stated Goals: EXCELLENT      RECOMMENDED REHABILITATION/EQUIPMENT: (at time of discharge pending progress): Due to the probability of continued deficits (see above) this patient will likely need continued skilled physical therapy after discharge. Equipment:   · None at this time                   HISTORY:   History of Present Injury/Illness (Reason for Referral):   Aortic occlusion (HCC) [I74.10] Occlusion of aorta (HCC) Occlusion of aorta (HCC)  Procedure(s) (LRB):  left femoral embolectomy/left lower extremity arteriogram (Left)  1 Day Post-Op  Past Medical History/Comorbidities:   Mr. Zechariah Santoyo  has a past medical history of Acute diastolic (congestive) heart failure ([facility] [address] (12/4/2016); CAD (coronary artery disease); Chronic kidney disease, stage 3; Chronic pain; Ex-smoker; GERD (gastroesophageal reflux disease); TIA (transient ischemic attack); Hypercholesteremia; Hypertension; Old MI (myocardial infarction) (11/2016); and Osteoarthritis. Mr. Zechariah Santoyo  has a past surgical history that includes heart catheterization (12/2016); colonoscopy (N/A, 6/22/2017); knee arthroscopy (Left); and flexible sigmoidoscopy (N/A, 9/9/2017). Social History/Living Environment:   Home Environment: Private residence  # Steps to Enter: 6  Rails to Enter: Yes  One/Two Story Residence: One story  Living Alone: Yes  Support Systems: Child(tisha)  Patient Expects to be Discharged to[de-identified] Unknown  Current DME Used/Available at Home: Cane, straight  Tub or Shower Type: Shower  Prior Level of Function/Work/Activity:  Modified independent with straight cane for ambulation secondary to decreased sensation LLE for a few months, he reports independence with ADLs and driving at baseline. Number of Personal Factors/Comorbidities that affect the Plan of Care: 3+: HIGH COMPLEXITY   EXAMINATION:   Most Recent Physical Functioning:   Gross Assessment:                  Posture:  Posture (WDL): Exceptions to WDL  Posture Assessment:  Forward head, Rounded shoulders  Balance:  Sitting: Impaired  Sitting - Static: Fair (occasional)  Sitting - Dynamic: Fair (occasional)  Standing: Impaired  Standing - Static: Fair  Standing - Dynamic : Poor (+) Bed Mobility:     Wheelchair Mobility:     Transfers:  Sit to Stand: Contact guard assistance;Assist x2  Stand to Sit: Contact guard assistance;Assist x2  Gait:     Base of Support: Widened  Speed/Jennifer: Fluctuations  Step Length: Left shortened;Right shortened  Gait Abnormalities: Decreased step clearance; Path deviations; Shuffling gait  Distance (ft): 70 Feet (ft)  Assistive Device: Other (comment) (pushing wheelchair)  Ambulation - Level of Assistance: Minimal assistance;Assist x2       Body Structures Involved:  1. Nerves  2. Heart  3. Muscles Body Functions Affected:  1. Sensory/Pain  2. Cardio  3. Hematological  4. Neuromusculoskeletal  5. Movement Related Activities and Participation Affected:  1. Mobility  2. Self Care  3. Domestic Life  4. Interpersonal Interactions and Relationships  5. Community, Social and [place]   Number of elements that affect the Plan of Care: 4+: HIGH COMPLEXITY   CLINICAL PRESENTATION:   Presentation: Evolving clinical presentation with unstable and unpredictable characteristics: HIGH COMPLEXITY   CLINICAL DECISION MAKIN [facility] Inpatient Short Form  How much difficulty does the patient currently have. .. Unable A Lot A Little None   1. Turning over in bed (including adjusting bedclothes, sheets and blankets)? [ ] 1   [X] 2   [ ] 3   [ ] 4   2. Sitting down on and standing up from a chair with arms ( e.g., wheelchair, bedside commode, etc.)   [X] 1   [ ] 2   [ ] 3   [ ] 4   3. Moving from lying on back to sitting on the side of the bed? [ ] 1   [X] 2   [ ] 3   [ ] 4   How much help from another person does the patient currently need. .. Total A Lot A Little None   4. Moving to and from a bed to a chair (including a wheelchair)? [X] 1   [ ] 2   [ ] 3   [ ] 4   5. Need to walk in hospital room? [X] 1   [ ] 2   [ ] 3   [ ] 4   6. Climbing 3-5 steps with a railing? [X] 1   [ ] 2   [ ] 3   [ ] 4   © , Trustees of [address] [place] [address] [address] [address], under license to [employer].  All rights reserved    Score:  Initial: 8 Most Recent: X (Date: -- )     Interpretation of Tool:  Represents activities that are increasingly more difficult (i.e. Bed mobility, Transfers, Gait). Score 24 23 22-20 19-15 14-10 9-7 6       Modifier CH CI CJ CK CL CM CN         · Mobility - Walking and Moving Around:               - CURRENT STATUS:    CM - 80%-99% impaired, limited or restricted               - GOAL STATUS:           CK - 40%-59% impaired, limited or restricted               - D/C STATUS:                       ---------------To be determined---------------  Payor[place] / [place] / Product Type: Medicaid /       Medical Necessity:     · Patient demonstrates good rehab potential due to higher previous functional level. Reason for Services/Other Comments:  · Patient continues to require modification of therapeutic interventions to increase complexity of exercises. Use of outcome tool(s) and clinical judgement create a POC that gives a: Questionable prediction of patient's progress: MODERATE COMPLEXITY                 TREATMENT:   (In addition to Assessment/Re-Assessment sessions the following treatments were rendered)   Pre-treatment Symptoms/Complaints: \"I am motivated. \"  Pain: Initial:   Pain Intensity 1: 0  Post Session: Unchanged      Therapeutic Activity: (    13 Minutes): Therapeutic activities including Chair transfer and Ambulation on level ground to improve mobility, strength and activity tolerance. Required verbal cues   to promote dynamic balance in standing and promote motor control of bilateral, upper extremity(s), lower extremity(s). Therapeutic Exercise: (10 Minutes):  Exercises per grid below to improve mobility and strength. Required minimal verbal cues to promote proper body alignment, promote proper body posture and promote proper body breathing techniques. Progressed repetitions as indicated.      Date:  9/12/17 Date:  9/13/17 Date:     ACTIVITY/EXERCISE AM PM AM PM AM PM   Ambulation:           Distance  Device  Duration         Seated Heel Raises x20  x20      Seated Toe Raises x20  x20      Seated Long Arc Quads x20  x20      Seated Marching x10  x20      Seated Hip Abduction                  B = bilateral; AA = active assistive; A = active; P = passive      · Braces/Orthotics/Lines/Etc: lopez catheter and wound vac  Treatment/Session Assessment:    · Response to Treatment:  Patient tolerated treatment well with fatigue. · Interdisciplinary Collaboration:  · Physical Therapy Assistant, Registered Nurse and SPTA  · After treatment position/precautions:  · Up in chair, Bed/Chair-wheels locked, Call light within reach and RN notified  · Compliance with Program/Exercises: making an effort. · Recommendations/Intent for next treatment session: \"Next visit will focus on advancements to more challenging activities and reduction in assistance provided\".   Total Treatment Duration:PT Patient Time In/Time Out  Time In: 9205  Time Out: 4949 [facility], [place], [place]

## 2017-09-13 NOTE — PROGRESS NOTES
Problem: Self Care Deficits Care Plan (Adult)  Goal: *Acute Goals and Plan of Care (Insert Text)  GOALS:    1: Pt will perform toileting with maximal assistance and adaptive equipment as needed. 2: Pt will perform grooming with minimal assistance and adaptive equipment as needed. Goal MET (9/13/2017)  3: Pt will tolerate sitting edge of bed for 5 minutes for ADL prep.  4: Pt will perform toilet transfers with moderate assistance and the least restrictive device to promote quality of life. 5: Pt will tolerate 15 minutes of therapeutic activity with minimal rest breaks. Progressing (9/13/2017)    Time Frame: 7 visits      OCCUPATIONAL THERAPY: Daily Note, Treatment Day: 3rd and AM 9/13/2017  INPATIENT: Hospital Day: 7  Payor: [id number] / Plan: [place] / Product Type: Medicaid /      NAME/AGE/GENDER: Jese Willoughby is a 61 y.o. male     PRIMARY DIAGNOSIS:  Aortic occlusion ([facility] [address] [I74.10]  Ischemic colitis ([facility] [address] [K55.9] Occlusion of aorta (HCC) Occlusion of aorta (HCC)  Procedure(s) (LRB):  SIGMOIDOSCOPY FLEXIBLE (N/A)  4 Days Post-Op  ICD-10: Treatment Diagnosis:        · Generalized Muscle Weakness (M62.81)  · Dizziness and Giddiness (R42)   Precautions/Allergies:         Review of patient's allergies indicates no known allergies. ASSESSMENT:   Mr. Darryle Simmers was admitted for the above diagnosis. Pt presents sitting up in chair post ambulating with PTA and student. Pt progressing with above goals. He continues to require additional time and rest breaks though slowly progressing with activity tolerance. Pt able to groom self with mostly setup except for combing the back of his head, requires minimal assistance to don/doff gown; SOB noted. Overall, good participation and initiation. Pt left with all needs in reach. Will continue to benefit from skilled OT during stay. Cont with POC.   This section established at most recent assessment   PROBLEM LIST (Impairments causing functional limitations):  1. Decreased Strength  2. Decreased ADL/Functional Activities  3. Decreased Transfer Abilities  4. Decreased Ambulation Ability/Technique  5. Decreased Balance  6. Increased Pain  7. Decreased Activity Tolerance  8. Decreased Work Simplification/Energy Conservation Techniques  9. Increased Fatigue  10. Increased Shortness of Breath  11. Decreased Flexibility/Joint Mobility    INTERVENTIONS PLANNED: (Benefits and precautions of occupational therapy have been discussed with the patient.)  1. Activities of daily living training  2. Adaptive equipment training  3. Clothing management  4. Donning&doffing training  5. Group therapy  6. Therapeutic activity  7. Therapeutic exercise  8. Energy conservation      TREATMENT PLAN: Frequency/Duration: Follow patient 3x/week to address above goals. Rehabilitation Potential For Stated Goals: GOOD      RECOMMENDED REHABILITATION/EQUIPMENT: (at time of discharge pending progress): Due to the probability of continued deficits (see above) this patient will likely need continued skilled occupational therapy after discharge. Equipment:   · to be determined               OCCUPATIONAL PROFILE AND HISTORY:   History of Present Injury/Illness (Reason for Referral):  See H&P  Past Medical History/Comorbidities:   Mr. Marquise Nettles  has a past medical history of Acute diastolic (congestive) heart failure ([address] (12/4/2016); CAD (coronary artery disease); Chronic kidney disease, stage 3; Chronic pain; Ex-smoker; GERD (gastroesophageal reflux disease); TIA (transient ischemic attack); Hypercholesteremia; Hypertension; Old MI (myocardial infarction) (11/2016); and Osteoarthritis. Mr. Marquise Nettles  has a past surgical history that includes heart catheterization (12/2016); colonoscopy (N/A, 6/22/2017); knee arthroscopy (Left); and flexible sigmoidoscopy (N/A, 9/9/2017).   Social History/Living Environment:   Home Environment: Private residence  # Steps to Enter: 6  Rails to Enter: Yes  One/Two Story Residence: One story  Living Alone: Yes  Support Systems: Child(tisha)  Patient Expects to be Discharged to[de-identified] Unknown  Current DME Used/Available at Home: Cane, straight  Tub or Shower Type: Shower  Prior Level of Function/Work/Activity:  Works, drives, Independent with self care. Number of Personal Factors/Comorbidities that affect the Plan of Care: Expanded review of therapy/medical records (1-2):  MODERATE COMPLEXITY   ASSESSMENT OF OCCUPATIONAL PERFORMANCE[de-identified]   Activities of Daily Living:           Basic ADLs (From Assessment) Complex ADLs (From Assessment)   Basic ADL  Feeding: Minimum assistance  Oral Facial Hygiene/Grooming: Maximum assistance  Bathing: Total assistance  Upper Body Dressing: Maximum assistance  Lower Body Dressing: Total assistance  Toileting: Maximum assistance     Grooming/Bathing/Dressing Activities of Daily Living   Grooming  Grooming Assistance: Contact guard assistance  Washing Face: Supervision/set-up  Washing Hands: Supervision/set-up  Brushing Teeth: Supervision/set-up  Brushing/Combing Hair: Minimum assistance (back of head only)                 Upper Body [address]: Minimum  assistance (tie in back)       Bed/Mat Mobility  Sit to Stand: Contact guard assistance;Assist x2          Most Recent Physical Functioning:   Gross Assessment:  Strength: Generally decreased, functional               Posture:  Posture (WDL): Exceptions to WDL  Posture Assessment:  Forward head, Rounded shoulders  Balance:  Sitting: Impaired  Sitting - Static: Fair (occasional)  Sitting - Dynamic: Fair (occasional)  Standing: Impaired  Standing - Static: Fair  Standing - Dynamic : Poor (+) Bed Mobility:     Wheelchair Mobility:     Transfers:  Sit to Stand: Contact guard assistance;Assist x2  Stand to Sit: Contact guard assistance;Assist x2                    Patient Vitals for the past 6 hrs:   BP BP Patient Position SpO2 O2 Flow Rate (L/min) Pulse   09/13/17 0440 150/72 - 95 % - 88   17 0444 185/80 - 94 % - 86   17 0459 175/77 - 94 % - 86   17 0538 164/76 - 95 % - 90   17 0700 - At rest;Sitting 95 % - 85   17 0721 - - 96 % 0 l/min -   17 0755 178/84 - 97 % - 85   17 0759 168/77 - 94 % - 89   17 0800 - - - - 96        Mental Status  Neurologic State: Alert, Eyes open spontaneously  Orientation Level: Oriented to person, Oriented to place, Oriented to situation, Disoriented to time  Cognition: Appropriate decision making, Appropriate for age attention/concentration, Appropriate safety awareness, Follows commands  Perception: Appears intact  Perseveration: No perseveration noted  Safety/Judgement: Awareness of environment                               Physical Skills Involved:  1. Range of Motion  2. Balance  3. Strength  4. Activity Tolerance  5. Pain (acute) Cognitive Skills Affected (resulting in the inability to perform in a timely and safe manner):  1. WFLs Psychosocial Skills Affected:  1. Habits/Routines  2. Environmental Adaptation  3. Self-Awareness   Number of elements that affect the Plan of Care: 5+:  HIGH COMPLEXITY   CLINICAL DECISION MAKIN[address] [place] [id number] AM-PAC 6 Clicks   Daily Activity Inpatient Short Form  How much help from another person does the patient currently need. .. Total A Lot A Little None   1. Putting on and taking off regular lower body clothing? [X] 1   [ ] 2   [ ] 3   [ ] 4   2. Bathing (including washing, rinsing, drying)? [X] 1   [ ] 2   [ ] 3   [ ] 4   3. Toileting, which includes using toilet, bedpan or urinal?   [X] 1   [ ] 2   [ ] 3   [ ] 4   4. Putting on and taking off regular upper body clothing? [X] 1   [ ] 2   [ ] 3   [ ] 4   5. Taking care of personal grooming such as brushing teeth? [ ] 1   [X] 2   [ ] 3   [ ] 4   6. Eating meals? [ ] 1   [X] 2   [ ] 3   [ ] 4   © , Trustees of [address] [place] [address] [address] [address], under license to [employer].  All rights reserved Score:  Initial: 8 Most Recent: X (Date: -- )     Interpretation of Tool:  Represents activities that are increasingly more difficult (i.e. Bed mobility, Transfers, Gait). Score 24 23 22-20 19-15 14-10 9-7 6       Modifier CH CI CJ CK CL CM CN         · Self Care:               - CURRENT STATUS:    CM - 80%-99% impaired, limited or restricted               - GOAL STATUS:           CK - 40%-59% impaired, limited or restricted               - D/C STATUS:                       ---------------To be determined---------------  Payor: [employer] [place], [place] [address] / Plan: [place] [address] [place] [address] Rd / Product Type: Medicaid /       Medical Necessity:     · Patient demonstrates good rehab potential due to higher previous functional level. Reason for Services/Other Comments:  · Patient continues to require skilled intervention due to medical complications and patient unable to attend/participate in therapy as expected. Use of outcome tool(s) and clinical judgement create a POC that gives a: HIGH COMPLEXITY             TREATMENT:   (In addition to Assessment/Re-Assessment sessions the following treatments were rendered)      Pre-treatment Symptoms/Complaints:  Decreased ability to perform ADLs, self care, and functional mobility; decreased tolerance for activities  Pain: Initial:   Pain Intensity 1: 0  Post Session:  Appears to be in less pain, too tired to answer      Self Care: (24 minutes): Procedure(s) (per grid) utilized to improve and/or restore self-care/home management as related to dressing, bathing and grooming. Required minimal verbal and tactile cueing to facilitate activities of daily living skills and compensatory activities. Therapeutic Exercise: (0 minutes):  Exercises per grid below to improve mobility, strength and activity tolerance. Required minimal visual, verbal and tactile cues to promote proper body alignment and promote proper body mechanics.   Progressed resistance and repetitions as indicated. UE Exercises (with yellow and red theraband) Date:  9/12/2017 Date:   Date:     Activity/Exercise Parameters Parameters Parameters   Shoulder Abd/Adduction 10 reps     Shoulder Flexion 8 reps     Elbow Flexion 10 reps     Punches 10 reps                         Braces/Orthotics/Lines/Etc:   · IV  · lopez catheter  · arterial line  · O2 Device: Nasal cannula  Treatment/Session Assessment:    · Response to Treatment:  Patient progressing  · Interdisciplinary Collaboration:  · Occupational Therapist, Registered Nurse and PTA and student completed tx  · After treatment position/precautions:  · Up in chair, Bed/Chair-wheels locked and Call light within reach  · Compliance with Program/Exercises: compliant  · Recommendations/Intent for next treatment session: \"Next visit will focus on advancements to more challenging activities and reduction in assistance provided\".   Total Treatment Duration:  :OT Patient Time In/Time Out  Time In: 0919  Time Out: [facility], COLT/L

## 2017-09-13 NOTE — WOUND CARE
Bilateral lower groin incisions well approximated, groin wound VACS changed, adjacent blisters and peeling skin cause difficult to establish seal, xeroform, 4x4, tegaderm over popped blisters adjacent to 2 dressings y connected wound vacs dressings to 1 machine, hydrocolloid and paste strips at folds. Will continue to monitor.

## 2017-09-13 NOTE — PROGRESS NOTES
Report from arlene gutierrez . Pt sitting up in recliner. martin well. States little pain right now but tolerable.

## 2017-09-13 NOTE — PROGRESS NOTES
TRANSFER - OUT REPORT:    Verbal report given to María Foster RN(name) on Дмитрий Mosqueda  being transferred to [facility](unit) for routine progression of care       Report consisted of patients Situation, Background, Assessment and   Recommendations(SBAR). Information from the following report(s) SBAR, Kardex, OR Summary, Procedure Summary, Intake/Output, MAR and Recent Results was reviewed with the receiving nurse. Lines:   Quad Lumen 09/07/17 Right Internal jugular (Active)   Central Line Being Utilized Yes 9/13/2017  4:36 PM   Criteria for Appropriate Use Hemodynamically unstable, requiring monitoring lines, vasopressors, or volume resuscitation 9/13/2017  4:36 PM   Site Assessment Clean, dry, & intact 9/13/2017  4:36 PM   Infiltration Assessment 0 9/13/2017 11:00 AM   Affected Extremity/Extremities Color distal to insertion site pink (or appropriate for race) 9/13/2017  4:36 PM   Date of Last Dressing Change 09/12/17 9/13/2017  4:36 PM   Dressing Status Clean, dry, & intact 9/13/2017  4:36 PM   Dressing Type Disk with Chlorhexadine gluconate (CHG); Transparent 9/13/2017  4:36 PM   Action Taken Open ports on tubing capped 9/13/2017  4:36 PM   Proximal Hub Color/Line Status White;Capped 9/13/2017  4:36 PM   Positive Blood Return (Medial Site) Yes 9/13/2017 11:00 AM   Medial 1 Hub Color/Line Status Blue;Capped 9/13/2017  4:36 PM   Positive Blood Return (Lateral Site) No 9/13/2017 11:00 AM   Medial 2 Hub Color/Line Status Gray;Capped 9/13/2017  4:36 PM   Positive Blood Return (Site #3) Yes 9/13/2017 11:00 AM   Distal Hub Color/Line Status Brown;Capped 9/13/2017  4:36 PM   Positive Blood Return (Site #4) Yes 9/13/2017 11:00 AM   Alcohol Cap Used No 9/13/2017 11:00 AM       Peripheral IV 06/22/17 Left Hand (Active)       Peripheral IV 09/07/17 Left; Lower Arm (Active)   Site Assessment Clean, dry, & intact 9/13/2017  4:36 PM   Phlebitis Assessment 0 9/13/2017  4:36 PM   Infiltration Assessment 0 9/13/2017  4:36 PM Dressing Status Clean, dry, & intact 9/13/2017  4:36 PM   Dressing Type Tape;Transparent 9/13/2017  4:36 PM   Hub Color/Line Status Green;Flushed 9/13/2017  4:36 PM   Action Taken Open ports on tubing capped 9/12/2017  3:12 PM   Alcohol Cap Used No 9/13/2017 11:00 AM        Opportunity for questions and clarification was provided.       Patient transported with:   Monitor  Registered Nurse

## 2017-09-13 NOTE — PROGRESS NOTES
TRANSFER - IN REPORT:    Verbal report received from Sylvia, [address] (name) on Дмитрий Mosqueda  being received from [facility](unit) for routine progression of care      Report consisted of patients Situation, Background, Assessment and   Recommendations(SBAR). Information from the following report(s) SBAR, Kardex, OR Summary, Intake/Output, MAR, Recent Results and Cardiac Rhythm NSR was reviewed with the receiving nurse. Opportunity for questions and clarification was provided. Assessment completed upon patients arrival to unit and care assumed. Full body skin assessment completed with second RN. Sacrum with no redness or breakdown noted, allevyn in place. Mid abdominal incision with staples and abdominal binder in place. Bilateral groins with wound vac dressings c/d/i. Left anterior upper thigh with several open blisters covered with xeroform gauze. BLE with 2+ pitting edema. Genital and scrotal edema 3+. No other abnormalities noted.

## 2017-09-13 NOTE — PROGRESS NOTES
[address] FAX: [phone]         VASCULAR SURGERY FLOOR PROGRESS NOTE    Admit Date: 2017  POD: 4 Days Post-Op    Procedure:  Procedure(s):  SIGMOIDOSCOPY FLEXIBLE    Subjective:     Patient has no new complaints. Objective:     Vitals:  Blood pressure 164/76, pulse 90, temperature 98.7 °F (37.1 °C), resp. rate 21, height 5' 6\" (1.676 m), weight 222 lb 14.4 oz (101.1 kg), SpO2 96 %. Temp (24hrs), Av.5 °F (36.9 °C), Min:98.3 °F (36.8 °C), Max:98.9 °F (37.2 °C)      Intake / Output:    Intake/Output Summary (Last 24 hours) at 17 0723  Last data filed at 17 0400   Gross per 24 hour   Intake              984 ml   Output             4148 ml   Net            -3164 ml       Physical Exam:    Constitutional: he appears well-developed. No distress. HENT:   Head: Atraumatic. Eyes: Pupils are equal, round, and reactive to light. Neck: Normal range of motion. Cardiovascular: Regular rhythm. Pulmonary/Chest: Effort normal and breath sounds normal. No respiratory distress. Abdominal: Soft. Bowel sounds are normal. he exhibits no distension. There is no tenderness. There is no guarding. No hernia. Musculoskeletal: Normal range of motion. Neurological: He is alert.  CN II- XII grossly intact  Vascular: Wound VAC in place feet are warm dopplerable pedal signals    Labs:   Recent Labs      17   0208  17   1719  17   0651   HGB  9.2*  9.6*  7.4*   WBC  7.9   --   7.6   K  3.3*   --   3.7   GLU  97   --   109*       Data Review     Assessment:     Patient Active Problem List    Diagnosis Date Noted    Ischemia of left lower extremity 2017    Claudication ([facility] 2017    Occlusion of aorta ([facility] [address] 2017    Chronic left-sided low back pain 2017    Essential hypertension 2016    Renal insufficiency 2016    Dyslipidemia 2016       Plan/Recommendations/Medical Decision Making: We will increase Coreg to 25 mg twice daily to help with the hypertension  -Another dose of 40 of Lasix today to help diuresis recheck labs basic metabolic noon today  -Up and out of bed  -Most likely will transfer to stepdown midday today    Elements of this note have been dictated using speech recognition software. As a result, errors of speech recognition may have occurred.

## 2017-09-13 NOTE — PROGRESS NOTES
[facility] Progress Note    Follow-Up on: HARPER    ROS:  Gen - no fever, no chills, appetite unchanged  CV - no chest pain, no palpitation  Lung - no shortness of breath, no cough  Abd - no tenderness, no nausea/vomiting, no diarrhea  Ext - + edema, + groin pain    Exam:  Vitals:    09/13/17 0721 09/13/17 0755 09/13/17 0759 09/13/17 0800   BP:  178/84 168/77    Pulse:  85 89 96   Resp:  16 (!) 33 (!) 40   Temp:       SpO2: 96% 97% 94%    Weight:       Height:             Intake/Output Summary (Last 24 hours) at 09/13/17 0840  Last data filed at 09/13/17 0600   Gross per 24 hour   Intake             1684 ml   Output             4135 ml   Net            -2451 ml       Wt Readings from Last 3 Encounters:   09/13/17 101.1 kg (222 lb 14.4 oz)   08/25/17 94.1 kg (207 lb 8 oz)   08/11/17 93 kg (205 lb)       GEN - in no distress  CV - regular, no murmur, no rub  Lung - clear bilaterally  Abd - soft, nontender  Ext - 2+ edema    Recent Labs      09/13/17   0208  09/12/17   1719  09/12/17   0651  09/11/17   0421   WBC  7.9   --   7.6  7.4   HGB  9.2*  9.6*  7.4*  8.0*   HCT  26.6*  28.1*  22.6*  24.0*   PLT  167   --   132*  112*        Recent Labs      09/13/17   0208  09/12/17   0651  09/11/17   0421   NA  139  142  145   K  3.3*  3.7  4.4   CL  106  111*  115*   CO2  22  22  20*   BUN  20  23  26*   CREA  1.96*  2.05*  2.19*   CA  8.0*  7.9*  7.7*   GLU  97  109*  122*       Assessment / Plan:  Principal Problem:    Occlusion of aorta ([facility] (3/3/2017)    Active Problems:    Ischemia of left lower extremity (9/8/2017)      Claudication ([facility] (9/8/2017)        1. HARPER/CKD - baseline Cr about 1.8-2  - Atrophic left kidney  - Cr stable at the moment, likely at or near baseline  - Diuresing well  2. Aortic occlusion - s/p aortobifemoral bypass  3.  HTN

## 2017-09-13 NOTE — PROGRESS NOTES
Pt back to bed. Bathed linen changed. allevyn replaced to sacral area. No redness. or skin breakdown noted. Dressing to Krystle Rutledge [facility] abd area changed. Skin intact to abd with staples. 4x4 and abd pad applied. Wound vac to shereen groin areas intact and functioning.

## 2017-09-14 LAB
ANION GAP SERPL CALC-SCNC: 10 MMOL/L (ref 7–16)
APPEARANCE UR: CLEAR
BACTERIA URNS QL MICRO: 0 /HPF
BILIRUB UR QL: NEGATIVE
BUN SERPL-MCNC: 20 MG/DL (ref 6–23)
CALCIUM SERPL-MCNC: 7.8 MG/DL (ref 8.3–10.4)
CASTS URNS QL MICRO: 0 /LPF
CHLORIDE SERPL-SCNC: 105 MMOL/L (ref 98–107)
CO2 SERPL-SCNC: 24 MMOL/L (ref 21–32)
COLOR UR: YELLOW
CREAT SERPL-MCNC: 1.91 MG/DL (ref 0.8–1.5)
EPI CELLS #/AREA URNS HPF: 0 /HPF
ERYTHROCYTE [DISTWIDTH] IN BLOOD BY AUTOMATED COUNT: 15.2 % (ref 11.9–14.6)
GLUCOSE SERPL-MCNC: 119 MG/DL (ref 65–100)
GLUCOSE UR STRIP.AUTO-MCNC: NEGATIVE MG/DL
HCT VFR BLD AUTO: 27.2 % (ref 41.1–50.3)
HGB BLD-MCNC: 9.2 G/DL (ref 13.6–17.2)
HGB UR QL STRIP: ABNORMAL
KETONES UR QL STRIP.AUTO: NEGATIVE MG/DL
LEUKOCYTE ESTERASE UR QL STRIP.AUTO: NEGATIVE
MAGNESIUM SERPL-MCNC: 1.5 MG/DL (ref 1.8–2.4)
MCH RBC QN AUTO: 29.3 PG (ref 26.1–32.9)
MCHC RBC AUTO-ENTMCNC: 33.8 G/DL (ref 31.4–35)
MCV RBC AUTO: 86.6 FL (ref 79.6–97.8)
NITRITE UR QL STRIP.AUTO: NEGATIVE
PH UR STRIP: 5.5 [PH] (ref 5–9)
PLATELET # BLD AUTO: 202 K/UL (ref 150–450)
PMV BLD AUTO: 10.2 FL (ref 10.8–14.1)
POTASSIUM SERPL-SCNC: 3.5 MMOL/L (ref 3.5–5.1)
PROT UR STRIP-MCNC: NEGATIVE MG/DL
RBC # BLD AUTO: 3.14 M/UL (ref 4.23–5.67)
RBC #/AREA URNS HPF: ABNORMAL /HPF
SODIUM SERPL-SCNC: 139 MMOL/L (ref 136–145)
SP GR UR REFRACTOMETRY: 1 (ref 1–1.02)
UROBILINOGEN UR QL STRIP.AUTO: 0.2 EU/DL (ref 0.2–1)
WBC # BLD AUTO: 8.1 K/UL (ref 4.3–11.1)
WBC URNS QL MICRO: ABNORMAL /HPF

## 2017-09-14 PROCEDURE — 81001 URINALYSIS AUTO W/SCOPE: CPT | Performed by: SURGERY

## 2017-09-14 PROCEDURE — 85027 COMPLETE CBC AUTOMATED: CPT | Performed by: SURGERY

## 2017-09-14 PROCEDURE — 94640 AIRWAY INHALATION TREATMENT: CPT

## 2017-09-14 PROCEDURE — 97530 THERAPEUTIC ACTIVITIES: CPT

## 2017-09-14 PROCEDURE — 74750000023 HC WOUND THERAPY

## 2017-09-14 PROCEDURE — 77030019952 HC CANSTR VAC ASST KCON -B

## 2017-09-14 PROCEDURE — 83735 ASSAY OF MAGNESIUM: CPT | Performed by: SURGERY

## 2017-09-14 PROCEDURE — 36592 COLLECT BLOOD FROM PICC: CPT

## 2017-09-14 PROCEDURE — 74011250636 HC RX REV CODE- 250/636: Performed by: SURGERY

## 2017-09-14 PROCEDURE — 74011250636 HC RX REV CODE- 250/636: Performed by: INTERNAL MEDICINE

## 2017-09-14 PROCEDURE — 97535 SELF CARE MNGMENT TRAINING: CPT

## 2017-09-14 PROCEDURE — 87086 URINE CULTURE/COLONY COUNT: CPT | Performed by: SURGERY

## 2017-09-14 PROCEDURE — 65660000004 HC RM CVT STEPDOWN

## 2017-09-14 PROCEDURE — 74011250637 HC RX REV CODE- 250/637: Performed by: PHYSICIAN ASSISTANT

## 2017-09-14 PROCEDURE — 74011250637 HC RX REV CODE- 250/637: Performed by: SURGERY

## 2017-09-14 PROCEDURE — 80048 BASIC METABOLIC PNL TOTAL CA: CPT | Performed by: SURGERY

## 2017-09-14 PROCEDURE — 74011000250 HC RX REV CODE- 250: Performed by: SURGERY

## 2017-09-14 RX ORDER — LEVALBUTEROL INHALATION SOLUTION 1.25 MG/3ML
0.63 SOLUTION RESPIRATORY (INHALATION)
Status: DISCONTINUED | OUTPATIENT
Start: 2017-09-14 | End: 2017-09-29 | Stop reason: HOSPADM

## 2017-09-14 RX ORDER — MAGNESIUM SULFATE HEPTAHYDRATE 40 MG/ML
2 INJECTION, SOLUTION INTRAVENOUS ONCE
Status: COMPLETED | OUTPATIENT
Start: 2017-09-14 | End: 2017-09-14

## 2017-09-14 RX ADMIN — CALCIUM GLUCONATE 2 G: 94 INJECTION, SOLUTION INTRAVENOUS at 11:26

## 2017-09-14 RX ADMIN — AMLODIPINE BESYLATE 5 MG: 10 TABLET ORAL at 08:49

## 2017-09-14 RX ADMIN — ASPIRIN 81 MG: 81 TABLET, COATED ORAL at 08:49

## 2017-09-14 RX ADMIN — HEPARIN SODIUM 5000 UNITS: 5000 INJECTION, SOLUTION INTRAVENOUS; SUBCUTANEOUS at 08:48

## 2017-09-14 RX ADMIN — OXYCODONE HYDROCHLORIDE AND ACETAMINOPHEN 1 TABLET: 7.5; 325 TABLET ORAL at 15:11

## 2017-09-14 RX ADMIN — Medication 10 ML: at 20:05

## 2017-09-14 RX ADMIN — OXYCODONE HYDROCHLORIDE AND ACETAMINOPHEN 2 TABLET: 7.5; 325 TABLET ORAL at 22:25

## 2017-09-14 RX ADMIN — OXYCODONE HYDROCHLORIDE AND ACETAMINOPHEN 1 TABLET: 7.5; 325 TABLET ORAL at 12:40

## 2017-09-14 RX ADMIN — Medication 10 ML: at 06:12

## 2017-09-14 RX ADMIN — CARVEDILOL 25 MG: 25 TABLET, FILM COATED ORAL at 08:49

## 2017-09-14 RX ADMIN — DOCUSATE SODIUM 100 MG: 100 CAPSULE, LIQUID FILLED ORAL at 08:49

## 2017-09-14 RX ADMIN — LEVALBUTEROL HYDROCHLORIDE 0.63 MG: 1.25 SOLUTION RESPIRATORY (INHALATION) at 11:14

## 2017-09-14 RX ADMIN — HEPARIN SODIUM 5000 UNITS: 5000 INJECTION, SOLUTION INTRAVENOUS; SUBCUTANEOUS at 22:23

## 2017-09-14 RX ADMIN — FUROSEMIDE 40 MG: 10 INJECTION, SOLUTION INTRAMUSCULAR; INTRAVENOUS at 08:48

## 2017-09-14 RX ADMIN — MAGNESIUM SULFATE HEPTAHYDRATE 2 G: 40 INJECTION, SOLUTION INTRAVENOUS at 10:55

## 2017-09-14 RX ADMIN — POTASSIUM CHLORIDE 40 MEQ: 20 TABLET, EXTENDED RELEASE ORAL at 08:48

## 2017-09-14 RX ADMIN — PANTOPRAZOLE SODIUM 40 MG: 40 TABLET, DELAYED RELEASE ORAL at 07:20

## 2017-09-14 RX ADMIN — LEVALBUTEROL HYDROCHLORIDE 0.63 MG: 1.25 SOLUTION RESPIRATORY (INHALATION) at 07:27

## 2017-09-14 RX ADMIN — OXYCODONE HYDROCHLORIDE AND ACETAMINOPHEN 2 TABLET: 7.5; 325 TABLET ORAL at 18:37

## 2017-09-14 RX ADMIN — CARVEDILOL 25 MG: 25 TABLET, FILM COATED ORAL at 19:50

## 2017-09-14 RX ADMIN — OXYCODONE HYDROCHLORIDE AND ACETAMINOPHEN 1 TABLET: 7.5; 325 TABLET ORAL at 03:54

## 2017-09-14 RX ADMIN — OXYCODONE HYDROCHLORIDE AND ACETAMINOPHEN 2 TABLET: 7.5; 325 TABLET ORAL at 08:44

## 2017-09-14 RX ADMIN — HEPARIN SODIUM 5000 UNITS: 5000 INJECTION, SOLUTION INTRAVENOUS; SUBCUTANEOUS at 15:11

## 2017-09-14 RX ADMIN — POTASSIUM CHLORIDE 40 MEQ: 20 TABLET, EXTENDED RELEASE ORAL at 18:00

## 2017-09-14 RX ADMIN — Medication 10 ML: at 12:41

## 2017-09-14 NOTE — PROGRESS NOTES
Problem: Nutrition Deficit  Goal: *Optimize nutritional status  Nutrition  Reason for assessment: LOS  Assessment:   Diet order(s): 9-7: NPO, 9-10: Clear liquid, 9-11: GI soft, 9-12: Regular  Food/Nutrition Patient History:  Pt reports his appetite is \"so so\" but that's the way it has been \"forever\". He thinks he is eating about the same amount here as he does at home. He declines offer of nutritional supplement. Anthropometrics:Height: 5' 6\" (167.6 cm),  Weight: 103.4 kg (228 lb), Weight Source: Bed, pre-op weight 94 kg, BMI 33.5 c/w class I obesity   Macronutrient needs:  EER:  0064-3496 kcal /day (15-18 kcal/kg pre-op BW)  EPR:  51-78 grams protein/day (0.8-1.2 grams/kg IBW)  Intake/Comparative Standards:  Average intake for past 3 day(s)/6 recorded meal(s): 33%. This potentially meets ~50% of kcal and ~60% of protein needs     Nutrition Diagnosis: Inadequate oral intake r/t decreased ability to consume sufficient oral intake as evidenced by decreased intake as above     Intervention:  Meals and snacks: Continue current diet. Encouraged pt to use Always available menu. He reports he already ordered something special tonight.   Nutrition Supplement Therapy: Declined      Marlene Cox, [address] [place] [address], [facility] [address] [place] [address], [address] [place] [address]

## 2017-09-14 NOTE — PROGRESS NOTES
Bedside and Verbal shift change report given to [facility] RN (oncoming nurse) by Rasheed Osuna RN (offgoing nurse). Report given with SBAR, Procedure Summary, Intake/Output, MAR and Recent Results.

## 2017-09-14 NOTE — PROGRESS NOTES
Bedside Report and care received from Lisandro Marrero RN(off going nurse)  via H&P, SBAR, Emar and Kardex. VSS, assessment to follow.

## 2017-09-14 NOTE — WOUND CARE
Wound VAC seal leak reported by nursing. Nurse attempted to reseal, unsuccessful. Left groin fold loose, paste and duoderm and tegaderm used to seal open area at fold. Will monitor.

## 2017-09-14 NOTE — PROGRESS NOTES
Problem: Mobility Impaired (Adult and Pediatric)  Goal: *Acute Goals and Plan of Care (Insert Text)  STG:  (1.)Mr. Tran will move from supine to sit and sit to supine , scoot up and down and roll side to side with MINIMAL ASSIST within 5 day(s). (2.)Mr. Tran will transfer from bed to chair and chair to bed with MINIMAL ASSIST using the least restrictive device within 5 day(s). (3.)Mr. Tran will ambulate with MINIMAL ASSIST for 50 feet with the least restrictive device within 5 day(s). (4.)Mr. Tran will maintain stable vital signs throughout all functional mobility within 5 days. (5.)Mr. Tran will perform standing static and dynamic balance activities x 10 minutes with MINIMAL ASSIST to improve safety within 5 day(s). LTG:  (1.)Mr. Tran will move from supine to sit and sit to supine , scoot up and down and roll side to side in bed with CONTACT GUARD ASSIST within 10 day(s). (2.)Mr. Tran will transfer from bed to chair and chair to bed with CONTACT GUARD ASSIST using the least restrictive device within 10 day(s). (3.)Mr. Tran will ambulate with CONTACT GUARD ASSIST for 100 feet with the least restrictive device within 10 day(s). ________________________________________________________________________________________________      PHYSICAL THERAPY: Daily Note, Treatment Day: 5th and AM 9/14/2017  INPATIENT: Hospital Day: 8  Payor: [id number] / Plan: [place] / Product Type: Medicaid /      NAME/AGE/GENDER: Brandy Sommers is a 61 y.o. male     PRIMARY DIAGNOSIS: Aortic occlusion ([facility] [address] [I74.10]  Ischemic colitis ([facility] [address] [K55.9] Occlusion of aorta (HCC) Occlusion of aorta (HCC)  Procedure(s) (LRB):  SIGMOIDOSCOPY FLEXIBLE (N/A)  5 Days Post-Op  ICD-10: Treatment Diagnosis:       · Difficulty in walking, Not elsewhere classified (R26.2)   Precaution/Allergies:  Review of patient's allergies indicates no known allergies.        ASSESSMENT: Mr. eBtty Benson presents sitting up on bedside chair, agreeable to treatment. He stated having more back pain today, but ready to work. Patient was able to stand and ambulate around bed; however, patient was limited today as he is having to urinate frequently due to lasix dose. Patient instructed in LE exercises to perform throughout the day. Patient also instructed to attempt to stand every HR to assist with repositioning and back pain. Patient was grateful for session, but limited today due to results of medication (lasix). Good participation, not much progress towards goals. Will continue treatment as able. This section established at most recent assessment   PROBLEM LIST (Impairments causing functional limitations):  1. Decreased Strength  2. Decreased ADL/Functional Activities  3. Decreased Transfer Abilities  4. Decreased Ambulation Ability/Technique  5. Decreased Balance  6. Increased Pain  7. Decreased Activity Tolerance  8. Decreased Knowledge of Precautions  9. Decreased [place] with Home Exercise Program    INTERVENTIONS PLANNED: (Benefits and precautions of physical therapy have been discussed with the patient.)  1. Balance Exercise  2. Bed Mobility  3. Family Education  4. Gait Training  5. Home Exercise Program (HEP)  6. Therapeutic Activites  7. Therapeutic Exercise/Strengthening  8. Transfer Training  9. Patient Education  10. Group Therapy      TREATMENT PLAN: Frequency/Duration: 5 times a week for duration of hospital stay  Rehabilitation Potential For Stated Goals: EXCELLENT      RECOMMENDED REHABILITATION/EQUIPMENT: (at time of discharge pending progress): Due to the probability of continued deficits (see above) this patient will likely need continued skilled physical therapy after discharge. Equipment:   · None at this time                   HISTORY:   History of Present Injury/Illness (Reason for Referral):   Aortic occlusion (HCC) [I74.10] Occlusion of aorta (HCC) Occlusion of aorta (HCC)  Procedure(s) (LRB):  left femoral embolectomy/left lower extremity arteriogram (Left)  1 Day Post-Op  Past Medical History/Comorbidities:   Mr. Chari Slaas  has a past medical history of Acute diastolic (congestive) heart failure ([facility] [address] (12/4/2016); CAD (coronary artery disease); Chronic kidney disease, stage 3; Chronic pain; Ex-smoker; GERD (gastroesophageal reflux disease); TIA (transient ischemic attack); Hypercholesteremia; Hypertension; Old MI (myocardial infarction) (11/2016); and Osteoarthritis. Mr. Chari Salas  has a past surgical history that includes heart catheterization (12/2016); colonoscopy (N/A, 6/22/2017); knee arthroscopy (Left); and flexible sigmoidoscopy (N/A, 9/9/2017). Social History/Living Environment:   Home Environment: Private residence  # Steps to Enter: 6  Rails to Enter: Yes  One/Two Story Residence: One story  Living Alone: Yes  Support Systems: Child(tisha)  Patient Expects to be Discharged to[de-identified] Unknown  Current DME Used/Available at Home: Cane, straight  Tub or Shower Type: Shower  Prior Level of Function/Work/Activity:  Modified independent with straight cane for ambulation secondary to decreased sensation LLE for a few months, he reports independence with ADLs and driving at baseline. Number of Personal Factors/Comorbidities that affect the Plan of Care: 3+: HIGH COMPLEXITY   EXAMINATION:   Most Recent Physical Functioning:   Gross Assessment:                  Posture:     Balance:  Sitting: Intact  Standing: Impaired  Standing - Static: Constant support; Fair  Standing - Dynamic : Fair Bed Mobility:     Wheelchair Mobility:     Transfers:  Sit to Stand: Contact guard assistance  Stand to Sit: Contact guard assistance  Gait:     Base of Support: Widened  Speed/Jennifer: Slow  Step Length: Right shortened;Left shortened  Gait Abnormalities: Decreased step clearance; Path deviations  Distance (ft): 15 Feet (ft)  Assistive Device: Walker, rolling  Ambulation - Level of Assistance: Contact guard assistance       Body Structures Involved:  1. Nerves  2. Heart  3. Muscles Body Functions Affected:  1. Sensory/Pain  2. Cardio  3. Hematological  4. Neuromusculoskeletal  5. Movement Related Activities and Participation Affected:  1. Mobility  2. Self Care  3. Domestic Life  4. Interpersonal Interactions and Relationships  5. Community, Social and [place]   Number of elements that affect the Plan of Care: 4+: HIGH COMPLEXITY   CLINICAL PRESENTATION:   Presentation: Evolving clinical presentation with unstable and unpredictable characteristics: HIGH COMPLEXITY   CLINICAL DECISION MAKIN [facility] Mobility Inpatient Short Form  How much difficulty does the patient currently have. .. Unable A Lot A Little None   1. Turning over in bed (including adjusting bedclothes, sheets and blankets)? [ ] 1   [X] 2   [ ] 3   [ ] 4   2. Sitting down on and standing up from a chair with arms ( e.g., wheelchair, bedside commode, etc.)   [X] 1   [ ] 2   [ ] 3   [ ] 4   3. Moving from lying on back to sitting on the side of the bed? [ ] 1   [X] 2   [ ] 3   [ ] 4   How much help from another person does the patient currently need. .. Total A Lot A Little None   4. Moving to and from a bed to a chair (including a wheelchair)? [X] 1   [ ] 2   [ ] 3   [ ] 4   5. Need to walk in hospital room? [X] 1   [ ] 2   [ ] 3   [ ] 4   6. Climbing 3-5 steps with a railing? [X] 1   [ ] 2   [ ] 3   [ ] 4   © , Trustees of [address] [place] [address] [address] [address], under license to [employer]. All rights reserved    Score:  Initial: 8 Most Recent: X (Date: -- )     Interpretation of Tool:  Represents activities that are increasingly more difficult (i.e. Bed mobility, Transfers, Gait).        Score 24 23 22-20 19-15 14-10 9-7 6       Modifier CH CI CJ CK CL CM CN         · Mobility - Walking and Moving Around:               - CURRENT STATUS:    CM - 80%-99% impaired, limited or restricted               - GOAL STATUS:           CK - 40%-59% impaired, limited or restricted               - D/C STATUS:                       ---------------To be determined---------------  Payor: [id number] / Plan: [place] [address] / Product Type: Medicaid /       Medical Necessity:     · Patient demonstrates good rehab potential due to higher previous functional level. Reason for Services/Other Comments:  · Patient continues to require modification of therapeutic interventions to increase complexity of exercises. Use of outcome tool(s) and clinical judgement create a POC that gives a: Questionable prediction of patient's progress: MODERATE COMPLEXITY                 TREATMENT:   (In addition to Assessment/Re-Assessment sessions the following treatments were rendered)   Pre-treatment Symptoms/Complaints: \"I will try. \"  Pain: Initial:   Pain Intensity 1: 5  Pain Intervention(s) 1: Ambulation/Increased Activity, Nurse notified, Repositioned  Post Session: Unchanged      Therapeutic Activity: (    13 Minutes): Therapeutic activities including Chair transfer and Ambulation on level ground to improve mobility, strength and activity tolerance. Required verbal cues   to promote dynamic balance in standing and promote motor control of bilateral, upper extremity(s), lower extremity(s). Therapeutic Exercise: ( ):  Exercises per grid below to improve mobility and strength. Required minimal verbal cues to promote proper body alignment, promote proper body posture and promote proper body breathing techniques. Progressed repetitions as indicated.      Date:  9/12/17 Date:  9/13/17 Date:     ACTIVITY/EXERCISE AM PM AM PM AM PM   Ambulation:           Distance  Device  Duration         Seated Heel Raises x20  x20      Seated Toe Raises x20  x20      Seated Long Arc Quads x20  x20      Seated Marching x10  x20      Seated Hip Abduction                  B = bilateral; AA = active assistive; A = active; P = passive      · Braces/Orthotics/Lines/Etc: lopez catheter and wound vac  Treatment/Session Assessment:    · Response to Treatment:  Patient tolerated treatment well with fatigue. · Interdisciplinary Collaboration:  · Physical Therapy Assistant and Registered Nurse  · After treatment position/precautions:  · Up in chair, Bed/Chair-wheels locked, Call light within reach and RN notified  · Compliance with Program/Exercises: making an effort. · Recommendations/Intent for next treatment session: \"Next visit will focus on advancements to more challenging activities and reduction in assistance provided\".   Total Treatment Duration:PT Patient Time In/Time Out  Time In: 0920  Time Out: 1009     Lashawn Thakur DPT

## 2017-09-14 NOTE — PROGRESS NOTES
Problem: Self Care Deficits Care Plan (Adult)  Goal: *Acute Goals and Plan of Care (Insert Text)  GOALS:    1: Pt will perform toileting with maximal assistance and adaptive equipment as needed. 2: Pt will perform grooming with minimal assistance and adaptive equipment as needed. 3: Pt will tolerate sitting edge of bed for 5 minutes for ADL prep.  4: Pt will perform toilet transfers with moderate assistance and the least restrictive device to promote quality of life. 5: Pt will tolerate 15 minutes of therapeutic activity with minimal rest breaks. Time Frame: 7 visits      OCCUPATIONAL THERAPY: Daily Note, Treatment Day: 2nd and AM 9/14/2017  INPATIENT: Hospital Day: 8  Payor: [id number] / Plan: [address] / Product Type: Medicaid /      NAME/AGE/GENDER: Marta Mathur is a 61 y.o. male     PRIMARY DIAGNOSIS:  Aortic occlusion ([facility] [address] [I74.10]  Ischemic colitis ([facility] [address] [K55.9] Occlusion of aorta (HCC) Occlusion of aorta (HCC)  Procedure(s) (LRB):  SIGMOIDOSCOPY FLEXIBLE (N/A)  5 Days Post-Op  ICD-10: Treatment Diagnosis:        · Generalized Muscle Weakness (M62.81)  · Dizziness and Giddiness (R42)   Precautions/Allergies:         Review of patient's allergies indicates no known allergies. ASSESSMENT:   Mr. Garcia Reasons was admitted for the above diagnosis. Patient supine in bed; agreeable for therapy. Patient stood with SBA and ambulated to sink using RW with SBA and verbal cues for walker safety. Patient performed sink side ADLs with SBA. Patient then ambulated using a RW in room with SBA. Patient sat on edge of bed with SBA. Patient modified independent with feeding task. Patient steadily progressing towards goals. Cont OT per tx plan. This section established at most recent assessment   PROBLEM LIST (Impairments causing functional limitations):  1. Decreased Strength  2. Decreased ADL/Functional Activities  3. Decreased Transfer Abilities  4.  Decreased Ambulation Ability/Technique  5. Decreased Balance  6. Increased Pain  7. Decreased Activity Tolerance  8. Decreased Work Simplification/Energy Conservation Techniques  9. Increased Fatigue  10. Increased Shortness of Breath  11. Decreased Flexibility/Joint Mobility    INTERVENTIONS PLANNED: (Benefits and precautions of occupational therapy have been discussed with the patient.)  1. Activities of daily living training  2. Adaptive equipment training  3. Clothing management  4. Donning&doffing training  5. Group therapy  6. Therapeutic activity  7. Therapeutic exercise  8. Energy conservation      TREATMENT PLAN: Frequency/Duration: Follow patient 3x/week to address above goals. Rehabilitation Potential For Stated Goals: GOOD      RECOMMENDED REHABILITATION/EQUIPMENT: (at time of discharge pending progress): Due to the probability of continued deficits (see above) this patient will likely need continued skilled occupational therapy after discharge. Equipment:   · to be determined               OCCUPATIONAL PROFILE AND HISTORY:   History of Present Injury/Illness (Reason for Referral):  See H&P  Past Medical History/Comorbidities:   Mr. Stacie Berumen  has a past medical history of Acute diastolic (congestive) heart failure ([facility] [address] (12/4/2016); CAD (coronary artery disease); Chronic kidney disease, stage 3; Chronic pain; Ex-smoker; GERD (gastroesophageal reflux disease); TIA (transient ischemic attack); Hypercholesteremia; Hypertension; Old MI (myocardial infarction) (11/2016); and Osteoarthritis. Mr. Stacie Berumen  has a past surgical history that includes heart catheterization (12/2016); colonoscopy (N/A, 6/22/2017); knee arthroscopy (Left); and flexible sigmoidoscopy (N/A, 9/9/2017).   Social History/Living Environment:   Home Environment: Private residence  # Steps to Enter: 6  Rails to Enter: Yes  One/Two Story Residence: One story  Living Alone: Yes  Support Systems: Child(tisha)  Patient Expects to be Discharged to[de-identified] Unknown  Current DME Used/Available at Home: Cane, straight  Tub or Shower Type: Shower  Prior Level of Function/Work/Activity:  Works, drives, Independent with self care. Number of Personal Factors/Comorbidities that affect the Plan of Care: Expanded review of therapy/medical records (1-2):  MODERATE COMPLEXITY   ASSESSMENT OF OCCUPATIONAL PERFORMANCE[de-identified]   Activities of Daily Living:           Basic ADLs (From Assessment) Complex ADLs (From Assessment)   Basic ADL  Feeding: Minimum assistance  Oral Facial Hygiene/Grooming: Maximum assistance  Bathing: Total assistance  Upper Body Dressing: Maximum assistance  Lower Body Dressing: Total assistance  Toileting: Maximum assistance     Grooming/Bathing/Dressing Activities of Daily Living   Grooming  Grooming Assistance: Stand-by assistance  Washing Face: Stand-by assistance  Washing Hands: Stand-by assistance  Brushing Teeth: Stand-by assistance       Feeding  Feeding Assistance: Modified independent                 Bed/Mat Mobility  Sit to Stand: Contact guard assistance          Most Recent Physical Functioning:   Gross Assessment:                  Posture:  Posture (WDL): Exceptions to WDL  Posture Assessment: Forward head, Rounded shoulders  Balance:  Sitting: Intact  Standing: Impaired  Standing - Static: Constant support; Fair  Standing - Dynamic : Fair Bed Mobility:     Wheelchair Mobility:     Transfers:  Sit to Stand: Contact guard assistance  Stand to Sit: Contact guard assistance                    Patient Vitals for the past 6 hrs:   BP BP Patient Position SpO2 Pulse   09/14/17 1114 - - 96 % 87   09/14/17 1122 135/65 At rest 96 % 72        Mental Status  Neurologic State: Alert  Orientation Level: Oriented X4  Cognition: Appropriate decision making, Appropriate for age attention/concentration, Appropriate safety awareness  Perception: Appears intact  Perseveration: No perseveration noted  Safety/Judgement: Awareness of environment                               Physical Skills Involved:  1. Range of Motion  2. Balance  3. Strength  4. Activity Tolerance  5. Pain (acute) Cognitive Skills Affected (resulting in the inability to perform in a timely and safe manner):  1. WFLs Psychosocial Skills Affected:  1. Habits/Routines  2. Environmental Adaptation  3. Self-Awareness   Number of elements that affect the Plan of Care: 5+:  HIGH COMPLEXITY   CLINICAL DECISION MAKIN[address] [place] [address] AM-PAC 6 Clicks   Daily Activity Inpatient Short Form  How much help from another person does the patient currently need. .. Total A Lot A Little None   1. Putting on and taking off regular lower body clothing? [X] 1   [ ] 2   [ ] 3   [ ] 4   2. Bathing (including washing, rinsing, drying)? [X] 1   [ ] 2   [ ] 3   [ ] 4   3. Toileting, which includes using toilet, bedpan or urinal?   [X] 1   [ ] 2   [ ] 3   [ ] 4   4. Putting on and taking off regular upper body clothing? [X] 1   [ ] 2   [ ] 3   [ ] 4   5. Taking care of personal grooming such as brushing teeth? [ ] 1   [X] 2   [ ] 3   [ ] 4   6. Eating meals? [ ] 1   [X] 2   [ ] 3   [ ] 4   © , Trustees of [address] [place] [address], under license to [employer]. All rights reserved    Score:  Initial: 8 Most Recent: X (Date: -- )     Interpretation of Tool:  Represents activities that are increasingly more difficult (i.e. Bed mobility, Transfers, Gait).        Score 24 23 22-20 19-15 14-10 9-7 6       Modifier CH CI CJ CK CL CM CN         · Self Care:               - CURRENT STATUS:    CM - 80%-99% impaired, limited or restricted               - GOAL STATUS:           CK - 40%-59% impaired, limited or restricted               - D/C STATUS:                       ---------------To be determined---------------  Payor: [address] / Plan: [place] MEDICA[place] OF [address] / Product Type: Medicaid /       Medical Necessity:     · Patient demonstrates good rehab potential due to higher previous functional level.  Reason for Services/Other Comments:  · Patient continues to require skilled intervention due to medical complications and patient unable to attend/participate in therapy as expected. Use of outcome tool(s) and clinical judgement create a POC that gives a: HIGH COMPLEXITY             TREATMENT:   (In addition to Assessment/Re-Assessment sessions the following treatments were rendered)      Pre-treatment Symptoms/Complaints:  Decreased ability to perform ADLs, self care, and functional mobility; decreased tolerance for activities  Pain: Initial:   Pain Intensity 1: 1  Post Session:  Appears to be in less pain, too tired to answer      Therapeutic Exercise: (10 minutes):  Exercises per grid below to improve mobility, strength and activity tolerance. Required minimal visual, verbal and tactile cues to promote proper body alignment and promote proper body mechanics. Progressed resistance and repetitions as indicated. UE Exercises (with yellow and red theraband) Date:  9/12/2017 Date:   Date:     Activity/Exercise Parameters Parameters Parameters   Shoulder Abd/Adduction 10 reps     Shoulder Flexion 8 reps     Elbow Flexion 10 reps     Punches 10 reps                         Braces/Orthotics/Lines/Etc:   · IV  · lpoez catheter  · arterial line  · O2 Device: Nasal cannula  Treatment/Session Assessment:    · Response to Treatment:  Agrees to therapy  · Interdisciplinary Collaboration:  · Certified Occupational Therapy Assistant and Registered Nurse  · After treatment position/precautions:  · Up in chair, Bed/Chair-wheels locked and Call light within reach  · Compliance with Program/Exercises: Will assess as treatment progresses. · Recommendations/Intent for next treatment session: \"Next visit will focus on advancements to more challenging activities and reduction in assistance provided\".   Total Treatment Duration:OT Patient Time In/Time Out  Time In: 9404  Time Out: C/Perlita Whitt 1106, OT

## 2017-09-14 NOTE — PROGRESS NOTES
[address] FAX: [phone]         VASCULAR SURGERY FLOOR PROGRESS NOTE    Admit Date: 2017  POD: 5 Days Post-Op    Procedure:  Procedure(s):  SIGMOIDOSCOPY FLEXIBLE    Subjective:     Patient has no new complaints. Objective:     Vitals:  Blood pressure 124/77, pulse 75, temperature 98.1 °F (36.7 °C), resp. rate 18, height 5' 6\" (1.676 m), weight 228 lb (103.4 kg), SpO2 97 %. Temp (24hrs), Av.2 °F (36.8 °C), Min:97 °F (36.1 °C), Max:99.5 °F (37.5 °C)      Intake / Output:    Intake/Output Summary (Last 24 hours) at 17 0735  Last data filed at 17 0300   Gross per 24 hour   Intake              240 ml   Output             3264 ml   Net            -3024 ml       Physical Exam:    Constitutional: he appears well-developed. No distress. HENT:   Head: Atraumatic. Eyes: Pupils are equal, round, and reactive to light. Neck: Normal range of motion. Cardiovascular: Regular rhythm. Pulmonary/Chest: Effort normal and breath sounds normal. No respiratory distress. Abdominal: Soft. Bowel sounds are normal. he exhibits no distension. There is no tenderness. There is no guarding. No hernia. Musculoskeletal: Normal range of motion. Neurological: He is alert.  CN II- XII grossly intact  Vascular: Bilateral groin VAC intact, and abdomen soft feet are warm    Labs:   Recent Labs      17   0327  17   1159  17   0208   HGB  9.2*   --   9.2*   WBC  8.1   --   7.9   K  3.5  3.5  3.3*   GLU  119*  138*  97       Data Review     Assessment:     Patient Active Problem List    Diagnosis Date Noted    Ischemia of left lower extremity 2017    Claudication ([facility] [address] 2017    Occlusion of aorta ([facility] [address] 2017    Chronic left-sided low back pain 2017    Essential hypertension 2016    Renal insufficiency 2016    Dyslipidemia 2016       Plan/Recommendations/Medical Decision Making: Patient clinically stable bilateral lower extremity well-perfused and warm feet motor and sensory intact  -We will continue to gently dilate diuresing daily Lasix defer to nephrology kidney function baseline  -Continue work with physical therapy up and out of bed  -We will send UA and urine culture today  -We will consult social work most likely will need outpatient rehabilitation with physical therapy upon discharge hopefully early next week with wound VAC therapy    Elements of this note have been dictated using speech recognition software. As a result, errors of speech recognition may have occurred.

## 2017-09-14 NOTE — PROGRESS NOTES
Admit Date: 9/7/2017      Subjective:          Review of Systems  Cardio-vascular: no chest pain, no SOB  GI: no N/V/D  : no dysuria, no hematuria    Objective:     Patient Vitals for the past 8 hrs:   BP Temp Pulse Resp SpO2 Weight   09/14/17 0727 - - - - 97 % -   09/14/17 0711 154/70 98.2 °F (36.8 °C) 68 18 96 % -   09/14/17 0710 - - 69 - 96 % -   09/14/17 0600 - - - - - 103.4 kg (228 lb)   09/14/17 0303 124/77 98.1 °F (36.7 °C) 75 18 96 % -            Physical Exam:   Lungs: decreased BS  CV: RR,   Abdomen: soft, not tender, no rebound.   Ext: ++++ edema          Data Review   Recent Results (from the past 8 hour(s))   CBC W/O DIFF    Collection Time: 09/14/17  3:27 AM   Result Value Ref Range    WBC 8.1 4.3 - 11.1 K/uL    RBC 3.14 (L) 4.23 - 5.67 M/uL    HGB 9.2 (L) 13.6 - 17.2 g/dL    HCT 27.2 (L) 41.1 - 50.3 %    MCV 86.6 79.6 - 97.8 FL    MCH 29.3 26.1 - 32.9 PG    MCHC 33.8 31.4 - 35.0 g/dL    RDW 15.2 (H) 11.9 - 14.6 %    PLATELET 896 652 - 169 K/uL    MPV 10.2 (L) 10.8 - 91.6 FL   METABOLIC PANEL, BASIC    Collection Time: 09/14/17  3:27 AM   Result Value Ref Range    Sodium 139 136 - 145 mmol/L    Potassium 3.5 3.5 - 5.1 mmol/L    Chloride 105 98 - 107 mmol/L    CO2 24 21 - 32 mmol/L    Anion gap 10 7 - 16 mmol/L    Glucose 119 (H) 65 - 100 mg/dL    BUN 20 6 - 23 MG/DL    Creatinine 1.91 (H) 0.8 - 1.5 MG/DL    GFR est AA 47 (L) >60 ml/min/1.73m2    GFR est non-AA 39 (L) >60 ml/min/1.73m2    Calcium 7.8 (L) 8.3 - 10.4 MG/DL   MAGNESIUM    Collection Time: 09/14/17  3:27 AM   Result Value Ref Range    Magnesium 1.5 (L) 1.8 - 2.4 mg/dL           Assessment:     Principal Problem:    Occlusion of aorta (HCC) (3/3/2017)    Active Problems:    Ischemia of left lower extremity (9/8/2017)      Claudication ([address] (9/8/2017)    HARPER/CKD    Plan:     Renal Function relatively stable   Good UOP on Lasix   F/u    Tata Regan MD

## 2017-09-15 LAB
ANION GAP SERPL CALC-SCNC: 9 MMOL/L (ref 7–16)
BUN SERPL-MCNC: 21 MG/DL (ref 6–23)
CALCIUM SERPL-MCNC: 7.7 MG/DL (ref 8.3–10.4)
CHLORIDE SERPL-SCNC: 105 MMOL/L (ref 98–107)
CO2 SERPL-SCNC: 26 MMOL/L (ref 21–32)
CREAT SERPL-MCNC: 1.74 MG/DL (ref 0.8–1.5)
ERYTHROCYTE [DISTWIDTH] IN BLOOD BY AUTOMATED COUNT: 15.4 % (ref 11.9–14.6)
GLUCOSE SERPL-MCNC: 108 MG/DL (ref 65–100)
HCT VFR BLD AUTO: 26.7 % (ref 41.1–50.3)
HGB BLD-MCNC: 9.2 G/DL (ref 13.6–17.2)
MAGNESIUM SERPL-MCNC: 1.7 MG/DL (ref 1.8–2.4)
MCH RBC QN AUTO: 29.4 PG (ref 26.1–32.9)
MCHC RBC AUTO-ENTMCNC: 34.5 G/DL (ref 31.4–35)
MCV RBC AUTO: 85.3 FL (ref 79.6–97.8)
PLATELET # BLD AUTO: 246 K/UL (ref 150–450)
PMV BLD AUTO: 10.3 FL (ref 10.8–14.1)
POTASSIUM SERPL-SCNC: 3.6 MMOL/L (ref 3.5–5.1)
RBC # BLD AUTO: 3.13 M/UL (ref 4.23–5.67)
SODIUM SERPL-SCNC: 140 MMOL/L (ref 136–145)
WBC # BLD AUTO: 8.3 K/UL (ref 4.3–11.1)

## 2017-09-15 PROCEDURE — 99253 IP/OBS CNSLTJ NEW/EST LOW 45: CPT | Performed by: PHYSICAL MEDICINE & REHABILITATION

## 2017-09-15 PROCEDURE — 74011250637 HC RX REV CODE- 250/637: Performed by: PHYSICIAN ASSISTANT

## 2017-09-15 PROCEDURE — 74011250636 HC RX REV CODE- 250/636: Performed by: SURGERY

## 2017-09-15 PROCEDURE — 85027 COMPLETE CBC AUTOMATED: CPT | Performed by: SURGERY

## 2017-09-15 PROCEDURE — 80048 BASIC METABOLIC PNL TOTAL CA: CPT | Performed by: SURGERY

## 2017-09-15 PROCEDURE — 74011250637 HC RX REV CODE- 250/637: Performed by: SURGERY

## 2017-09-15 PROCEDURE — 36592 COLLECT BLOOD FROM PICC: CPT

## 2017-09-15 PROCEDURE — 74011250636 HC RX REV CODE- 250/636: Performed by: INTERNAL MEDICINE

## 2017-09-15 PROCEDURE — 83735 ASSAY OF MAGNESIUM: CPT | Performed by: SURGERY

## 2017-09-15 PROCEDURE — 74750000023 HC WOUND THERAPY

## 2017-09-15 PROCEDURE — 65660000004 HC RM CVT STEPDOWN

## 2017-09-15 PROCEDURE — 77030019934 HC DRSG VAC ASST KCON -B

## 2017-09-15 PROCEDURE — 97530 THERAPEUTIC ACTIVITIES: CPT

## 2017-09-15 RX ORDER — FLUCONAZOLE 100 MG/1
200 TABLET ORAL DAILY
Status: DISCONTINUED | OUTPATIENT
Start: 2017-09-15 | End: 2017-09-20

## 2017-09-15 RX ORDER — MAGNESIUM SULFATE HEPTAHYDRATE 40 MG/ML
2 INJECTION, SOLUTION INTRAVENOUS ONCE
Status: COMPLETED | OUTPATIENT
Start: 2017-09-15 | End: 2017-09-15

## 2017-09-15 RX ADMIN — FLUCONAZOLE 200 MG: 100 TABLET ORAL at 11:58

## 2017-09-15 RX ADMIN — HEPARIN SODIUM 5000 UNITS: 5000 INJECTION, SOLUTION INTRAVENOUS; SUBCUTANEOUS at 22:27

## 2017-09-15 RX ADMIN — POTASSIUM CHLORIDE 40 MEQ: 20 TABLET, EXTENDED RELEASE ORAL at 08:33

## 2017-09-15 RX ADMIN — OXYCODONE HYDROCHLORIDE AND ACETAMINOPHEN 1 TABLET: 7.5; 325 TABLET ORAL at 09:25

## 2017-09-15 RX ADMIN — Medication 10 ML: at 19:36

## 2017-09-15 RX ADMIN — FUROSEMIDE 40 MG: 10 INJECTION, SOLUTION INTRAMUSCULAR; INTRAVENOUS at 08:15

## 2017-09-15 RX ADMIN — OXYCODONE HYDROCHLORIDE AND ACETAMINOPHEN 2 TABLET: 7.5; 325 TABLET ORAL at 02:06

## 2017-09-15 RX ADMIN — POTASSIUM CHLORIDE 40 MEQ: 20 TABLET, EXTENDED RELEASE ORAL at 17:37

## 2017-09-15 RX ADMIN — HEPARIN SODIUM 5000 UNITS: 5000 INJECTION, SOLUTION INTRAVENOUS; SUBCUTANEOUS at 14:58

## 2017-09-15 RX ADMIN — OXYCODONE HYDROCHLORIDE AND ACETAMINOPHEN 2 TABLET: 7.5; 325 TABLET ORAL at 05:54

## 2017-09-15 RX ADMIN — CARVEDILOL 25 MG: 25 TABLET, FILM COATED ORAL at 08:33

## 2017-09-15 RX ADMIN — Medication 10 ML: at 14:57

## 2017-09-15 RX ADMIN — MAGNESIUM SULFATE HEPTAHYDRATE 2 G: 40 INJECTION, SOLUTION INTRAVENOUS at 08:31

## 2017-09-15 RX ADMIN — CARVEDILOL 25 MG: 25 TABLET, FILM COATED ORAL at 17:37

## 2017-09-15 RX ADMIN — OXYCODONE HYDROCHLORIDE AND ACETAMINOPHEN 1 TABLET: 7.5; 325 TABLET ORAL at 15:33

## 2017-09-15 RX ADMIN — OXYCODONE HYDROCHLORIDE AND ACETAMINOPHEN 2 TABLET: 7.5; 325 TABLET ORAL at 19:38

## 2017-09-15 RX ADMIN — Medication 10 ML: at 05:56

## 2017-09-15 RX ADMIN — CALCIUM GLUCONATE 2 G: 94 INJECTION, SOLUTION INTRAVENOUS at 08:16

## 2017-09-15 RX ADMIN — AMLODIPINE BESYLATE 5 MG: 10 TABLET ORAL at 08:33

## 2017-09-15 RX ADMIN — OXYCODONE HYDROCHLORIDE AND ACETAMINOPHEN 2 TABLET: 7.5; 325 TABLET ORAL at 22:39

## 2017-09-15 RX ADMIN — ASPIRIN 81 MG: 81 TABLET, COATED ORAL at 08:32

## 2017-09-15 RX ADMIN — HEPARIN SODIUM 5000 UNITS: 5000 INJECTION, SOLUTION INTRAVENOUS; SUBCUTANEOUS at 08:15

## 2017-09-15 RX ADMIN — PANTOPRAZOLE SODIUM 40 MG: 40 TABLET, DELAYED RELEASE ORAL at 05:55

## 2017-09-15 RX ADMIN — ONDANSETRON 4 MG: 2 INJECTION INTRAMUSCULAR; INTRAVENOUS at 14:52

## 2017-09-15 RX ADMIN — MORPHINE SULFATE 2 MG: 2 INJECTION, SOLUTION INTRAMUSCULAR; INTRAVENOUS at 08:27

## 2017-09-15 NOTE — WOUND CARE
Removed bilateral groin wound VACs with Dr Isidro Harmon at bedside. All healing skin tears covered with Duo Derm to these areas. Then dry gauze/ABD dressings placed. NO TAPE to this area per Dr Isidro Harmon. Stretch/net underwear to be used to secure. Hold VAC in room for possible re application this weekend. Patient and nurse updated. Wound team will continue to follow.

## 2017-09-15 NOTE — PROGRESS NOTES
OT Note:    OT attempted to see patient today for therapy. Pt supine upon arrival. Pt was instructed to perform exercises on his own this weekend. Has theraband at bedside. No charges for time spent with patient. Will re-attempt to see patient at a later date/time.     Thanks,  Mike Ordonez

## 2017-09-15 NOTE — PROGRESS NOTES
[address] FAX: [phone]         VASCULAR SURGERY FLOOR PROGRESS NOTE    Admit Date: 2017  POD: 6 Days Post-Op    Procedure:  Procedure(s):  SIGMOIDOSCOPY FLEXIBLE    Subjective:     Patient has no new complaints. Objective:     Vitals:  Blood pressure 163/72, pulse 77, temperature 97.7 °F (36.5 °C), resp. rate 18, height 5' 6\" (1.676 m), weight 226 lb 11.2 oz (102.8 kg), SpO2 98 %. Temp (24hrs), Av.4 °F (36.3 °C), Min:97.1 °F (36.2 °C), Max:97.7 °F (36.5 °C)      Intake / Output:    Intake/Output Summary (Last 24 hours) at 09/15/17 0730  Last data filed at 09/15/17 0238   Gross per 24 hour   Intake             1680 ml   Output             3351 ml   Net            -1671 ml       Physical Exam:    Constitutional: he appears well-developed. No distress. HENT:   Head: Atraumatic. Eyes: Pupils are equal, round, and reactive to light. Neck: Normal range of motion. Cardiovascular: Regular rhythm. Pulmonary/Chest: Effort normal and breath sounds normal. No respiratory distress. Abdominal: Soft. Bowel sounds are normal. he exhibits no distension. There is no tenderness. There is no guarding. No hernia. Musculoskeletal: Normal range of motion. Neurological: He is alert. CN II- XII grossly intact  Vascular: Bilateral groins intact feet are warm    Labs:   Recent Labs      09/15/17   0235  17   0327   HGB  9.2*  9.2*   WBC  8.3  8.1   K  3.6  3.5   GLU  108*  119*       Data Review     Assessment:     Patient Active Problem List    Diagnosis Date Noted    Ischemia of left lower extremity 2017    Claudication ([facility] 2017    Occlusion of aorta ([facility] 2017    Chronic left-sided low back pain 2017    Essential hypertension 2016    Renal insufficiency 2016    Dyslipidemia 2016       Plan/Recommendations/Medical Decision Making:      We will continue to work with physical therapy up and out of bed no activity restrictions  -Continue daily Lasix diuresis per documentation patient is weight up 20 pounds, will continue daily basic metabolic panel and electrolyte replacement hemoglobin been stable no more CBCs  -Continue wound VAC to groin will evaluate today with nursing care most likely will need wound VAC upon discharge to rehab early next week  -Afebrile vital signs stable will get a second IV to remove central line from right neck, UA negative    Elements of this note have been dictated using speech recognition software. As a result, errors of speech recognition may have occurred.

## 2017-09-15 NOTE — PROGRESS NOTES
Bedside and Verbal shift change report given to [employer] (oncoming nurse) by Mariaelena Lynne (offgoing nurse). Report included the following information SBAR, Kardex, Intake/Output, MAR, Recent Results and Med Rec Status.

## 2017-09-15 NOTE — PROGRESS NOTES
Verbal & Bedside shift change report given to Shalom Abraham (oncoming nurse) by Arelis Chavez (offgoing nurse). Report given with SBAR, Kardex, Intake/Output, MAR and Recent Results.

## 2017-09-15 NOTE — PROGRESS NOTES
Bedside and Verbal shift change report given to Gil (oncoming nurse) by Joie Rangel (offgoing nurse). Report included the following information SBAR, Kardex, Intake/Output, MAR, Recent Results and Med Rec Status.

## 2017-09-15 NOTE — PROGRESS NOTES
Patient seen and examined. Removed wound vacs from groin. Patient with multiple areas of abrasion from the tape and moisture. Staples are intact. Will remove wound VAC for the over the weekend and to keep dry dressings on the groin area. Area does not look infected however high risk secondary to obesity and poor circulation, and re-op groin on the left. Will give patient a couple days of prophylactic antifungal medication.     Sunshine Jensen

## 2017-09-15 NOTE — PROGRESS NOTES
[address] Nephrology Progress Note    Follow-Up on: HARPER    ROS:  Gen - no fever, no chills, appetite unchanged  CV - no chest pain, no palpitation  Lung - no shortness of breath, no cough  Abd - no tenderness, no nausea/vomiting, no diarrhea  Ext - + edema    Exam:  Vitals:    09/14/17 2225 09/15/17 0238 09/15/17 0305 09/15/17 0706   BP: 107/55 163/72  168/77   Pulse: 66 77  76   Resp: 16 18  18   Temp: 97.1 °F (36.2 °C) 97.7 °F (36.5 °C)  98.3 °F (36.8 °C)   SpO2: 98% 98%  98%   Weight:   102.8 kg (226 lb 11.2 oz)    Height:             Intake/Output Summary (Last 24 hours) at 09/15/17 1111  Last data filed at 09/15/17 1036   Gross per 24 hour   Intake             1920 ml   Output             5301 ml   Net            -3381 ml       Wt Readings from Last 3 Encounters:   09/15/17 102.8 kg (226 lb 11.2 oz)   08/25/17 94.1 kg (207 lb 8 oz)   08/11/17 93 kg (205 lb)       GEN - in no distress  CV - regular, no murmur, no rub  Lung - clear bilaterally  Abd - soft, nontender  Ext - 2+ edema    Recent Labs      09/15/17   0235  09/14/17   0327  09/13/17   0208   WBC  8.3  8.1  7.9   HGB  9.2*  9.2*  9.2*   HCT  26.7*  27.2*  26.6*   PLT  246  202  167        Recent Labs      09/15/17   0235  09/14/17   0327  09/13/17   1159   NA  140  139  139   K  3.6  3.5  3.5   CL  105  105  104   CO2  26  24  23   BUN  21  20  21   CREA  1.74*  1.91*  1.87*   CA  7.7*  7.8*  8.3   GLU  108*  119*  138*   MG  1.7*  1.5*   --        Assessment / Plan:  Principal Problem:    [facility] ([facility] (3/3/2017)    Active Problems:    Ischemia of left lower extremity (9/8/2017)      Claudication ([facility] (9/8/2017)        1. HARPER/CKD - baseline Cr about 1.8-2  - Atrophic left kidney  - Cr stable at the moment, likely at or near baseline  - Diuresing well with IV Lasix  2. Aortic occlusion - s/p aortobifemoral bypass  3.  HTN

## 2017-09-15 NOTE — PROGRESS NOTES
Problem: Mobility Impaired (Adult and Pediatric)  Goal: *Acute Goals and Plan of Care (Insert Text)  STG:  (1.)Mr. Tran will move from supine to sit and sit to supine , scoot up and down and roll side to side with MINIMAL ASSIST within 5 day(s). (2.)Mr. Tran will transfer from bed to chair and chair to bed with MINIMAL ASSIST using the least restrictive device within 5 day(s). (3.)Mr. Tran will ambulate with MINIMAL ASSIST for 50 feet with the least restrictive device within 5 day(s). (4.)Mr. Tran will maintain stable vital signs throughout all functional mobility within 5 days. (5.)Mr. Tran will perform standing static and dynamic balance activities x 10 minutes with MINIMAL ASSIST to improve safety within 5 day(s). LTG:  (1.)Mr. Tran will move from supine to sit and sit to supine , scoot up and down and roll side to side in bed with CONTACT GUARD ASSIST within 10 day(s). (2.)Mr. Tran will transfer from bed to chair and chair to bed with CONTACT GUARD ASSIST using the least restrictive device within 10 day(s). (3.)Mr. Tran will ambulate with CONTACT GUARD ASSIST for 100 feet with the least restrictive device within 10 day(s). ________________________________________________________________________________________________      PHYSICAL THERAPY: Daily Note, Treatment Day: 6th and AM 9/15/2017  INPATIENT: Hospital Day: 9  Pay[place] / Plan[place] / Product Type: Medicaid /      NAME/AGE/GENDER: Taina Albarran is a 61 y.o. male     PRIMARY DIAGNOSIS: Aortic occlusion ([facility] [I74.10]  Ischemic colitis ([facility] [K55.9] Occlusion of aorta (HCC) Occlusion of aorta (HCC)  Procedure(s) (LRB):  SIGMOIDOSCOPY FLEXIBLE (N/A)  6 Days Post-Op  ICD-10: Treatment Diagnosis:       · Difficulty in walking, Not elsewhere classified (R26.2)   Precaution/Allergies:  Review of patient's allergies indicates no known allergies.        ASSESSMENT: Mr. Bev Spear presents lying supine in bed. He is agreeable to treatment. He needed moderate assistance with left lower extremity to get to edge of bed. He needed additional time with bed mobility and scooting. He needed max assist to don a pair of mesh underwear to hold bandages in place over wounds. He then stood with CGA and ambulated with CGA to sink and performed sink side ADL. He needed CGA for balance at sink. He then ambulated to chair where he sat and needed to urinate. He then stood and ambulated into hallway with rolling walker and CGA. He needed verbal cues for safety and a standing rest break before returning to room in bedside chair. He became fatigued with minimal activity this treatment. Good progress with mobility this treatment. Will continue PT efforts. This section established at most recent assessment   PROBLEM LIST (Impairments causing functional limitations):  1. Decreased Strength  2. Decreased ADL/Functional Activities  3. Decreased Transfer Abilities  4. Decreased Ambulation Ability/Technique  5. Decreased Balance  6. Increased Pain  7. Decreased Activity Tolerance  8. Decreased Knowledge of Precautions  9. Decreased [place] with Home Exercise Program    INTERVENTIONS PLANNED: (Benefits and precautions of physical therapy have been discussed with the patient.)  1. Balance Exercise  2. Bed Mobility  3. Family Education  4. Gait Training  5. Home Exercise Program (HEP)  6. Therapeutic Activites  7. Therapeutic Exercise/Strengthening  8. Transfer Training  9. Patient Education  10. Group Therapy      TREATMENT PLAN: Frequency/Duration: 5 times a week for duration of hospital stay  Rehabilitation Potential For Stated Goals: EXCELLENT      RECOMMENDED REHABILITATION/EQUIPMENT: (at time of discharge pending progress): Due to the probability of continued deficits (see above) this patient will likely need continued skilled physical therapy after discharge.   Equipment:   · None at this time                   HISTORY:   History of Present Injury/Illness (Reason for Referral): Aortic occlusion (HCC) [I74.10] Occlusion of aorta (HCC) Occlusion of aorta (HCC)  Procedure(s) (LRB):  left femoral embolectomy/left lower extremity arteriogram (Left)  1 Day Post-Op  Past Medical History/Comorbidities:   Mr. Dawit Grove  has a past medical history of Acute diastolic (congestive) heart failure ([facility] (12/4/2016); CAD (coronary artery disease); Chronic kidney disease, stage 3; Chronic pain; Ex-smoker; GERD (gastroesophageal reflux disease); TIA (transient ischemic attack); Hypercholesteremia; Hypertension; Old MI (myocardial infarction) (11/2016); and Osteoarthritis. Mr. Dawit Grove  has a past surgical history that includes heart catheterization (12/2016); colonoscopy (N/A, 6/22/2017); knee arthroscopy (Left); and flexible sigmoidoscopy (N/A, 9/9/2017). Social History/Living Environment:   Home Environment: Private residence  # Steps to Enter: 6  Rails to Enter: Yes  One/Two Story Residence: One story  Living Alone: Yes  Support Systems: Child(tisha)  Patient Expects to be Discharged to[de-identified] Unknown  Current DME Used/Available at Home: Cane, straight  Tub or Shower Type: Shower  Prior Level of Function/Work/Activity:  Modified independent with straight cane for ambulation secondary to decreased sensation LLE for a few months, he reports independence with ADLs and driving at baseline. Number of Personal Factors/Comorbidities that affect the Plan of Care: 3+: HIGH COMPLEXITY   EXAMINATION:   Most Recent Physical Functioning:   Gross Assessment:                  Posture:  Posture (WDL): Exceptions to WDL  Posture Assessment: Forward head, Rounded shoulders  Balance:  Sitting: Intact  Sitting - Static: Good (unsupported)  Sitting - Dynamic: Good (unsupported)  Standing: Impaired  Standing - Static: Constant support; Fair  Standing - Dynamic : Fair Bed Mobility:  Supine to Sit: Moderate assistance; Additional time  Scooting: Moderate assistance; Additional time  Wheelchair Mobility:     Transfers:  Sit to Stand: Contact guard assistance  Stand to Sit: Contact guard assistance  Gait:     Base of Support: Widened  Speed/Jennifer: Pace decreased (<100 feet/min)  Step Length: Left shortened;Right shortened  Gait Abnormalities: Decreased step clearance; Path deviations; Shuffling gait  Distance (ft): 90 Feet (ft)  Assistive Device: Walker, rolling  Ambulation - Level of Assistance: Contact guard assistance       Body Structures Involved:  1. Nerves  2. Heart  3. Muscles Body Functions Affected:  1. Sensory/Pain  2. Cardio  3. Hematological  4. Neuromusculoskeletal  5. Movement Related Activities and Participation Affected:  1. Mobility  2. Self Care  3. Domestic Life  4. Interpersonal Interactions and Relationships  5. Community, Social and [place]   Number of elements that affect the Plan of Care: 4+: HIGH COMPLEXITY   CLINICAL PRESENTATION:   Presentation: Evolving clinical presentation with unstable and unpredictable characteristics: HIGH COMPLEXITY   CLINICAL DECISION MAKIN [facility] Inpatient Short Form  How much difficulty does the patient currently have. .. Unable A Lot A Little None   1. Turning over in bed (including adjusting bedclothes, sheets and blankets)? [ ] 1   [X] 2   [ ] 3   [ ] 4   2. Sitting down on and standing up from a chair with arms ( e.g., wheelchair, bedside commode, etc.)   [X] 1   [ ] 2   [ ] 3   [ ] 4   3. Moving from lying on back to sitting on the side of the bed? [ ] 1   [X] 2   [ ] 3   [ ] 4   How much help from another person does the patient currently need. .. Total A Lot A Little None   4. Moving to and from a bed to a chair (including a wheelchair)? [X] 1   [ ] 2   [ ] 3   [ ] 4   5. Need to walk in hospital room? [X] 1   [ ] 2   [ ] 3   [ ] 4   6. Climbing 3-5 steps with a railing?    [X] 1   [ ] 2   [ ] 3   [ ] 4   © 2007, Trustees of [facility], under license to [employer]. All rights reserved    Score:  Initial: 8 Most Recent: X (Date: -- )     Interpretation of Tool:  Represents activities that are increasingly more difficult (i.e. Bed mobility, Transfers, Gait). Score 24 23 22-20 19-15 14-10 9-7 6       Modifier CH CI CJ CK CL CM CN         · Mobility - Walking and Moving Around:               - CURRENT STATUS:    CM - 80%-99% impaired, limited or restricted               - GOAL STATUS:           CK - 40%-59% impaired, limited or restricted               - D/C STATUS:                       ---------------To be determined---------------  Pay[place] / [place] / Product Type: Medicaid /       Medical Necessity:     · Patient demonstrates good rehab potential due to higher previous functional level. Reason for Services/Other Comments:  · Patient continues to require modification of therapeutic interventions to increase complexity of exercises. Use of outcome tool(s) and clinical judgement create a POC that gives a: Questionable prediction of patient's progress: MODERATE COMPLEXITY                 TREATMENT:   (In addition to Assessment/Re-Assessment sessions the following treatments were rendered)   Pre-treatment Symptoms/Complaints: \"I just don't feel good today. \"  Pain: Initial:   Pain Intensity 1: 0  Post Session: Unchanged      Therapeutic Activity: (    38 Minutes): Therapeutic activities including Chair transfer and Ambulation on level ground to improve mobility, strength and activity tolerance. Required verbal cues   to promote dynamic balance in standing and promote motor control of bilateral, upper extremity(s), lower extremity(s). Therapeutic Exercise: ( ):  Exercises per grid below to improve mobility and strength.   Required minimal verbal cues to promote proper body alignment, promote proper body posture and promote proper body breathing techniques. Progressed repetitions as indicated. Date:  9/12/17 Date:  9/13/17 Date:     ACTIVITY/EXERCISE AM PM AM PM AM PM   Ambulation:           Distance  Device  Duration         Seated Heel Raises x20  x20      Seated Toe Raises x20  x20      Seated Long Arc Quads x20  x20      Seated Marching x10  x20      Seated Hip Abduction                  B = bilateral; AA = active assistive; A = active; P = passive      · Braces/Orthotics/Lines/Etc: NOne  Treatment/Session Assessment:    · Response to Treatment:  Patient tolerated treatment well with fatigue. · Interdisciplinary Collaboration:  · Physical Therapy Assistant and Registered Nurse  · After treatment position/precautions:  · Up in chair, Bed/Chair-wheels locked, Call light within reach and RN notified  · Compliance with Program/Exercises: making an effort. · Recommendations/Intent for next treatment session: \"Next visit will focus on advancements to more challenging activities and reduction in assistance provided\".   Total Treatment Duration:PT Patient Time In/Time Out  Time In: 1010  Time Out: 88 Augustina Matthews, PTA

## 2017-09-15 NOTE — CONSULTS
PM&R Rehab Consult    Subjective:     Date of Consultation:  September 15, 2017    Referring Physician: Perla Nash (vascular surgery)    Patient is a 61 y.o. male who is being seen for a rehab assessment for inpt rehab due to debility due to prolonged hospital stay and complex medical course    Principal Problem:    Occlusion of aorta ([facility] [address] (3/3/2017)    Active Problems:    Ischemia of left lower extremity (9/8/2017)      Claudication ([address] (9/8/2017)    HPI; Mr. Nichols is a pleasant, previously functionally modified independent 61 yo WM with a PMH of CHF, CAD, HTN. stg 3 CKD and chronic pain due to debilitating PVD who was admitted by Dr Isabella May on 9/2 secondary to  a junctional renal aorta occlusion with ischemic rest pain. This was confirmed with a CT scan, which showed he had junctional renal occlusion with occlusion of the common iliacs and external iliac arteries that reconstitutes of his common femoral arteries by his epigastrics. Of note, the patient did also have occlusion of his left renal artery, and his right renal artery was also small in nature. He underwent a  Aortobifemoral bypass graft with a 14 x 7 Dacron graft on day of admission. Post op respiratory failure and hypotension requiring an ICU admission and IV pressors. He was seen in consult by GI and underwent a colonoscopy due to melena and abdominal pain and distention. No active bleed or ischemia noted. He has been followed by Nephrology due to acute on chronic kidney dz and post op oliguria. This is improving. The wound to the left groin has had a wound vac until today with plans to keep off during weekend and monitor wound. He is participating in therapies but reports low endurance and MCCAULEY. He is getting diuretics for fld retention with a 20lb wt gain. Per PT; ''He needed moderate assistance with left lower extremity to get to edge of bed. He needed additional time with bed mobility and scooting.  He needed max assist to don a pair of mesh underwear to hold bandages in place over wounds. He then stood with CGA and ambulated with CGA to sink and performed sink side ADL. He needed CGA for balance at sink. He then ambulated to chair where he sat and needed to urinate. He then stood and ambulated into hallway with rolling walker and CGA. He needed verbal cues for safety and a standing rest break before returning to room in bedside chair. He became fatigued with minimal activity this treatment. Good progress with mobility this treatment. Will continue PT efforts. \"  Per OT; he is max A to total assist with ADLs.      Past Medical History:   Diagnosis Date    Acute diastolic (congestive) heart failure (HCC) 12/4/2016    LVEF 55-55% 12/4/16    CAD (coronary artery disease)     in one vessel, unable to stent 12/2016- \"collateral vessels\" per heart cath    Chronic kidney disease, stage 3     Chronic pain     low back and bilateral lower extremeties    Ex-smoker     QUIT 11/2016    1 pk/day x 40 yrs    GERD (gastroesophageal reflux disease)     takes an occassional zantac, OTC    Hx-TIA (transient ischemic attack)     2 mild strokes, patient states it was \"yrs ago\" , no residual weakness    Hypercholesteremia     managed with medication     Hypertension     managed with meds    Old MI (myocardial infarction) 11/2016    Osteoarthritis     generalized      Family History   Problem Relation Age of Onset    Stroke Mother      multiple    No Known Problems Father     Diabetes Sister     Hypertension Brother     Hypertension Sister     Heart Disease Sister       Social History   Substance Use Topics    Smoking status: Former Smoker     Packs/day: 1.00     Years: 42.00     Quit date: 11/25/2016    Smokeless tobacco: Never Used    Alcohol use No     Past Surgical History:   Procedure Laterality Date    COLONOSCOPY N/A 6/22/2017    COLONOSCOPY  BMI 31 performed by Darrian Bobo MD at [facility] 2017    SIGMOIDOSCOPY FLEXIBLE performed by Reid Weiss MD at [address] [place] [address] Drive  2016    unable to stent- 1 ves blockage    HX KNEE ARTHROSCOPY Left     L knee surgery x 2      Prior to Admission medications    Medication Sig Start Date End Date Taking? Authorizing Provider   HYDROcodone-acetaminophen (NORCO)  mg tablet Take 1 Tab by mouth every eight (8) hours as needed for Pain. Max Daily Amount: 3 Tabs. Indications: Pain 17  Yes Melita Villagran MD   carvedilol (COREG) 12.5 mg tablet Take 1 Tab by mouth two (2) times daily (with meals). 17  Yes Deepa Vargas MD   docusate sodium (STOOL SOFTENER) 100 mg capsule Take 100 mg by mouth as needed for Constipation. Yes Historical Provider   raNITIdine (ZANTAC) 150 mg tablet Take 150 mg by mouth as needed for Indigestion. Yes Historical Provider   aspirin delayed-release 81 mg tablet Take 81 mg by mouth every morning. 16  Yes SANGEETA Nichole   pravastatin (PRAVACHOL) 20 mg tablet Take 1 Tab by mouth nightly. Indications: hyperlipidemia 17   Deepa Vargas MD   nitroglycerin (NITROSTAT) 0.4 mg SL tablet 1 Tab by SubLINGual route every five (5) minutes as needed for Chest Pain. Patient taking differently: 0.4 mg by SubLINGual route every five (5) minutes as needed for Chest Pain (\"I took it one time back in March\" per pt 17). 16   SANGEETA Gu     No Known Allergies     Review of Systems:  A comprehensive review of systems was negative except for that written in the HPI. Objective:     Vitals:  Blood pressure 168/77, pulse 76, temperature 98.3 °F (36.8 °C), resp. rate 18, height 5' 6\" (1.676 m), weight 226 lb 11.2 oz (102.8 kg), SpO2 98 %.   Temp (24hrs), Av.6 °F (36.4 °C), Min:97.1 °F (36.2 °C), Max:98.3 °F (36.8 °C)      Intake and Output:  1901 - 09/15 0700  In: 1868 [P.O.:1680]  Out: 2536 [Urine:3850; Drains:200]    Physical Exam:  General:  Alert, oriented and mood affect appropriate. A bit SOB as he just finished ambulating with therapies   Lungs:   Clear to auscultation bilaterally. Decreased at bases   Heart:  Regular rate and rhythm, S1, S2 stable, no murmur, click, rub or gallop. Abdomen:   Soft, non-tender. Bowel sounds present. No masses,  No organomegaly. Neuro Muscular: Cannot lift LLE off bed due to pain o/w no focal deficits. Skin:  No rashes, lesions, or signs/symptoms or infection.  Wound in left groin with dressing intact, dry         Labs/Studies:Recent Results (from the past 72 hour(s))   HGB & HCT    Collection Time: 09/12/17  5:19 PM   Result Value Ref Range    HGB 9.6 (L) 13.6 - 17.2 g/dL    HCT 28.1 (L) 41.1 - 50.3 %   CBC W/O DIFF    Collection Time: 09/13/17  2:08 AM   Result Value Ref Range    WBC 7.9 4.3 - 11.1 K/uL    RBC 3.12 (L) 4.23 - 5.67 M/uL    HGB 9.2 (L) 13.6 - 17.2 g/dL    HCT 26.6 (L) 41.1 - 50.3 %    MCV 85.3 79.6 - 97.8 FL    MCH 29.5 26.1 - 32.9 PG    MCHC 34.6 31.4 - 35.0 g/dL    RDW 15.6 (H) 11.9 - 14.6 %    PLATELET 481 831 - 989 K/uL    MPV 10.2 (L) 10.8 - 60.7 FL   METABOLIC PANEL, BASIC    Collection Time: 09/13/17  2:08 AM   Result Value Ref Range    Sodium 139 136 - 145 mmol/L    Potassium 3.3 (L) 3.5 - 5.1 mmol/L    Chloride 106 98 - 107 mmol/L    CO2 22 21 - 32 mmol/L    Anion gap 11 7 - 16 mmol/L    Glucose 97 65 - 100 mg/dL    BUN 20 6 - 23 MG/DL    Creatinine 1.96 (H) 0.8 - 1.5 MG/DL    GFR est AA 45 (L) >60 ml/min/1.73m2    GFR est non-AA 38 (L) >60 ml/min/1.73m2    Calcium 8.0 (L) 8.3 - 27.1 MG/DL   METABOLIC PANEL, BASIC    Collection Time: 09/13/17 11:59 AM   Result Value Ref Range    Sodium 139 136 - 145 mmol/L    Potassium 3.5 3.5 - 5.1 mmol/L    Chloride 104 98 - 107 mmol/L    CO2 23 21 - 32 mmol/L    Anion gap 12 7 - 16 mmol/L    Glucose 138 (H) 65 - 100 mg/dL    BUN 21 6 - 23 MG/DL    Creatinine 1.87 (H) 0.8 - 1.5 MG/DL    GFR est AA 48 (L) >60 ml/min/1.73m2    GFR est non-AA 40 (L) >60 ml/min/1.73m2 Calcium 8.3 8.3 - 10.4 MG/DL   CBC W/O DIFF    Collection Time: 09/14/17  3:27 AM   Result Value Ref Range    WBC 8.1 4.3 - 11.1 K/uL    RBC 3.14 (L) 4.23 - 5.67 M/uL    HGB 9.2 (L) 13.6 - 17.2 g/dL    HCT 27.2 (L) 41.1 - 50.3 %    MCV 86.6 79.6 - 97.8 FL    MCH 29.3 26.1 - 32.9 PG    MCHC 33.8 31.4 - 35.0 g/dL    RDW 15.2 (H) 11.9 - 14.6 %    PLATELET 985 835 - 190 K/uL    MPV 10.2 (L) 10.8 - 54.6 FL   METABOLIC PANEL, BASIC    Collection Time: 09/14/17  3:27 AM   Result Value Ref Range    Sodium 139 136 - 145 mmol/L    Potassium 3.5 3.5 - 5.1 mmol/L    Chloride 105 98 - 107 mmol/L    CO2 24 21 - 32 mmol/L    Anion gap 10 7 - 16 mmol/L    Glucose 119 (H) 65 - 100 mg/dL    BUN 20 6 - 23 MG/DL    Creatinine 1.91 (H) 0.8 - 1.5 MG/DL    GFR est AA 47 (L) >60 ml/min/1.73m2    GFR est non-AA 39 (L) >60 ml/min/1.73m2    Calcium 7.8 (L) 8.3 - 10.4 MG/DL   MAGNESIUM    Collection Time: 09/14/17  3:27 AM   Result Value Ref Range    Magnesium 1.5 (L) 1.8 - 2.4 mg/dL   CULTURE, URINE    Collection Time: 09/14/17 10:15 AM   Result Value Ref Range    Special Requests: NO SPECIAL REQUESTS      Culture result: NO GROWTH 1 DAY     URINALYSIS W/ RFLX MICROSCOPIC    Collection Time: 09/14/17 10:15 AM   Result Value Ref Range    Color YELLOW      Appearance CLEAR      Specific gravity 1.004 1.001 - 1.023      pH (UA) 5.5 5.0 - 9.0      Protein NEGATIVE  NEG mg/dL    Glucose NEGATIVE  mg/dL    Ketone NEGATIVE  NEG mg/dL    Bilirubin NEGATIVE  NEG      Blood MODERATE (A) NEG      Urobilinogen 0.2 0.2 - 1.0 EU/dL    Nitrites NEGATIVE  NEG      Leukocyte Esterase NEGATIVE  NEG      WBC 0-3 0 /hpf    RBC 10-20 0 /hpf    Epithelial cells 0 0 /hpf    Bacteria 0 0 /hpf    Casts 0 0 /lpf   CBC W/O DIFF    Collection Time: 09/15/17  2:35 AM   Result Value Ref Range    WBC 8.3 4.3 - 11.1 K/uL    RBC 3.13 (L) 4.23 - 5.67 M/uL    HGB 9.2 (L) 13.6 - 17.2 g/dL    HCT 26.7 (L) 41.1 - 50.3 %    MCV 85.3 79.6 - 97.8 FL    MCH 29.4 26.1 - 32.9 PG MCHC 34.5 31.4 - 35.0 g/dL    RDW 15.4 (H) 11.9 - 14.6 %    PLATELET 771 582 - 621 K/uL    MPV 10.3 (L) 10.8 - 26.1 FL   METABOLIC PANEL, BASIC    Collection Time: 09/15/17  2:35 AM   Result Value Ref Range    Sodium 140 136 - 145 mmol/L    Potassium 3.6 3.5 - 5.1 mmol/L    Chloride 105 98 - 107 mmol/L    CO2 26 21 - 32 mmol/L    Anion gap 9 7 - 16 mmol/L    Glucose 108 (H) 65 - 100 mg/dL    BUN 21 6 - 23 MG/DL    Creatinine 1.74 (H) 0.8 - 1.5 MG/DL    GFR est AA 52 (L) >60 ml/min/1.73m2    GFR est non-AA 43 (L) >60 ml/min/1.73m2    Calcium 7.7 (L) 8.3 - 10.4 MG/DL   MAGNESIUM    Collection Time: 09/15/17  2:35 AM   Result Value Ref Range    Magnesium 1.7 (L) 1.8 - 2.4 mg/dL         Functional Assessment:  Functional Assessment  Decline in Gait/Transfer/Balance: Yes (comment) (uses a cane)  Decline in Capacity to Feed/Dress/Bathe: No  Developmental Delay: No  Chewing/Swallowing Problems: No  Difficulty with Secretions: No  Speech Slurred/Thick/Garbled: No       Ambulation:  Activity and Safety  Activity Level: Up with Assistance  Ambulate: Ambulate to bathroom  Activity: In bed  Activity Assistance: Partial (one person)  Weight Bearing Status: WBAT (Weight Bearing as Tolerated)  NWB (Non Weight Bearing extremities: BLE (Bilateral Lower Extremities)  Mode of Transportation: Wheelchair, Stretcher  Repositioned: Sitting, Up in chair  Patient Turned: Turns self  Head of Bed Elevated: Self regulated  Distance Ambulated (ft): 2 ft  Activity Response:  Tolerated well  Assistive Device: Fall prevention device  Safety Measures: Bed/Chair-Wheels locked, Bed in low position, Call light within reach, Gripper socks, Side rails X2     Impression/Plan:     Principal Problem:    Occlusion of aorta ([facility] [address] (3/3/2017)    Active Problems:    Ischemia of left lower extremity (9/8/2017)      Claudication ([facility] [address] (9/8/2017)    Debilitation    Recommendations: Continue Acute Rehab Program  Coordination of rehab/medical care  Counseling of PM & R care issues management  Monitoring and management of medical conditions per plan of care/orders   -appropriate for [facility] but will need [place] Medicaid approval. Will begin precert process but should note that this has taken 1-2 weeks in the past for a response. I will continue to follow with you. His hope is to improve during this time so that he could possibly dc home with [facility] by the time [facility] receives a response.  -cont daily PT/OT. Out of bed to chair at least bid for 2hrs  -continuing to monitor electrolytes, renal fxn and post op anemia ; he meets medical necessity for [facility] due to multiple issues with hi risk of decompensation due to multi organ involvement; I have no doubt that pt can benefit from, tolerate and actively participate in [facility] level of care. He is too medically complex to be managed at a SNF level  -overall rehab potential is good for a meaningful recovery.   Discussion with patient/Staff  Reviewed Therapies/Labs/Meds/Records    Signed By:  Chucky Chawla MD     September 15, 2017

## 2017-09-15 NOTE — PROGRESS NOTES
Pt seen resting in bed. States he lives alone. Was independent prior to admission. Uses cane to ambulate with and drives self. Able to get rx and now has [employer] insurance. Has PCP. He has daughter, that works and has family per pt. He also has brother and sisters that help him sometimes if needs something. Discussed d/c POC for rehab. He understands that he is going to need STR. He would like referral sent to 9th floor as first choice. Will check to see if accepts. If not we did discuss facilities. On Monday, 9/18 we can send for LOC if needed and [facility] does not accept. CM to continue following for d/c needs. Care Management Interventions  PCP Verified by CM: Yes  Mode of Transport at Discharge: Other (see comment)  Physical Therapy Consult: Yes  Occupational Therapy Consult: Yes  Current Support Network: Own Home, Lives Alone  Confirm Follow Up Transport: Family  Plan discussed with Pt/Family/Caregiver: Yes  Freedom of Choice Offered:  Yes

## 2017-09-16 LAB
ANION GAP SERPL CALC-SCNC: 7 MMOL/L (ref 7–16)
BACTERIA SPEC CULT: NORMAL
BUN SERPL-MCNC: 23 MG/DL (ref 6–23)
CALCIUM SERPL-MCNC: 8 MG/DL (ref 8.3–10.4)
CHLORIDE SERPL-SCNC: 102 MMOL/L (ref 98–107)
CO2 SERPL-SCNC: 29 MMOL/L (ref 21–32)
CREAT SERPL-MCNC: 1.67 MG/DL (ref 0.8–1.5)
GLUCOSE SERPL-MCNC: 134 MG/DL (ref 65–100)
POTASSIUM SERPL-SCNC: 3.9 MMOL/L (ref 3.5–5.1)
SERVICE CMNT-IMP: NORMAL
SODIUM SERPL-SCNC: 138 MMOL/L (ref 136–145)

## 2017-09-16 PROCEDURE — 74011250636 HC RX REV CODE- 250/636: Performed by: SURGERY

## 2017-09-16 PROCEDURE — 65660000004 HC RM CVT STEPDOWN

## 2017-09-16 PROCEDURE — 80048 BASIC METABOLIC PNL TOTAL CA: CPT | Performed by: SURGERY

## 2017-09-16 PROCEDURE — 74011250636 HC RX REV CODE- 250/636: Performed by: INTERNAL MEDICINE

## 2017-09-16 PROCEDURE — 97530 THERAPEUTIC ACTIVITIES: CPT

## 2017-09-16 PROCEDURE — 74011250637 HC RX REV CODE- 250/637: Performed by: PHYSICIAN ASSISTANT

## 2017-09-16 PROCEDURE — 36415 COLL VENOUS BLD VENIPUNCTURE: CPT | Performed by: SURGERY

## 2017-09-16 PROCEDURE — 74011250637 HC RX REV CODE- 250/637: Performed by: SURGERY

## 2017-09-16 PROCEDURE — 74011250636 HC RX REV CODE- 250/636: Performed by: ANESTHESIOLOGY

## 2017-09-16 RX ADMIN — POTASSIUM CHLORIDE 40 MEQ: 20 TABLET, EXTENDED RELEASE ORAL at 08:14

## 2017-09-16 RX ADMIN — Medication 10 ML: at 19:10

## 2017-09-16 RX ADMIN — HEPARIN SODIUM 5000 UNITS: 5000 INJECTION, SOLUTION INTRAVENOUS; SUBCUTANEOUS at 15:32

## 2017-09-16 RX ADMIN — Medication 10 ML: at 15:32

## 2017-09-16 RX ADMIN — HEPARIN SODIUM 5000 UNITS: 5000 INJECTION, SOLUTION INTRAVENOUS; SUBCUTANEOUS at 08:15

## 2017-09-16 RX ADMIN — FLUCONAZOLE 200 MG: 100 TABLET ORAL at 08:13

## 2017-09-16 RX ADMIN — AMLODIPINE BESYLATE 5 MG: 10 TABLET ORAL at 08:13

## 2017-09-16 RX ADMIN — FUROSEMIDE 40 MG: 10 INJECTION, SOLUTION INTRAMUSCULAR; INTRAVENOUS at 08:14

## 2017-09-16 RX ADMIN — OXYCODONE HYDROCHLORIDE AND ACETAMINOPHEN 2 TABLET: 7.5; 325 TABLET ORAL at 02:43

## 2017-09-16 RX ADMIN — POTASSIUM CHLORIDE 40 MEQ: 20 TABLET, EXTENDED RELEASE ORAL at 17:36

## 2017-09-16 RX ADMIN — OXYCODONE HYDROCHLORIDE AND ACETAMINOPHEN 1 TABLET: 7.5; 325 TABLET ORAL at 15:34

## 2017-09-16 RX ADMIN — OXYCODONE HYDROCHLORIDE AND ACETAMINOPHEN 1 TABLET: 7.5; 325 TABLET ORAL at 08:13

## 2017-09-16 RX ADMIN — CARVEDILOL 25 MG: 25 TABLET, FILM COATED ORAL at 17:36

## 2017-09-16 RX ADMIN — DOCUSATE SODIUM 100 MG: 100 CAPSULE, LIQUID FILLED ORAL at 08:14

## 2017-09-16 RX ADMIN — PANTOPRAZOLE SODIUM 40 MG: 40 TABLET, DELAYED RELEASE ORAL at 02:43

## 2017-09-16 RX ADMIN — HEPARIN SODIUM 5000 UNITS: 5000 INJECTION, SOLUTION INTRAVENOUS; SUBCUTANEOUS at 22:34

## 2017-09-16 RX ADMIN — Medication 10 ML: at 02:45

## 2017-09-16 RX ADMIN — DIPHENHYDRAMINE HYDROCHLORIDE 12.5 MG: 50 INJECTION, SOLUTION INTRAMUSCULAR; INTRAVENOUS at 22:56

## 2017-09-16 RX ADMIN — CARVEDILOL 25 MG: 25 TABLET, FILM COATED ORAL at 08:13

## 2017-09-16 RX ADMIN — OXYCODONE HYDROCHLORIDE AND ACETAMINOPHEN 2 TABLET: 7.5; 325 TABLET ORAL at 22:34

## 2017-09-16 RX ADMIN — OXYCODONE HYDROCHLORIDE AND ACETAMINOPHEN 2 TABLET: 7.5; 325 TABLET ORAL at 19:09

## 2017-09-16 RX ADMIN — ASPIRIN 81 MG: 81 TABLET, COATED ORAL at 08:13

## 2017-09-16 RX ADMIN — DIPHENHYDRAMINE HYDROCHLORIDE 12.5 MG: 50 INJECTION, SOLUTION INTRAMUSCULAR; INTRAVENOUS at 02:43

## 2017-09-16 NOTE — PROGRESS NOTES
Verbal & Bedside shift change report given to Imelda Rose (oncoming nurse) by Solomon Avina (offgoing nurse). Report given with SBAR, Kardex, Intake/Output, MAR and Recent Results.

## 2017-09-16 NOTE — PROGRESS NOTES
Pt drank 100% of carton of 2% milk, 100% of carton of chocolate milk, 25% of pimento cheese sandwich, and 100% of peach crumble for dinner.

## 2017-09-16 NOTE — PROGRESS NOTES
Problem: Nutrition Deficit  Goal: *Optimize nutritional status  Nutrition F/U:  Nutrition Consult for Calorie Count. Assessment:  Weight 96.1 kg (standing scale 9/16/17 CVSD), edema - 2+ pitting. The patient continue to eat poorly, reporting a poor appetite. He reports that his eyesight is poor and he can't read the menu to preview it in order to make mor informed choices. He is reporting a dry mouth which he feels makes \"the food grow in my mouth - the longer I chew it, the bigger it gets, then I can't swallow it. \" He is now willing to accept milk and Ensure with his meals. Macronutrient Needs:  EER:  5330-8458 kcal /day (15-18 kcal/kg pre-op BW)  EPR:  51-78 grams protein/day (0.8-1.2 grams/kg IBW)  Intake/Comparative Standards:  Poor documentation and the patient is having difficulty  meals during recall. Intervention:   Meals and Snacks: Regular. Calorie Count started today. Medical Food Supplement Therapy: The patient's choice of milk with all trays. Medical Food Supplement Therapy: Commercial Beverage: Ensure Enlive (360 calories, 20 gm protein per bottle) or Ensure Plus (350 calories, 13 gm protein per can) with all trays. Nutrition Discharge Plan: Too soon to determine. Liyah Leija.  Mylene Milligan  [phone]

## 2017-09-16 NOTE — PROGRESS NOTES
Problem: Mobility Impaired (Adult and Pediatric)  Goal: *Acute Goals and Plan of Care (Insert Text)  STG:  (1.)Mr. Tran will move from supine to sit and sit to supine , scoot up and down and roll side to side with MINIMAL ASSIST within 5 day(s). (2.)Mr. Tran will transfer from bed to chair and chair to bed with MINIMAL ASSIST using the least restrictive device within 5 day(s). (3.)Mr. Tran will ambulate with MINIMAL ASSIST for 50 feet with the least restrictive device within 5 day(s). (4.)Mr. Tran will maintain stable vital signs throughout all functional mobility within 5 days. (5.)Mr. Tran will perform standing static and dynamic balance activities x 10 minutes with MINIMAL ASSIST to improve safety within 5 day(s). LTG:  (1.)Mr. Tran will move from supine to sit and sit to supine , scoot up and down and roll side to side in bed with CONTACT GUARD ASSIST within 10 day(s). (2.)Mr. Tran will transfer from bed to chair and chair to bed with CONTACT GUARD ASSIST using the least restrictive device within 10 day(s). (3.)Mr. Tran will ambulate with CONTACT GUARD ASSIST for 100 feet with the least restrictive device within 10 day(s). ________________________________________________________________________________________________      Physical Therapy Note:     Patient not seen this am secondary to RN stated to hold treatment this am and to check back this afternoon. Thank you.       Tiny Hancock, PTA

## 2017-09-16 NOTE — PROGRESS NOTES
[address] FAX: [phone]          VASCULAR SURGERY FLOOR PROGRESS NOTE    Admit Date: 2017  POD: 7 Days Post-Op    Procedure:  Procedure(s):  SIGMOIDOSCOPY FLEXIBLE    Subjective:     Patient has complaints of increased drainage from left groin. . Seems to be most noticeable when standing. Not taking all of meal trays - approx 1/2, sometimes less. ROS - unchanged except as noted above. Objective:     Vitals:  Blood pressure 180/82, pulse 68, temperature 96.5 °F (35.8 °C), resp. rate 20, height 5' 6\" (1.676 m), weight 225 lb (102.1 kg), SpO2 97 %. Temp (24hrs), Av °F (36.7 °C), Min:96.5 °F (35.8 °C), Max:98.6 °F (37 °C)      Intake / Output:    Intake/Output Summary (Last 24 hours) at 17 0948  Last data filed at 17 0930   Gross per 24 hour   Intake             1540 ml   Output             3200 ml   Net            -1660 ml       Physical Exam:    GEN: alert, cooperative, no distress, appears stated age  Left groin not actively draining. Skin is better - much less excoriation then when I saw him 6 days ago, much less edema also. Incisions are all intact  No LE ischemic changes, feet are warm. Labs:   Recent Labs      17   0346  09/15/17   0235  17   0327   HGB   --   9.2*  9.2*   WBC   --   8.3  8.1   K  3.9  3.6  3.5   GLU  134*  108*  119*       Data Review reviewed  Consultants documentation and Nursing documentation    Assessment:     Patient Active Problem List    Diagnosis Date Noted    Ischemia of left lower extremity 2017    Claudication ([facility] [address] 2017    Occlusion of aorta ([facility] [address] 2017    Chronic left-sided low back pain 2017    Essential hypertension 2016    Renal insufficiency 2016    Dyslipidemia 2016       Plan/Recommendations/Medical Decision Making:     Nutrition service consult. Meal supplements. Continue local wound care.     Elements of this note have been dictated using speech recognition software. As a result, errors of speech recognition may have occurred.

## 2017-09-16 NOTE — PROGRESS NOTES
Left message for Luis Martinez RD for consult. Meal ticket heavily soiled with food from lunch. Pt ate 100% of a strawberry ensure and 50% of a fruit cup for lunch. Will continue to follow and document all calorie intake per order.

## 2017-09-16 NOTE — PROGRESS NOTES
Problem: Mobility Impaired (Adult and Pediatric)  Goal: *Acute Goals and Plan of Care (Insert Text)  STG:  (1.)Mr. Tran will move from supine to sit and sit to supine , scoot up and down and roll side to side with MINIMAL ASSIST within 5 day(s). (2.)Mr. Tran will transfer from bed to chair and chair to bed with MINIMAL ASSIST using the least restrictive device within 5 day(s). (3.)Mr. Tran will ambulate with MINIMAL ASSIST for 50 feet with the least restrictive device within 5 day(s). (4.)Mr. Tran will maintain stable vital signs throughout all functional mobility within 5 days. (5.)Mr. Tran will perform standing static and dynamic balance activities x 10 minutes with MINIMAL ASSIST to improve safety within 5 day(s). LTG:  (1.)Mr. Tran will move from supine to sit and sit to supine , scoot up and down and roll side to side in bed with CONTACT GUARD ASSIST within 10 day(s). (2.)Mr. Tran will transfer from bed to chair and chair to bed with CONTACT GUARD ASSIST using the least restrictive device within 10 day(s). (3.)Mr. Tran will ambulate with CONTACT GUARD ASSIST for 100 feet with the least restrictive device within 10 day(s). ________________________________________________________________________________________________      PHYSICAL THERAPY: Daily Note, Treatment Day: 7th and PM 9/16/2017  INPATIENT: Hospital Day: 10  Payor: [place] / Plan: [address] [place] [address] / Product Type: Medicaid /      NAME/AGE/GENDER: Brandy Sommers is a 61 y.o. male     PRIMARY DIAGNOSIS: Aortic occlusion ([facility] [place] [address] [I74.10]  Ischemic colitis ([facility] [address] [K55.9] Occlusion of aorta (HCC) Occlusion of aorta (HCC)  Procedure(s) (LRB):  SIGMOIDOSCOPY FLEXIBLE (N/A)  7 Days Post-Op  ICD-10: Treatment Diagnosis:       · Difficulty in walking, Not elsewhere classified (R26.2)   Precaution/Allergies:  Review of patient's allergies indicates no known allergies.        ASSESSMENT: Mr. Beth Hunter presents lying supine in bed. He is agreeable to treatment. He continues to need assistance with bed mobility with left lower extremity. He sat at edge of bed with good sitting balance and then stood and ambulated handheld assistance to sink to perform sink-side ADL. He used sink for support while standing at sink. He then ambulated back to chair and urinated upon sitting down. He then was agreeable to standing and ambulating into hallway. He maintained ambulation distance this treatment with rolling walker. Next treatment will attempt ambulation with straight cane. He is progressing toward goals. Will continue PT efforts. This section established at most recent assessment   PROBLEM LIST (Impairments causing functional limitations):  1. Decreased Strength  2. Decreased ADL/Functional Activities  3. Decreased Transfer Abilities  4. Decreased Ambulation Ability/Technique  5. Decreased Balance  6. Increased Pain  7. Decreased Activity Tolerance  8. Decreased Knowledge of Precautions  9. Decreased [place] with Home Exercise Program    INTERVENTIONS PLANNED: (Benefits and precautions of physical therapy have been discussed with the patient.)  1. Balance Exercise  2. Bed Mobility  3. Family Education  4. Gait Training  5. Home Exercise Program (HEP)  6. Therapeutic Activites  7. Therapeutic Exercise/Strengthening  8. Transfer Training  9. Patient Education  10. Group Therapy      TREATMENT PLAN: Frequency/Duration: 5 times a week for duration of hospital stay  Rehabilitation Potential For Stated Goals: EXCELLENT      RECOMMENDED REHABILITATION/EQUIPMENT: (at time of discharge pending progress): Due to the probability of continued deficits (see above) this patient will likely need continued skilled physical therapy after discharge. Equipment:   · None at this time                   HISTORY:   History of Present Injury/Illness (Reason for Referral):   Aortic occlusion (HCC) [I74.10] Occlusion of aorta (HCC) Occlusion of aorta (HCC)  Procedure(s) (LRB):  left femoral embolectomy/left lower extremity arteriogram (Left)  1 Day Post-Op  Past Medical History/Comorbidities:   Mr. Vincenzo Almeida  has a past medical history of Acute diastolic (congestive) heart failure ([facility] [address] (12/4/2016); CAD (coronary artery disease); Chronic kidney disease, stage 3; Chronic pain; Ex-smoker; GERD (gastroesophageal reflux disease); TIA (transient ischemic attack); Hypercholesteremia; Hypertension; Old MI (myocardial infarction) (11/2016); and Osteoarthritis. Mr. Vincenzo Almeida  has a past surgical history that includes heart catheterization (12/2016); colonoscopy (N/A, 6/22/2017); knee arthroscopy (Left); and flexible sigmoidoscopy (N/A, 9/9/2017). Social History/Living Environment:   Home Environment: Private residence  # Steps to Enter: 6  Rails to Enter: Yes  One/Two Story Residence: One story  Living Alone: Yes  Support Systems: Child(tisha)  Patient Expects to be Discharged to[de-identified] Unknown  Current DME Used/Available at Home: Cane, straight  Tub or Shower Type: Shower  Prior Level of Function/Work/Activity:  Modified independent with straight cane for ambulation secondary to decreased sensation LLE for a few months, he reports independence with ADLs and driving at baseline. Number of Personal Factors/Comorbidities that affect the Plan of Care: 3+: HIGH COMPLEXITY   EXAMINATION:   Most Recent Physical Functioning:   Gross Assessment:                  Posture:  Posture (WDL): Exceptions to WDL  Posture Assessment:  Forward head, Rounded shoulders  Balance:  Sitting: Intact  Sitting - Static: Good (unsupported)  Sitting - Dynamic: Good (unsupported)  Standing: Impaired  Standing - Static: Fair  Standing - Dynamic : Fair Bed Mobility:  Supine to Sit: Minimum assistance  Scooting: Minimum assistance  Wheelchair Mobility:     Transfers:  Sit to Stand: Contact guard assistance  Stand to Sit: Contact guard assistance  Gait:     Base of Support: Widened  Speed/Jennifer: Pace decreased (<100 feet/min)  Step Length: Left shortened;Right shortened  Gait Abnormalities: Decreased step clearance; Path deviations  Distance (ft): 90 Feet (ft)  Assistive Device: Walker, rolling  Ambulation - Level of Assistance: Contact guard assistance       Body Structures Involved:  1. Nerves  2. Heart  3. Muscles Body Functions Affected:  1. Sensory/Pain  2. Cardio  3. Hematological  4. Neuromusculoskeletal  5. Movement Related Activities and Participation Affected:  1. Mobility  2. Self Care  3. Domestic Life  4. Interpersonal Interactions and Relationships  5. Community, Social and [place]   Number of elements that affect the Plan of Care: 4+: HIGH COMPLEXITY   CLINICAL PRESENTATION:   Presentation: Evolving clinical presentation with unstable and unpredictable characteristics: HIGH COMPLEXITY   CLINICAL DECISION MAKIN [facility] Mobility Inpatient Short Form  How much difficulty does the patient currently have. .. Unable A Lot A Little None   1. Turning over in bed (including adjusting bedclothes, sheets and blankets)? [ ] 1   [X] 2   [ ] 3   [ ] 4   2. Sitting down on and standing up from a chair with arms ( e.g., wheelchair, bedside commode, etc.)   [X] 1   [ ] 2   [ ] 3   [ ] 4   3. Moving from lying on back to sitting on the side of the bed? [ ] 1   [X] 2   [ ] 3   [ ] 4   How much help from another person does the patient currently need. .. Total A Lot A Little None   4. Moving to and from a bed to a chair (including a wheelchair)? [X] 1   [ ] 2   [ ] 3   [ ] 4   5. Need to walk in hospital room? [X] 1   [ ] 2   [ ] 3   [ ] 4   6. Climbing 3-5 steps with a railing? [X] 1   [ ] 2   [ ] 3   [ ] 4   © , Trustees of [address] [place] [address] [address] [address], under license to [employer].  All rights reserved    Score:  Initial: 8 Most Recent: X (Date: -- )     Interpretation of Tool:  Represents activities that are increasingly more difficult (i.e. Bed mobility, Transfers, Gait). Score 24 23 22-20 19-15 14-10 9-7 6       Modifier CH CI CJ CK CL CM CN         · Mobility - Walking and Moving Around:               - CURRENT STATUS:    CM - 80%-99% impaired, limited or restricted               - GOAL STATUS:           CK - 40%-59% impaired, limited or restricted               - D/C STATUS:                       ---------------To be determined---------------  Pay[place] / Plan: [place] / Product Type: Medicaid /       Medical Necessity:     · Patient demonstrates good rehab potential due to higher previous functional level. Reason for Services/Other Comments:  · Patient continues to require modification of therapeutic interventions to increase complexity of exercises. Use of outcome tool(s) and clinical judgement create a POC that gives a: Questionable prediction of patient's progress: MODERATE COMPLEXITY                 TREATMENT:   (In addition to Assessment/Re-Assessment sessions the following treatments were rendered)   Pre-treatment Symptoms/Complaints: \"I am bleeding on the floor. It freaks me out. \"  Pain: Initial:   Pain Intensity 1: 5  Pain Intervention(s) 1:  (RN present giving patient pain medication)  Post Session: Unchanged      Therapeutic Activity: (    23 Minutes): Therapeutic activities including Bed transfers, Chair transfer and Ambulation on level ground to improve mobility, strength and activity tolerance. Required verbal cues   to promote dynamic balance in standing and promote motor control of bilateral, upper extremity(s), lower extremity(s). Therapeutic Exercise: ( ):  Exercises per grid below to improve mobility and strength. Required minimal verbal cues to promote proper body alignment, promote proper body posture and promote proper body breathing techniques. Progressed repetitions as indicated.      Date:  9/12/17 Date:  9/13/17 Date:     ACTIVITY/EXERCISE AM PM AM PM AM PM   Ambulation:           Distance  Device  Duration         Seated Heel Raises x20  x20      Seated Toe Raises x20  x20      Seated Long Arc Quads x20  x20      Seated Marching x10  x20      Seated Hip Abduction                  B = bilateral; AA = active assistive; A = active; P = passive      · Braces/Orthotics/Lines/Etc: NOne  Treatment/Session Assessment:    · Response to Treatment:  Patient tolerated treatment well with fatigue. · Interdisciplinary Collaboration:  · Physical Therapy Assistant and Registered Nurse  · After treatment position/precautions:  · Up in chair, Bed/Chair-wheels locked, Call light within reach and RN notified  · Compliance with Program/Exercises: making an effort. · Recommendations/Intent for next treatment session: \"Next visit will focus on advancements to more challenging activities and reduction in assistance provided\".   Total Treatment Duration:PT Patient Time In/Time Out  Time In: 0899  Time Out: [address], PTA

## 2017-09-16 NOTE — PROGRESS NOTES
[facility] Progress Note    Follow-Up on: HARPER    ROS:  Gen - no fever, no chills, appetite unchanged, generalized pain  CV - no chest pain, no palpitation  Lung - no shortness of breath, no cough  Abd - no tenderness, no nausea/vomiting, no diarrhea  Ext - + edema    Exam:  Vitals:    09/16/17 0239 09/16/17 0400 09/16/17 0730 09/16/17 0813   BP: 163/77  180/82 180/82   Pulse: 73  67 68   Resp: 18  20    Temp: 98.3 °F (36.8 °C)  96.5 °F (35.8 °C)    SpO2: 96%  97%    Weight:  102.1 kg (225 lb)     Height:             Intake/Output Summary (Last 24 hours) at 09/16/17 0922  Last data filed at 09/16/17 6315   Gross per 24 hour   Intake             1540 ml   Output             4150 ml   Net            -2610 ml       Wt Readings from Last 3 Encounters:   09/16/17 102.1 kg (225 lb)   08/25/17 94.1 kg (207 lb 8 oz)   08/11/17 93 kg (205 lb)       GEN - in no distress  CV - regular, no murmur, no rub  Lung - clear bilaterally  Abd - soft, nontender  Ext - 2+ edema    Recent Labs      09/15/17   0235  09/14/17   0327   WBC  8.3  8.1   HGB  9.2*  9.2*   HCT  26.7*  27.2*   PLT  246  202        Recent Labs      09/16/17   0346  09/15/17   0235  09/14/17   0327   NA  138  140  139   K  3.9  3.6  3.5   CL  102  105  105   CO2  29  26  24   BUN  23  21  20   CREA  1.67*  1.74*  1.91*   CA  8.0*  7.7*  7.8*   GLU  134*  108*  119*   MG   --   1.7*  1.5*       Assessment / Plan:  Principal Problem:    Occlusion of aorta ([facility] [address] (3/3/2017)    Active Problems:    Ischemia of left lower extremity (9/8/2017)      Claudication ([facility] [address] (9/8/2017)        1. HARPER/CKD - baseline Cr about 1.8-2  - Atrophic left kidney  - Cr stable at the moment, likely at or near baseline  - Diuresing well with IV Lasix, okay to continue  2. Aortic occlusion - s/p aortobifemoral bypass  3.  HTN

## 2017-09-17 LAB
ANION GAP SERPL CALC-SCNC: 9 MMOL/L (ref 7–16)
BUN SERPL-MCNC: 22 MG/DL (ref 6–23)
CALCIUM SERPL-MCNC: 8.1 MG/DL (ref 8.3–10.4)
CHLORIDE SERPL-SCNC: 101 MMOL/L (ref 98–107)
CO2 SERPL-SCNC: 28 MMOL/L (ref 21–32)
CREAT SERPL-MCNC: 1.71 MG/DL (ref 0.8–1.5)
GLUCOSE SERPL-MCNC: 123 MG/DL (ref 65–100)
POTASSIUM SERPL-SCNC: 4.1 MMOL/L (ref 3.5–5.1)
SODIUM SERPL-SCNC: 138 MMOL/L (ref 136–145)

## 2017-09-17 PROCEDURE — 97110 THERAPEUTIC EXERCISES: CPT

## 2017-09-17 PROCEDURE — 80048 BASIC METABOLIC PNL TOTAL CA: CPT | Performed by: SURGERY

## 2017-09-17 PROCEDURE — 65660000004 HC RM CVT STEPDOWN

## 2017-09-17 PROCEDURE — 97530 THERAPEUTIC ACTIVITIES: CPT

## 2017-09-17 PROCEDURE — 36415 COLL VENOUS BLD VENIPUNCTURE: CPT | Performed by: SURGERY

## 2017-09-17 PROCEDURE — 74011250636 HC RX REV CODE- 250/636: Performed by: INTERNAL MEDICINE

## 2017-09-17 PROCEDURE — 74011250636 HC RX REV CODE- 250/636: Performed by: SURGERY

## 2017-09-17 PROCEDURE — 74011250637 HC RX REV CODE- 250/637: Performed by: SURGERY

## 2017-09-17 PROCEDURE — 74011250637 HC RX REV CODE- 250/637: Performed by: PHYSICIAN ASSISTANT

## 2017-09-17 RX ADMIN — OXYCODONE HYDROCHLORIDE AND ACETAMINOPHEN 2 TABLET: 7.5; 325 TABLET ORAL at 02:30

## 2017-09-17 RX ADMIN — Medication 10 ML: at 15:09

## 2017-09-17 RX ADMIN — ASPIRIN 81 MG: 81 TABLET, COATED ORAL at 08:37

## 2017-09-17 RX ADMIN — OXYCODONE HYDROCHLORIDE AND ACETAMINOPHEN 1 TABLET: 7.5; 325 TABLET ORAL at 23:20

## 2017-09-17 RX ADMIN — HEPARIN SODIUM 5000 UNITS: 5000 INJECTION, SOLUTION INTRAVENOUS; SUBCUTANEOUS at 15:08

## 2017-09-17 RX ADMIN — CARVEDILOL 25 MG: 25 TABLET, FILM COATED ORAL at 17:14

## 2017-09-17 RX ADMIN — FUROSEMIDE 40 MG: 10 INJECTION, SOLUTION INTRAMUSCULAR; INTRAVENOUS at 08:37

## 2017-09-17 RX ADMIN — FLUCONAZOLE 200 MG: 100 TABLET ORAL at 08:37

## 2017-09-17 RX ADMIN — POTASSIUM CHLORIDE 40 MEQ: 20 TABLET, EXTENDED RELEASE ORAL at 17:17

## 2017-09-17 RX ADMIN — HYDROMORPHONE HYDROCHLORIDE 1 MG: 2 TABLET ORAL at 07:54

## 2017-09-17 RX ADMIN — HEPARIN SODIUM 5000 UNITS: 5000 INJECTION, SOLUTION INTRAVENOUS; SUBCUTANEOUS at 23:04

## 2017-09-17 RX ADMIN — HEPARIN SODIUM 5000 UNITS: 5000 INJECTION, SOLUTION INTRAVENOUS; SUBCUTANEOUS at 07:57

## 2017-09-17 RX ADMIN — ONDANSETRON 4 MG: 2 INJECTION INTRAMUSCULAR; INTRAVENOUS at 10:13

## 2017-09-17 RX ADMIN — OXYCODONE HYDROCHLORIDE AND ACETAMINOPHEN 1 TABLET: 7.5; 325 TABLET ORAL at 12:35

## 2017-09-17 RX ADMIN — AMLODIPINE BESYLATE 5 MG: 10 TABLET ORAL at 08:38

## 2017-09-17 RX ADMIN — OXYCODONE HYDROCHLORIDE AND ACETAMINOPHEN 2 TABLET: 7.5; 325 TABLET ORAL at 06:19

## 2017-09-17 RX ADMIN — PANTOPRAZOLE SODIUM 40 MG: 40 TABLET, DELAYED RELEASE ORAL at 02:30

## 2017-09-17 RX ADMIN — OXYCODONE HYDROCHLORIDE AND ACETAMINOPHEN 1 TABLET: 7.5; 325 TABLET ORAL at 17:14

## 2017-09-17 RX ADMIN — CARVEDILOL 25 MG: 25 TABLET, FILM COATED ORAL at 08:37

## 2017-09-17 RX ADMIN — POTASSIUM CHLORIDE 40 MEQ: 20 TABLET, EXTENDED RELEASE ORAL at 08:36

## 2017-09-17 RX ADMIN — Medication 10 ML: at 06:18

## 2017-09-17 RX ADMIN — Medication 10 ML: at 20:45

## 2017-09-17 NOTE — PROGRESS NOTES
Pt c/o chronic back pain. Heating pad ordered for pt this morning, however materials never delivered to unit. Mariola, Nursing supervisor called to inform & equipment requested. Awaiting equipment. Pt given pain med and repositioned in bed.

## 2017-09-17 NOTE — PROGRESS NOTES
Problem: Falls - Risk of  Goal: *Absence of Falls  Document Jair Fall Risk and appropriate interventions in the flowsheet.    Outcome: Progressing Towards Goal  Fall Risk Interventions:  Mobility Interventions: Patient to call before getting OOB     Mentation Interventions: More frequent rounding, Door open when patient unattended     Medication Interventions: Patient to call before getting OOB, Teach patient to arise slowly     Elimination Interventions: Call light in reach, Patient to call for help with toileting needs, Urinal in reach     History of Falls Interventions: Bed/chair exit alarm, Consult care management for discharge planning, Door open when patient unattended, Evaluate medications/consider consulting pharmacy

## 2017-09-17 NOTE — PROGRESS NOTES
Bedside and Verbal shift change report given to Quinn Nix (oncoming nurse) by Mei Braun (offgoing nurse). Report included the following information SBAR, Kardex, Intake/Output, MAR, Recent Results and Med Rec Status.

## 2017-09-17 NOTE — PROGRESS NOTES
[facility] Progress Note    Follow-Up on: HARPER    ROS:  Gen - no fever, no chills, appetite unchanged, + drainage from wound  CV - no chest pain, no palpitation  Lung - no shortness of breath, no cough  Abd - no tenderness, nauseated today  Ext - + edema    Exam:  Vitals:    09/16/17 2302 09/17/17 0230 09/17/17 0500 09/17/17 0710   BP: 171/77 143/67  134/74   Pulse: 78 70  76   Resp: 16 16 16   Temp: 98.7 °F (37.1 °C) 98.1 °F (36.7 °C)  97.7 °F (36.5 °C)   SpO2: 97% 97%  98%   Weight:   94.8 kg (209 lb)    Height:             Intake/Output Summary (Last 24 hours) at 09/17/17 0938  Last data filed at 09/17/17 6320   Gross per 24 hour   Intake             2620 ml   Output             2350 ml   Net              270 ml       Wt Readings from Last 3 Encounters:   09/17/17 94.8 kg (209 lb)   08/25/17 94.1 kg (207 lb 8 oz)   08/11/17 93 kg (205 lb)       GEN - in no distress  CV - regular, no murmur, no rub  Lung - clear bilaterally  Abd - soft, nontender  Ext - 1-2+ edema    Recent Labs      09/15/17   0235   WBC  8.3   HGB  9.2*   HCT  26.7*   PLT  246        Recent Labs      09/17/17   0522  09/16/17   0346  09/15/17   0235   NA  138  138  140   K  4.1  3.9  3.6   CL  101  102  105   CO2  28  29  26   BUN  22  23  21   CREA  1.71*  1.67*  1.74*   CA  8.1*  8.0*  7.7*   GLU  123*  134*  108*   MG   --    --   1.7*       Assessment / Plan:  Principal Problem:    Occlusion of aorta ([facility] [address] (3/3/2017)    Active Problems:    Ischemia of left lower extremity (9/8/2017)      Claudication ([facility] [address] (9/8/2017)        1. HARPER/CKD - baseline Cr about 1.8-2  - Atrophic left kidney  - Cr stable at the moment, likely at or near baseline  - Diuresing well with IV Lasix, okay to continue  2. Aortic occlusion - s/p aortobifemoral bypass  3.  HTN

## 2017-09-17 NOTE — PROGRESS NOTES
Problem: Mobility Impaired (Adult and Pediatric)  Goal: *Acute Goals and Plan of Care (Insert Text)  STG:  (1.)Mr. Tran will move from supine to sit and sit to supine , scoot up and down and roll side to side with MINIMAL ASSIST within 5 day(s). (2.)Mr. Tran will transfer from bed to chair and chair to bed with MINIMAL ASSIST using the least restrictive device within 5 day(s). (3.)Mr. Tran will ambulate with MINIMAL ASSIST for 50 feet with the least restrictive device within 5 day(s). (4.)Mr. Tran will maintain stable vital signs throughout all functional mobility within 5 days. (5.)Mr. Tran will perform standing static and dynamic balance activities x 10 minutes with MINIMAL ASSIST to improve safety within 5 day(s). LTG:  (1.)Mr. Tran will move from supine to sit and sit to supine , scoot up and down and roll side to side in bed with CONTACT GUARD ASSIST within 10 day(s). (2.)Mr. Tran will transfer from bed to chair and chair to bed with CONTACT GUARD ASSIST using the least restrictive device within 10 day(s). (3.)Mr. Tran will ambulate with CONTACT GUARD ASSIST for 100 feet with the least restrictive device within 10 day(s). LTG (revised 9/17/17):  (1.)Mr. Tran will move from supine to sit and sit to supine , scoot up and down and roll side to side in bed with MODIFIED INDEPENDENCE within 7 day(s). (2.)Mr. Tran will transfer from bed to chair and chair to bed with MODIFIED INDEPENDENCE using the least restrictive device within 7 day(s). (3.)Mr. Tran will ambulate with CONTACT GUARD ASSIST for 250 feet with the least restrictive device within 7 day(s). (4.)Mr. Tran will perform standing static and dynamic balance activities x 10 minutes with CONTACT GUARD ASSIST to improve safety within 7 day(s). (5.)Mr. Ely Lundy will perform 6 steps with HR and SUPERVISION within 7 days for safety ascending and descending stairs for home. (6.)Mr. Ely Lundy will be independent in [facility] for strengthening B LE within 7 days. ________________________________________________________________________________________________      PHYSICAL THERAPY: Daily Note, Treatment Day: Day of Assessment and PM 9/17/2017  INPATIENT: Hospital Day: 11  Payor: [place] / Plan: [address] [place] [address] Avenue / Product Type: Medicaid /      NAME/AGE/GENDER: Christa Carcamo is a 61 y.o. male     PRIMARY DIAGNOSIS: Aortic occlusion ([facility] [I74.10]  Ischemic colitis ([facility] [K55.9] Occlusion of aorta (HCC) Occlusion of aorta (HCC)  Procedure(s) (LRB):  SIGMOIDOSCOPY FLEXIBLE (N/A)  8 Days Post-Op  ICD-10: Treatment Diagnosis:       · Difficulty in walking, Not elsewhere classified (R26.2)   Precaution/Allergies:  Review of patient's allergies indicates no known allergies. ASSESSMENT:      Mr. Erica Ozuna presents sitting up in bedside chair with family present. He is agreeable to treatment. He stood with CGA and increased his ambulation distance this treatment with rolling walker. He needs verbal cues to slow down during ambulation and to stay inside walker. He returned to room and was instructed in seated exercises with good technique. His left lower extremity had less ROM than the right lower extremity secondary to increased pain in left lower extremity. Overall good progress with mobility and increased activity tolerance noted. Will continue PT efforts. This section established at most recent assessment   PROBLEM LIST (Impairments causing functional limitations):  1. Decreased Strength  2. Decreased ADL/Functional Activities  3. Decreased Transfer Abilities  4. Decreased Ambulation Ability/Technique  5. Decreased Balance  6. Increased Pain  7. Decreased Activity Tolerance  8. Decreased Knowledge of Precautions  9.  Decreased Malin with Home Exercise Program    INTERVENTIONS PLANNED: (Benefits and precautions of physical therapy have been discussed with the patient.)  1. Balance Exercise  2. Bed Mobility  3. Family Education  4. Gait Training  5. Home Exercise Program (HEP)  6. Therapeutic Activites  7. Therapeutic Exercise/Strengthening  8. Transfer Training  9. Patient Education  10. Group Therapy      TREATMENT PLAN: Frequency/Duration: 5 times a week for duration of hospital stay  Rehabilitation Potential For Stated Goals: EXCELLENT      RECOMMENDED REHABILITATION/EQUIPMENT: (at time of discharge pending progress): Due to the probability of continued deficits (see above) this patient will likely need continued skilled physical therapy after discharge. Equipment:   · None at this time                   HISTORY:   History of Present Injury/Illness (Reason for Referral): Aortic occlusion (HCC) [I74.10] Occlusion of aorta (HCC) Occlusion of aorta (HCC)  Procedure(s) (LRB):  left femoral embolectomy/left lower extremity arteriogram (Left)  1 Day Post-Op  Past Medical History/Comorbidities:   Mr. Darryle Simmers  has a past medical history of Acute diastolic (congestive) heart failure ([facility] (12/4/2016); CAD (coronary artery disease); Chronic kidney disease, stage 3; Chronic pain; Ex-smoker; GERD (gastroesophageal reflux disease); TIA (transient ischemic attack); Hypercholesteremia; Hypertension; Old MI (myocardial infarction) (11/2016); and Osteoarthritis. Mr. Darryle Simmers  has a past surgical history that includes heart catheterization (12/2016); colonoscopy (N/A, 6/22/2017); knee arthroscopy (Left); and flexible sigmoidoscopy (N/A, 9/9/2017).   Social History/Living Environment:   Home Environment: Private residence  # Steps to Enter: 6  Rails to Enter: Yes  One/Two Story Residence: One story  Living Alone: Yes  Support Systems: Child(tisha)  Patient Expects to be Discharged to[de-identified] Unknown  Current DME Used/Available at Home: Cane, straight  Tub or Shower Type: Shower  Prior Level of Function/Work/Activity:  Modified independent with straight cane for ambulation secondary to decreased sensation LLE for a few months, he reports independence with ADLs and driving at baseline. Number of Personal Factors/Comorbidities that affect the Plan of Care: 3+: HIGH COMPLEXITY   EXAMINATION:   Most Recent Physical Functioning:   Gross Assessment:                  Posture:  Posture (WDL): Exceptions to WDL  Posture Assessment: Forward head, Rounded shoulders  Balance:  Sitting: Intact  Sitting - Static: Good (unsupported)  Sitting - Dynamic: Good (unsupported)  Standing: Impaired  Standing - Static: Fair  Standing - Dynamic : Fair Bed Mobility:     Wheelchair Mobility:     Transfers:  Sit to Stand: Contact guard assistance  Stand to Sit: Contact guard assistance  Gait:     Base of Support: Widened  Speed/Jennifer: Accelerated  Step Length: Left shortened;Right shortened  Gait Abnormalities: Decreased step clearance; Path deviations  Distance (ft): 120 Feet (ft)  Assistive Device: Walker, rolling  Ambulation - Level of Assistance: Contact guard assistance       Body Structures Involved:  1. Nerves  2. Heart  3. Muscles Body Functions Affected:  1. Sensory/Pain  2. Cardio  3. Hematological  4. Neuromusculoskeletal  5. Movement Related Activities and Participation Affected:  1. Mobility  2. Self Care  3. Domestic Life  4. Interpersonal Interactions and Relationships  5. Community, Social and [place]   Number of elements that affect the Plan of Care: 4+: HIGH COMPLEXITY   CLINICAL PRESENTATION:   Presentation: Evolving clinical presentation with unstable and unpredictable characteristics: HIGH COMPLEXITY   CLINICAL DECISION MAKIN [facility] Mobility Inpatient Short Form  How much difficulty does the patient currently have. .. Unable A Lot A Little None   1. Turning over in bed (including adjusting bedclothes, sheets and blankets)? [ ] 1   [X] 2   [ ] 3   [ ] 4   2.   Sitting down on and standing up from a chair with arms ( e.g., wheelchair, bedside commode, etc.)   [X] 1   [ ] 2   [ ] 3   [ ] 4   3. Moving from lying on back to sitting on the side of the bed? [ ] 1   [X] 2   [ ] 3   [ ] 4   How much help from another person does the patient currently need. .. Total A Lot A Little None   4. Moving to and from a bed to a chair (including a wheelchair)? [X] 1   [ ] 2   [ ] 3   [ ] 4   5. Need to walk in hospital room? [X] 1   [ ] 2   [ ] 3   [ ] 4   6. Climbing 3-5 steps with a railing? [X] 1   [ ] 2   [ ] 3   [ ] 4   © 2007, Trustees of [address] [place] [address] [address] [address], under license to [employer]. All rights reserved    Score:  Initial: 8 Most Recent: X (Date: -- )     Interpretation of Tool:  Represents activities that are increasingly more difficult (i.e. Bed mobility, Transfers, Gait). Score 24 23 22-20 19-15 14-10 9-7 6       Modifier CH CI CJ CK CL CM CN         · Mobility - Walking and Moving Around:               - CURRENT STATUS:    CM - 80%-99% impaired, limited or restricted               - GOAL STATUS:           CK - 40%-59% impaired, limited or restricted               - D/C STATUS:                       ---------------To be determined---------------  Payor: [place] / Plan: [place] / Product Type: Medicaid /       Medical Necessity:     · Patient demonstrates good rehab potential due to higher previous functional level. Reason for Services/Other Comments:  · Patient continues to require modification of therapeutic interventions to increase complexity of exercises. Use of outcome tool(s) and clinical judgement create a POC that gives a: Questionable prediction of patient's progress: MODERATE COMPLEXITY                 TREATMENT:   (In addition to Assessment/Re-Assessment sessions the following treatments were rendered)   Pre-treatment Symptoms/Complaints: \"I don't want to bleed to death. \"  Pain: Initial:   Pain Intensity 1: 0  Pain Intervention(s) 1:  (RN present giving patient pain medication) Post Session: Unchanged      Therapeutic Activity: (    13 Minutes): Therapeutic activities including Bed transfers, Chair transfer and Ambulation on level ground to improve mobility, strength and activity tolerance. Required verbal cues   to promote dynamic balance in standing and promote motor control of bilateral, upper extremity(s), lower extremity(s). Therapeutic Exercise: (12   Minutes):  Exercises per grid below to improve mobility and strength. Required minimal verbal cues to promote proper body alignment, promote proper body posture and promote proper body breathing techniques. Progressed repetitions as indicated. Date:  9/12/17 Date:  9/13/17 Date:  9-17-17   ACTIVITY/EXERCISE AM PM AM PM AM PM   Ambulation:           Distance  Device  Duration         Seated Heel Raises x20  x20  x20    Seated Toe Raises x20  x20  x20    Seated Long Arc Quads x20  x20  x20    Seated Marching x10  x20  x20    Seated Hip Abduction     x5             B = bilateral; AA = active assistive; A = active; P = passive      · Braces/Orthotics/Lines/Etc: NOne  Treatment/Session Assessment:    · Response to Treatment:  Patient tolerated treatment well with fatigue. · Interdisciplinary Collaboration:  · Physical Therapy Assistant and Registered Nurse  · After treatment position/precautions:  · Up in chair, Bed/Chair-wheels locked, Call light within reach and RN notified  · Compliance with Program/Exercises: making an effort. · Recommendations/Intent for next treatment session: \"Next visit will focus on advancements to more challenging activities and reduction in assistance provided\".   Total Treatment Duration:PT Patient Time In/Time Out  Time In: 1325  Time Out: [facility], \[facility] [place] [facility]\""

## 2017-09-17 NOTE — PROGRESS NOTES
[address]   [address] FAX: [phone]         VASCULAR SURGERY FLOOR PROGRESS NOTE    Admit Date: 2017  POD: 8 Days Post-Op    Procedure:  Procedure(s):  SIGMOIDOSCOPY FLEXIBLE    Subjective:     Patient has no new complaints. Left groin wound draining clear fluid    Objective:     Vitals:  Blood pressure 134/74, pulse 76, temperature 97.7 °F (36.5 °C), resp. rate 16, height 5' 6\" (1.676 m), weight 209 lb (94.8 kg), SpO2 98 %. Temp (24hrs), Av °F (36.7 °C), Min:97.1 °F (36.2 °C), Max:98.9 °F (37.2 °C)      Intake / Output:    Intake/Output Summary (Last 24 hours) at 17 1041  Last data filed at 17 1035   Gross per 24 hour   Intake             2220 ml   Output             2400 ml   Net             -180 ml       Physical Exam:    Constitutional: he appears well-developed. No distress. HENT:   Head: Atraumatic. Eyes: Pupils are equal, round, and reactive to light. Neck: Normal range of motion. Cardiovascular: Regular rhythm. Pulmonary/Chest: Effort normal and breath sounds normal. No respiratory distress. Abdominal: Soft. Bowel sounds are normal. he exhibits no distension. There is no tenderness. There is no guarding. No hernia. Musculoskeletal: Normal range of motion. Neurological: He is alert. CN II- XII grossly intact  Wound: Intact, staples in place no drainage from left groin wound, left groin wound draining clear fluid.   Vascular:     Right:    LEFT:                      Radial: 2+    Radial: 2+         Brachial: 2+   Brachial: 2+          Femoral: 2+   Femoral: 2+  Doppler flow bilaterally         Carotid Bruit: No   Carotid Bruit: No    Labs:   Recent Labs      17   0522  17   0346  09/15/17   0235   HGB   --    --   9.2*   WBC   --    --   8.3   K  4.1  3.9  3.6   GLU  123*  134*  108*       Data Review    Assessment:     Patient Active Problem List    Diagnosis Date Noted    Ischemia of left lower extremity 09/08/2017    Claudication ([facility] 09/08/2017    [facility] of [facility] 03/03/2017    Chronic left-sided low back pain 01/03/2017    Essential hypertension 12/16/2016    Renal insufficiency 12/16/2016    Dyslipidemia 12/04/2016       Plan/Recommendations/Medical Decision Making:     Continue wound care and diuresis (per nephrology)      Elements of this note have been dictated using speech recognition software. As a result, errors of speech recognition may have occurred.

## 2017-09-17 NOTE — PROGRESS NOTES
Pt left groin draining large amount of serosanguinous fluid. Handheld pressure held and sandbag applied to groin. Pt given pain med for c/o pain. Incisions cleaned with Hibiclens. ABD pad applied. Gown Changed. Will monitor. Call light in reach.

## 2017-09-17 NOTE — PROGRESS NOTES
LTG (revised 9/17/17):  (1.)Mr. Tran will move from supine to sit and sit to supine , scoot up and down and roll side to side in bed with MODIFIED INDEPENDENCE within 7 day(s). (2.)Mr. Tran will transfer from bed to chair and chair to bed with MODIFIED INDEPENDENCE using the least restrictive device within 7 day(s). (3.)Mr. Tran will ambulate with CONTACT GUARD ASSIST for 250 feet with the least restrictive device within 7 day(s). (4.)Mr. Tran will perform standing static and dynamic balance activities x 10 minutes with CONTACT GUARD ASSIST to improve safety within 7 day(s). (5.)Mr. Cali Clemente will perform 6 steps with HR and SUPERVISION within 7 days for safety ascending and descending stairs for home. (6.)Mr. Cali Clemente will be independent in [facility] for strengthening B LE within 7 days. PT present for reassessment (8th visit) for pt. Pt has made great progress and met most all goals set upon initial evaluation. New goals set above. Pt demo improved mobility with transfers and ambulation this date. Pt MMT in sitting-R LE grossly 4/5; L hip flex 3/5, L knee ext 3/5, L knee flex 3/5, L DF 4/5. Pt sensation decreased L LE > R LE. PT to cont to follow for acute care needs to address deficits, POC updated. Please see PTA note for treatment.    Vipul Becerra, PT  9/17/2017

## 2017-09-17 NOTE — PROGRESS NOTES
Bedside and Verbal shift change report given to Jm Vogel   (oncoming nurse) by Julio Koo (offgoing nurse). Report included the following information SBAR, Kardex, Intake/Output, MAR, Recent Results and Med Rec Status.

## 2017-09-18 LAB
ANION GAP SERPL CALC-SCNC: 10 MMOL/L (ref 7–16)
BUN SERPL-MCNC: 21 MG/DL (ref 6–23)
CALCIUM SERPL-MCNC: 8.2 MG/DL (ref 8.3–10.4)
CHLORIDE SERPL-SCNC: 99 MMOL/L (ref 98–107)
CO2 SERPL-SCNC: 28 MMOL/L (ref 21–32)
CREAT SERPL-MCNC: 1.87 MG/DL (ref 0.8–1.5)
GLUCOSE SERPL-MCNC: 116 MG/DL (ref 65–100)
POTASSIUM SERPL-SCNC: 4.2 MMOL/L (ref 3.5–5.1)
SODIUM SERPL-SCNC: 137 MMOL/L (ref 136–145)

## 2017-09-18 PROCEDURE — 74011250637 HC RX REV CODE- 250/637: Performed by: SURGERY

## 2017-09-18 PROCEDURE — 97110 THERAPEUTIC EXERCISES: CPT

## 2017-09-18 PROCEDURE — 36415 COLL VENOUS BLD VENIPUNCTURE: CPT | Performed by: INTERNAL MEDICINE

## 2017-09-18 PROCEDURE — 74011250636 HC RX REV CODE- 250/636: Performed by: SURGERY

## 2017-09-18 PROCEDURE — 65660000004 HC RM CVT STEPDOWN

## 2017-09-18 PROCEDURE — 74750000023 HC WOUND THERAPY

## 2017-09-18 PROCEDURE — 97605 NEG PRS WND THER DME<=50SQCM: CPT

## 2017-09-18 PROCEDURE — 80048 BASIC METABOLIC PNL TOTAL CA: CPT | Performed by: INTERNAL MEDICINE

## 2017-09-18 PROCEDURE — 97530 THERAPEUTIC ACTIVITIES: CPT

## 2017-09-18 PROCEDURE — 74011250637 HC RX REV CODE- 250/637: Performed by: PHYSICIAN ASSISTANT

## 2017-09-18 RX ORDER — CEFAZOLIN SODIUM IN 0.9 % NACL 2 G/50 ML
2 INTRAVENOUS SOLUTION, PIGGYBACK (ML) INTRAVENOUS EVERY 8 HOURS
Status: DISCONTINUED | OUTPATIENT
Start: 2017-09-18 | End: 2017-09-21

## 2017-09-18 RX ADMIN — Medication 5 ML: at 23:11

## 2017-09-18 RX ADMIN — OXYCODONE HYDROCHLORIDE AND ACETAMINOPHEN 1 TABLET: 7.5; 325 TABLET ORAL at 20:21

## 2017-09-18 RX ADMIN — ASPIRIN 81 MG: 81 TABLET, COATED ORAL at 08:41

## 2017-09-18 RX ADMIN — CEFAZOLIN 2 G: 1 INJECTION, POWDER, FOR SOLUTION INTRAMUSCULAR; INTRAVENOUS; PARENTERAL at 18:07

## 2017-09-18 RX ADMIN — CARVEDILOL 25 MG: 25 TABLET, FILM COATED ORAL at 16:11

## 2017-09-18 RX ADMIN — PANTOPRAZOLE SODIUM 40 MG: 40 TABLET, DELAYED RELEASE ORAL at 05:58

## 2017-09-18 RX ADMIN — CEFAZOLIN 2 G: 1 INJECTION, POWDER, FOR SOLUTION INTRAMUSCULAR; INTRAVENOUS; PARENTERAL at 10:08

## 2017-09-18 RX ADMIN — Medication 10 ML: at 05:59

## 2017-09-18 RX ADMIN — HEPARIN SODIUM 5000 UNITS: 5000 INJECTION, SOLUTION INTRAVENOUS; SUBCUTANEOUS at 16:11

## 2017-09-18 RX ADMIN — Medication 5 ML: at 20:50

## 2017-09-18 RX ADMIN — AMLODIPINE BESYLATE 5 MG: 10 TABLET ORAL at 08:39

## 2017-09-18 RX ADMIN — CARVEDILOL 25 MG: 25 TABLET, FILM COATED ORAL at 08:42

## 2017-09-18 RX ADMIN — OXYCODONE HYDROCHLORIDE AND ACETAMINOPHEN 1 TABLET: 7.5; 325 TABLET ORAL at 05:58

## 2017-09-18 RX ADMIN — Medication 10 ML: at 13:36

## 2017-09-18 RX ADMIN — Medication 10 ML: at 20:23

## 2017-09-18 RX ADMIN — HEPARIN SODIUM 5000 UNITS: 5000 INJECTION, SOLUTION INTRAVENOUS; SUBCUTANEOUS at 23:10

## 2017-09-18 RX ADMIN — FLUCONAZOLE 200 MG: 100 TABLET ORAL at 08:39

## 2017-09-18 RX ADMIN — POTASSIUM CHLORIDE 40 MEQ: 20 TABLET, EXTENDED RELEASE ORAL at 08:39

## 2017-09-18 RX ADMIN — Medication 5 ML: at 16:24

## 2017-09-18 RX ADMIN — OXYCODONE HYDROCHLORIDE AND ACETAMINOPHEN 1 TABLET: 7.5; 325 TABLET ORAL at 13:43

## 2017-09-18 RX ADMIN — POTASSIUM CHLORIDE 40 MEQ: 20 TABLET, EXTENDED RELEASE ORAL at 18:00

## 2017-09-18 RX ADMIN — HEPARIN SODIUM 5000 UNITS: 5000 INJECTION, SOLUTION INTRAVENOUS; SUBCUTANEOUS at 08:42

## 2017-09-18 RX ADMIN — OXYCODONE HYDROCHLORIDE AND ACETAMINOPHEN 1 TABLET: 7.5; 325 TABLET ORAL at 16:24

## 2017-09-18 NOTE — PROGRESS NOTES
Progress Note    Patient: Cehrri Draper MRN: [id number]  SSN: [id number]    YOB: 1960  Age: 61 y.o. Sex: male      Admit Date: 9/7/2017    LOS: 11 days       PROCEDURE(S);  9/7/2017  Aortobifemoral bypass graft with a 14 x 7 Dacron graft     9/8/2017  1. Left common femoral embolectomy  2. Left lower extremity arteriogram     9/9/2017  Colonoscopy Huber Banegas MD)       Subjective:     Patient reports pain controlled with Percocet, not using morphine. +BM, +flatus. Admits to mild anorexia, tolerating diet. Working with PT. Pulling 1200 on IS. UOP 2350ml. Left groin wound vac replaced this AM.    Objective:     Vitals:    09/17/17 2304 09/18/17 0343 09/18/17 0603 09/18/17 0701   BP: 164/78 154/58  153/74   Pulse: 75 70  77   Resp:  18  18   Temp: 98 °F (36.7 °C) 98 °F (36.7 °C)  97.7 °F (36.5 °C)   SpO2: 97% 95%  97%   Weight:   203 lb 8 oz (92.3 kg)    Height:            Intake and Output:  Current Shift: 09/18 0701 - 09/18 1900  In: 240 [P.O.:240]  Out: -   Last three shifts: 09/16 1901 - 09/18 0700  In: 1860 [P.O.:1840;  I.V.:20]  Out: 3600 [Urine:3600]    Physical Exam:   GENERAL: awake, alert, cooperative  LUNG: clear to auscultation bilaterally  HEART: regular rate and rhythm  ABDOMEN: soft, protuberant but not distended, minimally tender, normoactive bowel sounds  EXTREMITIES: 2+ LE edema; feet warm, NV intact, palpable right DP  NEUROLOGIC: no focal deficits  PSYCHIATRIC: appropriate mood and affect     Drains / Dressings:  Midline incision clean / dry / intact with staples  Wound vac to left groin, serous drainage in cannister  Right groin incision clean / dry / intact with staples  Scattered proximal LE skin tears / blisters covered      Lab/Data Review:  BMP:   Lab Results   Component Value Date/Time     09/18/2017 03:45 AM    K 4.2 09/18/2017 03:45 AM    CL 99 09/18/2017 03:45 AM    CO2 28 09/18/2017 03:45 AM    AGAP 10 09/18/2017 03:45 AM     (H) 09/18/2017 03:45 AM    BUN 21 09/18/2017 03:45 AM    CREA 1.87 (H) 09/18/2017 03:45 AM    GFRAA 48 (L) 09/18/2017 03:45 AM    GFRNA 40 (L) 09/18/2017 03:45 AM          Assessment / Plan:     Principal Problem:    Occlusion of aorta ([address] (3/3/2017)    Active Problems:    Ischemia of left lower extremity (9/8/2017)      Claudication ([address] (9/8/2017)      Stop furosemide per Dr. Jose French left groin wound vac  VTE prophylaxis - ASA, heparin SQ  Pain control - Percocet 7.5mg PO (morphine 2mg IV)  Mobilize - appreciate PT  Further plan per Dr. Maddy Carrera      Signed By: Myah Serrano PA-C    September 18, 2017      Physician Assistant with Vascular Surgery Willard Herbert MD / Wenona Dandy.  Maddy Carrera MD / Newton Valencia MD

## 2017-09-18 NOTE — PROGRESS NOTES
Patient complains of discomfort of tongue and throat. White patches noted to tongue. SANGEETA Schwarz notified. Orders given.

## 2017-09-18 NOTE — WOUND CARE
Spoke with Dr Hillary Hutchinson, instructed to restart VAC to left groin incision line. Large serosanguinous drainage expressed from site in order to place dressing this am. Paste strip used to seal trouble areas. Continue Duo Derm over skin tears already in use. One removed and area healed underneath. Answered all patient questions. May go to rehab soon. Will follow and adjust as needed. Discussed with [facility] PA with vascular as well. 13:00 no output from VAC and usman incision area swollen. Discussed with [facility] PA and received order to remove a staple and wick to assist with proper drainage. Patient immediately reported relief and dressing resealed and serosanguinous drainage released into tubing freely now. Will follow up tomorrow. Updated nurse and patient.

## 2017-09-18 NOTE — PROGRESS NOTES
Accepted to [facility], but awaiting insurance approval. This could take time, as it is CHARLEE. CM continue to follow.

## 2017-09-18 NOTE — PROGRESS NOTES
Sitting in chair. BSR given to incoming RN, Amor bolivar. Aware of wound care, calorie count and all other information regarding Pt.

## 2017-09-18 NOTE — PROGRESS NOTES
Call to verify precert was started for [facility]. Spoke with Marck Kaye who states she checked with Thalia Chang and nothing available as of today. Again, CHARLEE can take a while to get approved. CM continue to follow.

## 2017-09-18 NOTE — PROGRESS NOTES
Problem: Mobility Impaired (Adult and Pediatric)  Goal: *Acute Goals and Plan of Care (Insert Text)  STG:  (1.)Mr. Tran will move from supine to sit and sit to supine , scoot up and down and roll side to side with MINIMAL ASSIST within 5 day(s). (2.)Mr. Tran will transfer from bed to chair and chair to bed with MINIMAL ASSIST using the least restrictive device within 5 day(s). (3.)Mr. Tran will ambulate with MINIMAL ASSIST for 50 feet with the least restrictive device within 5 day(s). (4.)Mr. Tran will maintain stable vital signs throughout all functional mobility within 5 days. (5.)Mr. Tran will perform standing static and dynamic balance activities x 10 minutes with MINIMAL ASSIST to improve safety within 5 day(s). LTG:  (1.)Mr. Tran will move from supine to sit and sit to supine , scoot up and down and roll side to side in bed with CONTACT GUARD ASSIST within 10 day(s). (2.)Mr. Tran will transfer from bed to chair and chair to bed with CONTACT GUARD ASSIST using the least restrictive device within 10 day(s). (3.)Mr. Tran will ambulate with CONTACT GUARD ASSIST for 100 feet with the least restrictive device within 10 day(s). LTG (revised 9/17/17):  (1.)Mr. Tran will move from supine to sit and sit to supine , scoot up and down and roll side to side in bed with MODIFIED INDEPENDENCE within 7 day(s). (2.)Mr. Tran will transfer from bed to chair and chair to bed with MODIFIED INDEPENDENCE using the least restrictive device within 7 day(s). (3.)Mr. Tran will ambulate with CONTACT GUARD ASSIST for 250 feet with the least restrictive device within 7 day(s). (4.)Mr. Tran will perform standing static and dynamic balance activities x 10 minutes with CONTACT GUARD ASSIST to improve safety within 7 day(s). (5.)Mr. Bev Spear will perform 6 steps with HR and SUPERVISION within 7 days for safety ascending and descending stairs for home. (6.)Mr. Bev Spear will be independent in HEP for strengthening B LE within 7 days. ________________________________________________________________________________________________      PHYSICAL THERAPY: Daily Note, Treatment Day: 1st and AM 9/18/2017  INPATIENT: Hospital Day: 12  Payor: [id number] / Plan: [place] MEDICAID [facility] / Product Type: Medicaid /      NAME/AGE/GENDER: Cesario Subramanian is a 61 y.o. male     PRIMARY DIAGNOSIS: Aortic occlusion ([facility] [address] [I74.10]  Ischemic colitis ([facility] [address] [K55.9] Occlusion of aorta (HCC) Occlusion of aorta (HCC)  Procedure(s) (LRB):  SIGMOIDOSCOPY FLEXIBLE (N/A)  9 Days Post-Op  ICD-10: Treatment Diagnosis:       · Difficulty in walking, Not elsewhere classified (R26.2)   Precaution/Allergies:  Review of patient's allergies indicates no known allergies. ASSESSMENT:      Mr. Cristina Villasenor presents sitting up in chair agreeable to PT. He increased his ambulation distance with safer gait speed and a standing rest break. He stayed inside walker this treatment. He returned to room and performed seated exercises with fair technique. Good progress noted with mobility this treatment. Patient is awaiting insurance approval for [facility]. Will continue PT efforts. This section established at most recent assessment   PROBLEM LIST (Impairments causing functional limitations):  1. Decreased Strength  2. Decreased ADL/Functional Activities  3. Decreased Transfer Abilities  4. Decreased Ambulation Ability/Technique  5. Decreased Balance  6. Increased Pain  7. Decreased Activity Tolerance  8. Decreased Knowledge of Precautions  9. Decreased [place] with Home Exercise Program    INTERVENTIONS PLANNED: (Benefits and precautions of physical therapy have been discussed with the patient.)  1. Balance Exercise  2. Bed Mobility  3. Family Education  4. Gait Training  5. Home Exercise Program (HEP)  6. Therapeutic Activites  7. Therapeutic Exercise/Strengthening  8. Transfer Training  9.  Patient Education  10. Group Therapy      TREATMENT PLAN: Frequency/Duration: 5 times a week for duration of hospital stay  Rehabilitation Potential For Stated Goals: EXCELLENT      RECOMMENDED REHABILITATION/EQUIPMENT: (at time of discharge pending progress): Due to the probability of continued deficits (see above) this patient will likely need continued skilled physical therapy after discharge. Equipment:   · None at this time                   HISTORY:   History of Present Injury/Illness (Reason for Referral): Aortic occlusion (HCC) [I74.10] Occlusion of aorta (HCC) Occlusion of aorta (HCC)  Procedure(s) (LRB):  left femoral embolectomy/left lower extremity arteriogram (Left)  1 Day Post-Op  Past Medical History/Comorbidities:   Mr. Chari Salas  has a past medical history of Acute diastolic (congestive) heart failure ([facility] [address] (12/4/2016); CAD (coronary artery disease); Chronic kidney disease, stage 3; Chronic pain; Ex-smoker; GERD (gastroesophageal reflux disease); TIA (transient ischemic attack); Hypercholesteremia; Hypertension; Old MI (myocardial infarction) (11/2016); and Osteoarthritis. Mr. Chari Salas  has a past surgical history that includes heart catheterization (12/2016); colonoscopy (N/A, 6/22/2017); knee arthroscopy (Left); and flexible sigmoidoscopy (N/A, 9/9/2017). Social History/Living Environment:   Home Environment: Private residence  # Steps to Enter: 6  Rails to Enter: Yes  One/Two Story Residence: One story  Living Alone: Yes  Support Systems: Child(tisha)  Patient Expects to be Discharged to[de-identified] Unknown  Current DME Used/Available at Home: Cane, straight  Tub or Shower Type: Shower  Prior Level of Function/Work/Activity:  Modified independent with straight cane for ambulation secondary to decreased sensation LLE for a few months, he reports independence with ADLs and driving at baseline.       Number of Personal Factors/Comorbidities that affect the Plan of Care: 3+: HIGH COMPLEXITY   EXAMINATION:   Most Recent Physical Functioning:   Gross Assessment:                  Posture:  Posture (WDL): Exceptions to WDL  Posture Assessment: Forward head, Rounded shoulders  Balance:  Sitting: Intact  Sitting - Static: Good (unsupported)  Sitting - Dynamic: Good (unsupported)  Standing: Impaired  Standing - Static: Fair  Standing - Dynamic : Fair Bed Mobility:     Wheelchair Mobility:     Transfers:  Sit to Stand: Stand-by asssistance  Stand to Sit: Stand-by asssistance  Gait:     Base of Support: Widened  Speed/Jennifer:  (normalizing today)  Step Length: Left shortened;Right shortened  Gait Abnormalities: Decreased step clearance; Path deviations  Distance (ft): 120 Feet (ft) (rest break; 80 feet)  Assistive Device: Walker, rolling  Ambulation - Level of Assistance: Contact guard assistance       Body Structures Involved:  1. Nerves  2. Heart  3. Muscles Body Functions Affected:  1. Sensory/Pain  2. Cardio  3. Hematological  4. Neuromusculoskeletal  5. Movement Related Activities and Participation Affected:  1. Mobility  2. Self Care  3. Domestic Life  4. Interpersonal Interactions and Relationships  5. Community, Social and [place]   Number of elements that affect the Plan of Care: 4+: HIGH COMPLEXITY   CLINICAL PRESENTATION:   Presentation: Evolving clinical presentation with unstable and unpredictable characteristics: HIGH COMPLEXITY   CLINICAL DECISION MAKIN [facility] Inpatient Short Form  How much difficulty does the patient currently have. .. Unable A Lot A Little None   1. Turning over in bed (including adjusting bedclothes, sheets and blankets)? [ ] 1   [X] 2   [ ] 3   [ ] 4   2. Sitting down on and standing up from a chair with arms ( e.g., wheelchair, bedside commode, etc.)   [X] 1   [ ] 2   [ ] 3   [ ] 4   3. Moving from lying on back to sitting on the side of the bed?    [ ] 1   [X] 2   [ ] 3   [ ] 4   How much help from another person does the patient currently need... Total A Lot A Little None   4. Moving to and from a bed to a chair (including a wheelchair)? [X] 1   [ ] 2   [ ] 3   [ ] 4   5. Need to walk in hospital room? [X] 1   [ ] 2   [ ] 3   [ ] 4   6. Climbing 3-5 steps with a railing? [X] 1   [ ] 2   [ ] 3   [ ] 4   © 2007, Trustees of [address] [place] [address] [address] [address], under license to [employer]. All rights reserved    Score:  Initial: 8 Most Recent: X (Date: -- )     Interpretation of Tool:  Represents activities that are increasingly more difficult (i.e. Bed mobility, Transfers, Gait). Score 24 23 22-20 19-15 14-10 9-7 6       Modifier CH CI CJ CK CL CM CN         · Mobility - Walking and Moving Around:               - CURRENT STATUS:    CM - 80%-99% impaired, limited or restricted               - GOAL STATUS:           CK - 40%-59% impaired, limited or restricted               - D/C STATUS:                       ---------------To be determined---------------  Pay[place] / [place] / Product Type: Medicaid /       Medical Necessity:     · Patient demonstrates good rehab potential due to higher previous functional level. Reason for Services/Other Comments:  · Patient continues to require modification of therapeutic interventions to increase complexity of exercises. Use of outcome tool(s) and clinical judgement create a POC that gives a: Questionable prediction of patient's progress: MODERATE COMPLEXITY                 TREATMENT:   (In addition to Assessment/Re-Assessment sessions the following treatments were rendered)   Pre-treatment Symptoms/Complaints: \"I feel okay. I want to get out of here. \"  Pain: Initial:   Pain Intensity 1: 0  Pain Intervention(s) 1:  (RN present giving patient pain medication)  Post Session: Unchanged      Therapeutic Activity: (    10 Minutes):   Therapeutic activities including Bed transfers, Chair transfer and Ambulation on level ground to improve mobility, strength and activity tolerance. Required verbal cues   to promote dynamic balance in standing and promote motor control of bilateral, upper extremity(s), lower extremity(s). Therapeutic Exercise: (15  Minutes):  Exercises per grid below to improve mobility and strength. Required minimal verbal cues to promote proper body alignment, promote proper body posture and promote proper body breathing techniques. Progressed repetitions as indicated. Date:  9/18/17 Date:  9/13/17 Date:  9-17-17   ACTIVITY/EXERCISE AM PM AM PM AM PM   Ambulation:           Distance  Device  Duration         Seated Heel Raises x20  x20  x20    Seated Toe Raises x20  x20  x20    Seated Long Arc Quads x20  x20  x20    Seated Marching x20  x20  x20    Seated Hip Abduction x20    x5             B = bilateral; AA = active assistive; A = active; P = passive      · Braces/Orthotics/Lines/Etc: NOne  Treatment/Session Assessment:    · Response to Treatment:  Patient tolerated treatment well with fatigue. · Interdisciplinary Collaboration:  · Physical Therapy Assistant and Registered Nurse  · After treatment position/precautions:  · Up in chair, Bed/Chair-wheels locked, Call light within reach and RN notified  · Compliance with Program/Exercises: making an effort. · Recommendations/Intent for next treatment session: \"Next visit will focus on advancements to more challenging activities and reduction in assistance provided\".   Total Treatment Duration:PT Patient Time In/Time Out  Time In: 1005  Time Out: Olivia Austin [address] [place] [address]

## 2017-09-18 NOTE — PROGRESS NOTES
Patient reports increasing discomfort at wound vac site. No drainage in chamber at this time. Wound vac dressing intact. Skin on right side of dressing is reddened and slightly swollen. Patient states, \"It feels tight and like it's slowly getting tighter. \" Wound nurse, Sirena Suggs notified and states she will come see patient. Will continue to monitor closely.

## 2017-09-18 NOTE — PROGRESS NOTES
Admit Date: 9/7/2017      Subjective:          Review of Systems  Cardio-vascular: no chest pain, no SOB  GI: no N/V/D  : no dysuria, no hematuria    Objective:     Patient Vitals for the past 8 hrs:   BP Temp Pulse Resp SpO2 Weight   09/18/17 1108 142/70 97.4 °F (36.3 °C) 71 18 100 % -   09/18/17 0701 153/74 97.7 °F (36.5 °C) 77 18 97 % -   09/18/17 0603 - - - - - 92.3 kg (203 lb 8 oz)     09/18 0701 - 09/18 1900  In: 290 [P.O.:240; I.V.:50]  Out: 300 [Urine:300]      Physical Exam:   Lungs: decreased BS  CV: RR,   Abdomen: soft, not tender, no rebound. Ext: + edema          Data Review No results found for this or any previous visit (from the past 8 hour(s)). Assessment:     Principal Problem:    Occlusion of aorta ([address] (3/3/2017)    Active Problems:    Ischemia of left lower extremity (9/8/2017)      Claudication ([address] (9/8/2017)        Plan:     Renal F. Relatively stable.   Zach Arias  F/u    Winnie Nick MD

## 2017-09-18 NOTE — PROGRESS NOTES
[facility], [address] [place] [address]. . [address] FAX: [phone]          VASCULAR SURGERY FLOOR PROGRESS NOTE    Admit Date: 2017  POD: 9 Days Post-Op    Procedure:  Procedure(s):  SIGMOIDOSCOPY FLEXIBLE    Subjective:     Patient has no c/o. Persistent clear fluid leak from left groin, VAC now placed. ROS - unchanged except as noted above. Objective:     Vitals:  Blood pressure 153/74, pulse 77, temperature 97.7 °F (36.5 °C), resp. rate 18, height 5' 6\" (1.676 m), weight 203 lb 8 oz (92.3 kg), SpO2 97 %. Temp (24hrs), Av °F (36.7 °C), Min:97.7 °F (36.5 °C), Max:98.4 °F (36.9 °C)      Intake / Output:    Intake/Output Summary (Last 24 hours) at 17 0924  Last data filed at 17 0754   Gross per 24 hour   Intake              720 ml   Output             1900 ml   Net            -1180 ml       Physical Exam:    GEN: alert, cooperative, no distress, appears stated age  Incision:  abdomen  healing well, right leg  healing well, left leg  healing well  VAC L groin  Feet warm, no ischemic changes. Labs:   Recent Labs      17   0345  17   0522   K  4.2  4.1   GLU  116*  123*       Data Review reviewed  Consultants documentation, Nursing documentation and I & O    Assessment:     Patient Active Problem List    Diagnosis Date Noted    Ischemia of left lower extremity 2017    Claudication ([facility] [address] 2017    Occlusion of aorta ([facility] [address] 2017    Chronic left-sided low back pain 2017    Essential hypertension 2016    Renal insufficiency 2016    Dyslipidemia 2016       Plan/Recommendations/Medical Decision Making:     Ancef IV added due to lymph leak in proximity to prosthetic bypass graft. Elements of this note have been dictated using speech recognition software. As a result, errors of speech recognition may have occurred.

## 2017-09-19 PROCEDURE — 97110 THERAPEUTIC EXERCISES: CPT

## 2017-09-19 PROCEDURE — 65660000004 HC RM CVT STEPDOWN

## 2017-09-19 PROCEDURE — 74011250636 HC RX REV CODE- 250/636: Performed by: SURGERY

## 2017-09-19 PROCEDURE — 74750000023 HC WOUND THERAPY

## 2017-09-19 PROCEDURE — 74011250637 HC RX REV CODE- 250/637: Performed by: PHYSICIAN ASSISTANT

## 2017-09-19 PROCEDURE — 97530 THERAPEUTIC ACTIVITIES: CPT

## 2017-09-19 PROCEDURE — 74011250637 HC RX REV CODE- 250/637: Performed by: SURGERY

## 2017-09-19 RX ADMIN — CARVEDILOL 25 MG: 25 TABLET, FILM COATED ORAL at 16:16

## 2017-09-19 RX ADMIN — Medication 5 ML: at 16:19

## 2017-09-19 RX ADMIN — CEFAZOLIN 2 G: 1 INJECTION, POWDER, FOR SOLUTION INTRAMUSCULAR; INTRAVENOUS; PARENTERAL at 02:31

## 2017-09-19 RX ADMIN — OXYCODONE HYDROCHLORIDE AND ACETAMINOPHEN 1 TABLET: 7.5; 325 TABLET ORAL at 21:36

## 2017-09-19 RX ADMIN — OXYCODONE HYDROCHLORIDE AND ACETAMINOPHEN 1 TABLET: 7.5; 325 TABLET ORAL at 02:37

## 2017-09-19 RX ADMIN — ASPIRIN 81 MG: 81 TABLET, COATED ORAL at 08:11

## 2017-09-19 RX ADMIN — POTASSIUM CHLORIDE 40 MEQ: 20 TABLET, EXTENDED RELEASE ORAL at 17:14

## 2017-09-19 RX ADMIN — Medication 10 ML: at 16:17

## 2017-09-19 RX ADMIN — PANTOPRAZOLE SODIUM 40 MG: 40 TABLET, DELAYED RELEASE ORAL at 05:10

## 2017-09-19 RX ADMIN — Medication 5 ML: at 13:01

## 2017-09-19 RX ADMIN — POTASSIUM CHLORIDE 40 MEQ: 20 TABLET, EXTENDED RELEASE ORAL at 08:11

## 2017-09-19 RX ADMIN — HEPARIN SODIUM 5000 UNITS: 5000 INJECTION, SOLUTION INTRAVENOUS; SUBCUTANEOUS at 22:21

## 2017-09-19 RX ADMIN — OXYCODONE HYDROCHLORIDE AND ACETAMINOPHEN 1 TABLET: 7.5; 325 TABLET ORAL at 17:14

## 2017-09-19 RX ADMIN — Medication 5 ML: at 08:13

## 2017-09-19 RX ADMIN — Medication 10 ML: at 05:14

## 2017-09-19 RX ADMIN — DOCUSATE SODIUM 100 MG: 100 CAPSULE, LIQUID FILLED ORAL at 08:11

## 2017-09-19 RX ADMIN — HEPARIN SODIUM 5000 UNITS: 5000 INJECTION, SOLUTION INTRAVENOUS; SUBCUTANEOUS at 16:16

## 2017-09-19 RX ADMIN — OXYCODONE HYDROCHLORIDE AND ACETAMINOPHEN 1 TABLET: 7.5; 325 TABLET ORAL at 13:05

## 2017-09-19 RX ADMIN — Medication 5 ML: at 20:21

## 2017-09-19 RX ADMIN — FLUCONAZOLE 200 MG: 100 TABLET ORAL at 08:11

## 2017-09-19 RX ADMIN — Medication 10 ML: at 08:11

## 2017-09-19 RX ADMIN — Medication 10 ML: at 20:21

## 2017-09-19 RX ADMIN — AMLODIPINE BESYLATE 5 MG: 10 TABLET ORAL at 08:11

## 2017-09-19 RX ADMIN — CARVEDILOL 25 MG: 25 TABLET, FILM COATED ORAL at 08:11

## 2017-09-19 RX ADMIN — CEFAZOLIN 2 G: 1 INJECTION, POWDER, FOR SOLUTION INTRAMUSCULAR; INTRAVENOUS; PARENTERAL at 10:13

## 2017-09-19 RX ADMIN — Medication 5 ML: at 05:10

## 2017-09-19 RX ADMIN — HEPARIN SODIUM 5000 UNITS: 5000 INJECTION, SOLUTION INTRAVENOUS; SUBCUTANEOUS at 08:11

## 2017-09-19 RX ADMIN — CEFAZOLIN 2 G: 1 INJECTION, POWDER, FOR SOLUTION INTRAMUSCULAR; INTRAVENOUS; PARENTERAL at 17:34

## 2017-09-19 NOTE — PROGRESS NOTES
[address]   [address] FAX: [phone]         VASCULAR SURGERY FLOOR PROGRESS NOTE    Admit Date: 2017  POD: 10 Days Post-Op    Procedure:  Procedure(s):  SIGMOIDOSCOPY FLEXIBLE    Subjective:     Patient has no new complaints. Objective:     Vitals:  Blood pressure 139/66, pulse 77, temperature 98.4 °F (36.9 °C), resp. rate 18, height 5' 6\" (1.676 m), weight 217 lb 12.8 oz (98.8 kg), SpO2 96 %. Temp (24hrs), Av.9 °F (36.6 °C), Min:97.4 °F (36.3 °C), Max:98.4 °F (36.9 °C)      Intake / Output:    Intake/Output Summary (Last 24 hours) at 17 0726  Last data filed at 17 0336   Gross per 24 hour   Intake              770 ml   Output             1450 ml   Net             -680 ml       Physical Exam:    Constitutional: he appears well-developed. No distress. HENT:   Head: Atraumatic. Eyes: Pupils are equal, round, and reactive to light. Neck: Normal range of motion. Cardiovascular: Regular rhythm. Pulmonary/Chest: Effort normal and breath sounds normal. No respiratory distress. Abdominal: Soft. Bowel sounds are normal. he exhibits no distension. There is no tenderness. There is no guarding. No hernia. Musculoskeletal: Normal range of motion. Neurological: He is alert.  CN II- XII grossly intact  Vascular: Feet warm abdomen soft left groin wound VAC in place  Labs:   Recent Labs      17   0345  17   0522   K  4.2  4.1   GLU  116*  123*       Data Review     Assessment:     Patient Active Problem List    Diagnosis Date Noted    Ischemia of left lower extremity 2017    Claudication ([facility] [address] 2017    Occlusion of aorta ([facility] [address] 2017    Chronic left-sided low back pain 2017    Essential hypertension 2016    Renal insufficiency 2016    Dyslipidemia 2016       Plan/Recommendations/Medical Decision Making:     Patient well-perfused  -Continue PT OT  -Continue left groin wound VAC most likely will need it for at least 1-2 more weeks  -We will plan to remove abdominal staples next week  -DC telemetry    Elements of this note have been dictated using speech recognition software. As a result, errors of speech recognition may have occurred.

## 2017-09-19 NOTE — PROGRESS NOTES
[address] Nephrology progress note    Follow-Up on: 9/19/2017     Patient seen and examined. Getting ready for lunch. Some abdominal discomfort noted otherwise he is comfortable. ROS:  Gen - no fever, no chills, appetite okay  CV - no chest pain, no orthopnea  Lung - no shortness of breath, no cough  Abd - no tenderness, no nausea, no vomiting  Ext - noted edema    Exam:  Vitals:    09/19/17 0351 09/19/17 0717 09/19/17 1040 09/19/17 1041   BP:  156/76 125/61 125/61   Pulse:  84 75 76   Resp:  18 18   Temp:  98.2 °F (36.8 °C)  98.3 °F (36.8 °C)   SpO2:  97% 98% 96%   Weight: 98.8 kg (217 lb 12.8 oz)      Height:             Intake/Output Summary (Last 24 hours) at 09/19/17 1313  Last data filed at 09/19/17 0807   Gross per 24 hour   Intake              720 ml   Output             1150 ml   Net             -430 ml       GEN - in no distress  CV - S1, S2, no rub  Lung - clear bilaterally  Abd - soft, nontender  Ext - 1+ edema    No results for input(s): WBC, HGB, HCT, PLT, HGBEXT, HCTEXT, PLTEXT in the last 72 hours. Recent Labs      09/18/17   0345  09/17/17   0522   NA  137  138   K  4.2  4.1   CL  99  101   CO2  28  28   BUN  21  22   CREA  1.87*  1.71*   CA  8.2*  8.1*   GLU  116*  123*       Assessment / Plan:    1. HARPER on Stage III CKD - creatinine not far from baseline. Has atrophic left kidney. Remains non-oliguric. Lasix prn.    2.  HTN - stable    3. Aortic occlusion s/p aortobifem bypass (9/12)    Renal function stable. Following closely. Discussed with patient.

## 2017-09-19 NOTE — PROGRESS NOTES
Problem: Falls - Risk of  Goal: *Absence of Falls  Document Jair Fall Risk and appropriate interventions in the flowsheet.    Outcome: Progressing Towards Goal  Fall Risk Interventions:  Mobility Interventions: Patient to call before getting OOB     Mentation Interventions: Adequate sleep, hydration, pain control     Medication Interventions: Patient to call before getting OOB, Teach patient to arise slowly     Elimination Interventions: Call light in reach, Elevated toilet seat, Patient to call for help with toileting needs, Toilet paper/wipes in reach, Toileting schedule/hourly rounds, Urinal in reach     History of Falls Interventions: Evaluate medications/consider consulting pharmacy

## 2017-09-19 NOTE — PROGRESS NOTES
Problem: Mobility Impaired (Adult and Pediatric)  Goal: *Acute Goals and Plan of Care (Insert Text)  STG:  (1.)Mr. Tran will move from supine to sit and sit to supine , scoot up and down and roll side to side with MINIMAL ASSIST within 5 day(s). (2.)Mr. Tran will transfer from bed to chair and chair to bed with MINIMAL ASSIST using the least restrictive device within 5 day(s). GOAL MET 9/19/2017  (3.)Mr. Tran will ambulate with MINIMAL ASSIST for 50 feet with the least restrictive device within 5 day(s). GOAL MET 9/19/2017  (4.)Mr. Tran will maintain stable vital signs throughout all functional mobility within 5 days. GOAL MET 9/19/2017  (5.)Mr. Tran will perform standing static and dynamic balance activities x 10 minutes with MINIMAL ASSIST to improve safety within 5 day(s). LTG:  (1.)Mr. Tran will move from supine to sit and sit to supine , scoot up and down and roll side to side in bed with CONTACT GUARD ASSIST within 10 day(s). (2.)Mr. Tran will transfer from bed to chair and chair to bed with CONTACT GUARD ASSIST using the least restrictive device within 10 day(s). GOAL MET 9/19/2017  (3.)Mr. Tran will ambulate with CONTACT GUARD ASSIST for 100 feet with the least restrictive device within 10 day(s). GOAL MET 9/19/2017    LTG (revised 9/17/17):  (1.)Mr. Tran will move from supine to sit and sit to supine , scoot up and down and roll side to side in bed with MODIFIED INDEPENDENCE within 7 day(s). (2.)Mr. Tran will transfer from bed to chair and chair to bed with MODIFIED INDEPENDENCE using the least restrictive device within 7 day(s). (3.)Mr. Tran will ambulate with CONTACT GUARD ASSIST for 250 feet with the least restrictive device within 7 day(s). (4.)Mr. Tran will perform standing static and dynamic balance activities x 10 minutes with CONTACT GUARD ASSIST to improve safety within 7 day(s). (5.)Mr. Tran will perform 6 steps with HR and SUPERVISION within 7 days for safety ascending and descending stairs for home. (6.)Mr. Myles Berg will be independent in HEP for strengthening B LE within 7 days. ________________________________________________________________________________________________      PHYSICAL THERAPY: Daily Note, Treatment Day: 2nd and AM 9/19/2017  INPATIENT: Hospital Day: 13  Pay[place] / [place] / Product Type: Medicaid /      NAME/AGE/GENDER: Ana Quintero is a 61 y.o. male     PRIMARY DIAGNOSIS: Aortic occlusion ([facility] [I74.10]  Ischemic colitis ([facility] [address] [K55.9] Occlusion of aorta (HCC) Occlusion of aorta (HCC)  Procedure(s) (LRB):  SIGMOIDOSCOPY FLEXIBLE (N/A)  10 Days Post-Op  ICD-10: Treatment Diagnosis:       · Difficulty in walking, Not elsewhere classified (R26.2)   Precaution/Allergies:  Review of patient's allergies indicates no known allergies. ASSESSMENT:      Mr. Myles Berg was sitting up in recliner chair upon contact and agreeable to PT. Patient able to transfer to standing with SBA and ambulate 125' x2 with use of rolling walker, CGA and rest break needed due to fatigue/slight SOB. Patient demonstrates slight path deviations and increasd trunk sway but no LOB noted. Patient returns to recliner chair where he was left with needs in reach. Patient is currently functioning well below his baseline as he does not typically utilize assistive device during ambulation and was working two physical jobs 8 months ago. Will continue efforts. Above goals in red have been met. This section established at most recent assessment   PROBLEM LIST (Impairments causing functional limitations):  1. Decreased Strength  2. Decreased ADL/Functional Activities  3. Decreased Transfer Abilities  4. Decreased Ambulation Ability/Technique  5. Decreased Balance  6. Increased Pain  7. Decreased Activity Tolerance  8. Decreased Knowledge of Precautions  9.  Decreased [place] with Home Exercise Program INTERVENTIONS PLANNED: (Benefits and precautions of physical therapy have been discussed with the patient.)  1. Balance Exercise  2. Bed Mobility  3. Family Education  4. Gait Training  5. Home Exercise Program (HEP)  6. Therapeutic Activites  7. Therapeutic Exercise/Strengthening  8. Transfer Training  9. Patient Education  10. Group Therapy      TREATMENT PLAN: Frequency/Duration: 5 times a week for duration of hospital stay  Rehabilitation Potential For Stated Goals: EXCELLENT      RECOMMENDED REHABILITATION/EQUIPMENT: (at time of discharge pending progress): Due to the probability of continued deficits (see above) this patient will likely need continued skilled physical therapy after discharge. Equipment:   · None at this time                   HISTORY:   History of Present Injury/Illness (Reason for Referral): Aortic occlusion (HCC) [I74.10] Occlusion of aorta (HCC) Occlusion of aorta (HCC)  Procedure(s) (LRB):  left femoral embolectomy/left lower extremity arteriogram (Left)  1 Day Post-Op  Past Medical History/Comorbidities:   Mr. Marquise Nettles  has a past medical history of Acute diastolic (congestive) heart failure ([address] (12/4/2016); CAD (coronary artery disease); Chronic kidney disease; Chronic kidney disease, stage 3; Chronic pain; Ex-smoker; GERD (gastroesophageal reflux disease); TIA (transient ischemic attack); Hypercholesteremia; Hypertension; Old MI (myocardial infarction) (11/2016); Osteoarthritis; and Stroke ([address] Mr. Marquise Nettles  has a past surgical history that includes heart catheterization (12/2016); colonoscopy (N/A, 6/22/2017); knee arthroscopy (Left); flexible sigmoidoscopy (N/A, 9/9/2017); vascular surgery procedure unlist (09/07/2017); and vascular surgery procedure unlist (Left, 09/08/2017).   Social History/Living Environment:   Home Environment: Private residence  # Steps to Enter: 6  Rails to Enter: Yes  One/Two Story Residence: One story  Living Alone: Yes  Support Systems: Child(tisha)  Patient Expects to be Discharged to[de-identified] Unknown  Current DME Used/Available at Home: Cane, straight  Tub or Shower Type: Shower  Prior Level of Function/Work/Activity:  Modified independent with straight cane for ambulation secondary to decreased sensation LLE for a few months, he reports independence with ADLs and driving at baseline. Number of Personal Factors/Comorbidities that affect the Plan of Care: 3+: HIGH COMPLEXITY   EXAMINATION:   Most Recent Physical Functioning:   Gross Assessment:                  Posture:     Balance:  Sitting: Intact  Standing: Impaired; With support  Standing - Static: Good  Standing - Dynamic : Fair Bed Mobility:     Wheelchair Mobility:     Transfers:  Sit to Stand: Stand-by asssistance  Stand to Sit: Stand-by asssistance  Gait:     Step Length: Left shortened;Right shortened  Gait Abnormalities: Decreased step clearance;Trunk sway increased; Path deviations  Distance (ft):  (125' x 2)  Assistive Device: Walker, rolling  Ambulation - Level of Assistance: Contact guard assistance  Interventions: Safety awareness training; Tactile cues; Verbal cues       Body Structures Involved:  1. Nerves  2. Heart  3. Muscles Body Functions Affected:  1. Sensory/Pain  2. Cardio  3. Hematological  4. Neuromusculoskeletal  5. Movement Related Activities and Participation Affected:  1. Mobility  2. Self Care  3. Domestic Life  4. Interpersonal Interactions and Relationships  5. Community, Social and [place]   Number of elements that affect the Plan of Care: 4+: HIGH COMPLEXITY   CLINICAL PRESENTATION:   Presentation: Evolving clinical presentation with unstable and unpredictable characteristics: HIGH COMPLEXITY   CLINICAL DECISION MAKIN [facility] Mobility Inpatient Short Form  How much difficulty does the patient currently have. .. Unable A Lot A Little None   1. Turning over in bed (including adjusting bedclothes, sheets and blankets)?    [ ] 1 [X] 2   [ ] 3   [ ] 4   2. Sitting down on and standing up from a chair with arms ( e.g., wheelchair, bedside commode, etc.)   [X] 1   [ ] 2   [ ] 3   [ ] 4   3. Moving from lying on back to sitting on the side of the bed? [ ] 1   [X] 2   [ ] 3   [ ] 4   How much help from another person does the patient currently need. .. Total A Lot A Little None   4. Moving to and from a bed to a chair (including a wheelchair)? [X] 1   [ ] 2   [ ] 3   [ ] 4   5. Need to walk in hospital room? [X] 1   [ ] 2   [ ] 3   [ ] 4   6. Climbing 3-5 steps with a railing? [X] 1   [ ] 2   [ ] 3   [ ] 4   © 2007, Trustees of [address] [place] [address], under license to [employer]. All rights reserved    Score:  Initial: 8 Most Recent: X (Date: -- )     Interpretation of Tool:  Represents activities that are increasingly more difficult (i.e. Bed mobility, Transfers, Gait). Score 24 23 22-20 19-15 14-10 9-7 6       Modifier CH CI CJ CK CL CM CN         · Mobility - Walking and Moving Around:               - CURRENT STATUS:    CM - 80%-99% impaired, limited or restricted               - GOAL STATUS:           CK - 40%-59% impaired, limited or restricted               - D/C STATUS:                       ---------------To be determined---------------  Pay[place] / [place] / Product Type: Medicaid /       Medical Necessity:     · Patient demonstrates good rehab potential due to higher previous functional level. Reason for Services/Other Comments:  · Patient continues to require modification of therapeutic interventions to increase complexity of exercises.    Use of outcome tool(s) and clinical judgement create a POC that gives a: Questionable prediction of patient's progress: MODERATE COMPLEXITY                 TREATMENT:   (In addition to Assessment/Re-Assessment sessions the following treatments were rendered)   Pre-treatment Symptoms/Complaints: None  Pain: Initial: Pain Intensity 1: 0  Post Session: Unchanged      Therapeutic Activity: (    12 Minutes): Therapeutic activities including transfer training, ambulation on level ground, static/dynamic standing balance activities, ambulation on level ground, and patient education to improve mobility, strength and activity tolerance. Required verbal cues Safety awareness training; Tactile cues; Verbal cues to promote static and dynamic balance in standing and promote coordination of bilateral, lower extremity(s). Therapeutic Exercise: (  Minutes):  Exercises per grid below to improve mobility and strength. Required minimal verbal cues to promote proper body alignment, promote proper body posture and promote proper body breathing techniques. Progressed repetitions as indicated. Date:  9/18/17 Date:  9/13/17 Date:  9-17-17   ACTIVITY/EXERCISE AM PM AM PM AM PM   Ambulation:           Distance  Device  Duration         Seated Heel Raises x20  x20  x20    Seated Toe Raises x20  x20  x20    Seated Long Arc Quads x20  x20  x20    Seated Marching x20  x20  x20    Seated Hip Abduction x20    x5             B = bilateral; AA = active assistive; A = active; P = passive      · Braces/Orthotics/Lines/Etc: wound vac  Treatment/Session Assessment:    · Response to Treatment:  See above   · Interdisciplinary Collaboration:  · Physical Therapy Assistant and Registered Nurse  · After treatment position/precautions:  · Up in chair, Bed/Chair-wheels locked, Call light within reach and RN notified  · Compliance with Program/Exercises: making an effort. · Recommendations/Intent for next treatment session: \"Next visit will focus on advancements to more challenging activities and reduction in assistance provided\".   Total Treatment Duration  PT Patient Time In/Time Out  Time In: 1054  Time Out: [address]  [facility]

## 2017-09-19 NOTE — PROGRESS NOTES
Bedside and Verbal shift change report given to [address], RN (oncoming nurse) by Sj David RN (offgoing nurse).

## 2017-09-19 NOTE — PROGRESS NOTES
Bedside and Verbal shift change report given to Eriberto Edwards RN (oncoming nurse) by Jerrald Sacks, RN (offgoing nurse).

## 2017-09-19 NOTE — PROGRESS NOTES
Problem: Nutrition Deficit  Goal: *Optimize nutritional status  Nutrition F/U:  Assessment:  Weight 98.8 kg (bedscale 9/19/17 CVSD), edema - 1+ pitting. The patient continue to eat poorly, reporting a poor appetite. He continues to report that \"the food grows in my mouth - the longer I chew it, the bigger it gets, then I can't swallow it. \"  He states that he is unable to eat any solids and has consumed a total of two ensure. He reports that he is eating jello and drinking juice. I strongly encouraged the patient to drink ensure enlive at each meal.  He states \"I will try. \"  Macronutrient Needs:  EER:  6635-7102 kcal /day (15-18 kcal/kg pre-op BW)  EPR:  51-78 grams protein/day (0.8-1.2 grams/kg IBW)  Intake/Comparative Standards:  Fifteen recorded meal intakes over one week, averaging 41%, potentially meeting 67% kcal needs, 77% protein needs. Calorie count results: x 1 day: Consumed 348 kcal, ~6 grams of PRO. Intervention:   Meals and Snacks: Regular. Add preferences. Medical Food Supplement Therapy: Continue milk at each meal.  Medical Food Supplement Therapy: Commercial Beverage: Ensure Enlive TID, add ensure clear BID  Nutrition Discharge Plan: Too soon to determine.      Sherrill Munoz [address], [address] [place] [address],  [address] [place] [address], [phone]

## 2017-09-19 NOTE — PROGRESS NOTES
Problem: Self Care Deficits Care Plan (Adult)  Goal: *Acute Goals and Plan of Care (Insert Text)  GOALS:    1: Pt will perform toileting with maximal assistance and adaptive equipment as needed. 2: Pt will perform grooming with minimal assistance and adaptive equipment as needed. 3: Pt will tolerate sitting edge of bed for 5 minutes for ADL prep.  4: Pt will perform toilet transfers with moderate assistance and the least restrictive device to promote quality of life. 5: Pt will tolerate 15 minutes of therapeutic activity with minimal rest breaks. Time Frame: 7 visits      OCCUPATIONAL THERAPY: Daily Note, Treatment Day: 3rd and PM 9/19/2017  INPATIENT: Hospital Day: 13  Payor: [place] / Plan: [address] [place] [address] Avenue / Product Type: Medicaid /      NAME/AGE/GENDER: Silvia Qureshi is a 61 y.o. male     PRIMARY DIAGNOSIS:  Aortic occlusion ([facility] [address] [I74.10]  Ischemic colitis ([facility] [address] [K55.9] Occlusion of aorta (HCC) Occlusion of aorta (HCC)  Procedure(s) (LRB):  SIGMOIDOSCOPY FLEXIBLE (N/A)  10 Days Post-Op  ICD-10: Treatment Diagnosis:        · Generalized Muscle Weakness (M62.81)  · Dizziness and Giddiness (R42)   Precautions/Allergies:         Review of patient's allergies indicates no known allergies. ASSESSMENT:   Mr. Alban Palomino was admitted for the above diagnosis. Patient supine in bed. Pt able to perform UE exercises (in grid below) while sitting edge of bed to increase strength and activity tolerance. Pt did well with exercises. Returned to bed with no assistance. Will continue to benefit from skilled OT during stay. This section established at most recent assessment   PROBLEM LIST (Impairments causing functional limitations):  1. Decreased Strength  2. Decreased ADL/Functional Activities  3. Decreased Transfer Abilities  4. Decreased Ambulation Ability/Technique  5. Decreased Balance  6. Increased Pain  7. Decreased Activity Tolerance  8.  Decreased Work Simplification/Energy Conservation Techniques  9. Increased Fatigue  10. Increased Shortness of Breath  11. Decreased Flexibility/Joint Mobility    INTERVENTIONS PLANNED: (Benefits and precautions of occupational therapy have been discussed with the patient.)  1. Activities of daily living training  2. Adaptive equipment training  3. Clothing management  4. Donning&doffing training  5. Group therapy  6. Therapeutic activity  7. Therapeutic exercise  8. Energy conservation      TREATMENT PLAN: Frequency/Duration: Follow patient 3x/week to address above goals. Rehabilitation Potential For Stated Goals: GOOD      RECOMMENDED REHABILITATION/EQUIPMENT: (at time of discharge pending progress): Due to the probability of continued deficits (see above) this patient will likely need continued skilled occupational therapy after discharge. Equipment:   · to be determined               OCCUPATIONAL PROFILE AND HISTORY:   History of Present Injury/Illness (Reason for Referral):  See H&P  Past Medical History/Comorbidities:   Mr. Kishan Klein  has a past medical history of Acute diastolic (congestive) heart failure ([facility] [address] (12/4/2016); CAD (coronary artery disease); Chronic kidney disease; Chronic kidney disease, stage 3; Chronic pain; Ex-smoker; GERD (gastroesophageal reflux disease); TIA (transient ischemic attack); Hypercholesteremia; Hypertension; Old MI (myocardial infarction) (11/2016); Osteoarthritis; and Stroke ([facility] [address] Mr. Kishan Klein  has a past surgical history that includes heart catheterization (12/2016); colonoscopy (N/A, 6/22/2017); knee arthroscopy (Left); flexible sigmoidoscopy (N/A, 9/9/2017); vascular surgery procedure unlist (09/07/2017); and vascular surgery procedure unlist (Left, 09/08/2017).   Social History/Living Environment:   Home Environment: Private residence  # Steps to Enter: 6  Rails to Enter: Yes  One/Two Story Residence: One story  Living Alone: Yes  Support Systems: Child(tisha)  Patient Expects to be Discharged to[de-identified] Unknown  Current DME Used/Available at Home: Cane, straight  Tub or Shower Type: Shower  Prior Level of Function/Work/Activity:  Works, drives, Independent with self care. Number of Personal Factors/Comorbidities that affect the Plan of Care: Expanded review of therapy/medical records (1-2):  MODERATE COMPLEXITY   ASSESSMENT OF OCCUPATIONAL PERFORMANCE[de-identified]   Activities of Daily Living:           Basic ADLs (From Assessment) Complex ADLs (From Assessment)   Basic ADL  Feeding: Minimum assistance  Oral Facial Hygiene/Grooming: Maximum assistance  Bathing: Total assistance  Upper Body Dressing: Maximum assistance  Lower Body Dressing: Total assistance  Toileting: Maximum assistance     Grooming/Bathing/Dressing Activities of Daily Living     Cognitive Retraining  Safety/Judgement: Awareness of environment                       Bed/Mat Mobility  Sit to Stand: Stand-by asssistance          Most Recent Physical Functioning:   Gross Assessment:                  Posture:  Posture (WDL): Exceptions to WDL  Posture Assessment: Forward head, Rounded shoulders  Balance:  Sitting: Intact  Standing: Impaired; With support  Standing - Static: Good  Standing - Dynamic : Fair Bed Mobility:     Wheelchair Mobility:     Transfers:  Sit to Stand: Stand-by asssistance  Stand to Sit: Stand-by asssistance      Patient Vitals for the past 6 hrs:   BP SpO2 Pulse   09/19/17 1040 125/61 98 % 75   09/19/17 1041 125/61 96 % 76   09/19/17 1447 144/66 95 % 72        Mental Status  Neurologic State: Alert  Orientation Level: Oriented X4  Cognition: Follows commands  Perception: Appears intact  Perseveration: No perseveration noted  Safety/Judgement: Awareness of environment                               Physical Skills Involved:  1. Range of Motion  2. Balance  3. Strength  4. Activity Tolerance  5. Pain (acute) Cognitive Skills Affected (resulting in the inability to perform in a timely and safe manner):  1.  [facility] Psychosocial Skills Affected:  1. Habits/Routines  2. Environmental Adaptation  3. Self-Awareness   Number of elements that affect the Plan of Care: 5+:  HIGH COMPLEXITY   CLINICAL DECISION MAKIN[address] [place] [address] AM-PAC 6 Clicks   Daily Activity Inpatient Short Form  How much help from another person does the patient currently need. .. Total A Lot A Little None   1. Putting on and taking off regular lower body clothing? [X] 1   [ ] 2   [ ] 3   [ ] 4   2. Bathing (including washing, rinsing, drying)? [X] 1   [ ] 2   [ ] 3   [ ] 4   3. Toileting, which includes using toilet, bedpan or urinal?   [X] 1   [ ] 2   [ ] 3   [ ] 4   4. Putting on and taking off regular upper body clothing? [X] 1   [ ] 2   [ ] 3   [ ] 4   5. Taking care of personal grooming such as brushing teeth? [ ] 1   [X] 2   [ ] 3   [ ] 4   6. Eating meals? [ ] 1   [X] 2   [ ] 3   [ ] 4   © , Trustees of [address] [place] [address], under license to [employer]. All rights reserved    Score:  Initial: 8 Most Recent: X (Date: -- )     Interpretation of Tool:  Represents activities that are increasingly more difficult (i.e. Bed mobility, Transfers, Gait). Score 24 23 22-20 19-15 14-10 9-7 6       Modifier CH CI CJ CK CL CM CN         · Self Care:               - CURRENT STATUS:    CM - 80%-99% impaired, limited or restricted               - GOAL STATUS:           CK - 40%-59% impaired, limited or restricted               - D/C STATUS:                       ---------------To be determined---------------  Payor: [id number] / Plan: [place] MEDICAID OF [address] [place] [address] Rd / Product Type: Medicaid /       Medical Necessity:     · Patient demonstrates good rehab potential due to higher previous functional level. Reason for Services/Other Comments:  · Patient continues to require skilled intervention due to medical complications and patient unable to attend/participate in therapy as expected.    Use of outcome tool(s) and clinical judgement create a POC that gives a: HIGH COMPLEXITY             TREATMENT:   (In addition to Assessment/Re-Assessment sessions the following treatments were rendered)      Pre-treatment Symptoms/Complaints:  Decreased ability to perform ADLs, self care, and functional mobility; decreased tolerance for activities  Pain: Initial:   Pain Intensity 1: 0  Post Session:  Appears to be in less pain, too tired to answer      Therapeutic Exercise: (15 minutes):  Exercises per grid below to improve mobility, strength and activity tolerance. Required minimal visual, verbal and tactile cues to promote proper body alignment and promote proper body mechanics. Progressed resistance and repetitions as indicated. UE Exercises (with yellow and red theraband) Date:  9/12/2017 Date:  9/19/2017 Date:     Activity/Exercise Parameters Parameters Parameters   Shoulder Abd/Adduction 10 reps 3 sets 15 reps with yellow theraband    Shoulder Flexion 8 reps 3 sets 15 reps with 2# dowel    Elbow Flexion 10 reps 2 sets 20 reps with red theraband    Punches 10 reps 3 sets 15 reps with 2# dowel                        Braces/Orthotics/Lines/Etc:   · IV  · lopez catheter  · arterial line  · O2 Device: Nasal cannula  Treatment/Session Assessment:    · Response to Treatment:  Agrees to therapy  · Interdisciplinary Collaboration:  · Certified Occupational Therapy Assistant and Registered Nurse  · After treatment position/precautions:  · Supine in bed, Bed/Chair-wheels locked, Bed in low position and Call light within reach  · Compliance with Program/Exercises: Will assess as treatment progresses. · Recommendations/Intent for next treatment session: \"Next visit will focus on advancements to more challenging activities and reduction in assistance provided\".   Total Treatment Duration:OT Patient Time In/Time Out  Time In: 8961  Time Out: 19 Brittni Garcia

## 2017-09-19 NOTE — PROGRESS NOTES
LOC/Complex LOC started with CHARLEE. Confirmation # [id number]. Clinical faxed to [place] office at [phone]. Currently awaiting approval for IRC/9th floor with CHARLEE.

## 2017-09-19 NOTE — PROGRESS NOTES
Problem: Falls - Risk of  Goal: *Absence of Falls  Document Jair Fall Risk and appropriate interventions in the flowsheet.    Outcome: Progressing Towards Goal  Fall Risk Interventions:  Mobility Interventions: Bed/chair exit alarm, Patient to call before getting OOB     Mentation Interventions: More frequent rounding, Door open when patient unattended     Medication Interventions: Bed/chair exit alarm, Evaluate medications/consider consulting pharmacy, Patient to call before getting OOB, Teach patient to arise slowly     Elimination Interventions: Call light in reach, Elevated toilet seat, Patient to call for help with toileting needs, Toilet paper/wipes in reach, Urinal in reach, Bed/chair exit alarm     History of Falls Interventions: Bed/chair exit alarm, Consult care management for discharge planning, Door open when patient unattended, Evaluate medications/consider consulting pharmacy

## 2017-09-20 PROCEDURE — 97530 THERAPEUTIC ACTIVITIES: CPT

## 2017-09-20 PROCEDURE — 65660000004 HC RM CVT STEPDOWN

## 2017-09-20 PROCEDURE — 74011250637 HC RX REV CODE- 250/637: Performed by: SURGERY

## 2017-09-20 PROCEDURE — 97110 THERAPEUTIC EXERCISES: CPT

## 2017-09-20 PROCEDURE — 74750000023 HC WOUND THERAPY

## 2017-09-20 PROCEDURE — 74011250636 HC RX REV CODE- 250/636: Performed by: SURGERY

## 2017-09-20 PROCEDURE — 74011250637 HC RX REV CODE- 250/637: Performed by: PHYSICIAN ASSISTANT

## 2017-09-20 RX ADMIN — Medication 5 ML: at 15:16

## 2017-09-20 RX ADMIN — DOCUSATE SODIUM 100 MG: 100 CAPSULE, LIQUID FILLED ORAL at 08:07

## 2017-09-20 RX ADMIN — HEPARIN SODIUM 5000 UNITS: 5000 INJECTION, SOLUTION INTRAVENOUS; SUBCUTANEOUS at 15:15

## 2017-09-20 RX ADMIN — OXYCODONE HYDROCHLORIDE AND ACETAMINOPHEN 1 TABLET: 7.5; 325 TABLET ORAL at 08:41

## 2017-09-20 RX ADMIN — Medication 5 ML: at 20:46

## 2017-09-20 RX ADMIN — OXYCODONE HYDROCHLORIDE AND ACETAMINOPHEN 1 TABLET: 7.5; 325 TABLET ORAL at 21:02

## 2017-09-20 RX ADMIN — HEPARIN SODIUM 5000 UNITS: 5000 INJECTION, SOLUTION INTRAVENOUS; SUBCUTANEOUS at 22:19

## 2017-09-20 RX ADMIN — OXYCODONE HYDROCHLORIDE AND ACETAMINOPHEN 1 TABLET: 7.5; 325 TABLET ORAL at 00:49

## 2017-09-20 RX ADMIN — Medication 10 ML: at 05:21

## 2017-09-20 RX ADMIN — OXYCODONE HYDROCHLORIDE AND ACETAMINOPHEN 1 TABLET: 7.5; 325 TABLET ORAL at 22:19

## 2017-09-20 RX ADMIN — CEFAZOLIN 2 G: 1 INJECTION, POWDER, FOR SOLUTION INTRAMUSCULAR; INTRAVENOUS; PARENTERAL at 00:50

## 2017-09-20 RX ADMIN — CEFAZOLIN 2 G: 1 INJECTION, POWDER, FOR SOLUTION INTRAMUSCULAR; INTRAVENOUS; PARENTERAL at 18:16

## 2017-09-20 RX ADMIN — ASPIRIN 81 MG: 81 TABLET, COATED ORAL at 08:07

## 2017-09-20 RX ADMIN — Medication 5 ML: at 08:08

## 2017-09-20 RX ADMIN — HEPARIN SODIUM 5000 UNITS: 5000 INJECTION, SOLUTION INTRAVENOUS; SUBCUTANEOUS at 08:07

## 2017-09-20 RX ADMIN — PANTOPRAZOLE SODIUM 40 MG: 40 TABLET, DELAYED RELEASE ORAL at 05:21

## 2017-09-20 RX ADMIN — CARVEDILOL 25 MG: 25 TABLET, FILM COATED ORAL at 08:07

## 2017-09-20 RX ADMIN — Medication 10 ML: at 20:46

## 2017-09-20 RX ADMIN — OXYCODONE HYDROCHLORIDE AND ACETAMINOPHEN 1 TABLET: 7.5; 325 TABLET ORAL at 05:21

## 2017-09-20 RX ADMIN — OXYCODONE HYDROCHLORIDE AND ACETAMINOPHEN 1 TABLET: 7.5; 325 TABLET ORAL at 15:24

## 2017-09-20 RX ADMIN — OXYCODONE HYDROCHLORIDE AND ACETAMINOPHEN 1 TABLET: 7.5; 325 TABLET ORAL at 12:28

## 2017-09-20 RX ADMIN — CARVEDILOL 25 MG: 25 TABLET, FILM COATED ORAL at 18:06

## 2017-09-20 RX ADMIN — Medication 5 ML: at 05:21

## 2017-09-20 RX ADMIN — CEFAZOLIN 2 G: 1 INJECTION, POWDER, FOR SOLUTION INTRAMUSCULAR; INTRAVENOUS; PARENTERAL at 09:06

## 2017-09-20 RX ADMIN — AMLODIPINE BESYLATE 5 MG: 10 TABLET ORAL at 08:07

## 2017-09-20 RX ADMIN — Medication 5 ML: at 00:48

## 2017-09-20 RX ADMIN — Medication 10 ML: at 15:16

## 2017-09-20 NOTE — PROGRESS NOTES
Admit Date: 9/7/2017      Subjective:          Review of Systems  Cardio-vascular: no chest pain,  SOB better  GI: no N/V/D  : no dysuria, no hematuria    Objective:     Patient Vitals for the past 8 hrs:   BP Temp Pulse Resp SpO2 Weight   09/20/17 0702 158/73 98 °F (36.7 °C) 76 20 98 % -   09/20/17 0230 164/77 98.5 °F (36.9 °C) 71 16 95 % 92.9 kg (204 lb 11.2 oz)     09/20 0701 - 09/20 1900  In: 440 [P.O.:440]  Out: 525 [Urine:525]      Physical Exam:   Lungs: decreased BS  CV: RR,  Abdomen: soft, not tender, no rebound, distended  Ext: + edema better          Data Review No results found for this or any previous visit (from the past 8 hour(s)). Assessment:     Principal Problem:    Occlusion of aorta ([address] (3/3/2017)    Active Problems:    Ischemia of left lower extremity (9/8/2017)      Claudication ([address] (9/8/2017)      HARPER/CKD  Plan:     Renal F.  And edema  Better   Recheck lab in am    Matheus Storey MD

## 2017-09-20 NOTE — PROGRESS NOTES
Bedside and Verbal shift change report given to Kassie Mckeon (oncoming nurse) by Sonia Marcial RN (offgoing nurse).

## 2017-09-20 NOTE — PROGRESS NOTES
Problem: Mobility Impaired (Adult and Pediatric)  Goal: *Acute Goals and Plan of Care (Insert Text)  STG:  (1.)Mr. Tran will move from supine to sit and sit to supine , scoot up and down and roll side to side with MINIMAL ASSIST within 5 day(s). (2.)Mr. Tran will transfer from bed to chair and chair to bed with MINIMAL ASSIST using the least restrictive device within 5 day(s). GOAL MET 9/19/2017  (3.)Mr. Tran will ambulate with MINIMAL ASSIST for 50 feet with the least restrictive device within 5 day(s). GOAL MET 9/19/2017  (4.)Mr. Tran will maintain stable vital signs throughout all functional mobility within 5 days. GOAL MET 9/19/2017  (5.)Mr. Tran will perform standing static and dynamic balance activities x 10 minutes with MINIMAL ASSIST to improve safety within 5 day(s). LTG:  (1.)Mr. Tran will move from supine to sit and sit to supine , scoot up and down and roll side to side in bed with CONTACT GUARD ASSIST within 10 day(s). (2.)Mr. Tran will transfer from bed to chair and chair to bed with CONTACT GUARD ASSIST using the least restrictive device within 10 day(s). GOAL MET 9/19/2017  (3.)Mr. Tran will ambulate with CONTACT GUARD ASSIST for 100 feet with the least restrictive device within 10 day(s). GOAL MET 9/19/2017    LTG (revised 9/17/17):  (1.)Mr. Tran will move from supine to sit and sit to supine , scoot up and down and roll side to side in bed with MODIFIED INDEPENDENCE within 7 day(s). (2.)Mr. Tran will transfer from bed to chair and chair to bed with MODIFIED INDEPENDENCE using the least restrictive device within 7 day(s). (3.)Mr. Tran will ambulate with CONTACT GUARD ASSIST for 250 feet with the least restrictive device within 7 day(s). (4.)Mr. Tran will perform standing static and dynamic balance activities x 10 minutes with CONTACT GUARD ASSIST to improve safety within 7 day(s). (5.)Mr. Tran will perform 6 steps with HR and SUPERVISION within 7 days for safety ascending and descending stairs for home. (6.)Mr. Ely Lundy will be independent in HEP for strengthening B LE within 7 days. ________________________________________________________________________________________________      PHYSICAL THERAPY: Daily Note, Treatment Day: 3rd and AM 9/20/2017  INPATIENT: Hospital Day: 14  Payor: [id number] / Plan: [place] / Product Type: Medicaid /      NAME/AGE/GENDER: Clarisse Issa is a 61 y.o. male     PRIMARY DIAGNOSIS: Aortic occlusion ([address] [I74.10]  Ischemic colitis ([address] [K55.9] Occlusion of aorta (HCC) Occlusion of aorta (HCC)  Procedure(s) (LRB):  SIGMOIDOSCOPY FLEXIBLE (N/A)  11 Days Post-Op  ICD-10: Treatment Diagnosis:       · Difficulty in walking, Not elsewhere classified (R26.2)   Precaution/Allergies:  Review of patient's allergies indicates no known allergies. ASSESSMENT:      Mr. Ely Lundy was sitting up in bedside chair agreeable to treatment. He stood with SBA and maintained his ambulation distance this treatment. He has a slight limp on left lower extremity. He needs verbal cues fro safety with rolling walker and cues to slow down for safety. He had no loss of balance episode during ambulation. Patient is currently functioning well below his baseline as he does not typically utilize assistive device during ambulation and was working two physical jobs 8 months ago. Will continue efforts. This section established at most recent assessment   PROBLEM LIST (Impairments causing functional limitations):  1. Decreased Strength  2. Decreased ADL/Functional Activities  3. Decreased Transfer Abilities  4. Decreased Ambulation Ability/Technique  5. Decreased Balance  6. Increased Pain  7. Decreased Activity Tolerance  8. Decreased Knowledge of Precautions  9.  Decreased Pearson with Home Exercise Program    INTERVENTIONS PLANNED: (Benefits and precautions of physical therapy have been discussed with the patient.)  1. Balance Exercise  2. Bed Mobility  3. Family Education  4. Gait Training  5. Home Exercise Program (HEP)  6. Therapeutic Activites  7. Therapeutic Exercise/Strengthening  8. Transfer Training  9. Patient Education  10. Group Therapy      TREATMENT PLAN: Frequency/Duration: 5 times a week for duration of hospital stay  Rehabilitation Potential For Stated Goals: EXCELLENT      RECOMMENDED REHABILITATION/EQUIPMENT: (at time of discharge pending progress): Due to the probability of continued deficits (see above) this patient will likely need continued skilled physical therapy after discharge. Equipment:   · None at this time                   HISTORY:   History of Present Injury/Illness (Reason for Referral): Aortic occlusion (HCC) [I74.10] Occlusion of aorta (HCC) Occlusion of aorta (HCC)  Procedure(s) (LRB):  left femoral embolectomy/left lower extremity arteriogram (Left)  1 Day Post-Op  Past Medical History/Comorbidities:   Mr. Kimberly Perry  has a past medical history of Acute diastolic (congestive) heart failure ([facility] (12/4/2016); CAD (coronary artery disease); Chronic kidney disease; Chronic kidney disease, stage 3; Chronic pain; Ex-smoker; GERD (gastroesophageal reflux disease); TIA (transient ischemic attack); Hypercholesteremia; Hypertension; Old MI (myocardial infarction) (11/2016); Osteoarthritis; and Stroke ([facility] Mr. Kimberly Perry  has a past surgical history that includes heart catheterization (12/2016); colonoscopy (N/A, 6/22/2017); knee arthroscopy (Left); flexible sigmoidoscopy (N/A, 9/9/2017); vascular surgery procedure unlist (09/07/2017); and vascular surgery procedure unlist (Left, 09/08/2017).   Social History/Living Environment:   Home Environment: Private residence  # Steps to Enter: 6  Rails to Enter: Yes  One/Two Story Residence: One story  Living Alone: Yes  Support Systems: Child(tisha)  Patient Expects to be Discharged to[de-identified] Unknown  Current DME Used/Available at Home: Dash Duvall straight  Tub or Shower Type: Shower  Prior Level of Function/Work/Activity:  Modified independent with straight cane for ambulation secondary to decreased sensation LLE for a few months, he reports independence with ADLs and driving at baseline. Number of Personal Factors/Comorbidities that affect the Plan of Care: 3+: HIGH COMPLEXITY   EXAMINATION:   Most Recent Physical Functioning:   Gross Assessment:                  Posture:  Posture (WDL): Exceptions to WDL  Posture Assessment: Forward head, Rounded shoulders  Balance:  Sitting: Intact  Sitting - Static: Good (unsupported)  Sitting - Dynamic: Good (unsupported)  Standing: Impaired  Standing - Static: Good  Standing - Dynamic : Fair Bed Mobility:     Wheelchair Mobility:     Transfers:  Sit to Stand: Stand-by asssistance  Stand to Sit: Stand-by asssistance  Gait:     Base of Support: Widened  Speed/Jennifer: Fluctuations  Step Length: Left shortened;Right shortened  Gait Abnormalities: Decreased step clearance;Trunk sway increased; Path deviations; Antalgic  Distance (ft): 125 Feet (ft) (x 2)  Assistive Device: Walker, rolling  Ambulation - Level of Assistance: Contact guard assistance  Interventions: Safety awareness training       Body Structures Involved:  1. Nerves  2. Heart  3. Muscles Body Functions Affected:  1. Sensory/Pain  2. Cardio  3. Hematological  4. Neuromusculoskeletal  5. Movement Related Activities and Participation Affected:  1. Mobility  2. Self Care  3. Domestic Life  4. Interpersonal Interactions and Relationships  5. Community, Social and [place]   Number of elements that affect the Plan of Care: 4+: HIGH COMPLEXITY   CLINICAL PRESENTATION:   Presentation: Evolving clinical presentation with unstable and unpredictable characteristics: HIGH COMPLEXITY   CLINICAL DECISION MAKIN [facility] Inpatient Short Form  How much difficulty does the patient currently have. ..  Unable A Lot A Little None   1. Turning over in bed (including adjusting bedclothes, sheets and blankets)? [ ] 1   [X] 2   [ ] 3   [ ] 4   2. Sitting down on and standing up from a chair with arms ( e.g., wheelchair, bedside commode, etc.)   [X] 1   [ ] 2   [ ] 3   [ ] 4   3. Moving from lying on back to sitting on the side of the bed? [ ] 1   [X] 2   [ ] 3   [ ] 4   How much help from another person does the patient currently need. .. Total A Lot A Little None   4. Moving to and from a bed to a chair (including a wheelchair)? [X] 1   [ ] 2   [ ] 3   [ ] 4   5. Need to walk in hospital room? [X] 1   [ ] 2   [ ] 3   [ ] 4   6. Climbing 3-5 steps with a railing? [X] 1   [ ] 2   [ ] 3   [ ] 4   © 2007, Trustees of [address] [place] [address], under license to [employer]. All rights reserved    Score:  Initial: 8 Most Recent: X (Date: -- )     Interpretation of Tool:  Represents activities that are increasingly more difficult (i.e. Bed mobility, Transfers, Gait). Score 24 23 22-20 19-15 14-10 9-7 6       Modifier CH CI CJ CK CL CM CN         · Mobility - Walking and Moving Around:               - CURRENT STATUS:    CM - 80%-99% impaired, limited or restricted               - GOAL STATUS:           CK - 40%-59% impaired, limited or restricted               - D/C STATUS:                       ---------------To be determined---------------  Payor[place] / [place] / Product Type: Medicaid /       Medical Necessity:     · Patient demonstrates good rehab potential due to higher previous functional level. Reason for Services/Other Comments:  · Patient continues to require modification of therapeutic interventions to increase complexity of exercises.    Use of outcome tool(s) and clinical judgement create a POC that gives a: Questionable prediction of patient's progress: MODERATE COMPLEXITY                 TREATMENT:   (In addition to Assessment/Re-Assessment sessions the following treatments were rendered)   Pre-treatment Symptoms/Complaints: None  Pain: Initial:   Pain Intensity 1: 0  Pain Intervention(s) 1:  (RN present giving patient pain medication)  Post Session: Unchanged      Therapeutic Activity: (    15 Minutes): Therapeutic activities including transfer training, ambulation on level ground, static/dynamic standing balance activities, ambulation on level ground, and patient education to improve mobility, strength and activity tolerance. Required verbal cues Safety awareness training to promote static and dynamic balance in standing and promote coordination of bilateral, lower extremity(s). Therapeutic Exercise: ( 15 Minutes):  Exercises per grid below to improve mobility and strength. Required minimal verbal cues to promote proper body alignment, promote proper body posture and promote proper body breathing techniques. Progressed repetitions as indicated. Date:  9/18/17 Date:  9/20/17 Date:  9-17-17   ACTIVITY/EXERCISE AM PM AM PM AM PM   Ambulation:           Distance  Device  Duration         Seated Heel Raises x20  \x25  x20    Seated Toe Raises x20  x25  x20    Seated Long Arc Quads x20  x25  x20    Seated Marching x20  x25  x20    Seated Hip Abduction x20  x25  x5    Glut set   x25      B = bilateral; AA = active assistive; A = active; P = passive      · Braces/Orthotics/Lines/Etc: wound vac  Treatment/Session Assessment:    · Response to Treatment:  See above   · Interdisciplinary Collaboration:  · Physical Therapy Assistant and Registered Nurse  · After treatment position/precautions:  · Up in chair, Bed/Chair-wheels locked, Call light within reach and RN notified  · Compliance with Program/Exercises: making an effort. · Recommendations/Intent for next treatment session: \"Next visit will focus on advancements to more challenging activities and reduction in assistance provided\".   Total Treatment Duration  PT Patient Time In/Time Out  Time In: 0915  Time Out: 0981 [facility]

## 2017-09-20 NOTE — PROGRESS NOTES
[address] FAX: [phone]         VASCULAR SURGERY FLOOR PROGRESS NOTE    Admit Date: 2017  POD: 11 Days Post-Op    Procedure:  Procedure(s):  SIGMOIDOSCOPY FLEXIBLE    Subjective:     Patient has no new complaints. Objective:     Vitals:  Blood pressure 164/77, pulse 71, temperature 98.5 °F (36.9 °C), resp. rate 16, height 5' 6\" (1.676 m), weight 204 lb 11.2 oz (92.9 kg), SpO2 95 %. Temp (24hrs), Av.3 °F (36.8 °C), Min:97.6 °F (36.4 °C), Max:98.7 °F (37.1 °C)      Intake / Output:    Intake/Output Summary (Last 24 hours) at 17 0713  Last data filed at 17 0230   Gross per 24 hour   Intake             1180 ml   Output             1125 ml   Net               55 ml       Physical Exam:    Constitutional: he appears well-developed. No distress. HENT:   Head: Atraumatic. Eyes: Pupils are equal, round, and reactive to light. Neck: Normal range of motion. Cardiovascular: Regular rhythm. Pulmonary/Chest: Effort normal and breath sounds normal. No respiratory distress. Abdominal: Soft. Bowel sounds are normal. he exhibits no distension. There is no tenderness. There is no guarding. No hernia. Musculoskeletal: Normal range of motion. Neurological: He is alert. CN II- XII grossly intact  Vascular: Feet are warm  Labs:   Recent Labs      17   0345   K  4.2   GLU  116*       Data Review     Assessment:     Patient Active Problem List    Diagnosis Date Noted    Ischemia of left lower extremity 2017    Claudication ([address] 2017    Occlusion of aorta ([facility] 2017    Chronic left-sided low back pain 2017    Essential hypertension 2016    Renal insufficiency 2016    Dyslipidemia 2016       Plan/Recommendations/Medical Decision Making:      We will DC antifungal and p.o. potassium pills now that all of Lasix  -Up and out of bed with physical therapy today potentially going to rehab later on this week  -I will remove abdominal staples this Friday  Patient clinically doing well    Elements of this note have been dictated using speech recognition software. As a result, errors of speech recognition may have occurred.

## 2017-09-21 LAB
ANION GAP SERPL CALC-SCNC: 10 MMOL/L (ref 7–16)
BUN SERPL-MCNC: 16 MG/DL (ref 6–23)
CALCIUM SERPL-MCNC: 8.8 MG/DL (ref 8.3–10.4)
CHLORIDE SERPL-SCNC: 102 MMOL/L (ref 98–107)
CO2 SERPL-SCNC: 26 MMOL/L (ref 21–32)
CREAT SERPL-MCNC: 1.87 MG/DL (ref 0.8–1.5)
GLUCOSE SERPL-MCNC: 118 MG/DL (ref 65–100)
POTASSIUM SERPL-SCNC: 4.5 MMOL/L (ref 3.5–5.1)
SODIUM SERPL-SCNC: 138 MMOL/L (ref 136–145)

## 2017-09-21 PROCEDURE — 97530 THERAPEUTIC ACTIVITIES: CPT

## 2017-09-21 PROCEDURE — 97605 NEG PRS WND THER DME<=50SQCM: CPT

## 2017-09-21 PROCEDURE — 80048 BASIC METABOLIC PNL TOTAL CA: CPT | Performed by: INTERNAL MEDICINE

## 2017-09-21 PROCEDURE — 74011250636 HC RX REV CODE- 250/636: Performed by: SURGERY

## 2017-09-21 PROCEDURE — 74011250637 HC RX REV CODE- 250/637: Performed by: SURGERY

## 2017-09-21 PROCEDURE — 77030025162 HC DRSG VAC ASST 1 KCON -B

## 2017-09-21 PROCEDURE — 36415 COLL VENOUS BLD VENIPUNCTURE: CPT | Performed by: INTERNAL MEDICINE

## 2017-09-21 PROCEDURE — 65660000004 HC RM CVT STEPDOWN

## 2017-09-21 PROCEDURE — 74750000023 HC WOUND THERAPY

## 2017-09-21 PROCEDURE — 77030012939 HC DRSG HYDRCOIL BMS -A

## 2017-09-21 PROCEDURE — 77030019934 HC DRSG VAC ASST KCON -B

## 2017-09-21 PROCEDURE — 97110 THERAPEUTIC EXERCISES: CPT

## 2017-09-21 PROCEDURE — 74011250637 HC RX REV CODE- 250/637: Performed by: PHYSICIAN ASSISTANT

## 2017-09-21 RX ORDER — SULFAMETHOXAZOLE AND TRIMETHOPRIM 800; 160 MG/1; MG/1
1 TABLET ORAL EVERY 12 HOURS
Status: DISCONTINUED | OUTPATIENT
Start: 2017-09-21 | End: 2017-09-29 | Stop reason: HOSPADM

## 2017-09-21 RX ADMIN — OXYCODONE HYDROCHLORIDE AND ACETAMINOPHEN 1 TABLET: 7.5; 325 TABLET ORAL at 08:25

## 2017-09-21 RX ADMIN — OXYCODONE HYDROCHLORIDE AND ACETAMINOPHEN 1 TABLET: 7.5; 325 TABLET ORAL at 23:10

## 2017-09-21 RX ADMIN — OXYCODONE HYDROCHLORIDE AND ACETAMINOPHEN 1 TABLET: 7.5; 325 TABLET ORAL at 15:06

## 2017-09-21 RX ADMIN — HEPARIN SODIUM 5000 UNITS: 5000 INJECTION, SOLUTION INTRAVENOUS; SUBCUTANEOUS at 15:05

## 2017-09-21 RX ADMIN — Medication 10 ML: at 21:00

## 2017-09-21 RX ADMIN — Medication 5 ML: at 20:59

## 2017-09-21 RX ADMIN — CEFAZOLIN 2 G: 1 INJECTION, POWDER, FOR SOLUTION INTRAMUSCULAR; INTRAVENOUS; PARENTERAL at 04:13

## 2017-09-21 RX ADMIN — SULFAMETHOXAZOLE AND TRIMETHOPRIM 1 TABLET: 800; 160 TABLET ORAL at 08:26

## 2017-09-21 RX ADMIN — PANTOPRAZOLE SODIUM 40 MG: 40 TABLET, DELAYED RELEASE ORAL at 05:49

## 2017-09-21 RX ADMIN — ASPIRIN 81 MG: 81 TABLET, COATED ORAL at 05:49

## 2017-09-21 RX ADMIN — Medication 5 ML: at 15:11

## 2017-09-21 RX ADMIN — AMLODIPINE BESYLATE 5 MG: 10 TABLET ORAL at 08:22

## 2017-09-21 RX ADMIN — Medication 10 ML: at 15:12

## 2017-09-21 RX ADMIN — HEPARIN SODIUM 5000 UNITS: 5000 INJECTION, SOLUTION INTRAVENOUS; SUBCUTANEOUS at 23:05

## 2017-09-21 RX ADMIN — OXYCODONE HYDROCHLORIDE AND ACETAMINOPHEN 1 TABLET: 7.5; 325 TABLET ORAL at 17:29

## 2017-09-21 RX ADMIN — Medication 10 ML: at 05:50

## 2017-09-21 RX ADMIN — OXYCODONE HYDROCHLORIDE AND ACETAMINOPHEN 1 TABLET: 7.5; 325 TABLET ORAL at 06:01

## 2017-09-21 RX ADMIN — DOCUSATE SODIUM 100 MG: 100 CAPSULE, LIQUID FILLED ORAL at 08:22

## 2017-09-21 RX ADMIN — HEPARIN SODIUM 5000 UNITS: 5000 INJECTION, SOLUTION INTRAVENOUS; SUBCUTANEOUS at 05:49

## 2017-09-21 RX ADMIN — Medication 5 ML: at 00:00

## 2017-09-21 RX ADMIN — SULFAMETHOXAZOLE AND TRIMETHOPRIM 1 TABLET: 800; 160 TABLET ORAL at 20:57

## 2017-09-21 RX ADMIN — OXYCODONE HYDROCHLORIDE AND ACETAMINOPHEN 1 TABLET: 7.5; 325 TABLET ORAL at 20:57

## 2017-09-21 RX ADMIN — Medication 5 ML: at 08:23

## 2017-09-21 RX ADMIN — Medication 5 ML: at 04:13

## 2017-09-21 RX ADMIN — CARVEDILOL 25 MG: 25 TABLET, FILM COATED ORAL at 08:22

## 2017-09-21 RX ADMIN — CARVEDILOL 25 MG: 25 TABLET, FILM COATED ORAL at 17:24

## 2017-09-21 NOTE — PROGRESS NOTES
[facility] PM assessment complete. Denies any needs at this time.

## 2017-09-21 NOTE — PROGRESS NOTES
Call to [facility], liaison to check on update with [facility]. States they called for more information and updated clinical, but no word of approval yet. CM will continue to follow.

## 2017-09-21 NOTE — PROGRESS NOTES
Patient seen and examined. No acute issues overnight. Wound VAC removed this morning on my evaluation still had a small clear drainage most likely seroma did not have a older or concerns for infection removed one staple and replaced wound VAC with wound care nurse. Will DC IV antibiotics and put on p.o. Bactrim while wound VAC is in place to prevent surgical infection and potentially graft infection. The surrounding groin does not look cellulitic bilateral lower extremities are well perfused. Patient continued awaiting placement. Will remove staples in abdomen and right groin today.     Stephanie Lewis

## 2017-09-21 NOTE — PROGRESS NOTES
Kidney function has been stable with creatinine of 1.7-1.9. May see additional improvement down the road  Will be available as needed.  And outpt follow up for CKD also

## 2017-09-21 NOTE — PROGRESS NOTES
Problem: Mobility Impaired (Adult and Pediatric)  Goal: *Acute Goals and Plan of Care (Insert Text)  STG:  (1.)Mr. Tran will move from supine to sit and sit to supine , scoot up and down and roll side to side with MINIMAL ASSIST within 5 day(s). (2.)Mr. Tran will transfer from bed to chair and chair to bed with MINIMAL ASSIST using the least restrictive device within 5 day(s). GOAL MET 9/19/2017  (3.)Mr. Tran will ambulate with MINIMAL ASSIST for 50 feet with the least restrictive device within 5 day(s). GOAL MET 9/19/2017  (4.)Mr. Tran will maintain stable vital signs throughout all functional mobility within 5 days. GOAL MET 9/19/2017  (5.)Mr. Tran will perform standing static and dynamic balance activities x 10 minutes with MINIMAL ASSIST to improve safety within 5 day(s). LTG:  (1.)Mr. Tran will move from supine to sit and sit to supine , scoot up and down and roll side to side in bed with CONTACT GUARD ASSIST within 10 day(s). (2.)Mr. Tarn will transfer from bed to chair and chair to bed with CONTACT GUARD ASSIST using the least restrictive device within 10 day(s). GOAL MET 9/19/2017  (3.)Mr. Tran will ambulate with CONTACT GUARD ASSIST for 100 feet with the least restrictive device within 10 day(s). GOAL MET 9/19/2017    LTG (revised 9/17/17):  (1.)Mr. Tran will move from supine to sit and sit to supine , scoot up and down and roll side to side in bed with MODIFIED INDEPENDENCE within 7 day(s). (2.)Mr. Tran will transfer from bed to chair and chair to bed with MODIFIED INDEPENDENCE using the least restrictive device within 7 day(s). (3.)Mr. Tran will ambulate with CONTACT GUARD ASSIST for 250 feet with the least restrictive device within 7 day(s). (4.)Mr. Tran will perform standing static and dynamic balance activities x 10 minutes with CONTACT GUARD ASSIST to improve safety within 7 day(s). (5.)Mr. Tran will perform 6 steps with HR and SUPERVISION within 7 days for safety ascending and descending stairs for home. (6.)Mr. Kishan Klein will be independent in HEP for strengthening B LE within 7 days. ________________________________________________________________________________________________      PHYSICAL THERAPY: Daily Note, Treatment Day: 4th and AM 9/21/2017  INPATIENT: Hospital Day: 15  Pay[place] / Plan[place] / Product Type: Medicaid /      NAME/AGE/GENDER: Blanca Handy is a 61 y.o. male     PRIMARY DIAGNOSIS: Aortic occlusion ([facility] [I74.10]  Ischemic colitis ([facility] [K55.9] Occlusion of aorta (HCC) Occlusion of aorta (HCC)  Procedure(s) (LRB):  SIGMOIDOSCOPY FLEXIBLE (N/A)  12 Days Post-Op  ICD-10: Treatment Diagnosis:       · Difficulty in walking, Not elsewhere classified (R26.2)   Precaution/Allergies:  Review of patient's allergies indicates no known allergies. ASSESSMENT:      Mr. Kishan Klein was sitting up in bedside chair upon contact. He is agreeable to treatment. He complained of more pain today. He needed verbal cues with walker for safety. He needed a standing rest break secondary to increased pain. He returned to room and performed seated exercises with good technique. He did not perform as many reps as yesterday secondary to increased pain. No progress noted this treatment. Will continue PT efforts. This section established at most recent assessment   PROBLEM LIST (Impairments causing functional limitations):  1. Decreased Strength  2. Decreased ADL/Functional Activities  3. Decreased Transfer Abilities  4. Decreased Ambulation Ability/Technique  5. Decreased Balance  6. Increased Pain  7. Decreased Activity Tolerance  8. Decreased Knowledge of Precautions  9. Decreased [place] with Home Exercise Program    INTERVENTIONS PLANNED: (Benefits and precautions of physical therapy have been discussed with the patient.)  1. Balance Exercise  2. Bed Mobility  3. Family Education  4.  Gait Training  5. Home Exercise Program (HEP)  6. Therapeutic Activites  7. Therapeutic Exercise/Strengthening  8. Transfer Training  9. Patient Education  10. Group Therapy      TREATMENT PLAN: Frequency/Duration: 5 times a week for duration of hospital stay  Rehabilitation Potential For Stated Goals: EXCELLENT      RECOMMENDED REHABILITATION/EQUIPMENT: (at time of discharge pending progress): Due to the probability of continued deficits (see above) this patient will likely need continued skilled physical therapy after discharge. Equipment:   · None at this time                   HISTORY:   History of Present Injury/Illness (Reason for Referral): Aortic occlusion (HCC) [I74.10] Occlusion of aorta (HCC) Occlusion of aorta (HCC)  Procedure(s) (LRB):  left femoral embolectomy/left lower extremity arteriogram (Left)  1 Day Post-Op  Past Medical History/Comorbidities:   Mr. Chari Salas  has a past medical history of Acute diastolic (congestive) heart failure ([facility] (12/4/2016); CAD (coronary artery disease); Chronic kidney disease; Chronic kidney disease, stage 3; Chronic pain; Ex-smoker; GERD (gastroesophageal reflux disease); TIA (transient ischemic attack); Hypercholesteremia; Hypertension; Old MI (myocardial infarction) (11/2016); Osteoarthritis; and Stroke ([facility] Mr. Chari Salas  has a past surgical history that includes heart catheterization (12/2016); colonoscopy (N/A, 6/22/2017); knee arthroscopy (Left); flexible sigmoidoscopy (N/A, 9/9/2017); vascular surgery procedure unlist (09/07/2017); and vascular surgery procedure unlist (Left, 09/08/2017).   Social History/Living Environment:   Home Environment: Private residence  # Steps to Enter: 6  Rails to Enter: Yes  One/Two Story Residence: One story  Living Alone: Yes  Support Systems: Child(tisha)  Patient Expects to be Discharged to[de-identified] Unknown  Current DME Used/Available at Home: Cane, straight  Tub or Shower Type: Shower  Prior Level of Function/Work/Activity:  Modified independent with straight cane for ambulation secondary to decreased sensation LLE for a few months, he reports independence with ADLs and driving at baseline. Number of Personal Factors/Comorbidities that affect the Plan of Care: 3+: HIGH COMPLEXITY   EXAMINATION:   Most Recent Physical Functioning:   Gross Assessment:                  Posture:  Posture (WDL): Exceptions to WDL  Posture Assessment: Forward head, Rounded shoulders  Balance:  Sitting: Intact  Sitting - Static: Good (unsupported)  Sitting - Dynamic: Good (unsupported)  Standing: Impaired  Standing - Static: Good  Standing - Dynamic : Fair Bed Mobility:  Rolling: Stand-by asssistance  Sit to Supine: Stand-by asssistance  Scooting: Stand-by asssistance  Wheelchair Mobility:     Transfers:  Sit to Stand: Stand-by asssistance  Stand to Sit: Stand-by asssistance  Gait:     Base of Support: Widened  Speed/Jennifer: Pace decreased (<100 feet/min)  Step Length: Left shortened;Right shortened  Gait Abnormalities: Decreased step clearance; Path deviations  Distance (ft): 120 Feet (ft) (x 2 with standing rest break)  Assistive Device: Walker, rolling  Ambulation - Level of Assistance: Contact guard assistance  Interventions: Safety awareness training;Verbal cues       Body Structures Involved:  1. Nerves  2. Heart  3. Muscles Body Functions Affected:  1. Sensory/Pain  2. Cardio  3. Hematological  4. Neuromusculoskeletal  5. Movement Related Activities and Participation Affected:  1. Mobility  2. Self Care  3. Domestic Life  4. Interpersonal Interactions and Relationships  5.  Community, Social and [place]   Number of elements that affect the Plan of Care: 4+: HIGH COMPLEXITY   CLINICAL PRESENTATION:   Presentation: Evolving clinical presentation with unstable and unpredictable characteristics: HIGH COMPLEXITY   CLINICAL DECISION MAKIN [facility] Mobility Inpatient Short Form  How much difficulty does the patient currently have. .. Unable A Lot A Little None   1. Turning over in bed (including adjusting bedclothes, sheets and blankets)? [ ] 1   [X] 2   [ ] 3   [ ] 4   2. Sitting down on and standing up from a chair with arms ( e.g., wheelchair, bedside commode, etc.)   [X] 1   [ ] 2   [ ] 3   [ ] 4   3. Moving from lying on back to sitting on the side of the bed? [ ] 1   [X] 2   [ ] 3   [ ] 4   How much help from another person does the patient currently need. .. Total A Lot A Little None   4. Moving to and from a bed to a chair (including a wheelchair)? [X] 1   [ ] 2   [ ] 3   [ ] 4   5. Need to walk in hospital room? [X] 1   [ ] 2   [ ] 3   [ ] 4   6. Climbing 3-5 steps with a railing? [X] 1   [ ] 2   [ ] 3   [ ] 4   © 2007, Trustees of [address] [place] [address] [address] [address], under license to [employer]. All rights reserved    Score:  Initial: 8 Most Recent: X (Date: -- )     Interpretation of Tool:  Represents activities that are increasingly more difficult (i.e. Bed mobility, Transfers, Gait). Score 24 23 22-20 19-15 14-10 9-7 6       Modifier CH CI CJ CK CL CM CN         · Mobility - Walking and Moving Around:               - CURRENT STATUS:    CM - 80%-99% impaired, limited or restricted               - GOAL STATUS:           CK - 40%-59% impaired, limited or restricted               - D/C STATUS:                       ---------------To be determined---------------  Pay[place] / [place] / Product Type: Medicaid /       Medical Necessity:     · Patient demonstrates good rehab potential due to higher previous functional level. Reason for Services/Other Comments:  · Patient continues to require modification of therapeutic interventions to increase complexity of exercises.    Use of outcome tool(s) and clinical judgement create a POC that gives a: Questionable prediction of patient's progress: MODERATE COMPLEXITY                 TREATMENT:   (In addition to Assessment/Re-Assessment sessions the following treatments were rendered)   Pre-treatment Symptoms/Complaints: \"I think I pulled a muscle or something. \"  Pain: Initial:   Pain Intensity 1: 6  Pain Intervention(s) 1: Repositioned, Ambulation/Increased Activity  Post Session: Unchanged - he continued to have pain in right upper quadrant. RN is aware of patient's pain. Therapeutic Activity: (    13 Minutes): Therapeutic activities including transfer training, ambulation on level ground, static/dynamic standing balance activities, ambulation on level ground, and patient education to improve mobility, strength and activity tolerance. Required verbal cues Safety awareness training;Verbal cues to promote static and dynamic balance in standing and promote coordination of bilateral, lower extremity(s). Therapeutic Exercise: ( 13 Minutes):  Exercises per grid below to improve mobility and strength. Required minimal verbal cues to promote proper body alignment, promote proper body posture and promote proper body breathing techniques. Progressed repetitions as indicated. Date:  9/18/17 Date:  9/20/17 Date:  9-21-17   ACTIVITY/EXERCISE AM PM AM PM AM PM   Ambulation:           Distance  Device  Duration         Seated Heel Raises x20  \x25  x20 x25   Seated Toe Raises x20  x25  x20 x25   Seated Long Arc Quads x20  x25  x20 x15   Seated Marching x20  x25  x20 x15   Seated Hip Abduction x20  x25  x5 x15   Glut set   x25      B = bilateral; AA = active assistive; A = active; P = passive      · Braces/Orthotics/Lines/Etc: wound vac  Treatment/Session Assessment:    · Response to Treatment:  See above   · Interdisciplinary Collaboration:  · Physical Therapy Assistant and Registered Nurse  · After treatment position/precautions:  · Supine in bed, Bed/Chair-wheels locked, Bed in low position, Call light within reach and RN notified  · Compliance with Program/Exercises: making an effort.   · Recommendations/Intent for next treatment session: \"Next visit will focus on advancements to more challenging activities and reduction in assistance provided\".   Total Treatment Duration  PT Patient Time In/Time Out  Time In: 0910  Time Out: [address], CAY[place]

## 2017-09-21 NOTE — WOUND CARE
Patient seen with Dr Mik Wakefield for left groin wound vac dressing change. He removed one more staple to try and allow better drainage flow. When old dressing removed a large amount of serosanguinous drainage came out/was expressed. No new drainage in canister of vac yesterday. May need white foam packing at opening to try and keep small incision opening patent and draining. Patient stated he started feeling pressure over area about 1 or 2 am so he knew it was not draining. Will continue to follow and manage. 10:00 wound not draining, peeled back and changed small packing from black foam to white foam. Resealed with extra drape and paste strip. Sealed well and serosanguinous drainage started immediately, to canister. Patient reports relief. Updated surgeon and primary nurse.

## 2017-09-22 LAB
ERYTHROCYTE [DISTWIDTH] IN BLOOD BY AUTOMATED COUNT: 15.6 % (ref 11.9–14.6)
HCT VFR BLD AUTO: 31.2 % (ref 41.1–50.3)
HGB BLD-MCNC: 10.3 G/DL (ref 13.6–17.2)
MCH RBC QN AUTO: 29.3 PG (ref 26.1–32.9)
MCHC RBC AUTO-ENTMCNC: 33 G/DL (ref 31.4–35)
MCV RBC AUTO: 88.6 FL (ref 79.6–97.8)
PLATELET # BLD AUTO: 424 K/UL (ref 150–450)
PMV BLD AUTO: 9.4 FL (ref 10.8–14.1)
RBC # BLD AUTO: 3.52 M/UL (ref 4.23–5.67)
WBC # BLD AUTO: 9.5 K/UL (ref 4.3–11.1)

## 2017-09-22 PROCEDURE — 74011250637 HC RX REV CODE- 250/637: Performed by: PHYSICIAN ASSISTANT

## 2017-09-22 PROCEDURE — 97535 SELF CARE MNGMENT TRAINING: CPT

## 2017-09-22 PROCEDURE — 74011250636 HC RX REV CODE- 250/636: Performed by: SURGERY

## 2017-09-22 PROCEDURE — 74011250637 HC RX REV CODE- 250/637: Performed by: SURGERY

## 2017-09-22 PROCEDURE — 74750000023 HC WOUND THERAPY

## 2017-09-22 PROCEDURE — 85027 COMPLETE CBC AUTOMATED: CPT | Performed by: SURGERY

## 2017-09-22 PROCEDURE — 77030019952 HC CANSTR VAC ASST KCON -B

## 2017-09-22 PROCEDURE — 65660000004 HC RM CVT STEPDOWN

## 2017-09-22 PROCEDURE — 97530 THERAPEUTIC ACTIVITIES: CPT

## 2017-09-22 PROCEDURE — 97110 THERAPEUTIC EXERCISES: CPT

## 2017-09-22 PROCEDURE — 36415 COLL VENOUS BLD VENIPUNCTURE: CPT | Performed by: SURGERY

## 2017-09-22 RX ADMIN — HEPARIN SODIUM 5000 UNITS: 5000 INJECTION, SOLUTION INTRAVENOUS; SUBCUTANEOUS at 05:04

## 2017-09-22 RX ADMIN — HEPARIN SODIUM 5000 UNITS: 5000 INJECTION, SOLUTION INTRAVENOUS; SUBCUTANEOUS at 22:13

## 2017-09-22 RX ADMIN — HEPARIN SODIUM 5000 UNITS: 5000 INJECTION, SOLUTION INTRAVENOUS; SUBCUTANEOUS at 15:21

## 2017-09-22 RX ADMIN — CARVEDILOL 25 MG: 25 TABLET, FILM COATED ORAL at 08:58

## 2017-09-22 RX ADMIN — OXYCODONE HYDROCHLORIDE AND ACETAMINOPHEN 1 TABLET: 7.5; 325 TABLET ORAL at 05:03

## 2017-09-22 RX ADMIN — Medication 10 ML: at 21:02

## 2017-09-22 RX ADMIN — PANTOPRAZOLE SODIUM 40 MG: 40 TABLET, DELAYED RELEASE ORAL at 05:03

## 2017-09-22 RX ADMIN — CARVEDILOL 25 MG: 25 TABLET, FILM COATED ORAL at 16:55

## 2017-09-22 RX ADMIN — Medication 10 ML: at 05:04

## 2017-09-22 RX ADMIN — OXYCODONE HYDROCHLORIDE AND ACETAMINOPHEN 1 TABLET: 7.5; 325 TABLET ORAL at 18:39

## 2017-09-22 RX ADMIN — OXYCODONE HYDROCHLORIDE AND ACETAMINOPHEN 1 TABLET: 7.5; 325 TABLET ORAL at 09:03

## 2017-09-22 RX ADMIN — Medication 5 ML: at 04:00

## 2017-09-22 RX ADMIN — Medication 5 ML: at 21:02

## 2017-09-22 RX ADMIN — DOCUSATE SODIUM 100 MG: 100 CAPSULE, LIQUID FILLED ORAL at 08:58

## 2017-09-22 RX ADMIN — Medication 5 ML: at 16:55

## 2017-09-22 RX ADMIN — ASPIRIN 81 MG: 81 TABLET, COATED ORAL at 05:05

## 2017-09-22 RX ADMIN — AMLODIPINE BESYLATE 5 MG: 10 TABLET ORAL at 08:59

## 2017-09-22 RX ADMIN — Medication 5 ML: at 09:00

## 2017-09-22 RX ADMIN — Medication 5 ML: at 12:00

## 2017-09-22 RX ADMIN — SULFAMETHOXAZOLE AND TRIMETHOPRIM 1 TABLET: 800; 160 TABLET ORAL at 08:59

## 2017-09-22 RX ADMIN — OXYCODONE HYDROCHLORIDE AND ACETAMINOPHEN 1 TABLET: 7.5; 325 TABLET ORAL at 22:15

## 2017-09-22 RX ADMIN — SULFAMETHOXAZOLE AND TRIMETHOPRIM 1 TABLET: 800; 160 TABLET ORAL at 21:02

## 2017-09-22 RX ADMIN — OXYCODONE HYDROCHLORIDE AND ACETAMINOPHEN 1 TABLET: 7.5; 325 TABLET ORAL at 15:27

## 2017-09-22 RX ADMIN — Medication 10 ML: at 15:21

## 2017-09-22 NOTE — PROGRESS NOTES
Problem: Mobility Impaired (Adult and Pediatric)  Goal: *Acute Goals and Plan of Care (Insert Text)  STG:  (1.)Mr. Tran will move from supine to sit and sit to supine , scoot up and down and roll side to side with MINIMAL ASSIST within 5 day(s). (2.)Mr. Tran will transfer from bed to chair and chair to bed with MINIMAL ASSIST using the least restrictive device within 5 day(s). GOAL MET 9/19/2017  (3.)Mr. Tran will ambulate with MINIMAL ASSIST for 50 feet with the least restrictive device within 5 day(s). GOAL MET 9/19/2017  (4.)Mr. Tran will maintain stable vital signs throughout all functional mobility within 5 days. GOAL MET 9/19/2017  (5.)Mr. Tran will perform standing static and dynamic balance activities x 10 minutes with MINIMAL ASSIST to improve safety within 5 day(s). LTG:  (1.)Mr. Tran will move from supine to sit and sit to supine , scoot up and down and roll side to side in bed with CONTACT GUARD ASSIST within 10 day(s). (2.)Mr. Tran will transfer from bed to chair and chair to bed with CONTACT GUARD ASSIST using the least restrictive device within 10 day(s). GOAL MET 9/19/2017  (3.)Mr. Tran will ambulate with CONTACT GUARD ASSIST for 100 feet with the least restrictive device within 10 day(s). GOAL MET 9/19/2017    LTG (revised 9/17/17):  (1.)Mr. Tran will move from supine to sit and sit to supine , scoot up and down and roll side to side in bed with MODIFIED INDEPENDENCE within 7 day(s). (2.)Mr. Tran will transfer from bed to chair and chair to bed with MODIFIED INDEPENDENCE using the least restrictive device within 7 day(s). (3.)Mr. Tran will ambulate with CONTACT GUARD ASSIST for 250 feet with the least restrictive device within 7 day(s). (4.)Mr. Tran will perform standing static and dynamic balance activities x 10 minutes with CONTACT GUARD ASSIST to improve safety within 7 day(s). (5.)Mr. Tran will perform 6 steps with HR and SUPERVISION within 7 days for safety ascending and descending stairs for home. (6.)Mr. Marquise Nettles will be independent in HEP for strengthening B LE within 7 days. ________________________________________________________________________________________________      PHYSICAL THERAPY: Daily Note, Treatment Day: 5th and AM 9/22/2017  INPATIENT: Hospital Day: 16  Payor: [place] / Plan: [place] MEDICAID [facility] / Product Type: Medicaid /      NAME/AGE/GENDER: Cherri Draper is a 61 y.o. male     PRIMARY DIAGNOSIS: Aortic occlusion ([facility] [address] [I74.10]  Ischemic colitis ([facility] [address] [K55.9] Occlusion of aorta (HCC) Occlusion of aorta (HCC)  Procedure(s) (LRB):  SIGMOIDOSCOPY FLEXIBLE (N/A)  13 Days Post-Op  ICD-10: Treatment Diagnosis:       · Difficulty in walking, Not elsewhere classified (R26.2)   Precaution/Allergies:  Review of patient's allergies indicates no known allergies. ASSESSMENT:      Mr. Marquise Nettles was supine in bed and agreeable to treatment this morning. He stated he feels much better than yesterday and his pain is 5/10. He performed supine to sit transfer with SBA. He ambulated with rolling walker and CGA. He increased his ambulation distance this treatment. He required a standing rest break during gait secondary to fatigue. He returned to his room and performed seated exercises with good technique. He increased his exercise reps today. Fair progress noted with activity tolerance and mobility. Will continue PT efforts. This section established at most recent assessment   PROBLEM LIST (Impairments causing functional limitations):  1. Decreased Strength  2. Decreased ADL/Functional Activities  3. Decreased Transfer Abilities  4. Decreased Ambulation Ability/Technique  5. Decreased Balance  6. Increased Pain  7. Decreased Activity Tolerance  8. Decreased Knowledge of Precautions  9.  Decreased [place] with Home Exercise Program    INTERVENTIONS PLANNED: (Benefits and precautions of physical therapy have been discussed with the patient.)  1. Balance Exercise  2. Bed Mobility  3. Family Education  4. Gait Training  5. Home Exercise Program (HEP)  6. Therapeutic Activites  7. Therapeutic Exercise/Strengthening  8. Transfer Training  9. Patient Education  10. Group Therapy      TREATMENT PLAN: Frequency/Duration: 5 times a week for duration of hospital stay  Rehabilitation Potential For Stated Goals: EXCELLENT      RECOMMENDED REHABILITATION/EQUIPMENT: (at time of discharge pending progress): Due to the probability of continued deficits (see above) this patient will likely need continued skilled physical therapy after discharge. Equipment:   · None at this time                   HISTORY:   History of Present Injury/Illness (Reason for Referral): Aortic occlusion (HCC) [I74.10] Occlusion of aorta (HCC) Occlusion of aorta (HCC)  Procedure(s) (LRB):  left femoral embolectomy/left lower extremity arteriogram (Left)  1 Day Post-Op  Past Medical History/Comorbidities:   Mr. Julio César Milligan  has a past medical history of Acute diastolic (congestive) heart failure ([facility] [address] (12/4/2016); CAD (coronary artery disease); Chronic kidney disease; Chronic kidney disease, stage 3; Chronic pain; Ex-smoker; GERD (gastroesophageal reflux disease); TIA (transient ischemic attack); Hypercholesteremia; Hypertension; Old MI (myocardial infarction) (11/2016); Osteoarthritis; and Stroke ([facility] [address] Mr. Julio César Milligan  has a past surgical history that includes heart catheterization (12/2016); colonoscopy (N/A, 6/22/2017); knee arthroscopy (Left); flexible sigmoidoscopy (N/A, 9/9/2017); vascular surgery procedure unlist (09/07/2017); and vascular surgery procedure unlist (Left, 09/08/2017).   Social History/Living Environment:   Home Environment: Private residence  # Steps to Enter: 6  Rails to Enter: Yes  One/Two Story Residence: One story  Living Alone: Yes  Support Systems: Child(tisha)  Patient Expects to be Discharged to[de-identified] Unknown  Current DME Used/Available at Home: Cane, straight  Tub or Shower Type: Shower  Prior Level of Function/Work/Activity:  Modified independent with straight cane for ambulation secondary to decreased sensation LLE for a few months, he reports independence with ADLs and driving at baseline. Number of Personal Factors/Comorbidities that affect the Plan of Care: 3+: HIGH COMPLEXITY   EXAMINATION:   Most Recent Physical Functioning:   Gross Assessment:                  Posture:  Posture (WDL): Exceptions to WDL  Posture Assessment: Forward head, Rounded shoulders  Balance:  Sitting: Intact  Standing: Impaired  Standing - Static: Good  Standing - Dynamic : Fair Bed Mobility:  Supine to Sit: Stand-by asssistance  Scooting: Stand-by asssistance  Wheelchair Mobility:     Transfers:  Sit to Stand: Stand-by asssistance  Stand to Sit: Stand-by asssistance  Gait:     Base of Support: Widened  Speed/Jennifer: Fluctuations  Step Length: Left shortened;Right shortened  Gait Abnormalities: Decreased step clearance;Trunk sway increased  Distance (ft): 120 Feet (ft) (x2)  Assistive Device: Walker, rolling  Ambulation - Level of Assistance: Contact guard assistance  Interventions: Safety awareness training;Verbal cues       Body Structures Involved:  1. Nerves  2. Heart  3. Muscles Body Functions Affected:  1. Sensory/Pain  2. Cardio  3. Hematological  4. Neuromusculoskeletal  5. Movement Related Activities and Participation Affected:  1. Mobility  2. Self Care  3. Domestic Life  4. Interpersonal Interactions and Relationships  5. Community, Social and [place]   Number of elements that affect the Plan of Care: 4+: HIGH COMPLEXITY   CLINICAL PRESENTATION:   Presentation: Evolving clinical presentation with unstable and unpredictable characteristics: HIGH COMPLEXITY   CLINICAL DECISION MAKIN [facility] Inpatient Short Form  How much difficulty does the patient currently have. ..  Unable A Lot A Little None   1. Turning over in bed (including adjusting bedclothes, sheets and blankets)? [ ] 1   [X] 2   [ ] 3   [ ] 4   2. Sitting down on and standing up from a chair with arms ( e.g., wheelchair, bedside commode, etc.)   [X] 1   [ ] 2   [ ] 3   [ ] 4   3. Moving from lying on back to sitting on the side of the bed? [ ] 1   [X] 2   [ ] 3   [ ] 4   How much help from another person does the patient currently need. .. Total A Lot A Little None   4. Moving to and from a bed to a chair (including a wheelchair)? [X] 1   [ ] 2   [ ] 3   [ ] 4   5. Need to walk in hospital room? [X] 1   [ ] 2   [ ] 3   [ ] 4   6. Climbing 3-5 steps with a railing? [X] 1   [ ] 2   [ ] 3   [ ] 4   © 2007, Trustees of [address] [place] [address], under license to [employer]. All rights reserved    Score:  Initial: 8 Most Recent: X (Date: -- )     Interpretation of Tool:  Represents activities that are increasingly more difficult (i.e. Bed mobility, Transfers, Gait). Score 24 23 22-20 19-15 14-10 9-7 6       Modifier CH CI CJ CK CL CM CN         · Mobility - Walking and Moving Around:               - CURRENT STATUS:    CM - 80%-99% impaired, limited or restricted               - GOAL STATUS:           CK - 40%-59% impaired, limited or restricted               - D/C STATUS:                       ---------------To be determined---------------  Payor[place] / [place] / Product Type: Medicaid /       Medical Necessity:     · Patient demonstrates good rehab potential due to higher previous functional level. Reason for Services/Other Comments:  · Patient continues to require modification of therapeutic interventions to increase complexity of exercises.    Use of outcome tool(s) and clinical judgement create a POC that gives a: Questionable prediction of patient's progress: MODERATE COMPLEXITY                 TREATMENT:   (In addition to Assessment/Re-Assessment sessions the following treatments were rendered)   Pre-treatment Symptoms/Complaints: \"I feel so much better today. \"  Pain: Initial:   Pain Intensity 1: 5  Pain Intervention(s) 1: Repositioned, Ambulation/Increased Activity  Post Session: Unchanged       Therapeutic Activity: (    13 Minutes): Therapeutic activities including bed mobility training, transfer training, ambulation on level ground, static/dynamic standing balance activities, ambulation on level ground, and patient education to improve mobility, strength and activity tolerance. Required verbal cues Safety awareness training;Verbal cues to promote static and dynamic balance in standing and promote coordination of bilateral, lower extremity(s). Therapeutic Exercise: ( 12 Minutes):  Exercises per grid below to improve mobility and strength. Required minimal verbal cues to promote proper body alignment, promote proper body posture and promote proper body breathing techniques. Progressed repetitions as indicated. Date:  9/22/17 Date:  9/20/17 Date:  9-21-17   ACTIVITY/EXERCISE AM PM AM PM AM PM   Ambulation:           Distance  Device  Duration         Seated Heel Raises x25  \x25  x20 x25   Seated Toe Raises x25  x25  x20 x25   Seated Long Arc Quads x25  x25  x20 x15   Seated Marching x25  x25  x20 x15   Seated Hip Abduction x25  x25  x5 x15   Glut set x25  x25      Shoulder shrugs x25                 B = bilateral; AA = active assistive; A = active; P = passive      · Braces/Orthotics/Lines/Etc: wound vac  Treatment/Session Assessment:    · Response to Treatment:  Tolerated treatment well with some fatigue. · Interdisciplinary Collaboration:  · Physical Therapy Assistant, Registered Nurse and SPTA  · After treatment position/precautions:  · Up in chair, Bed/Chair-wheels locked, Bed in low position, Call light within reach, RN notified and Visitors at bedside  · Compliance with Program/Exercises: making an effort.   · Recommendations/Intent for next treatment session: \"Next visit will focus on advancements to more challenging activities and reduction in assistance provided\".   Total Treatment Duration  PT Patient Time In/Time Out  Time In: 1008  Time Out: [address] [facility]   [place], [place]

## 2017-09-22 NOTE — PROGRESS NOTES
Problem: Self Care Deficits Care Plan (Adult)  Goal: *Acute Goals and Plan of Care (Insert Text)  GOALS:    1: Pt will perform toileting with maximal assistance and adaptive equipment as needed. 2: Pt will perform grooming with minimal assistance and adaptive equipment as needed. 3: Pt will tolerate sitting edge of bed for 5 minutes for ADL prep.  4: Pt will perform toilet transfers with moderate assistance and the least restrictive device to promote quality of life. 5: Pt will tolerate 15 minutes of therapeutic activity with minimal rest breaks. Time Frame: 7 visits      OCCUPATIONAL THERAPY: Daily Note, Treatment Day: 4th and PM 9/22/2017  INPATIENT: Hospital Day: 16  Payor: [place] / Plan: [address] [place] [address] / Product Type: Medicaid /      NAME/AGE/GENDER: Katie John is a 61 y.o. male     PRIMARY DIAGNOSIS:  Aortic occlusion ([facility] [address] [I74.10]  Ischemic colitis ([facility] [address] [K55.9] Occlusion of aorta (HCC) Occlusion of aorta (HCC)  Procedure(s) (LRB):  SIGMOIDOSCOPY FLEXIBLE (N/A)  13 Days Post-Op  ICD-10: Treatment Diagnosis:        · Generalized Muscle Weakness (M62.81)  · Dizziness and Giddiness (R42)   Precautions/Allergies:         Review of patient's allergies indicates no known allergies. ASSESSMENT:   Mr. Shakila Garcia was admitted for the above diagnosis. Patient sitting up in chair upon arrival. Pt was introduced to and practiced using adaptive equipment (reacher and sock aid). Pt able to don/doff socks using equipment. Pt later assisted with shaving. Required more assistance due to being on blood thinners at this time. Pt left up in chair with all needs in reach. Will continue to benefit from skilled OT during stay. This section established at most recent assessment   PROBLEM LIST (Impairments causing functional limitations):  1. Decreased Strength  2. Decreased ADL/Functional Activities  3. Decreased Transfer Abilities  4.  Decreased Ambulation Ability/Technique  5. Decreased Balance  6. Increased Pain  7. Decreased Activity Tolerance  8. Decreased Work Simplification/Energy Conservation Techniques  9. Increased Fatigue  10. Increased Shortness of Breath  11. Decreased Flexibility/Joint Mobility    INTERVENTIONS PLANNED: (Benefits and precautions of occupational therapy have been discussed with the patient.)  1. Activities of daily living training  2. Adaptive equipment training  3. Clothing management  4. Donning&doffing training  5. Group therapy  6. Therapeutic activity  7. Therapeutic exercise  8. Energy conservation      TREATMENT PLAN: Frequency/Duration: Follow patient 3x/week to address above goals. Rehabilitation Potential For Stated Goals: GOOD      RECOMMENDED REHABILITATION/EQUIPMENT: (at time of discharge pending progress): Due to the probability of continued deficits (see above) this patient will likely need continued skilled occupational therapy after discharge. Equipment:   · to be determined               OCCUPATIONAL PROFILE AND HISTORY:   History of Present Injury/Illness (Reason for Referral):  See H&P  Past Medical History/Comorbidities:   Mr. Cali Clemente  has a past medical history of Acute diastolic (congestive) heart failure ([facility] [address] (12/4/2016); CAD (coronary artery disease); Chronic kidney disease; Chronic kidney disease, stage 3; Chronic pain; Ex-smoker; GERD (gastroesophageal reflux disease); TIA (transient ischemic attack); Hypercholesteremia; Hypertension; Old MI (myocardial infarction) (11/2016); Osteoarthritis; and Stroke ([address] Mr. Cali Clemente  has a past surgical history that includes heart catheterization (12/2016); colonoscopy (N/A, 6/22/2017); knee arthroscopy (Left); flexible sigmoidoscopy (N/A, 9/9/2017); vascular surgery procedure unlist (09/07/2017); and vascular surgery procedure unlist (Left, 09/08/2017).   Social History/Living Environment:   Home Environment: Private residence  # Steps to Enter: 6  Rails to Enter: Yes  One/Two Story Residence: One story  Living Alone: Yes  Support Systems: Child(tisha)  Patient Expects to be Discharged to[de-identified] Unknown  Current DME Used/Available at Home: Cane, straight  Tub or Shower Type: Shower  Prior Level of Function/Work/Activity:  Works, drives, Independent with self care. Number of Personal Factors/Comorbidities that affect the Plan of Care: Expanded review of therapy/medical records (1-2):  MODERATE COMPLEXITY   ASSESSMENT OF OCCUPATIONAL PERFORMANCE[de-identified]   Activities of Daily Living:           Basic ADLs (From Assessment) Complex ADLs (From Assessment)   Basic ADL  Feeding: Minimum assistance  Oral Facial Hygiene/Grooming: Maximum assistance  Bathing: Total assistance  Upper Body Dressing: Maximum assistance  Lower Body Dressing: Total assistance  Toileting: Maximum assistance     Grooming/Bathing/Dressing Activities of Daily Living   Grooming  Shaving: Maximum assistance (due to using safety razor and on blood thinners) Cognitive Retraining  Safety/Judgement: Awareness of environment                     Lower Body Dressing Assistance  Socks: Supervision/set-up  Leg Crossed Method Used: No  Position Performed: Seated in chair  Cues: Doff; Don  Adaptive Equipment Used: Reacher;Sock aid Bed/Mat Mobility  Supine to Sit: Stand-by asssistance  Sit to Stand: Stand-by asssistance  Scooting: Stand-by asssistance          Most Recent Physical Functioning:   Gross Assessment:                  Posture:  Posture (WDL): Exceptions to WDL  Posture Assessment:  Forward head, Rounded shoulders  Balance:  Sitting: Intact  Standing: Impaired  Standing - Static: Good  Standing - Dynamic : Fair Bed Mobility:  Supine to Sit: Stand-by asssistance  Scooting: Stand-by asssistance  Wheelchair Mobility:     Transfers:  Sit to Stand: Stand-by asssistance  Stand to Sit: Stand-by asssistance      Patient Vitals for the past 6 hrs:   BP BP Patient Position SpO2 Pulse   09/22/17 1202 119/61 Sitting 97 % 72   09/22/17 1519 144/65 Sitting 99 % 73        Mental Status  Neurologic State: Alert, Appropriate for age  Orientation Level: Oriented X4  Cognition: Follows commands  Perception: Appears intact  Perseveration: No perseveration noted  Safety/Judgement: Awareness of environment                               Physical Skills Involved:  1. Range of Motion  2. Balance  3. Strength  4. Activity Tolerance  5. Pain (acute) Cognitive Skills Affected (resulting in the inability to perform in a timely and safe manner):  1. WFLs Psychosocial Skills Affected:  1. Habits/Routines  2. Environmental Adaptation  3. Self-Awareness   Number of elements that affect the Plan of Care: 5+:  HIGH COMPLEXITY   CLINICAL DECISION MAKIN[address] [place] [address] AM-PAC 6 Clicks   Daily Activity Inpatient Short Form  How much help from another person does the patient currently need. .. Total A Lot A Little None   1. Putting on and taking off regular lower body clothing? [X] 1   [ ] 2   [ ] 3   [ ] 4   2. Bathing (including washing, rinsing, drying)? [X] 1   [ ] 2   [ ] 3   [ ] 4   3. Toileting, which includes using toilet, bedpan or urinal?   [X] 1   [ ] 2   [ ] 3   [ ] 4   4. Putting on and taking off regular upper body clothing? [X] 1   [ ] 2   [ ] 3   [ ] 4   5. Taking care of personal grooming such as brushing teeth? [ ] 1   [X] 2   [ ] 3   [ ] 4   6. Eating meals? [ ] 1   [X] 2   [ ] 3   [ ] 4   © , Trustees of [address] [place] [address], under license to [employer]. All rights reserved    Score:  Initial: 8 Most Recent: X (Date: -- )     Interpretation of Tool:  Represents activities that are increasingly more difficult (i.e. Bed mobility, Transfers, Gait).        Score 24 23 22-20 19-15 14-10 9-7 6       Modifier CH CI CJ CK CL CM CN         · Self Care:               - CURRENT STATUS:    CM - 80%-99% impaired, limited or restricted               - GOAL STATUS:           CK - 40%-59% impaired, limited or restricted  - D/C STATUS:                       ---------------To be determined---------------  Payor[place] / [place] [employer] [place] / Product Type: Medicaid /       Medical Necessity:     · Patient demonstrates good rehab potential due to higher previous functional level. Reason for Services/Other Comments:  · Patient continues to require skilled intervention due to medical complications and patient unable to attend/participate in therapy as expected. Use of outcome tool(s) and clinical judgement create a POC that gives a: HIGH COMPLEXITY             TREATMENT:   (In addition to Assessment/Re-Assessment sessions the following treatments were rendered)      Pre-treatment Symptoms/Complaints:  Decreased ability to perform ADLs, self care, and functional mobility; decreased tolerance for activities  Pain: Initial:   Pain Intensity 1: 0  Post Session:  Appears to be in less pain, too tired to answer      Self Care: (20 minutes): Procedure(s) (per grid) utilized to improve and/or restore self-care/home management as related to dressing and grooming. Required moderate manual and tactile cueing to facilitate activities of daily living skills.       UE Exercises (with yellow and red theraband) Date:  9/12/2017 Date:  9/19/2017 Date:     Activity/Exercise Parameters Parameters Parameters   Shoulder Abd/Adduction 10 reps 3 sets 15 reps with yellow theraband    Shoulder Flexion 8 reps 3 sets 15 reps with 2# dowel    Elbow Flexion 10 reps 2 sets 20 reps with red theraband    Punches 10 reps 3 sets 15 reps with 2# dowel                        Braces/Orthotics/Lines/Etc:   · IV  · lopez catheter  · arterial line  · O2 Device: Nasal cannula  Treatment/Session Assessment:    · Response to Treatment:  Agrees to therapy  · Interdisciplinary Collaboration:  · Certified Occupational Therapy Assistant and Registered Nurse  · After treatment position/precautions:  · Up in chair, Bed/Chair-wheels locked, Call light within reach and Nurse at bedside  · Compliance with Program/Exercises: Will assess as treatment progresses. · Recommendations/Intent for next treatment session: \"Next visit will focus on advancements to more challenging activities and reduction in assistance provided\".   Total Treatment Duration:OT Patient Time In/Time Out  Time In: 1500  Time Out: [address]

## 2017-09-22 NOTE — PROGRESS NOTES
[address]   [address] FAX: [phone]         VASCULAR SURGERY FLOOR PROGRESS NOTE    Admit Date: 2017  POD: 13 Days Post-Op    Procedure:  Procedure(s):  SIGMOIDOSCOPY FLEXIBLE    Subjective:     Patient has no new complaints. Objective:     Vitals:  Blood pressure 150/72, pulse 82, temperature 97.8 °F (36.6 °C), resp. rate 20, height 5' 6\" (1.676 m), weight 197 lb 1.6 oz (89.4 kg), SpO2 97 %. Temp (24hrs), Av °F (36.7 °C), Min:97.3 °F (36.3 °C), Max:98.7 °F (37.1 °C)      Intake / Output:    Intake/Output Summary (Last 24 hours) at 17 0941  Last data filed at 17 0618   Gross per 24 hour   Intake              740 ml   Output             1625 ml   Net             -885 ml       Physical Exam:    Constitutional: he appears well-developed. No distress. HENT:   Head: Atraumatic. Eyes: Pupils are equal, round, and reactive to light. Neck: Normal range of motion. Cardiovascular: Regular rhythm. Pulmonary/Chest: Effort normal and breath sounds normal. No respiratory distress. Abdominal: Soft. Bowel sounds are normal. he exhibits no distension. There is no tenderness. There is no guarding. No hernia. Musculoskeletal: Normal range of motion. Neurological: He is alert.  CN II- XII grossly intact  Vascular: Bilateral feet warm    Labs:   Recent Labs      17   0548  17   0544   HGB  10.3*   --    WBC  9.5   --    K   --   4.5   GLU   --   118*       Data Review     Assessment:     Patient Active Problem List    Diagnosis Date Noted    Ischemia of left lower extremity 2017    Claudication ([facility] [address] 2017    Occlusion of aorta ([facility] [address] 2017    Chronic left-sided low back pain 2017    Essential hypertension 2016    Renal insufficiency 2016    Dyslipidemia 2016       Plan/Recommendations/Medical Decision Making:     Patient clinically stable left groin wound VAC intact  -CBC stable no more labs needed  Continue p.o. antibiotics-with left groin wound open plan on wound VAC change on Monday     Elements of this note have been dictated using speech recognition software. As a result, errors of speech recognition may have occurred.

## 2017-09-23 PROCEDURE — 74011250637 HC RX REV CODE- 250/637: Performed by: PHYSICIAN ASSISTANT

## 2017-09-23 PROCEDURE — 65660000004 HC RM CVT STEPDOWN

## 2017-09-23 PROCEDURE — 97530 THERAPEUTIC ACTIVITIES: CPT

## 2017-09-23 PROCEDURE — 74011250636 HC RX REV CODE- 250/636: Performed by: SURGERY

## 2017-09-23 PROCEDURE — 74011250637 HC RX REV CODE- 250/637: Performed by: SURGERY

## 2017-09-23 PROCEDURE — 74750000023 HC WOUND THERAPY

## 2017-09-23 RX ADMIN — Medication 10 ML: at 04:20

## 2017-09-23 RX ADMIN — CARVEDILOL 25 MG: 25 TABLET, FILM COATED ORAL at 08:35

## 2017-09-23 RX ADMIN — Medication 10 ML: at 13:30

## 2017-09-23 RX ADMIN — PANTOPRAZOLE SODIUM 40 MG: 40 TABLET, DELAYED RELEASE ORAL at 04:17

## 2017-09-23 RX ADMIN — DOCUSATE SODIUM 100 MG: 100 CAPSULE, LIQUID FILLED ORAL at 08:35

## 2017-09-23 RX ADMIN — CARVEDILOL 25 MG: 25 TABLET, FILM COATED ORAL at 16:47

## 2017-09-23 RX ADMIN — OXYCODONE HYDROCHLORIDE AND ACETAMINOPHEN 1 TABLET: 7.5; 325 TABLET ORAL at 16:51

## 2017-09-23 RX ADMIN — HEPARIN SODIUM 5000 UNITS: 5000 INJECTION, SOLUTION INTRAVENOUS; SUBCUTANEOUS at 08:35

## 2017-09-23 RX ADMIN — OXYCODONE HYDROCHLORIDE AND ACETAMINOPHEN 1 TABLET: 7.5; 325 TABLET ORAL at 08:51

## 2017-09-23 RX ADMIN — AMLODIPINE BESYLATE 5 MG: 10 TABLET ORAL at 08:35

## 2017-09-23 RX ADMIN — ASPIRIN 81 MG: 81 TABLET, COATED ORAL at 08:35

## 2017-09-23 RX ADMIN — OXYCODONE HYDROCHLORIDE AND ACETAMINOPHEN 1 TABLET: 7.5; 325 TABLET ORAL at 21:16

## 2017-09-23 RX ADMIN — HEPARIN SODIUM 5000 UNITS: 5000 INJECTION, SOLUTION INTRAVENOUS; SUBCUTANEOUS at 22:32

## 2017-09-23 RX ADMIN — SULFAMETHOXAZOLE AND TRIMETHOPRIM 1 TABLET: 800; 160 TABLET ORAL at 21:16

## 2017-09-23 RX ADMIN — Medication 10 ML: at 21:17

## 2017-09-23 RX ADMIN — SULFAMETHOXAZOLE AND TRIMETHOPRIM 1 TABLET: 800; 160 TABLET ORAL at 08:35

## 2017-09-23 RX ADMIN — Medication 5 ML: at 10:34

## 2017-09-23 RX ADMIN — OXYCODONE HYDROCHLORIDE AND ACETAMINOPHEN 1 TABLET: 7.5; 325 TABLET ORAL at 04:16

## 2017-09-23 NOTE — PROGRESS NOTES
Bedside report given to oncoming nurse RUBIN Flannery. Opportunity for questions, concerns. Patient involved.

## 2017-09-23 NOTE — PROGRESS NOTES
Problem: Mobility Impaired (Adult and Pediatric)  Goal: *Acute Goals and Plan of Care (Insert Text)  STG:  (1.)Mr. Tran will move from supine to sit and sit to supine , scoot up and down and roll side to side with MINIMAL ASSIST within 5 day(s). (2.)Mr. Tran will transfer from bed to chair and chair to bed with MINIMAL ASSIST using the least restrictive device within 5 day(s). GOAL MET 9/19/2017  (3.)Mr. Tran will ambulate with MINIMAL ASSIST for 50 feet with the least restrictive device within 5 day(s). GOAL MET 9/19/2017  (4.)Mr. Tran will maintain stable vital signs throughout all functional mobility within 5 days. GOAL MET 9/19/2017  (5.)Mr. Tran will perform standing static and dynamic balance activities x 10 minutes with MINIMAL ASSIST to improve safety within 5 day(s). LTG:  (1.)Mr. Tran will move from supine to sit and sit to supine , scoot up and down and roll side to side in bed with CONTACT GUARD ASSIST within 10 day(s). (2.)Mr. Tran will transfer from bed to chair and chair to bed with CONTACT GUARD ASSIST using the least restrictive device within 10 day(s). GOAL MET 9/19/2017  (3.)Mr. Tran will ambulate with CONTACT GUARD ASSIST for 100 feet with the least restrictive device within 10 day(s). GOAL MET 9/19/2017    LTG (revised 9/17/17):  (1.)Mr. Tran will move from supine to sit and sit to supine , scoot up and down and roll side to side in bed with MODIFIED INDEPENDENCE within 7 day(s). (2.)Mr. Tran will transfer from bed to chair and chair to bed with MODIFIED INDEPENDENCE using the least restrictive device within 7 day(s). (3.)Mr. Tran will ambulate with CONTACT GUARD ASSIST for 250 feet with the least restrictive device within 7 day(s). (4.)Mr. Tran will perform standing static and dynamic balance activities x 10 minutes with CONTACT GUARD ASSIST to improve safety within 7 day(s). (5.)Mr. Tran will perform 6 steps with HR and SUPERVISION within 7 days for safety ascending and descending stairs for home. (6.)Mr. John Huber will be independent in HEP for strengthening B LE within 7 days. ________________________________________________________________________________________________      PHYSICAL THERAPY: Daily Note, Treatment Day: 6th and AM 9/23/2017  INPATIENT: Hospital Day: 17  Payor: [place] / Plan: [place] MEDICAID [facility] / Product Type: Medicaid /      NAME/AGE/GENDER: Yessica Fan is a 61 y.o. male     PRIMARY DIAGNOSIS: Aortic occlusion ([address] [I74.10]  Ischemic colitis ([facility] [address] [K55.9] Occlusion of aorta (HCC) Occlusion of aorta (HCC)  Procedure(s) (LRB):  SIGMOIDOSCOPY FLEXIBLE (N/A)  14 Days Post-Op  ICD-10: Treatment Diagnosis:       · Difficulty in walking, Not elsewhere classified (R26.2)   Precaution/Allergies:  Review of patient's allergies indicates no known allergies. ASSESSMENT:      Mr. John Huber was sitting in recliner chair and agreeable to treatment this morning. Mobility good. Sit to stand with supervision. He ambulated with rolling walker and CGA x 250 feet with slow but steady trisha. Patient returned to room and requesting to lay down. Bed mobility is with supervision. Patient positioned for comfort with needs within reach. Good session and patient participates well. Will continue PT efforts. This section established at most recent assessment   PROBLEM LIST (Impairments causing functional limitations):  1. Decreased Strength  2. Decreased ADL/Functional Activities  3. Decreased Transfer Abilities  4. Decreased Ambulation Ability/Technique  5. Decreased Balance  6. Increased Pain  7. Decreased Activity Tolerance  8. Decreased Knowledge of Precautions  9. Decreased [place] with Home Exercise Program    INTERVENTIONS PLANNED: (Benefits and precautions of physical therapy have been discussed with the patient.)  1. Balance Exercise  2. Bed Mobility  3. Family Education  4.  Gait Training  5. Home Exercise Program (HEP)  6. Therapeutic Activites  7. Therapeutic Exercise/Strengthening  8. Transfer Training  9. Patient Education  10. Group Therapy      TREATMENT PLAN: Frequency/Duration: 5 times a week for duration of hospital stay  Rehabilitation Potential For Stated Goals: EXCELLENT      RECOMMENDED REHABILITATION/EQUIPMENT: (at time of discharge pending progress): Due to the probability of continued deficits (see above) this patient will likely need continued skilled physical therapy after discharge. Equipment:   · None at this time                   HISTORY:   History of Present Injury/Illness (Reason for Referral): Aortic occlusion (HCC) [I74.10] Occlusion of aorta (HCC) Occlusion of aorta (HCC)  Procedure(s) (LRB):  left femoral embolectomy/left lower extremity arteriogram (Left)  1 Day Post-Op  Past Medical History/Comorbidities:   Mr. John Huber  has a past medical history of Acute diastolic (congestive) heart failure ([facility] [address] (12/4/2016); CAD (coronary artery disease); Chronic kidney disease; Chronic kidney disease, stage 3; Chronic pain; Ex-smoker; GERD (gastroesophageal reflux disease); TIA (transient ischemic attack); Hypercholesteremia; Hypertension; Old MI (myocardial infarction) (11/2016); Osteoarthritis; and Stroke ([facility] [address] Mr. John Huber  has a past surgical history that includes heart catheterization (12/2016); colonoscopy (N/A, 6/22/2017); knee arthroscopy (Left); flexible sigmoidoscopy (N/A, 9/9/2017); vascular surgery procedure unlist (09/07/2017); and vascular surgery procedure unlist (Left, 09/08/2017).   Social History/Living Environment:   Home Environment: Private residence  # Steps to Enter: 6  Rails to Enter: Yes  One/Two Story Residence: One story  Living Alone: Yes  Support Systems: Child(tisha)  Patient Expects to be Discharged to[de-identified] Unknown  Current DME Used/Available at Home: Cane, straight  Tub or Shower Type: Shower  Prior Level of Function/Work/Activity:  Modified independent with straight cane for ambulation secondary to decreased sensation LLE for a few months, he reports independence with ADLs and driving at baseline. Number of Personal Factors/Comorbidities that affect the Plan of Care: 3+: HIGH COMPLEXITY   EXAMINATION:   Most Recent Physical Functioning:   Gross Assessment:                  Posture:     Balance:  Sitting: Intact  Sitting - Static: Good (unsupported)  Sitting - Dynamic: Good (unsupported)  Standing: Intact  Standing - Static: Good  Standing - Dynamic : Fair Bed Mobility:  Rolling: Supervision  Sit to Supine: Supervision  Scooting: Supervision  Wheelchair Mobility:     Transfers:  Sit to Stand: Contact guard assistance  Stand to Sit: Contact guard assistance  Gait:     Base of Support: Center of gravity altered  Speed/Jennifer: Shuffled; Slow  Distance (ft): 250 Feet (ft)  Assistive Device: Walker, rolling       Body Structures Involved:  1. Nerves  2. Heart  3. Muscles Body Functions Affected:  1. Sensory/Pain  2. Cardio  3. Hematological  4. Neuromusculoskeletal  5. Movement Related Activities and Participation Affected:  1. Mobility  2. Self Care  3. Domestic Life  4. Interpersonal Interactions and Relationships  5. Community, Social and [place]   Number of elements that affect the Plan of Care: 4+: HIGH COMPLEXITY   CLINICAL PRESENTATION:   Presentation: Evolving clinical presentation with unstable and unpredictable characteristics: HIGH COMPLEXITY   CLINICAL DECISION MAKIN [facility] Mobility Inpatient Short Form  How much difficulty does the patient currently have. .. Unable A Lot A Little None   1. Turning over in bed (including adjusting bedclothes, sheets and blankets)? [ ] 1   [X] 2   [ ] 3   [ ] 4   2. Sitting down on and standing up from a chair with arms ( e.g., wheelchair, bedside commode, etc.)   [X] 1   [ ] 2   [ ] 3   [ ] 4   3. Moving from lying on back to sitting on the side of the bed? [ ] 1   [X] 2   [ ] 3   [ ] 4   How much help from another person does the patient currently need. .. Total A Lot A Little None   4. Moving to and from a bed to a chair (including a wheelchair)? [X] 1   [ ] 2   [ ] 3   [ ] 4   5. Need to walk in hospital room? [X] 1   [ ] 2   [ ] 3   [ ] 4   6. Climbing 3-5 steps with a railing? [X] 1   [ ] 2   [ ] 3   [ ] 4   © 2007, Trustees of [address] [place] [address] [address] [id number], under license to [email or web address]. All rights reserved    Score:  Initial: 8 Most Recent: X (Date: -- )     Interpretation of Tool:  Represents activities that are increasingly more difficult (i.e. Bed mobility, Transfers, Gait). Score 24 23 22-20 19-15 14-10 9-7 6       Modifier CH CI CJ CK CL CM CN         · Mobility - Walking and Moving Around:               - CURRENT STATUS:    CM - 80%-99% impaired, limited or restricted               - GOAL STATUS:           CK - 40%-59% impaired, limited or restricted               - D/C STATUS:                       ---------------To be determined---------------  Payor: [place] / Plan: [place] MEDICA[place] [facility] / Product Type: Medicaid /       Medical Necessity:     · Patient demonstrates good rehab potential due to higher previous functional level. Reason for Services/Other Comments:  · Patient continues to require modification of therapeutic interventions to increase complexity of exercises. Use of outcome tool(s) and clinical judgement create a POC that gives a: Questionable prediction of patient's progress: MODERATE COMPLEXITY                 TREATMENT:   (In addition to Assessment/Re-Assessment sessions the following treatments were rendered)   Pre-treatment Symptoms/Complaints: \"OK I am ready\"  Pain: Initial:   Pain Intensity 1: 0  Post Session: no c/o pain       Therapeutic Activity: (    25 Minutes):   Therapeutic activities including bed mobility training, transfer training, ambulation on level ground, static/dynamic standing balance activities, ambulation on level ground, and patient education to improve mobility, strength and activity tolerance. Required verbal cues   to promote static and dynamic balance in standing and promote coordination of bilateral, lower extremity(s). Therapeutic Exercise: (  Minutes):  Exercises per grid below to improve mobility and strength. Required minimal verbal cues to promote proper body alignment, promote proper body posture and promote proper body breathing techniques. Progressed repetitions as indicated. Date:  9/22/17 Date:  9/20/17 Date:  9-21-17   ACTIVITY/EXERCISE AM PM AM PM AM PM   Ambulation:           Distance  Device  Duration         Seated Heel Raises x25  \x25  x20 x25   Seated Toe Raises x25  x25  x20 x25   Seated Long Arc Quads x25  x25  x20 x15   Seated Marching x25  x25  x20 x15   Seated Hip Abduction x25  x25  x5 x15   Glut set x25  x25      Shoulder shrugs x25                 B = bilateral; AA = active assistive; A = active; P = passive      · Braces/Orthotics/Lines/Etc: wound vac  Treatment/Session Assessment:    · Response to Treatment:  Tolerated treatment well  · Interdisciplinary Collaboration:  · Physical Therapy Assistant and Registered Nurse  · After treatment position/precautions:  · Supine in bed, Bed/Chair-wheels locked, Bed in low position, Call light within reach and RN notified  · Compliance with Program/Exercises: compliant  · Recommendations/Intent for next treatment session: \"Next visit will focus on advancements to more challenging activities and reduction in assistance provided\".   Total Treatment Duration  PT Patient Time In/Time Out  Time In: 1036  Time Out: [address] [place], [place]

## 2017-09-24 PROCEDURE — 74750000023 HC WOUND THERAPY

## 2017-09-24 PROCEDURE — 97530 THERAPEUTIC ACTIVITIES: CPT

## 2017-09-24 PROCEDURE — 65660000004 HC RM CVT STEPDOWN

## 2017-09-24 PROCEDURE — 74011250637 HC RX REV CODE- 250/637: Performed by: PHYSICIAN ASSISTANT

## 2017-09-24 PROCEDURE — 74011250636 HC RX REV CODE- 250/636: Performed by: SURGERY

## 2017-09-24 PROCEDURE — 97110 THERAPEUTIC EXERCISES: CPT

## 2017-09-24 PROCEDURE — 74011250637 HC RX REV CODE- 250/637: Performed by: SURGERY

## 2017-09-24 RX ADMIN — CARVEDILOL 25 MG: 25 TABLET, FILM COATED ORAL at 18:02

## 2017-09-24 RX ADMIN — HEPARIN SODIUM 5000 UNITS: 5000 INJECTION, SOLUTION INTRAVENOUS; SUBCUTANEOUS at 14:11

## 2017-09-24 RX ADMIN — HEPARIN SODIUM 5000 UNITS: 5000 INJECTION, SOLUTION INTRAVENOUS; SUBCUTANEOUS at 08:33

## 2017-09-24 RX ADMIN — Medication 10 ML: at 21:10

## 2017-09-24 RX ADMIN — OXYCODONE HYDROCHLORIDE AND ACETAMINOPHEN 1 TABLET: 7.5; 325 TABLET ORAL at 14:11

## 2017-09-24 RX ADMIN — Medication 5 ML: at 18:00

## 2017-09-24 RX ADMIN — Medication 10 ML: at 04:02

## 2017-09-24 RX ADMIN — Medication 5 ML: at 13:21

## 2017-09-24 RX ADMIN — Medication 5 ML: at 08:33

## 2017-09-24 RX ADMIN — SULFAMETHOXAZOLE AND TRIMETHOPRIM 1 TABLET: 800; 160 TABLET ORAL at 21:05

## 2017-09-24 RX ADMIN — SULFAMETHOXAZOLE AND TRIMETHOPRIM 1 TABLET: 800; 160 TABLET ORAL at 08:34

## 2017-09-24 RX ADMIN — ASPIRIN 81 MG: 81 TABLET, COATED ORAL at 08:32

## 2017-09-24 RX ADMIN — OXYCODONE HYDROCHLORIDE AND ACETAMINOPHEN 1 TABLET: 7.5; 325 TABLET ORAL at 08:34

## 2017-09-24 RX ADMIN — PANTOPRAZOLE SODIUM 40 MG: 40 TABLET, DELAYED RELEASE ORAL at 04:02

## 2017-09-24 RX ADMIN — HEPARIN SODIUM 5000 UNITS: 5000 INJECTION, SOLUTION INTRAVENOUS; SUBCUTANEOUS at 22:31

## 2017-09-24 RX ADMIN — CARVEDILOL 25 MG: 25 TABLET, FILM COATED ORAL at 08:32

## 2017-09-24 RX ADMIN — Medication 5 ML: at 21:05

## 2017-09-24 RX ADMIN — OXYCODONE HYDROCHLORIDE AND ACETAMINOPHEN 1 TABLET: 7.5; 325 TABLET ORAL at 19:16

## 2017-09-24 RX ADMIN — Medication 10 ML: at 13:21

## 2017-09-24 RX ADMIN — AMLODIPINE BESYLATE 5 MG: 10 TABLET ORAL at 08:32

## 2017-09-24 RX ADMIN — OXYCODONE HYDROCHLORIDE AND ACETAMINOPHEN 1 TABLET: 7.5; 325 TABLET ORAL at 04:00

## 2017-09-24 RX ADMIN — DOCUSATE SODIUM 100 MG: 100 CAPSULE, LIQUID FILLED ORAL at 08:33

## 2017-09-24 NOTE — PROGRESS NOTES
Problem: Mobility Impaired (Adult and Pediatric)  Goal: *Acute Goals and Plan of Care (Insert Text)  STG:  (1.)Mr. Tran will move from supine to sit and sit to supine , scoot up and down and roll side to side with MINIMAL ASSIST within 5 day(s). (2.)Mr. Tran will transfer from bed to chair and chair to bed with MINIMAL ASSIST using the least restrictive device within 5 day(s). GOAL MET 9/19/2017  (3.)Mr. Tran will ambulate with MINIMAL ASSIST for 50 feet with the least restrictive device within 5 day(s). GOAL MET 9/19/2017  (4.)Mr. Tran will maintain stable vital signs throughout all functional mobility within 5 days. GOAL MET 9/19/2017  (5.)Mr. Tran will perform standing static and dynamic balance activities x 10 minutes with MINIMAL ASSIST to improve safety within 5 day(s). LTG:  (1.)Mr. Tran will move from supine to sit and sit to supine , scoot up and down and roll side to side in bed with CONTACT GUARD ASSIST within 10 day(s). (2.)Mr. Tran will transfer from bed to chair and chair to bed with CONTACT GUARD ASSIST using the least restrictive device within 10 day(s). GOAL MET 9/19/2017  (3.)Mr. Tran will ambulate with CONTACT GUARD ASSIST for 100 feet with the least restrictive device within 10 day(s). GOAL MET 9/19/2017    LTG (revised 9/17/17):  (1.)Mr. Tran will move from supine to sit and sit to supine , scoot up and down and roll side to side in bed with MODIFIED INDEPENDENCE within 7 day(s). (2.)Mr. Tran will transfer from bed to chair and chair to bed with MODIFIED INDEPENDENCE using the least restrictive device within 7 day(s). (3.)Mr. Tran will ambulate with CONTACT GUARD ASSIST for 250 feet with the least restrictive device within 7 day(s). (4.)Mr. Tran will perform standing static and dynamic balance activities x 10 minutes with CONTACT GUARD ASSIST to improve safety within 7 day(s). (5.)Mr. Tran will perform 6 steps with HR and SUPERVISION within 7 days for safety ascending and descending stairs for home. (6.)Mr. Chari Salas will be independent in HEP for strengthening B LE within 7 days. ________________________________________________________________________________________________      PHYSICAL THERAPY: Daily Note, Treatment Day: 7th and PM 9/24/2017  INPATIENT: Hospital Day: 18  Payor: [place] / Plan: [place] MEDICAID formerly [facility] / Product Type: Medicaid /      NAME/AGE/GENDER: Ethan Glover is a 61 y.o. male     PRIMARY DIAGNOSIS: Aortic occlusion ([address] [I74.10]  Ischemic colitis ([facility] [address] [K55.9] Occlusion of aorta (HCC) Occlusion of aorta (HCC)  Procedure(s) (LRB):  SIGMOIDOSCOPY FLEXIBLE (N/A)  15 Days Post-Op  ICD-10: Treatment Diagnosis:       · Difficulty in walking, Not elsewhere classified (R26.2)   Precaution/Allergies:  Review of patient's allergies indicates no known allergies. ASSESSMENT:      Mr. Chari Salas was sitting in recliner chair and agreeable to treatment this morning. Mobility good. Sit to stand with supervision. He ambulated with rolling walker and CGA x 250 feet with slow but steady trisha. Patient returned to recliner chair and performed therapeutic exercises without difficulty. Good session and patient participates well. Will continue PT efforts. This section established at most recent assessment   PROBLEM LIST (Impairments causing functional limitations):  1. Decreased Strength  2. Decreased ADL/Functional Activities  3. Decreased Transfer Abilities  4. Decreased Ambulation Ability/Technique  5. Decreased Balance  6. Increased Pain  7. Decreased Activity Tolerance  8. Decreased Knowledge of Precautions  9. Decreased Rice with Home Exercise Program    INTERVENTIONS PLANNED: (Benefits and precautions of physical therapy have been discussed with the patient.)  1. Balance Exercise  2. Bed Mobility  3. Family Education  4. Gait Training  5. Home Exercise Program (HEP)  6.  Therapeutic Activites  7. Therapeutic Exercise/Strengthening  8. Transfer Training  9. Patient Education  10. Group Therapy      TREATMENT PLAN: Frequency/Duration: 5 times a week for duration of hospital stay  Rehabilitation Potential For Stated Goals: EXCELLENT      RECOMMENDED REHABILITATION/EQUIPMENT: (at time of discharge pending progress): Due to the probability of continued deficits (see above) this patient will likely need continued skilled physical therapy after discharge. Equipment:   · None at this time                   HISTORY:   History of Present Injury/Illness (Reason for Referral): Aortic occlusion (HCC) [I74.10] Occlusion of aorta (HCC) Occlusion of aorta (HCC)  Procedure(s) (LRB):  left femoral embolectomy/left lower extremity arteriogram (Left)  1 Day Post-Op  Past Medical History/Comorbidities:   Mr. Beth Hunter  has a past medical history of Acute diastolic (congestive) heart failure ([facility] [address] (12/4/2016); CAD (coronary artery disease); Chronic kidney disease; Chronic kidney disease, stage 3; Chronic pain; Ex-smoker; GERD (gastroesophageal reflux disease); TIA (transient ischemic attack); Hypercholesteremia; Hypertension; Old MI (myocardial infarction) (11/2016); Osteoarthritis; and Stroke ([facility] [address] Mr. Beth Hunter  has a past surgical history that includes heart catheterization (12/2016); colonoscopy (N/A, 6/22/2017); knee arthroscopy (Left); flexible sigmoidoscopy (N/A, 9/9/2017); vascular surgery procedure unlist (09/07/2017); and vascular surgery procedure unlist (Left, 09/08/2017).   Social History/Living Environment:   Home Environment: Private residence  # Steps to Enter: 6  Rails to Enter: Yes  One/Two Story Residence: One story  Living Alone: Yes  Support Systems: Child(tisha)  Patient Expects to be Discharged to[de-identified] Unknown  Current DME Used/Available at Home: Cane, straight  Tub or Shower Type: Shower  Prior Level of Function/Work/Activity:  Modified independent with straight cane for ambulation secondary to decreased sensation LLE for a few months, he reports independence with ADLs and driving at baseline. Number of Personal Factors/Comorbidities that affect the Plan of Care: 3+: HIGH COMPLEXITY   EXAMINATION:   Most Recent Physical Functioning:   Gross Assessment:                  Posture:     Balance:  Sitting: Intact  Sitting - Static: Good (unsupported)  Sitting - Dynamic: Good (unsupported)  Standing: Intact  Standing - Static: Good  Standing - Dynamic : Fair Bed Mobility:     Wheelchair Mobility:     Transfers:  Sit to Stand: Contact guard assistance  Stand to Sit: Contact guard assistance  Gait:     Speed/Jennifer: Slow  Distance (ft): 250 Feet (ft)  Assistive Device: Walker, rolling       Body Structures Involved:  1. Nerves  2. Heart  3. Muscles Body Functions Affected:  1. Sensory/Pain  2. Cardio  3. Hematological  4. Neuromusculoskeletal  5. Movement Related Activities and Participation Affected:  1. Mobility  2. Self Care  3. Domestic Life  4. Interpersonal Interactions and Relationships  5. Community, Social and [place]   Number of elements that affect the Plan of Care: 4+: HIGH COMPLEXITY   CLINICAL PRESENTATION:   Presentation: Evolving clinical presentation with unstable and unpredictable characteristics: HIGH COMPLEXITY   CLINICAL DECISION MAKIN [facility] In[facility] Short Form  How much difficulty does the patient currently have. .. Unable A Lot A Little None   1. Turning over in bed (including adjusting bedclothes, sheets and blankets)? [ ] 1   [X] 2   [ ] 3   [ ] 4   2. Sitting down on and standing up from a chair with arms ( e.g., wheelchair, bedside commode, etc.)   [X] 1   [ ] 2   [ ] 3   [ ] 4   3. Moving from lying on back to sitting on the side of the bed? [ ] 1   [X] 2   [ ] 3   [ ] 4   How much help from another person does the patient currently need. .. Total A Lot A Little None   4.   Moving to and from a bed to a chair (including a wheelchair)? [X] 1   [ ] 2   [ ] 3   [ ] 4   5. Need to walk in hospital room? [X] 1   [ ] 2   [ ] 3   [ ] 4   6. Climbing 3-5 steps with a railing? [X] 1   [ ] 2   [ ] 3   [ ] 4   © 2007, Trustees of [address] [place] [address] [address] [id number], under license to [employer]. All rights reserved    Score:  Initial: 8 Most Recent: X (Date: -- )     Interpretation of Tool:  Represents activities that are increasingly more difficult (i.e. Bed mobility, Transfers, Gait). Score 24 23 22-20 19-15 14-10 9-7 6       Modifier CH CI CJ CK CL CM CN         · Mobility - Walking and Moving Around:               - CURRENT STATUS:    CM - 80%-99% impaired, limited or restricted               - GOAL STATUS:           CK - 40%-59% impaired, limited or restricted               - D/C STATUS:                       ---------------To be determined---------------  Payor: MEDICAID [facility] / Plan: [place] / Product Type: Medicaid /       Medical Necessity:     · Patient demonstrates good rehab potential due to higher previous functional level. Reason for Services/Other Comments:  · Patient continues to require modification of therapeutic interventions to increase complexity of exercises. Use of outcome tool(s) and clinical judgement create a POC that gives a: Questionable prediction of patient's progress: MODERATE COMPLEXITY                 TREATMENT:   (In addition to Assessment/Re-Assessment sessions the following treatments were rendered)   Pre-treatment Symptoms/Complaints: \"OK I am ready\"  Pain: Initial:   Pain Intensity 1: 0  Post Session: no c/o pain       Therapeutic Activity: (    14 Minutes): Therapeutic activities including bed mobility training, transfer training, ambulation on level ground, static/dynamic standing balance activities, ambulation on level ground, and patient education to improve mobility, strength and activity tolerance.   Required verbal cues   to promote static and dynamic balance in standing and promote coordination of bilateral, lower extremity(s). Therapeutic Exercise: (  13 Minutes):  Exercises per grid below to improve mobility and strength. Required minimal verbal cues to promote proper body alignment, promote proper body posture and promote proper body breathing techniques. Progressed repetitions as indicated. Date:  9/24/17 Date:   Date:     ACTIVITY/EXERCISE AM PM AM PM AM PM   Ambulation:           Distance  Device  Duration         Seated Heel Raises 30        Seated Toe Raises 30        Seated Long Arc Quads 30        Seated Marching 30        Seated Hip Abduction 30        Glut set 30        Shoulder shrugs 30                 B = bilateral; AA = active assistive; A = active; P = passive      · Braces/Orthotics/Lines/Etc: wound vac  Treatment/Session Assessment:    · Response to Treatment:  Tolerated treatment well  · Interdisciplinary Collaboration:  · Physical Therapy Assistant and Registered Nurse  · After treatment position/precautions:  · Up in chair, Bed/Chair-wheels locked, Bed in low position, Call light within reach and RN notified  · Compliance with Program/Exercises: compliant  · Recommendations/Intent for next treatment session: \"Next visit will focus on advancements to more challenging activities and reduction in assistance provided\".   Total Treatment Duration  PT Patient Time In/Time Out  Time In: 1036  Time Out: 1101     Zena Goyal-Daya, PTA

## 2017-09-24 NOTE — PROGRESS NOTES
Bedside shift report received from [facility], [address] [place] [address] (offgoing nurse). Report given to David Moreno RN. Vital signs stable. No complaints noted. Patient in stable condition.

## 2017-09-24 NOTE — PROGRESS NOTES
[address], [address] [place] [address]. [address] FAX: [phone]         VASCULAR SURGERY FLOOR PROGRESS NOTE    Admit Date: 2017  POD: 15 Days Post-Op    Procedure:  Procedure(s):  SIGMOIDOSCOPY FLEXIBLE    Subjective:     Patient has no new complaints. Objective:     Vitals:  Blood pressure 165/79, pulse 76, temperature 98.4 °F (36.9 °C), resp. rate 18, height 5' 6\" (1.676 m), weight 197 lb 3.2 oz (89.4 kg), SpO2 97 %. Temp (24hrs), Av.7 °F (36.5 °C), Min:97 °F (36.1 °C), Max:98.4 °F (36.9 °C)      Intake / Output:    Intake/Output Summary (Last 24 hours) at 17 0810  Last data filed at 17 0354   Gross per 24 hour   Intake              720 ml   Output             1400 ml   Net             -680 ml       Physical Exam:    Constitutional: he appears well-developed. No distress. Cardiovascular: Regular rhythm. Pulmonary/Chest: Effort normal and breath sounds normal. No respiratory distress. Abdominal: Soft. Bowel sounds are normal. he exhibits no distension. There is no tenderness. There is no guarding. No hernia. Musculoskeletal: Normal range of motion. Neurological: He is alert.  CN II- XII grossly intact  Vascular: Bilateral feet warm    Labs:   Recent Labs      17   0548   HGB  10.3*   WBC  9.5       Data Review     Assessment:     Patient Active Problem List    Diagnosis Date Noted    Ischemia of left lower extremity 2017    Claudication ([address] 2017    Occlusion of aorta ([address] 2017    Chronic left-sided low back pain 2017    Essential hypertension 2016    Renal insufficiency 2016    Dyslipidemia 2016       Plan/Recommendations/Medical Decision Making:     Patient clinically stable left groin wound VAC intact  -CBC stable no more labs needed  Continue p.o. antibiotics-with left groin wound open plan on wound VAC change on Monday   Awaiting insurance authorization for [facility] placement    Elements of this note have been dictated using speech recognition software. As a result, errors of speech recognition may have occurred. 9/25/2017     12:42 PM    I have personally seen and examined Jayjay Aguiar with  [employer]. I agree and confirm findings in history, physical exam, and assessment/plan as outlined above, except as noted below.     Qing Mejía MD

## 2017-09-25 PROCEDURE — 97605 NEG PRS WND THER DME<=50SQCM: CPT

## 2017-09-25 PROCEDURE — 97530 THERAPEUTIC ACTIVITIES: CPT

## 2017-09-25 PROCEDURE — 77030019934 HC DRSG VAC ASST KCON -B

## 2017-09-25 PROCEDURE — 65660000004 HC RM CVT STEPDOWN

## 2017-09-25 PROCEDURE — 74011250637 HC RX REV CODE- 250/637: Performed by: PHYSICIAN ASSISTANT

## 2017-09-25 PROCEDURE — 74750000023 HC WOUND THERAPY

## 2017-09-25 PROCEDURE — 77030025162 HC DRSG VAC ASST 1 KCON -B

## 2017-09-25 PROCEDURE — 74011250637 HC RX REV CODE- 250/637: Performed by: SURGERY

## 2017-09-25 PROCEDURE — 74011250636 HC RX REV CODE- 250/636: Performed by: SURGERY

## 2017-09-25 RX ORDER — FLUCONAZOLE 100 MG/1
200 TABLET ORAL DAILY
Status: DISCONTINUED | OUTPATIENT
Start: 2017-09-25 | End: 2017-09-29 | Stop reason: HOSPADM

## 2017-09-25 RX ADMIN — FLUCONAZOLE 200 MG: 100 TABLET ORAL at 08:35

## 2017-09-25 RX ADMIN — Medication 5 ML: at 04:00

## 2017-09-25 RX ADMIN — AMLODIPINE BESYLATE 5 MG: 10 TABLET ORAL at 08:36

## 2017-09-25 RX ADMIN — OXYCODONE HYDROCHLORIDE AND ACETAMINOPHEN 1 TABLET: 7.5; 325 TABLET ORAL at 20:22

## 2017-09-25 RX ADMIN — Medication 10 ML: at 05:53

## 2017-09-25 RX ADMIN — OXYCODONE HYDROCHLORIDE AND ACETAMINOPHEN 1 TABLET: 7.5; 325 TABLET ORAL at 15:33

## 2017-09-25 RX ADMIN — PANTOPRAZOLE SODIUM 40 MG: 40 TABLET, DELAYED RELEASE ORAL at 05:52

## 2017-09-25 RX ADMIN — Medication 10 ML: at 20:19

## 2017-09-25 RX ADMIN — HEPARIN SODIUM 5000 UNITS: 5000 INJECTION, SOLUTION INTRAVENOUS; SUBCUTANEOUS at 05:53

## 2017-09-25 RX ADMIN — SULFAMETHOXAZOLE AND TRIMETHOPRIM 1 TABLET: 800; 160 TABLET ORAL at 08:36

## 2017-09-25 RX ADMIN — Medication 10 ML: at 13:45

## 2017-09-25 RX ADMIN — CARVEDILOL 25 MG: 25 TABLET, FILM COATED ORAL at 17:09

## 2017-09-25 RX ADMIN — OXYCODONE HYDROCHLORIDE AND ACETAMINOPHEN 1 TABLET: 7.5; 325 TABLET ORAL at 08:36

## 2017-09-25 RX ADMIN — Medication 5 ML: at 00:00

## 2017-09-25 RX ADMIN — Medication 5 ML: at 10:30

## 2017-09-25 RX ADMIN — ASPIRIN 81 MG: 81 TABLET, COATED ORAL at 05:52

## 2017-09-25 RX ADMIN — CARVEDILOL 25 MG: 25 TABLET, FILM COATED ORAL at 08:35

## 2017-09-25 RX ADMIN — SULFAMETHOXAZOLE AND TRIMETHOPRIM 1 TABLET: 800; 160 TABLET ORAL at 20:18

## 2017-09-25 RX ADMIN — DOCUSATE SODIUM 100 MG: 100 CAPSULE, LIQUID FILLED ORAL at 08:35

## 2017-09-25 RX ADMIN — OXYCODONE HYDROCHLORIDE AND ACETAMINOPHEN 1 TABLET: 7.5; 325 TABLET ORAL at 03:03

## 2017-09-25 NOTE — PROGRESS NOTES
[address]   [address] FAX: [phone]         VASCULAR SURGERY FLOOR PROGRESS NOTE    Admit Date: 2017  POD: 16 Days Post-Op    Procedure:  Procedure(s):  SIGMOIDOSCOPY FLEXIBLE    Subjective:     Patient has no new complaints. Objective:     Vitals:  Blood pressure 165/88, pulse 72, temperature 98.8 °F (37.1 °C), resp. rate 18, height 5' 6\" (1.676 m), weight 192 lb 6.4 oz (87.3 kg), SpO2 97 %. Temp (24hrs), Av.2 °F (36.8 °C), Min:97.3 °F (36.3 °C), Max:98.9 °F (37.2 °C)      Intake / Output:    Intake/Output Summary (Last 24 hours) at 17 0713  Last data filed at 17 0209   Gross per 24 hour   Intake             1280 ml   Output             1375 ml   Net              -95 ml       Physical Exam:    Constitutional: he appears well-developed. No distress. HENT:   Head: Atraumatic. Eyes: Pupils are equal, round, and reactive to light. Neck: Normal range of motion. Cardiovascular: Regular rhythm. Pulmonary/Chest: Effort normal and breath sounds normal. No respiratory distress. Abdominal: Soft. Bowel sounds are normal. he exhibits no distension. There is no tenderness. There is no guarding. No hernia. Musculoskeletal: Normal range of motion. Neurological: He is alert. CN II- XII grossly intact  Vascular: Abdomen soft lower extremity feet are warm    Labs: No results for input(s): HGB, WBC, K, GLU, HGBEXT in the last 72 hours.     No lab exists for component:  CREA    Data Review     Assessment:     Patient Active Problem List    Diagnosis Date Noted    Ischemia of left lower extremity 2017    Claudication ([facility] [address] 2017    Occlusion of aorta ([facility] [address] 2017    Chronic left-sided low back pain 2017    Essential hypertension 2016    Renal insufficiency 2016    Dyslipidemia 2016       Plan/Recommendations/Medical Decision Making:     Continue with physical therapy awaiting placement  -Wound VAC changed today  -Continue antibiotics and antifungals prophylactic patient high risk for wound infection potentially put in the graft at risk for infection, we will plan to keep antibiotics going while wound VAC is in place and skin is open    Elements of this note have been dictated using speech recognition software. As a result, errors of speech recognition may have occurred.

## 2017-09-25 NOTE — PROGRESS NOTES
Problem: Mobility Impaired (Adult and Pediatric)  Goal: *Acute Goals and Plan of Care (Insert Text)  STG:  (1.)Mr. Tran will move from supine to sit and sit to supine , scoot up and down and roll side to side with MINIMAL ASSIST within 5 day(s). (2.)Mr. Tran will transfer from bed to chair and chair to bed with MINIMAL ASSIST using the least restrictive device within 5 day(s). GOAL MET 9/19/2017  (3.)Mr. Tran will ambulate with MINIMAL ASSIST for 50 feet with the least restrictive device within 5 day(s). GOAL MET 9/19/2017  (4.)Mr. Tran will maintain stable vital signs throughout all functional mobility within 5 days. GOAL MET 9/19/2017  (5.)Mr. Tran will perform standing static and dynamic balance activities x 10 minutes with MINIMAL ASSIST to improve safety within 5 day(s). LTG:  (1.)Mr. Tran will move from supine to sit and sit to supine , scoot up and down and roll side to side in bed with CONTACT GUARD ASSIST within 10 day(s). (2.)Mr. Tran will transfer from bed to chair and chair to bed with CONTACT GUARD ASSIST using the least restrictive device within 10 day(s). GOAL MET 9/19/2017  (3.)Mr. Tran will ambulate with CONTACT GUARD ASSIST for 100 feet with the least restrictive device within 10 day(s). GOAL MET 9/19/2017    LTG (revised 9/17/17):  (1.)Mr. Tran will move from supine to sit and sit to supine , scoot up and down and roll side to side in bed with MODIFIED INDEPENDENCE within 7 day(s). (2.)Mr. Tran will transfer from bed to chair and chair to bed with MODIFIED INDEPENDENCE using the least restrictive device within 7 day(s). (3.)Mr. Tran will ambulate with CONTACT GUARD ASSIST for 250 feet with the least restrictive device within 7 day(s). MET 9/25/17  (4.)Mr. rTan will perform standing static and dynamic balance activities x 10 minutes with CONTACT GUARD ASSIST to improve safety within 7 day(s). (5.)Mr. Tran will perform 6 steps with HR and SUPERVISION within 7 days for safety ascending and descending stairs for home. (6.)Mr. Myels Berg will be independent in HEP for strengthening B LE within 7 days. ________________________________________________________________________________________________      PHYSICAL THERAPY: Daily Note, Treatment Day: 7th and AM 9/25/2017  INPATIENT: Hospital Day: 19  Pay[place] / Plan[place] / Product Type: Medicaid /      NAME/AGE/GENDER: Ana Quintero is a 61 y.o. male     PRIMARY DIAGNOSIS: Aortic occlusion ([facility] [I74.10]  Ischemic colitis ([facility] [K55.9] Occlusion of aorta (HCC) Occlusion of aorta (HCC)  Procedure(s) (LRB):  SIGMOIDOSCOPY FLEXIBLE (N/A)  16 Days Post-Op  ICD-10: Treatment Diagnosis:       · Difficulty in walking, Not elsewhere classified (R26.2)   Precaution/Allergies:  Review of patient's allergies indicates no known allergies. ASSESSMENT:      Mr. Myles Berg is sitting up in chair on arrival.  He is agreeable to PT and hopes to go home today. Pt began ambulating in hallway with RW with steady gait. He states he does not plan to use anything at home. Pt finished his walk with no AD with SBA for balance. Pt performed LAQs and ankle pumps sitting in chair. He was left sitting up with needs in reach. This section established at most recent assessment   PROBLEM LIST (Impairments causing functional limitations):  1. Decreased Strength  2. Decreased ADL/Functional Activities  3. Decreased Transfer Abilities  4. Decreased Ambulation Ability/Technique  5. Decreased Balance  6. Increased Pain  7. Decreased Activity Tolerance  8. Decreased Knowledge of Precautions  9. Decreased Butts with Home Exercise Program    INTERVENTIONS PLANNED: (Benefits and precautions of physical therapy have been discussed with the patient.)  1. Balance Exercise  2. Bed Mobility  3. Family Education  4. Gait Training  5. Home Exercise Program (HEP)  6.  Therapeutic Activites  7. Therapeutic Exercise/Strengthening  8. Transfer Training  9. Patient Education  10. Group Therapy      TREATMENT PLAN: Frequency/Duration: 5 times a week for duration of hospital stay  Rehabilitation Potential For Stated Goals: EXCELLENT      RECOMMENDED REHABILITATION/EQUIPMENT: (at time of discharge pending progress): Due to the probability of continued deficits (see above) this patient will likely need continued skilled physical therapy after discharge. Equipment:   · None at this time                   HISTORY:   History of Present Injury/Illness (Reason for Referral): Aortic occlusion (HCC) [I74.10] Occlusion of aorta (HCC) Occlusion of aorta (HCC)  Procedure(s) (LRB):  left femoral embolectomy/left lower extremity arteriogram (Left)  1 Day Post-Op  Past Medical History/Comorbidities:   Mr. Cristina Villasenor  has a past medical history of Acute diastolic (congestive) heart failure ([facility] (12/4/2016); CAD (coronary artery disease); Chronic kidney disease; Chronic kidney disease, stage 3; Chronic pain; Ex-smoker; GERD (gastroesophageal reflux disease); TIA (transient ischemic attack); Hypercholesteremia; Hypertension; Old MI (myocardial infarction) (11/2016); Osteoarthritis; and Stroke ([facility] Mr. Cristina Villasenor  has a past surgical history that includes heart catheterization (12/2016); colonoscopy (N/A, 6/22/2017); knee arthroscopy (Left); flexible sigmoidoscopy (N/A, 9/9/2017); vascular surgery procedure unlist (09/07/2017); and vascular surgery procedure unlist (Left, 09/08/2017).   Social History/Living Environment:   Home Environment: Private residence  # Steps to Enter: 6  Rails to Enter: Yes  One/Two Story Residence: One story  Living Alone: Yes  Support Systems: Child(tisha)  Patient Expects to be Discharged to[de-identified] Unknown  Current DME Used/Available at Home: Cane, straight  Tub or Shower Type: Shower  Prior Level of Function/Work/Activity:  Modified independent with straight cane for ambulation secondary to decreased sensation LLE for a few months, he reports independence with ADLs and driving at baseline. Number of Personal Factors/Comorbidities that affect the Plan of Care: 3+: HIGH COMPLEXITY   EXAMINATION:   Most Recent Physical Functioning:   Gross Assessment:                  Posture:     Balance:  Sitting - Static: Good (unsupported)  Sitting - Dynamic: Good (unsupported)  Standing - Static: Good Bed Mobility:     Wheelchair Mobility:     Transfers:  Sit to Stand: Supervision  Stand to Sit: Supervision  Gait:     Base of Support: Center of gravity altered  Speed/Jennifer: Pace decreased (<100 feet/min)  Step Length: Left shortened;Right shortened  Gait Abnormalities: Decreased step clearance  Distance (ft): 250 Feet (ft)  Assistive Device: Walker, rolling; Other (comment) (transition to no AD)  Ambulation - Level of Assistance: Stand-by asssistance  Interventions: Safety awareness training;Verbal cues       Body Structures Involved:  1. Nerves  2. Heart  3. Muscles Body Functions Affected:  1. Sensory/Pain  2. Cardio  3. Hematological  4. Neuromusculoskeletal  5. Movement Related Activities and Participation Affected:  1. Mobility  2. Self Care  3. Domestic Life  4. Interpersonal Interactions and Relationships  5. Community, Social and [place]   Number of elements that affect the Plan of Care: 4+: HIGH COMPLEXITY   CLINICAL PRESENTATION:   Presentation: Evolving clinical presentation with unstable and unpredictable characteristics: HIGH COMPLEXITY   CLINICAL DECISION MAKIN [facility] Mobility Inpatient Short Form  How much difficulty does the patient currently have. .. Unable A Lot A Little None   1. Turning over in bed (including adjusting bedclothes, sheets and blankets)? [ ] 1   [X] 2   [ ] 3   [ ] 4   2. Sitting down on and standing up from a chair with arms ( e.g., wheelchair, bedside commode, etc.)   [X] 1   [ ] 2   [ ] 3   [ ] 4   3.   Moving from lying on back to sitting on the side of the bed? [ ] 1   [X] 2   [ ] 3   [ ] 4   How much help from another person does the patient currently need. .. Total A Lot A Little None   4. Moving to and from a bed to a chair (including a wheelchair)? [X] 1   [ ] 2   [ ] 3   [ ] 4   5. Need to walk in hospital room? [X] 1   [ ] 2   [ ] 3   [ ] 4   6. Climbing 3-5 steps with a railing? [X] 1   [ ] 2   [ ] 3   [ ] 4   © 2007, Trustees of [address] [place] [address] [address] [id number], under license to [employer]. All rights reserved    Score:  Initial: 8 Most Recent: X (Date: -- )     Interpretation of Tool:  Represents activities that are increasingly more difficult (i.e. Bed mobility, Transfers, Gait). Score 24 23 22-20 19-15 14-10 9-7 6       Modifier CH CI CJ CK CL CM CN         · Mobility - Walking and Moving Around:               - CURRENT STATUS:    CM - 80%-99% impaired, limited or restricted               - GOAL STATUS:           CK - 40%-59% impaired, limited or restricted               - D/C STATUS:                       ---------------To be determined---------------  Payor: MEDICAID [facility] / Plan: [place] / Product Type: Medicaid /       Medical Necessity:     · Patient demonstrates good rehab potential due to higher previous functional level. Reason for Services/Other Comments:  · Patient continues to require modification of therapeutic interventions to increase complexity of exercises. Use of outcome tool(s) and clinical judgement create a POC that gives a: Questionable prediction of patient's progress: MODERATE COMPLEXITY                 TREATMENT:   (In addition to Assessment/Re-Assessment sessions the following treatments were rendered)   Pre-treatment Symptoms/Complaints: \"I hope I get to go home today. \"  Pain: Initial:      Post Session: no c/o pain       Therapeutic Activity: (    15 Minutes):   Therapeutic activities including bed mobility training, transfer training, ambulation on level ground, and patient education to improve mobility, strength and activity tolerance. Required verbal cues Safety awareness training;Verbal cues to promote static and dynamic balance in standing and promote coordination of bilateral, lower extremity(s). Date:  9/24/17 Date:  9/25/17 Date:     ACTIVITY/EXERCISE AM PM AM PM AM PM   Ambulation:           Distance  Device  Duration         Seated Heel Raises 30  20      Seated Toe Raises 30  20      Seated Long Arc Quads 30  20      Seated Marching 30        Seated Hip Abduction 30        Glut set 30        Shoulder shrugs 30                 B = bilateral; AA = active assistive; A = active; P = passive    · Braces/Orthotics/Lines/Etc: wound vac  Treatment/Session Assessment:    · Response to Treatment:  Tolerated treatment well  · Interdisciplinary Collaboration:  · Registered Nurse  · After treatment position/precautions:  · Up in chair, Bed/Chair-wheels locked, Bed in low position, Call light within reach and RN notified  · Compliance with Program/Exercises: compliant  · Recommendations/Intent for next treatment session: \"Next visit will focus on advancements to more challenging activities and reduction in assistance provided\".   Total Treatment Duration  PT Patient Time In/Time Out  Time In: 1125  Time Out: Tasah 44, PTA

## 2017-09-25 NOTE — PROGRESS NOTES
Bedside shift report given to Stacy Cisneros RN (oncoming nurse) by Carmelo Serrano RN (offgoing nurse). Bedside shift report included the following information: SBAR, Kardex, ED Summary, MAR, and Recent Results.

## 2017-09-25 NOTE — PROGRESS NOTES
PM & R/[facility]  Medicaid has denied [facility] admission. Functionally, he is sup /cga with all mobility and ambulating 250 ft. He has been hindered most with OT. OT last saw pt 9/22; Max A to dependence with all ADLs. This does not appear to be consistent with functional mobility. When I first saw him on 9/15 he was 226lbs due to fluid retention. He has diuresed significantly and is now 192lbs! Will order OT daily. Hopefully, he will progress with ADLs quickly using adaptive eqpt, reacher and sock aid, so that he may dc home with HH. Would recommend HH PT/OT/aid/RN/SW.     Thank you, Dr. Rene Lujan

## 2017-09-25 NOTE — PROGRESS NOTES
Complex level of care intiaited with [facility]. Clinical and complex assessment faxed to [phone]. Call to Darya Diallo with [facility] office, [phone], to get [facility] LOC, which is already started. Not sure pt will be able to get complex approved, but continue to follow.

## 2017-09-25 NOTE — WOUND CARE
Patient seen for wound VAC dressing change to left groin wound. Opening 1.0x0.5 cm. Positional drainage. White foam placed in wound opening to keep serous drainage flowing and black foam over incision line. All old skin tears healed and Duo Derm removed from around wound. Patient updated and answered all of his questions. Does need to continue VAC to groin for now.

## 2017-09-25 NOTE — PROGRESS NOTES
Bedside report received from Kimber, [facility]. PM assessment complete. Denies any needs at this time.

## 2017-09-26 PROCEDURE — 74750000023 HC WOUND THERAPY

## 2017-09-26 PROCEDURE — 97535 SELF CARE MNGMENT TRAINING: CPT

## 2017-09-26 PROCEDURE — 97530 THERAPEUTIC ACTIVITIES: CPT

## 2017-09-26 PROCEDURE — 74011250637 HC RX REV CODE- 250/637: Performed by: SURGERY

## 2017-09-26 PROCEDURE — 74011250637 HC RX REV CODE- 250/637: Performed by: PHYSICIAN ASSISTANT

## 2017-09-26 PROCEDURE — 97110 THERAPEUTIC EXERCISES: CPT

## 2017-09-26 PROCEDURE — 65660000004 HC RM CVT STEPDOWN

## 2017-09-26 RX ADMIN — OXYCODONE HYDROCHLORIDE AND ACETAMINOPHEN 1 TABLET: 7.5; 325 TABLET ORAL at 08:44

## 2017-09-26 RX ADMIN — OXYCODONE HYDROCHLORIDE AND ACETAMINOPHEN 1 TABLET: 7.5; 325 TABLET ORAL at 04:00

## 2017-09-26 RX ADMIN — AMLODIPINE BESYLATE 5 MG: 10 TABLET ORAL at 08:36

## 2017-09-26 RX ADMIN — CARVEDILOL 25 MG: 25 TABLET, FILM COATED ORAL at 17:44

## 2017-09-26 RX ADMIN — FLUCONAZOLE 200 MG: 100 TABLET ORAL at 08:36

## 2017-09-26 RX ADMIN — ASPIRIN 81 MG: 81 TABLET, COATED ORAL at 04:00

## 2017-09-26 RX ADMIN — Medication 10 ML: at 20:04

## 2017-09-26 RX ADMIN — Medication 5 ML: at 20:05

## 2017-09-26 RX ADMIN — Medication 10 ML: at 04:03

## 2017-09-26 RX ADMIN — DOCUSATE SODIUM 100 MG: 100 CAPSULE, LIQUID FILLED ORAL at 08:36

## 2017-09-26 RX ADMIN — PANTOPRAZOLE SODIUM 40 MG: 40 TABLET, DELAYED RELEASE ORAL at 04:00

## 2017-09-26 RX ADMIN — SULFAMETHOXAZOLE AND TRIMETHOPRIM 1 TABLET: 800; 160 TABLET ORAL at 20:04

## 2017-09-26 RX ADMIN — SULFAMETHOXAZOLE AND TRIMETHOPRIM 1 TABLET: 800; 160 TABLET ORAL at 08:36

## 2017-09-26 RX ADMIN — OXYCODONE HYDROCHLORIDE AND ACETAMINOPHEN 2 TABLET: 7.5; 325 TABLET ORAL at 20:06

## 2017-09-26 RX ADMIN — Medication 10 ML: at 14:47

## 2017-09-26 RX ADMIN — OXYCODONE HYDROCHLORIDE AND ACETAMINOPHEN 1 TABLET: 7.5; 325 TABLET ORAL at 14:54

## 2017-09-26 RX ADMIN — CARVEDILOL 25 MG: 25 TABLET, FILM COATED ORAL at 08:36

## 2017-09-26 NOTE — PROGRESS NOTES
Bedside report received from Winsome Spangler RN. PM assessment complete. Denies any needs at this time.

## 2017-09-26 NOTE — PROGRESS NOTES
Problem: Mobility Impaired (Adult and Pediatric)  Goal: *Acute Goals and Plan of Care (Insert Text)  STG:  (1.)Mr. Tran will move from supine to sit and sit to supine , scoot up and down and roll side to side with MINIMAL ASSIST within 5 day(s). (2.)Mr. Tran will transfer from bed to chair and chair to bed with MINIMAL ASSIST using the least restrictive device within 5 day(s). GOAL MET 9/19/2017  (3.)Mr. Tran will ambulate with MINIMAL ASSIST for 50 feet with the least restrictive device within 5 day(s). GOAL MET 9/19/2017  (4.)Mr. Tran will maintain stable vital signs throughout all functional mobility within 5 days. GOAL MET 9/19/2017  (5.)Mr. Tran will perform standing static and dynamic balance activities x 10 minutes with MINIMAL ASSIST to improve safety within 5 day(s). LTG:  (1.)Mr. Tran will move from supine to sit and sit to supine , scoot up and down and roll side to side in bed with CONTACT GUARD ASSIST within 10 day(s). (2.)Mr. Tran will transfer from bed to chair and chair to bed with CONTACT GUARD ASSIST using the least restrictive device within 10 day(s). GOAL MET 9/19/2017  (3.)Mr. Tran will ambulate with CONTACT GUARD ASSIST for 100 feet with the least restrictive device within 10 day(s). GOAL MET 9/19/2017    LTG (revised 9/17/17):  (1.)Mr. Tran will move from supine to sit and sit to supine , scoot up and down and roll side to side in bed with MODIFIED INDEPENDENCE within 7 day(s). (2.)Mr. Tran will transfer from bed to chair and chair to bed with MODIFIED INDEPENDENCE using the least restrictive device within 7 day(s). (3.)Mr. Tran will ambulate with CONTACT GUARD ASSIST for 250 feet with the least restrictive device within 7 day(s). MET 9/25/17  (4.)Mr. Tran will perform standing static and dynamic balance activities x 10 minutes with CONTACT GUARD ASSIST to improve safety within 7 day(s). (5.)Mr. Tran will perform 6 steps with HR and SUPERVISION within 7 days for safety ascending and descending stairs for home. (6.)Mr. Ely Lundy will be independent in HEP for strengthening B LE within 7 days. ________________________________________________________________________________________________      PHYSICAL THERAPY: Daily Note, Treatment Day: Day of Assessment and AM 9/26/2017  INPATIENT: Hospital Day: 20  Payor: [id number] / Plan: [place] / Product Type: Medicaid /      NAME/AGE/GENDER: Clarisse Issa is a 61 y.o. male     PRIMARY DIAGNOSIS: Aortic occlusion ([facility] [address] [I74.10]  Ischemic colitis ([facility] [address] [K55.9] Occlusion of aorta (HCC) Occlusion of aorta (HCC)  Procedure(s) (LRB):  SIGMOIDOSCOPY FLEXIBLE (N/A)  17 Days Post-Op  ICD-10: Treatment Diagnosis:       · Difficulty in walking, Not elsewhere classified (R26.2)   Precaution/Allergies:  Review of patient's allergies indicates no known allergies. ASSESSMENT:      Mr. Ely Lundy presents lying supine in bed. He got to edge of bed with modified independence. He ambulated with no assistive device. He was able to manage his wound vac independently. He then ascended/descended 10 steps with Supervision and using left handrail with verbal cues. Gave patient a home exercise program consisting of seated and standing exercises. He demonstrated good technique with all exercises. He has met goals and will be discharged from physical therapy services this treatment. This section established at most recent assessment   PROBLEM LIST (Impairments causing functional limitations):  1. Decreased Strength  2. Decreased ADL/Functional Activities  3. Decreased Transfer Abilities  4. Decreased Ambulation Ability/Technique  5. Decreased Balance  6. Increased Pain  7. Decreased Activity Tolerance  8. Decreased Knowledge of Precautions  9.  Decreased [place] with Home Exercise Program    INTERVENTIONS PLANNED: (Benefits and precautions of physical therapy have been discussed with the patient.)  1. Balance Exercise  2. Bed Mobility  3. Family Education  4. Gait Training  5. Home Exercise Program (HEP)  6. Therapeutic Activites  7. Therapeutic Exercise/Strengthening  8. Transfer Training  9. Patient Education  10. Group Therapy      TREATMENT PLAN: Frequency/Duration: 5 times a week for duration of hospital stay  Rehabilitation Potential For Stated Goals: EXCELLENT      RECOMMENDED REHABILITATION/EQUIPMENT: (at time of discharge pending progress): Due to the probability of continued deficits (see above) this patient will likely need continued skilled physical therapy after discharge. Equipment:   · None at this time                   HISTORY:   History of Present Injury/Illness (Reason for Referral): Aortic occlusion (HCC) [I74.10] Occlusion of aorta (HCC) Occlusion of aorta (HCC)  Procedure(s) (LRB):  left femoral embolectomy/left lower extremity arteriogram (Left)  1 Day Post-Op  Past Medical History/Comorbidities:   Mr. Julio César Milligan  has a past medical history of Acute diastolic (congestive) heart failure ([facility] [address] (12/4/2016); CAD (coronary artery disease); Chronic kidney disease; Chronic kidney disease, stage 3; Chronic pain; Ex-smoker; GERD (gastroesophageal reflux disease); TIA (transient ischemic attack); Hypercholesteremia; Hypertension; Old MI (myocardial infarction) (11/2016); Osteoarthritis; and Stroke ([facility] [address] Mr. Julio César Milligan  has a past surgical history that includes heart catheterization (12/2016); colonoscopy (N/A, 6/22/2017); knee arthroscopy (Left); flexible sigmoidoscopy (N/A, 9/9/2017); vascular surgery procedure unlist (09/07/2017); and vascular surgery procedure unlist (Left, 09/08/2017).   Social History/Living Environment:   Home Environment: Private residence  # Steps to Enter: 6  Rails to Enter: Yes  One/Two Story Residence: One story  Living Alone: Yes  Support Systems: Child(tisha)  Patient Expects to be Discharged to[de-identified] Unknown  Current DME Used/Available at Home: Rosi [place] straight  Tub or Shower Type: Shower  Prior Level of Function/Work/Activity:  Modified independent with straight cane for ambulation secondary to decreased sensation LLE for a few months, he reports independence with ADLs and driving at baseline. Number of Personal Factors/Comorbidities that affect the Plan of Care: 3+: HIGH COMPLEXITY   EXAMINATION:   Most Recent Physical Functioning:   Gross Assessment:                  Posture:  Posture (WDL): Exceptions to WDL  Posture Assessment: Forward head, Rounded shoulders  Balance:  Sitting: Intact  Standing: Impaired  Standing - Static: Good  Standing - Dynamic : Fair (+) Bed Mobility:  Supine to Sit: Modified independent  Wheelchair Mobility:     Transfers:  Sit to Stand: Modified independent  Gait:     Base of Support: Center of gravity altered  Speed/Jennifer:  (normalizing)  Gait Abnormalities: Decreased step clearance  Distance (ft): 250 Feet (ft)  Assistive Device:  (none - he is carrying wound vac)  Ambulation - Level of Assistance: Modified independent  Number of Stairs Trained: 10  Stairs - Level of Assistance: Supervision  Rail Use: Left   Interventions: Verbal cues       Body Structures Involved:  1. Nerves  2. Heart  3. Muscles Body Functions Affected:  1. Sensory/Pain  2. Cardio  3. Hematological  4. Neuromusculoskeletal  5. Movement Related Activities and Participation Affected:  1. Mobility  2. Self Care  3. Domestic Life  4. Interpersonal Interactions and Relationships  5. Community, Social and [place]   Number of elements that affect the Plan of Care: 4+: HIGH COMPLEXITY   CLINICAL PRESENTATION:   Presentation: Evolving clinical presentation with unstable and unpredictable characteristics: HIGH COMPLEXITY   CLINICAL DECISION MAKIN [employer] Mobility Inpatient Short Form  How much difficulty does the patient currently have. .. Unable A Lot A Little None   1.   Turning over in bed (including adjusting bedclothes, sheets and blankets)? [ ] 1   [X] 2   [ ] 3   [ ] 4   2. Sitting down on and standing up from a chair with arms ( e.g., wheelchair, bedside commode, etc.)   [X] 1   [ ] 2   [ ] 3   [ ] 4   3. Moving from lying on back to sitting on the side of the bed? [ ] 1   [X] 2   [ ] 3   [ ] 4   How much help from another person does the patient currently need. .. Total A Lot A Little None   4. Moving to and from a bed to a chair (including a wheelchair)? [X] 1   [ ] 2   [ ] 3   [ ] 4   5. Need to walk in hospital room? [X] 1   [ ] 2   [ ] 3   [ ] 4   6. Climbing 3-5 steps with a railing? [X] 1   [ ] 2   [ ] 3   [ ] 4   © 2007, Trustees of [address] [place] [id number], under license to [employer]. All rights reserved    Score:  Initial: 8 Most Recent: X (Date: -- )     Interpretation of Tool:  Represents activities that are increasingly more difficult (i.e. Bed mobility, Transfers, Gait). Score 24 23 22-20 19-15 14-10 9-7 6       Modifier CH CI CJ CK CL CM CN         · Mobility - Walking and Moving Around:               - CURRENT STATUS:    CM - 80%-99% impaired, limited or restricted               - GOAL STATUS:           CK - 40%-59% impaired, limited or restricted               - D/C STATUS:                       ---------------To be determined---------------  Payor[place] / Plan[place] / Product Type: Medicaid /       Medical Necessity:     · Patient demonstrates good rehab potential due to higher previous functional level. Reason for Services/Other Comments:  · Patient continues to require modification of therapeutic interventions to increase complexity of exercises.    Use of outcome tool(s) and clinical judgement create a POC that gives a: Questionable prediction of patient's progress: MODERATE COMPLEXITY                 TREATMENT:   (In addition to Assessment/Re-Assessment sessions the following treatments were rendered)   Pre-treatment Symptoms/Complaints: \"I told the MD it was time to get out of here. \"  Pain: Initial:   Pain Intensity 1: 0  Pain Intervention(s) 1: Repositioned, Ambulation/Increased Activity  Post Session: no c/o pain       Therapeutic Activity: (    13 Minutes): Therapeutic activities including bed mobility training, transfer training, ambulation on level ground, and patient education to improve mobility, strength and activity tolerance. Required verbal cues Verbal cues to promote static and dynamic balance in standing and promote coordination of bilateral, lower extremity(s). Therapeutic Exercise: (12 Minutes):  Exercises per grid below to improve mobility, strength, balance and activity tolerance. Required minimal verbal cues to promote proper body posture and promote proper body breathing techniques. Progressed repetitions and complexity of movement as indicated. Date:  9/24/17 Date:  9/25/17 Date:  9-26-17   ACTIVITY/EXERCISE AM PM AM PM AM PM   Ambulation:           Distance  Device  Duration         Seated Heel Raises 30  20  x25    Seated Toe Raises 30  20  x25    Seated Long Arc Quads 30  20  x25    Seated Marching 30    x25    Seated Hip Abduction 30    x25    Glut set 30    x25    Shoulder shrugs 30                 B = bilateral; AA = active assistive; A = active; P = passive    · Braces/Orthotics/Lines/Etc: wound vac  Treatment/Session Assessment:    · Response to Treatment:  Tolerated treatment well  · Interdisciplinary Collaboration:  · Physical Therapist, Physical Therapy Assistant and Registered Nurse  · After treatment position/precautions:  · Up in chair, Bed/Chair-wheels locked, Bed in low position, Call light within reach and RN notified  · Compliance with Program/Exercises: compliant  · Recommendations/Intent for next treatment session:  Patient to be discharged from physical therapy services secondary to having met all goals.   Total Treatment Duration  PT Patient Time In/Time Out  Time In: 5946  Time Out: [address] PTA                     Observed treatment provided by Cortney Otero and agree with discharging patient as he has met all goals. Great progress by patient!   Thank you,  FELICE WeissT

## 2017-09-26 NOTE — PROGRESS NOTES
Patient seen and examined. No acute issues overnight. Patient still with well-perfused left lower extremity. Wound VAC in place. From vascular standpoint patient can be discharged home with home wound VAC. Will discuss with social work this morning about placement.     Sheldon Bach

## 2017-09-26 NOTE — PROGRESS NOTES
Problem: Self Care Deficits Care Plan (Adult)  Goal: *Acute Goals and Plan of Care (Insert Text)  GOALS:    1: Pt will perform toileting with maximal assistance and adaptive equipment as needed. MET  2: Pt will perform grooming with minimal assistance and adaptive equipment as needed. MET  3: Pt will tolerate sitting edge of bed for 5 minutes for ADL prep. MET  4: Pt will perform toilet transfers with moderate assistance and the least restrictive device to promote quality of life. MET  5: Pt will tolerate 15 minutes of therapeutic activity with minimal rest breaks. MET    Time Frame: 7 visits      OCCUPATIONAL THERAPY: Daily Note, Treatment Day: 5th and AM 9/26/2017  INPATIENT: Hospital Day: 20  Payor: [id number] / Plan: [place] MEDICA[place] [facility] / Product Type: Medicaid /      NAME/AGE/GENDER: Taina Albarran is a 61 y.o. male     PRIMARY DIAGNOSIS:  Aortic occlusion ([facility] [address] [I74.10]  Ischemic colitis ([facility] [address] [K55.9] Occlusion of aorta (HCC) Occlusion of aorta (HCC)  Procedure(s) (LRB):  SIGMOIDOSCOPY FLEXIBLE (N/A)  17 Days Post-Op  ICD-10: Treatment Diagnosis:        · Generalized Muscle Weakness (M62.81)  · Dizziness and Giddiness (R42)   Precautions/Allergies:         Review of patient's allergies indicates no known allergies. ASSESSMENT:   Mr. Gabrielle Padgett was admitted for the above diagnosis. Patient supine upon arrival. Pt able to perform bed mobility and transfer mod I. Pt entered bathroom, toileted standing with completed ADLs at sink in chair/standing. Pt did very well with all tasks. Managed wound vac on his own as well. Pt was able to don/doff pants and socks at this time with no assistance or adaptive equipment. Pt returned to supine. Felt a little fatigue at end of session but was okay. Pt is hoping to be discharged today. Has met all goals above. This section established at most recent assessment   PROBLEM LIST (Impairments causing functional limitations):  1.  Decreased Strength  2. Decreased ADL/Functional Activities  3. Decreased Transfer Abilities  4. Decreased Ambulation Ability/Technique  5. Decreased Balance  6. Increased Pain  7. Decreased Activity Tolerance  8. Decreased Work Simplification/Energy Conservation Techniques  9. Increased Fatigue  10. Increased Shortness of Breath  11. Decreased Flexibility/Joint Mobility    INTERVENTIONS PLANNED: (Benefits and precautions of occupational therapy have been discussed with the patient.)  1. Activities of daily living training  2. Adaptive equipment training  3. Clothing management  4. Donning&doffing training  5. Group therapy  6. Therapeutic activity  7. Therapeutic exercise  8. Energy conservation      TREATMENT PLAN: Frequency/Duration: Follow patient 3x/week to address above goals. Rehabilitation Potential For Stated Goals: GOOD      RECOMMENDED REHABILITATION/EQUIPMENT: (at time of discharge pending progress): Due to the probability of continued deficits (see above) this patient will likely need continued skilled occupational therapy after discharge. Equipment:   · to be determined               OCCUPATIONAL PROFILE AND HISTORY:   History of Present Injury/Illness (Reason for Referral):  See H&P  Past Medical History/Comorbidities:   Mr. Arelis Ocampo  has a past medical history of Acute diastolic (congestive) heart failure ([address] (12/4/2016); CAD (coronary artery disease); Chronic kidney disease; Chronic kidney disease, stage 3; Chronic pain; Ex-smoker; GERD (gastroesophageal reflux disease); TIA (transient ischemic attack); Hypercholesteremia; Hypertension; Old MI (myocardial infarction) (11/2016); Osteoarthritis; and Stroke ([address] Mr. Arelis Ocampo  has a past surgical history that includes heart catheterization (12/2016); colonoscopy (N/A, 6/22/2017); knee arthroscopy (Left); flexible sigmoidoscopy (N/A, 9/9/2017); vascular surgery procedure unlist (09/07/2017); and vascular surgery procedure unlist (Left, 09/08/2017).   Social History/Living Environment:   Home Environment: Private residence  # Steps to Enter: 6  Rails to Enter: Yes  One/Two Story Residence: One story  Living Alone: Yes  Support Systems: Child(tisha)  Patient Expects to be Discharged to[de-identified] Unknown  Current DME Used/Available at Home: Cane, straight  Tub or Shower Type: Shower  Prior Level of Function/Work/Activity:  Works, drives, Independent with self care. Number of Personal Factors/Comorbidities that affect the Plan of Care: Expanded review of therapy/medical records (1-2):  MODERATE COMPLEXITY   ASSESSMENT OF OCCUPATIONAL PERFORMANCE[de-identified]   Activities of Daily Living:           Basic ADLs (From Assessment) Complex ADLs (From Assessment)   Basic ADL  Feeding: Minimum assistance  Oral Facial Hygiene/Grooming: Maximum assistance  Bathing: Total assistance  Upper Body Dressing: Maximum assistance  Lower Body Dressing: Total assistance  Toileting: Maximum assistance     Grooming/Bathing/Dressing Activities of Daily Living   Grooming  Washing Face: Independent  Brushing/Combing Hair: Independent Cognitive Retraining  Safety/Judgement: Awareness of environment   Upper Body Bathing  Bathing Assistance: Modified independent  Position Performed: Seated in chair;Standing     Lower Body Bathing  Perineal  : Modified independent  Position Performed: Standing  Lower Body : Modified independent  Position Performed: Seated in chair Toileting  Toileting Assistance: Modified independent     Functional Transfers  Bathroom Mobility: Modified independent   Lower Body Dressing Assistance  Socks: Supervision/set-up  Leg Crossed Method Used: Yes  Position Performed: Seated edge of bed Bed/Mat Mobility  Supine to Sit: Modified independent  Sit to Stand: Modified independent          Most Recent Physical Functioning:   Gross Assessment:                  Posture:  Posture (WDL): Exceptions to WDL  Posture Assessment:  Forward head, Rounded shoulders  Balance:  Sitting: Intact  Standing: Intact  Standing - Static: Good  Standing - Dynamic : Good Bed Mobility:  Supine to Sit: Modified independent  Wheelchair Mobility:     Transfers:  Sit to Stand: Modified independent      Patient Vitals for the past 6 hrs:   BP BP Patient Position SpO2 Pulse   17 0340 178/84 At rest 96 % 72   17 0705 151/79 - 97 % 77        Mental Status  Neurologic State: Alert  Orientation Level: Oriented X4  Cognition: Appropriate decision making  Perception: Appears intact  Perseveration: No perseveration noted  Safety/Judgement: Awareness of environment                               Physical Skills Involved:  1. Range of Motion  2. Balance  3. Strength  4. Activity Tolerance  5. Pain (acute) Cognitive Skills Affected (resulting in the inability to perform in a timely and safe manner):  1. WFLs Psychosocial Skills Affected:  1. Habits/Routines  2. Environmental Adaptation  3. Self-Awareness   Number of elements that affect the Plan of Care: 5+:  HIGH COMPLEXITY   CLINICAL DECISION MAKIN[address] [place] [id number] AM-PAC 6 Clicks   Daily Activity Inpatient Short Form  How much help from another person does the patient currently need. .. Total A Lot A Little None   1. Putting on and taking off regular lower body clothing? [X] 1   [ ] 2   [ ] 3   [ ] 4   2. Bathing (including washing, rinsing, drying)? [X] 1   [ ] 2   [ ] 3   [ ] 4   3. Toileting, which includes using toilet, bedpan or urinal?   [X] 1   [ ] 2   [ ] 3   [ ] 4   4. Putting on and taking off regular upper body clothing? [X] 1   [ ] 2   [ ] 3   [ ] 4   5. Taking care of personal grooming such as brushing teeth? [ ] 1   [X] 2   [ ] 3   [ ] 4   6. Eating meals? [ ] 1   [X] 2   [ ] 3   [ ] 4   © , Trustees of [address] [place] [address], under license to [employer].  All rights reserved    Score:  Initial: 8 Most Recent: X (Date: -- )     Interpretation of Tool:  Represents activities that are increasingly more difficult (i.e. Bed mobility, Transfers, Gait).       Score 24 23 22-20 19-15 14-10 9-7 6       Modifier CH CI CJ CK CL CM CN         · Self Care:               - CURRENT STATUS:    CM - 80%-99% impaired, limited or restricted               - GOAL STATUS:           CK - 40%-59% impaired, limited or restricted               - D/C STATUS:                       ---------------To be determined---------------  Payor: [address] [place] [address] [address] / Plan: [place]ICA[place] OF [address] [place] [address] Rd / Product Type: Medicaid /       Medical Necessity:     · Patient demonstrates good rehab potential due to higher previous functional level. Reason for Services/Other Comments:  · Patient continues to require skilled intervention due to medical complications and patient unable to attend/participate in therapy as expected. Use of outcome tool(s) and clinical judgement create a POC that gives a: HIGH COMPLEXITY             TREATMENT:   (In addition to Assessment/Re-Assessment sessions the following treatments were rendered)      Pre-treatment Symptoms/Complaints:  Decreased ability to perform ADLs, self care, and functional mobility; decreased tolerance for activities  Pain: Initial:   Pain Intensity 1: 0  Post Session:  Appears to be in less pain, too tired to answer      Self Care: (15 minutes): Procedure(s) (per grid) utilized to improve and/or restore self-care/home management as related to dressing, bathing, toileting and grooming. Required no verbal, manual and tactile cueing to facilitate activities of daily living skills and compensatory activities. Therapeutic Activity: (10 minutes)  Therapeutic activities including Bed transfers, Chair transfers and bathroom mobility to improve mobility, strength, balance and activity tolerance. Required no physical assistance to promote static and dynamic balance in standing.            UE Exercises (with yellow and red theraband) Date:  9/12/2017 Date:  9/19/2017 Date:     Activity/Exercise Parameters Parameters Parameters   Shoulder Abd/Adduction 10 reps 3 sets 15 reps with yellow theraband    Shoulder Flexion 8 reps 3 sets 15 reps with 2# dowel    Elbow Flexion 10 reps 2 sets 20 reps with red theraband    Punches 10 reps 3 sets 15 reps with 2# dowel                        Braces/Orthotics/Lines/Etc:   · IV  · lopez catheter  · arterial line  · O2 Device: Nasal cannula  Treatment/Session Assessment:    · Response to Treatment:  Agrees to therapy  · Interdisciplinary Collaboration:  · Certified Occupational Therapy Assistant and Registered Nurse  · After treatment position/precautions:  · Up in chair, Bed/Chair-wheels locked, Call light within reach and Nurse at bedside  · Compliance with Program/Exercises: Will assess as treatment progresses. · Recommendations/Intent for next treatment session: \"Next visit will focus on advancements to more challenging activities and reduction in assistance provided\".   Total Treatment Duration:OT Patient Time In/Time Out  Time In: 5272  Time Out: [address] [place] [address] Reji Pryor

## 2017-09-27 ENCOUNTER — HOME HEALTH ADMISSION (OUTPATIENT)
Dept: HOME HEALTH SERVICES | Facility: HOME HEALTH | Age: 57
End: 2017-09-27
Payer: MEDICAID

## 2017-09-27 LAB
CREAT SERPL-MCNC: 1.84 MG/DL (ref 0.8–1.5)
PLATELET # BLD AUTO: 377 K/UL (ref 150–450)

## 2017-09-27 PROCEDURE — 74011250636 HC RX REV CODE- 250/636: Performed by: SURGERY

## 2017-09-27 PROCEDURE — 74750000023 HC WOUND THERAPY

## 2017-09-27 PROCEDURE — 74011250637 HC RX REV CODE- 250/637: Performed by: SURGERY

## 2017-09-27 PROCEDURE — 65660000004 HC RM CVT STEPDOWN

## 2017-09-27 PROCEDURE — 36415 COLL VENOUS BLD VENIPUNCTURE: CPT | Performed by: SURGERY

## 2017-09-27 PROCEDURE — 85049 AUTOMATED PLATELET COUNT: CPT | Performed by: SURGERY

## 2017-09-27 PROCEDURE — 82565 ASSAY OF CREATININE: CPT | Performed by: SURGERY

## 2017-09-27 PROCEDURE — 74011250637 HC RX REV CODE- 250/637: Performed by: PHYSICIAN ASSISTANT

## 2017-09-27 RX ORDER — POLYETHYLENE GLYCOL 3350 17 G/17G
17 POWDER, FOR SOLUTION ORAL DAILY
Status: DISCONTINUED | OUTPATIENT
Start: 2017-09-27 | End: 2017-09-29 | Stop reason: HOSPADM

## 2017-09-27 RX ADMIN — Medication 10 ML: at 15:11

## 2017-09-27 RX ADMIN — Medication 5 ML: at 15:21

## 2017-09-27 RX ADMIN — Medication 5 ML: at 08:26

## 2017-09-27 RX ADMIN — ASPIRIN 81 MG: 81 TABLET, COATED ORAL at 04:28

## 2017-09-27 RX ADMIN — SULFAMETHOXAZOLE AND TRIMETHOPRIM 1 TABLET: 800; 160 TABLET ORAL at 08:23

## 2017-09-27 RX ADMIN — CARVEDILOL 25 MG: 25 TABLET, FILM COATED ORAL at 16:51

## 2017-09-27 RX ADMIN — HEPARIN SODIUM 5000 UNITS: 5000 INJECTION, SOLUTION INTRAVENOUS; SUBCUTANEOUS at 15:11

## 2017-09-27 RX ADMIN — PANTOPRAZOLE SODIUM 40 MG: 40 TABLET, DELAYED RELEASE ORAL at 04:28

## 2017-09-27 RX ADMIN — SULFAMETHOXAZOLE AND TRIMETHOPRIM 1 TABLET: 800; 160 TABLET ORAL at 21:20

## 2017-09-27 RX ADMIN — OXYCODONE HYDROCHLORIDE AND ACETAMINOPHEN 1 TABLET: 7.5; 325 TABLET ORAL at 10:52

## 2017-09-27 RX ADMIN — CARVEDILOL 25 MG: 25 TABLET, FILM COATED ORAL at 08:23

## 2017-09-27 RX ADMIN — AMLODIPINE BESYLATE 5 MG: 10 TABLET ORAL at 08:26

## 2017-09-27 RX ADMIN — HEPARIN SODIUM 5000 UNITS: 5000 INJECTION, SOLUTION INTRAVENOUS; SUBCUTANEOUS at 08:23

## 2017-09-27 RX ADMIN — OXYCODONE HYDROCHLORIDE AND ACETAMINOPHEN 1 TABLET: 7.5; 325 TABLET ORAL at 04:28

## 2017-09-27 RX ADMIN — OXYCODONE HYDROCHLORIDE AND ACETAMINOPHEN 1 TABLET: 7.5; 325 TABLET ORAL at 15:33

## 2017-09-27 RX ADMIN — POLYETHYLENE GLYCOL 3350 17 G: 17 POWDER, FOR SOLUTION ORAL at 16:51

## 2017-09-27 RX ADMIN — FLUCONAZOLE 200 MG: 100 TABLET ORAL at 08:23

## 2017-09-27 RX ADMIN — Medication 10 ML: at 21:22

## 2017-09-27 RX ADMIN — OXYCODONE HYDROCHLORIDE AND ACETAMINOPHEN 1 TABLET: 7.5; 325 TABLET ORAL at 19:17

## 2017-09-27 RX ADMIN — DOCUSATE SODIUM 100 MG: 100 CAPSULE, LIQUID FILLED ORAL at 08:23

## 2017-09-27 RX ADMIN — Medication 10 ML: at 04:30

## 2017-09-27 NOTE — PROGRESS NOTES
Wound Vac orders faced to [facility] as per request, HH requested with [facility] for NSG for wound care. Pt. Ready for DC home pending wound vac.

## 2017-09-27 NOTE — DISCHARGE SUMMARY
Willa Early          Physician Discharge Summary     Patient: Jonn Rashid MRN: [id number]  SSN: [id number]    YOB: 1960  Age: 61 y.o. Sex: male       Admit Date: 9/7/2017    Discharge Date: 9/29/2017    Admitting Physician: Michelle Purdy MD     Discharge Provider: Daly Okeefe PA-C    Admission Diagnoses: Aortic occlusion (HCC) [I74.10]  Ischemic colitis ([facility] [K55.9]    Discharge Diagnoses:   Problem List as of 9/27/2017  Date Reviewed: 9/7/2017          Codes Class Noted - Resolved    Ischemia of left lower extremity ICD-10-CM: I99.8  ICD-9-CM: 459.9  9/8/2017 - Present        Claudication ([facility] ICD-10-CM: I73.9  ICD-9-CM: 443.9  9/8/2017 - Present        * (Principal)Occlusion of aorta ([facility] ICD-10-CM: I74.10  ICD-9-CM: 747.22  3/3/2017 - Present        Chronic left-sided low back pain ICD-10-CM: M54.5, G89.29  ICD-9-CM: 724.2, 338.29  1/3/2017 - Present        Essential hypertension ICD-10-CM: I10  ICD-9-CM: 401.9  12/16/2016 - Present        Renal insufficiency ICD-10-CM: N28.9  ICD-9-CM: 593.9  12/16/2016 - Present        Dyslipidemia (Chronic) ICD-10-CM: E78.5  ICD-9-CM: 272.4  12/4/2016 - Present        RESOLVED: Elevated troponin ICD-10-CM: R74.8  ICD-9-CM: 790.6  12/4/2016 - 12/16/2016        RESOLVED: Acute diastolic (congestive) heart failure (HCC) ICD-10-CM: I50.31  ICD-9-CM: 428.31, 428.0  12/4/2016 - 8/11/2017        RESOLVED: Acute respiratory failure with hypoxia (HCC) ICD-10-CM: J96.01  ICD-9-CM: 518.81  12/4/2016 - 8/11/2017        RESOLVED: Hypertensive urgency ICD-10-CM: I16.0  ICD-9-CM: 401.9  12/3/2016 - 8/11/2017               Procedures for this Admission: Procedure(s):  9/7/2017 - Dylan Garcia MD  Aortobifemoral bypass graft with a 14 x 7 Dacron graft      9/8/2017 - Dylan Garcia MD  1. Left common femoral embolectomy  2.  Left lower extremity arteriogram      9/9/2017  Colonoscopy Oswald Jacob MD)    Discharged Condition: stable    Brief History: Reina Lara was admitted with the following history of present illness. This is a 54-year-old male with PMH of debilitating peripheral vascular disease and tobacco use whom we have been following in clinic with surveillance ultrasounds. He had worsening debilitating claudication. 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 Dr. Ruth Martin had a long discussion with patient regarding the risks and benefits of the procedure, including bleeding, infection, worsening kidney function, potentially even dialysis. The patient elected to proceed with surgery, and we planned for an aortobifemoral bypass. Hospital Course: On day of admission, the patient was taken to the operating room and underwent the aortobifemoral bypass grafting. After extubation he was transferred to CV ICU to begin recovery. Later that evening, patient complained of left leg \"heaviness\" and staff noted loss of distal signals in the left foot. Abdominal CTA revealed ~30% plaque of posterolateral aspect of the aorta at the distal right renal and occlusion of the distal heel of the left common femoral anastomosis. He was taken back to the OR for left femoral embolectomy and anticoagulation then returned to CV ICU for continued recovery. He was continued on a heparin drip. He developed abdominal pain, hypotension and tachypnea, and an NG tube was placed. He showed signs of mild acute kidney injury, and nephrology was consulted. An echocardiogram revealed normal cardiac function. He mobilized with the assistance of physical therapy.  Pain management was attempted with epidural infusion then transition to systemic analgesics. With persistent hypotension and unchanging abdominal pain, there were concerns for bowel ischemia and GI was consulted. Imaging revealed ileus, and flexible sigmoidoscopy was performed. This noted mild ischemic colitis without endoscopic evidence of necrosis. His renal function improved, his abdominal pain lessened and he was started on a clear liquid diet. He used oxygen via nasal cannula and begin chest physiotherapy. Wound care was consulted, and incisional wound vacs were applied to bilateral groins. His diet was advanced, and pain management was switched from PCA to demand IV and PO medications. He continued to work with PT / OT. Diuresis was initiated. The patient was clinically stable with warm, well-perfused bilateral lower extremities with motor and sensory intact. He was transferred to [facility], and his lopez was removed. Case management began planning disposition for outpatient rehabilitation with physical therapy vs. home with home health for wound VAC therapy. His IV antibiotics were switched to oral treatment. His insurance limitations prevented him from discharge to inpatient rehabilitation and skilled nursing facility, thus plans were made for discharge to home with home health. Consults: Gastroenterology, Nephrology and Physical Medicine and Rehabilitation    Significant Diagnostic Studies: labs; microbiology; radiology: X-Ray, fluoroscopy and cardiac graphics; and flexible sigmoidoscopy.     Treatments: IV hydration; anti-infectives/antibiotics: Ancef, Bactrim DS, fluconazole; analgesia: acetaminophen, Dilaudid, morphine and Percocet; cardiac meds: carvedilol, amlodipine and furosemide; anticoagulation: ASA and heparin; therapies: PT, OT and SW; procedures and surgery: as noted above    Discharge Exam:  Constitutional: obese, well-developed, no distress  Head: atraumatic  Eyes: pupils are equal, round, and reactive to light  Neck: supple, normal range of motion  Cardiovascular: regular rhythm  Pulmonary/Chest: effort and breath sounds normal, no respiratory distress  Abdominal: soft, non-tender, non-distended, bowel sounds normoactive  Musculoskeletal: normal range of motion  Neurological: alert, CN II- XII grossly intact  Vascular: Left VAC in place, distal pulses present    Disposition: home with home health    Home Health Orders  1. Please perform wound vac dressing changes on Monday's and Thursday's      Patient Instructions:   Current Discharge Medication List      START taking these medications    Details   fluconazole (DIFLUCAN) 200 mg tablet Take 1 Tab by mouth daily for 14 days. FDA advises cautious prescribing of oral fluconazole in pregnancy. Qty: 14 Tab, Refills: 0      amLODIPine (NORVASC) 5 mg tablet Take 1 Tab by mouth daily for 30 days. Qty: 30 Tab, Refills: 0      trimethoprim-sulfamethoxazole (BACTRIM DS, SEPTRA DS) 160-800 mg per tablet Take 1 Tab by mouth every twelve (12) hours for 14 days. Qty: 28 Tab, Refills: 0      oxyCODONE-acetaminophen (PERCOCET) 5-325 mg per tablet Take 1 Tab by mouth every four (4) hours as needed for Pain. Max Daily Amount: 6 Tabs. Qty: 40 Tab, Refills: 0         CONTINUE these medications which have CHANGED    Details   carvedilol (COREG) 25 mg tablet Take 1 Tab by mouth two (2) times daily (with meals) for 30 days. Qty: 60 Tab, Refills: 0         CONTINUE these medications which have NOT CHANGED    Details   docusate sodium (STOOL SOFTENER) 100 mg capsule Take 100 mg by mouth as needed for Constipation. raNITIdine (ZANTAC) 150 mg tablet Take 150 mg by mouth as needed for Indigestion. aspirin delayed-release 81 mg tablet Take 81 mg by mouth every morning. pravastatin (PRAVACHOL) 20 mg tablet Take 1 Tab by mouth nightly.  Indications: hyperlipidemia  Qty: 30 Tab, Refills: 5      nitroglycerin (NITROSTAT) 0.4 mg SL tablet 1 Tab by SubLINGual route every five (5) minutes as needed for Chest Pain. Qty: 2 Bottle, Refills: 4         STOP taking these medications       HYDROcodone-acetaminophen (NORCO)  mg tablet Comments:   Reason for Stopping:               Reference MD discharge instructions, as well as those provided by nursing for diet, labs, medications, activity, wound care and any outpatient referrals. No orders of the defined types were placed in this encounter. Signed: Maldonado Christian PA-C  9/27/2017 10:20 AM  Physician Assistant with Vascular Surgery Jorden Vilchis MD / Sasha Nunez.  Salud Arteaga MD / Bhupendra Salazar MD

## 2017-09-27 NOTE — PROGRESS NOTES
[address]   [address] FAX: [phone]         VASCULAR SURGERY FLOOR PROGRESS NOTE    Admit Date: 2017  POD: 18 Days Post-Op    Procedure:  Procedure(s):  SIGMOIDOSCOPY FLEXIBLE    Subjective:     Patient has no new complaints. Objective:     Vitals:  Blood pressure 135/77, pulse 74, temperature 98.4 °F (36.9 °C), resp. rate 18, height 5' 6\" (1.676 m), weight 190 lb 9.6 oz (86.5 kg), SpO2 97 %. Temp (24hrs), Av.1 °F (36.7 °C), Min:98 °F (36.7 °C), Max:98.4 °F (36.9 °C)      Intake / Output:    Intake/Output Summary (Last 24 hours) at 17 0729  Last data filed at 17 0200   Gross per 24 hour   Intake              950 ml   Output              300 ml   Net              650 ml       Physical Exam:    Constitutional: he appears well-developed. No distress. HENT:   Head: Atraumatic. Eyes: Pupils are equal, round, and reactive to light. Neck: Normal range of motion. Cardiovascular: Regular rhythm. Pulmonary/Chest: Effort normal and breath sounds normal. No respiratory distress. Abdominal: Soft. Bowel sounds are normal. he exhibits no distension. There is no tenderness. There is no guarding. No hernia. Musculoskeletal: Normal range of motion. Neurological: He is alert. CN II- XII grossly intact  Vascular: Left VAC in place    Labs: No results for input(s): HGB, WBC, K, GLU, HGBEXT in the last 72 hours.     No lab exists for component:  CREA    Data Review     Assessment:     Patient Active Problem List    Diagnosis Date Noted    Ischemia of left lower extremity 2017    Claudication ([facility] 2017    Occlusion of aorta ([facility] 2017    Chronic left-sided low back pain 2017    Essential hypertension 2016    Renal insufficiency 2016    Dyslipidemia 2016       Plan/Recommendations/Medical Decision Making:     Patient clinically stable awaiting discharge with home wound VAC    Elements of this note have been dictated using speech recognition software. As a result, errors of speech recognition may have occurred.

## 2017-09-27 NOTE — PROGRESS NOTES
Home Care Face to Face Encounter    Patients Name: Janessa Stone    YOB: 1960    Primary Diagnosis: Aortic Occlusion    Date of Face to Face:   October 26, 2017    ORDERING MD:  Dr. Wenona Dandy. Oleg                             Face to Face Encounter findings are related to primary reason for home care:   yes. 1. I certify that the patient needs intermittent care as follows: skilled nursing care:  wound care    2. I certify that this patient is homebound, that is: 1) patient requires the use of a none device, special transportation, or assistance of another to leave the home; or 2) patient's condition makes leaving the home medically contraindicated; and 3) patient has a normal inability to leave the home and leaving the home requires considerable and taxing effort. Patient may leave the home for infrequent and short duration for medical reasons, and occasional absences for non-medical reasons. Homebound status is due to the following functional limitations:     3. I certify that this patient is under my care and that I, or a nurse practitioner or [address] [place] [address], or clinical nurse specialist, or certified nurse midwife, working with me, had a Face-to-Face Encounter that meets the physician Face-to-Face Encounter requirements.   The following are the clinical findings from the [address] [place] [address] encounter that support the need for skilled services and is a summary of the encounter:     See hospital note      Tejal Hong RN  9/27/2017

## 2017-09-27 NOTE — PROGRESS NOTES
Patient states he would like mechanical soft diet changed. States he wishes to have food of \"normal\" consistency. SANGEETA Molina notified. Orders given.

## 2017-09-27 NOTE — PROGRESS NOTES
OT Note:    OT checked in on patient today. Pt has met all goals from evaluation. Waiting on wound vac so he can be discharged home. Stated he did well with ADLs on his own today. OT will give patient long handle sponge to assist him with washing his feet. He stated he has pain when he bends over in his chest/abdomen. Will check on patient in the morning.     Thanks,  Mike Marrufo

## 2017-09-27 NOTE — PROGRESS NOTES
Statement of medical necessity for wound vac:    Patient with Dacron graft placed intraoperatively 9/7/2017, currently with serous drainage. He would be at significant risk for infection / seroma / rehospitalization / explantation of graft without application of wound vac to groin incision. Lia Perales PA-C  Physician Assistant with Vascular Surgery Ashly Crockett MD / Colleen Agudelo.  Alisa Lopez MD / Saad Devine MD

## 2017-09-28 PROCEDURE — 77030019934 HC DRSG VAC ASST KCON -B

## 2017-09-28 PROCEDURE — 65660000004 HC RM CVT STEPDOWN

## 2017-09-28 PROCEDURE — 77030008031

## 2017-09-28 PROCEDURE — 77030025162 HC DRSG VAC ASST 1 KCON -B

## 2017-09-28 PROCEDURE — 74011250637 HC RX REV CODE- 250/637: Performed by: PHYSICIAN ASSISTANT

## 2017-09-28 PROCEDURE — 77030011641 HC PASTE OST ADH BMS -A

## 2017-09-28 PROCEDURE — 74011250637 HC RX REV CODE- 250/637: Performed by: SURGERY

## 2017-09-28 PROCEDURE — 97605 NEG PRS WND THER DME<=50SQCM: CPT

## 2017-09-28 PROCEDURE — 97530 THERAPEUTIC ACTIVITIES: CPT

## 2017-09-28 PROCEDURE — 74750000023 HC WOUND THERAPY

## 2017-09-28 RX ADMIN — DOCUSATE SODIUM 100 MG: 100 CAPSULE, LIQUID FILLED ORAL at 08:48

## 2017-09-28 RX ADMIN — SULFAMETHOXAZOLE AND TRIMETHOPRIM 1 TABLET: 800; 160 TABLET ORAL at 08:48

## 2017-09-28 RX ADMIN — CARVEDILOL 25 MG: 25 TABLET, FILM COATED ORAL at 08:48

## 2017-09-28 RX ADMIN — Medication 10 ML: at 21:18

## 2017-09-28 RX ADMIN — Medication 10 ML: at 05:59

## 2017-09-28 RX ADMIN — CARVEDILOL 25 MG: 25 TABLET, FILM COATED ORAL at 16:49

## 2017-09-28 RX ADMIN — OXYCODONE HYDROCHLORIDE AND ACETAMINOPHEN 1 TABLET: 7.5; 325 TABLET ORAL at 05:59

## 2017-09-28 RX ADMIN — FLUCONAZOLE 200 MG: 100 TABLET ORAL at 08:48

## 2017-09-28 RX ADMIN — Medication 5 ML: at 05:58

## 2017-09-28 RX ADMIN — OXYCODONE HYDROCHLORIDE AND ACETAMINOPHEN 1 TABLET: 7.5; 325 TABLET ORAL at 21:18

## 2017-09-28 RX ADMIN — ASPIRIN 81 MG: 81 TABLET, COATED ORAL at 05:57

## 2017-09-28 RX ADMIN — POLYETHYLENE GLYCOL 3350 17 G: 17 POWDER, FOR SOLUTION ORAL at 08:48

## 2017-09-28 RX ADMIN — OXYCODONE HYDROCHLORIDE AND ACETAMINOPHEN 1 TABLET: 7.5; 325 TABLET ORAL at 11:57

## 2017-09-28 RX ADMIN — SULFAMETHOXAZOLE AND TRIMETHOPRIM 1 TABLET: 800; 160 TABLET ORAL at 21:18

## 2017-09-28 RX ADMIN — Medication 5 ML: at 21:16

## 2017-09-28 RX ADMIN — Medication 10 ML: at 15:13

## 2017-09-28 RX ADMIN — PANTOPRAZOLE SODIUM 40 MG: 40 TABLET, DELAYED RELEASE ORAL at 05:56

## 2017-09-28 RX ADMIN — AMLODIPINE BESYLATE 5 MG: 10 TABLET ORAL at 08:48

## 2017-09-28 RX ADMIN — OXYCODONE HYDROCHLORIDE AND ACETAMINOPHEN 1 TABLET: 7.5; 325 TABLET ORAL at 16:49

## 2017-09-28 NOTE — PROGRESS NOTES
Problem: Self Care Deficits Care Plan (Adult)  Goal: *Acute Goals and Plan of Care (Insert Text)  GOALS:    1: Pt will perform toileting with maximal assistance and adaptive equipment as needed. MET  2: Pt will perform grooming with minimal assistance and adaptive equipment as needed. MET  3: Pt will tolerate sitting edge of bed for 5 minutes for ADL prep. MET  4: Pt will perform toilet transfers with moderate assistance and the least restrictive device to promote quality of life. MET  5: Pt will tolerate 15 minutes of therapeutic activity with minimal rest breaks. MET    Time Frame: 7 visits      OCCUPATIONAL THERAPY: Daily Note, Treatment Day: 6th and PM 9/28/2017  INPATIENT: Hospital Day: 22  Payor: [id number] / Plan: [place] MEDICAID [address] / Product Type: Medicaid /      NAME/AGE/GENDER: Tatum Paul is a 61 y.o. male     PRIMARY DIAGNOSIS:  Aortic occlusion ([facility] [I74.10]  Ischemic colitis ([facility] [K55.9] Occlusion of aorta (HCC) Occlusion of aorta (HCC)  Procedure(s) (LRB):  SIGMOIDOSCOPY FLEXIBLE (N/A)  19 Days Post-Op  ICD-10: Treatment Diagnosis:        · Generalized Muscle Weakness (M62.81)  · Dizziness and Giddiness (R42)   Precautions/Allergies:         Review of patient's allergies indicates no known allergies. ASSESSMENT:   Mr. Beth Hunter was admitted for the above diagnosis. Patient supine upon arrival. Pt able to perform bed mobility and transfer mod I. Pt entered bathroom and was able to toilet standing. Pt practiced functional mobility in hallway and outside to increase strength and balance. Pt did well with mobility. Sat outside for about 15 minutes. Performed some leg kicks and toe taps while sitting. Pt returned to room and left edge of bed. RN notified. Pt will be re-assessed next visit. This section established at most recent assessment   PROBLEM LIST (Impairments causing functional limitations):  1. Decreased Strength  2.  Decreased ADL/Functional Activities  3. Decreased Transfer Abilities  4. Decreased Ambulation Ability/Technique  5. Decreased Balance  6. Increased Pain  7. Decreased Activity Tolerance  8. Decreased Work Simplification/Energy Conservation Techniques  9. Increased Fatigue  10. Increased Shortness of Breath  11. Decreased Flexibility/Joint Mobility    INTERVENTIONS PLANNED: (Benefits and precautions of occupational therapy have been discussed with the patient.)  1. Activities of daily living training  2. Adaptive equipment training  3. Clothing management  4. Donning&doffing training  5. Group therapy  6. Therapeutic activity  7. Therapeutic exercise  8. Energy conservation      TREATMENT PLAN: Frequency/Duration: Follow patient 3x/week to address above goals. Rehabilitation Potential For Stated Goals: GOOD      RECOMMENDED REHABILITATION/EQUIPMENT: (at time of discharge pending progress): Due to the probability of continued deficits (see above) this patient will likely need continued skilled occupational therapy after discharge. Equipment:   · to be determined               OCCUPATIONAL PROFILE AND HISTORY:   History of Present Injury/Illness (Reason for Referral):  See H&P  Past Medical History/Comorbidities:   Mr. Chika Shelton  has a past medical history of Acute diastolic (congestive) heart failure ([address] (12/4/2016); CAD (coronary artery disease); Chronic kidney disease; Chronic kidney disease, stage 3; Chronic pain; Ex-smoker; GERD (gastroesophageal reflux disease); TIA (transient ischemic attack); Hypercholesteremia; Hypertension; Old MI (myocardial infarction) (11/2016); Osteoarthritis; and Stroke ([address] Mr. Chika Shelton  has a past surgical history that includes heart catheterization (12/2016); colonoscopy (N/A, 6/22/2017); knee arthroscopy (Left); flexible sigmoidoscopy (N/A, 9/9/2017); vascular surgery procedure unlist (09/07/2017); and vascular surgery procedure unlist (Left, 09/08/2017).   Social History/Living Environment:   Home Environment: Private residence  # Steps to Enter: 6  Rails to [facility]: Yes  One/Two Story Residence: One story  Living Alone: Yes  Support Systems: Child(tisha)  Patient Expects to be Discharged to[de-identified] Unknown  Current DME Used/Available at Home: Cane, straight  Tub or Shower Type: Shower  Prior Level of Function/Work/Activity:  Works, drives, Independent with self care. Number of Personal Factors/Comorbidities that affect the Plan of Care: Expanded review of therapy/medical records (1-2):  MODERATE COMPLEXITY   ASSESSMENT OF OCCUPATIONAL PERFORMANCE[de-identified]   Activities of Daily Living:           Basic ADLs (From Assessment) Complex ADLs (From Assessment)   Basic ADL  Feeding: Minimum assistance  Oral Facial Hygiene/Grooming: Maximum assistance  Bathing: Total assistance  Upper Body Dressing: Maximum assistance  Lower Body Dressing: Total assistance  Toileting: Maximum assistance     Grooming/Bathing/Dressing Activities of Daily Living     Cognitive Retraining  Safety/Judgement: Awareness of environment                       Bed/Mat Mobility  Sit to Stand: Modified independent          Most Recent Physical Functioning:   Gross Assessment:                  Posture:  Posture (WDL): Exceptions to WDL  Posture Assessment: Forward head, Rounded shoulders  Balance:  Sitting: Intact  Standing: Impaired  Standing - Static: Good  Standing - Dynamic : Fair Bed Mobility:     Wheelchair Mobility:     Transfers:  Sit to Stand: Modified independent      Patient Vitals for the past 6 hrs:   BP SpO2 Pulse   09/28/17 1058 149/70 97 % 71        Mental Status  Neurologic State: Alert  Orientation Level: Oriented X4  Cognition: Follows commands  Perception: Appears intact  Perseveration: No perseveration noted  Safety/Judgement: Awareness of environment                               Physical Skills Involved:  1. Range of Motion  2. Balance  3. Strength  4. Activity Tolerance  5.  Pain (acute) Cognitive Skills Affected (resulting in the inability to perform in a timely and safe manner):  1. WFLs Psychosocial Skills Affected:  1. Habits/Routines  2. Environmental Adaptation  3. Self-Awareness   Number of elements that affect the Plan of Care: 5+:  HIGH COMPLEXITY   CLINICAL DECISION MAKIN[address] [place] [address] AM-PAC 6 Clicks   Daily Activity Inpatient Short Form  How much help from another person does the patient currently need. .. Total A Lot A Little None   1. Putting on and taking off regular lower body clothing? [X] 1   [ ] 2   [ ] 3   [ ] 4   2. Bathing (including washing, rinsing, drying)? [X] 1   [ ] 2   [ ] 3   [ ] 4   3. Toileting, which includes using toilet, bedpan or urinal?   [X] 1   [ ] 2   [ ] 3   [ ] 4   4. Putting on and taking off regular upper body clothing? [X] 1   [ ] 2   [ ] 3   [ ] 4   5. Taking care of personal grooming such as brushing teeth? [ ] 1   [X] 2   [ ] 3   [ ] 4   6. Eating meals? [ ] 1   [X] 2   [ ] 3   [ ] 4   © 2007, Trustees of [address] [place] [address], under license to [employer]. All rights reserved    Score:  Initial: 8 Most Recent: X (Date: -- )     Interpretation of Tool:  Represents activities that are increasingly more difficult (i.e. Bed mobility, Transfers, Gait). Score 24 23 22-20 19-15 14-10 9-7 6       Modifier CH CI CJ CK CL CM CN         · Self Care:               - CURRENT STATUS:    CM - 80%-99% impaired, limited or restricted               - GOAL STATUS:           CK - 40%-59% impaired, limited or restricted               - D/C STATUS:                       ---------------To be determined---------------  Payor: [address] [place] [address] [address] / Plan: [place] MEDICAID [address] / Product Type: Medicaid /       Medical Necessity:     · Patient demonstrates good rehab potential due to higher previous functional level.   Reason for Services/Other Comments:  · Patient continues to require skilled intervention due to medical complications and patient unable to attend/participate in therapy as expected. Use of outcome tool(s) and clinical judgement create a POC that gives a: HIGH COMPLEXITY             TREATMENT:   (In addition to Assessment/Re-Assessment sessions the following treatments were rendered)      Pre-treatment Symptoms/Complaints:  Decreased ability to perform ADLs, self care, and functional mobility; decreased tolerance for activities  Pain: Initial:   Pain Intensity 1: 0  Post Session:  Appears to be in less pain, too tired to answer     Therapeutic Activity: (25 minutes)  Therapeutic activities including bathroom mobility and functional mobility on level ground to improve mobility, strength, balance and activity tolerance. Required no physical assistance to promote static and dynamic balance in standing. UE Exercises (with yellow and red theraband) Date:  9/12/2017 Date:  9/19/2017 Date:     Activity/Exercise Parameters Parameters Parameters   Shoulder Abd/Adduction 10 reps 3 sets 15 reps with yellow theraband    Shoulder Flexion 8 reps 3 sets 15 reps with 2# dowel    Elbow Flexion 10 reps 2 sets 20 reps with red theraband    Punches 10 reps 3 sets 15 reps with 2# dowel                        Braces/Orthotics/Lines/Etc:   · IV  · lopez catheter  · arterial line  · O2 Device: Nasal cannula  Treatment/Session Assessment:    · Response to Treatment:  Agrees to therapy  · Interdisciplinary Collaboration:  · Certified Occupational Therapy Assistant and Registered Nurse  · After treatment position/precautions:  · Bed/Chair-wheels locked, Bed in low position, Call light within reach, RN notified and pt left sitting edge of bed. · Compliance with Program/Exercises: Will assess as treatment progresses. · Recommendations/Intent for next treatment session: \"Next visit will focus on advancements to more challenging activities and reduction in assistance provided\".   Total Treatment Duration:OT Patient Time In/Time Out  Time In: 1355  Time Out: 1420     Mike Daniel Bartlett

## 2017-09-28 NOTE — PROGRESS NOTES
Received bedside report from Mackenzie Cristobal, [address] [place] [address]. Opportunity for questions, concerns. Patient involved.

## 2017-09-28 NOTE — PROGRESS NOTES
Bedside report received from Baldemar Franklin RN. PM assessment complete. Denies any needs at this time.

## 2017-09-28 NOTE — PROGRESS NOTES
[address]   [address] FAX: [phone]         VASCULAR SURGERY FLOOR PROGRESS NOTE    Admit Date: 2017  POD: 19 Days Post-Op    Procedure:  Procedure(s):  SIGMOIDOSCOPY FLEXIBLE    Subjective:     Patient has no new complaints. Objective:     Vitals:  Blood pressure 127/75, pulse 75, temperature 98 °F (36.7 °C), resp. rate 18, height 5' 6\" (1.676 m), weight 191 lb 3.2 oz (86.7 kg), SpO2 99 %. Temp (24hrs), Av.6 °F (36.4 °C), Min:96.8 °F (36 °C), Max:98.1 °F (36.7 °C)      Intake / Output:    Intake/Output Summary (Last 24 hours) at 17 0777  Last data filed at 17 2132   Gross per 24 hour   Intake             1210 ml   Output              800 ml   Net              410 ml       Physical Exam:    Constitutional: he appears well-developed. No distress. HENT:   Head: Atraumatic. Eyes: Pupils are equal, round, and reactive to light. Neck: Normal range of motion. Cardiovascular: Regular rhythm. Pulmonary/Chest: Effort normal and breath sounds normal. No respiratory distress. Abdominal: Soft. Bowel sounds are normal. he exhibits no distension. There is no tenderness. There is no guarding. No hernia. Musculoskeletal: Normal range of motion. Neurological: He is alert. CN II- XII grossly intact  Vascular: Right groin intact left groin wound VAC stable    Labs: No results for input(s): HGB, WBC, K, GLU, HGBEXT in the last 72 hours.     No lab exists for component:  CREA    Data Review     Assessment:     Patient Active Problem List    Diagnosis Date Noted    Ischemia of left lower extremity 2017    Claudication ([facility] [facility] 2017    Occlusion of aorta ([facility] [facility] 2017    Chronic left-sided low back pain 2017    Essential hypertension 2016    Renal insufficiency 2016    Dyslipidemia 2016       Plan/Recommendations/Medical Decision Making:     Plan wound VAC changed today  -Still awaiting placement stable from vascular standpoint to be discharged with wound VAC and p.o. antibiotics until left wound heals    Elements of this note have been dictated using speech recognition software. As a result, errors of speech recognition may have occurred.

## 2017-09-28 NOTE — PROGRESS NOTES
Wound Vac approved, received call from staff at [address] [place] [address] will be delivered today before 2 pm.

## 2017-09-28 NOTE — WOUND CARE
Left groin wound vac dressing change, upon removal of wound vac, copious amounts of serosanguinous drainage drained from wound, wound edges expressed and additional fluid was expelled. Dr. Gilbert Mock notified, order to remove distal staple, removed and to notify patient he likely would not . White foam wick tucked into wound, black foam over. Will monitor.

## 2017-09-28 NOTE — PROGRESS NOTES
Bedside shift report received from Godwin Alfaro, [address] [place] [address] (offgoing nurse). Report given to Marguerite Brasher RN. Vital signs stable. No complaints noted. Patient in stable condition.

## 2017-09-28 NOTE — PROGRESS NOTES
Patient refusing heparin against advice. Given percocet for pain for [employer], she is at the time busy with another patient.

## 2017-09-29 VITALS
DIASTOLIC BLOOD PRESSURE: 66 MMHG | TEMPERATURE: 97.9 F | WEIGHT: 190.5 LBS | RESPIRATION RATE: 18 BRPM | BODY MASS INDEX: 30.62 KG/M2 | SYSTOLIC BLOOD PRESSURE: 141 MMHG | OXYGEN SATURATION: 98 % | HEART RATE: 74 BPM | HEIGHT: 66 IN

## 2017-09-29 PROCEDURE — 74750000023 HC WOUND THERAPY

## 2017-09-29 PROCEDURE — 74011250637 HC RX REV CODE- 250/637: Performed by: PHYSICIAN ASSISTANT

## 2017-09-29 PROCEDURE — 74011250637 HC RX REV CODE- 250/637: Performed by: SURGERY

## 2017-09-29 RX ORDER — AMLODIPINE BESYLATE 5 MG/1
5 TABLET ORAL DAILY
Qty: 30 TAB | Refills: 0 | Status: SHIPPED | OUTPATIENT
Start: 2017-09-29 | End: 2017-10-24 | Stop reason: SDUPTHER

## 2017-09-29 RX ORDER — OXYCODONE AND ACETAMINOPHEN 5; 325 MG/1; MG/1
1 TABLET ORAL
Qty: 40 TAB | Refills: 0 | Status: SHIPPED
Start: 2017-09-29 | End: 2017-10-10 | Stop reason: SDUPTHER

## 2017-09-29 RX ORDER — CARVEDILOL 25 MG/1
25 TABLET ORAL 2 TIMES DAILY WITH MEALS
Qty: 60 TAB | Refills: 0 | Status: SHIPPED | OUTPATIENT
Start: 2017-09-29 | End: 2017-10-24 | Stop reason: SDUPTHER

## 2017-09-29 RX ORDER — SULFAMETHOXAZOLE AND TRIMETHOPRIM 800; 160 MG/1; MG/1
1 TABLET ORAL EVERY 12 HOURS
Qty: 28 TAB | Refills: 0 | Status: SHIPPED | OUTPATIENT
Start: 2017-09-29 | End: 2017-10-13

## 2017-09-29 RX ORDER — FLUCONAZOLE 200 MG/1
200 TABLET ORAL DAILY
Qty: 14 TAB | Refills: 0 | Status: SHIPPED | OUTPATIENT
Start: 2017-09-29 | End: 2017-10-13

## 2017-09-29 RX ADMIN — Medication 10 ML: at 06:13

## 2017-09-29 RX ADMIN — ASPIRIN 81 MG: 81 TABLET, COATED ORAL at 08:21

## 2017-09-29 RX ADMIN — OXYCODONE HYDROCHLORIDE AND ACETAMINOPHEN 1 TABLET: 7.5; 325 TABLET ORAL at 04:09

## 2017-09-29 RX ADMIN — DOCUSATE SODIUM 100 MG: 100 CAPSULE, LIQUID FILLED ORAL at 08:21

## 2017-09-29 RX ADMIN — OXYCODONE HYDROCHLORIDE AND ACETAMINOPHEN 1 TABLET: 7.5; 325 TABLET ORAL at 08:20

## 2017-09-29 RX ADMIN — AMLODIPINE BESYLATE 5 MG: 10 TABLET ORAL at 08:21

## 2017-09-29 RX ADMIN — FLUCONAZOLE 200 MG: 100 TABLET ORAL at 08:21

## 2017-09-29 RX ADMIN — CARVEDILOL 25 MG: 25 TABLET, FILM COATED ORAL at 08:20

## 2017-09-29 RX ADMIN — PANTOPRAZOLE SODIUM 40 MG: 40 TABLET, DELAYED RELEASE ORAL at 06:13

## 2017-09-29 RX ADMIN — SULFAMETHOXAZOLE AND TRIMETHOPRIM 1 TABLET: 800; 160 TABLET ORAL at 08:21

## 2017-09-29 RX ADMIN — POLYETHYLENE GLYCOL 3350 17 G: 17 POWDER, FOR SOLUTION ORAL at 08:19

## 2017-09-29 NOTE — PROGRESS NOTES
Bedside shift report given to Bertha Eastman RN (oncoming nurse) by Zeke Patricia RN (offgoing nurse). Bedside shift report included the following information: SBAR, Kardex, ED Summary, MAR, and Recent Results.

## 2017-09-29 NOTE — WOUND CARE
Noted plans for discharge to day, connected to home vac. Supplies for home vac in room, patient verbalized understanding to take home.

## 2017-09-29 NOTE — PROGRESS NOTES
Patient seen and examined. No acute issues overnight. Wound VAC to left groin stable serous output. Left lower extremity foot warm. Patient can be discharged from vascular standpoint will need to have home care set up for wound VAC change on Monday. Patient be discharged home on p.o. antibiotics and p.o. antifungal until left groin is healed.     Dylan Garcia

## 2017-09-29 NOTE — DISCHARGE INSTRUCTIONS
DISCHARGE SUMMARY from Nurse    The following personal items are in your possession at time of discharge:    Dental Appliances: None  Visual Aid: None                            PATIENT INSTRUCTIONS:    After general anesthesia or intravenous sedation, for 24 hours or while taking prescription Narcotics:  · Limit your activities  · Do not drive and operate hazardous machinery  · Do not make important personal or business decisions  · Do  not drink alcoholic beverages  · If you have not urinated within 8 hours after discharge, please contact your surgeon on call. Report the following to your surgeon:  · Excessive pain, swelling, redness or odor of or around the surgical area  · Temperature over 100.5  · Nausea and vomiting lasting longer than 4 hours or if unable to take medications  · Any signs of decreased circulation or nerve impairment to extremity: change in color, persistent  numbness, tingling, coldness or increase pain  · Any questions    *  Please give a list of your current medications to your Primary Care Provider. *  Please update this list whenever your medications are discontinued, doses are      changed, or new medications (including over-the-counter products) are added. *  Please carry medication information at all times in case of emergency situations. These are general instructions for a healthy lifestyle:    No smoking/ No tobacco products/ Avoid exposure to second hand smoke    Surgeon General's Warning:  Quitting smoking now greatly reduces serious risk to your health.     Obesity, smoking, and sedentary lifestyle greatly increases your risk for illness    A healthy diet, regular physical exercise & weight monitoring are important for maintaining a healthy lifestyle    You may be retaining fluid if you have a history of heart failure or if you experience any of the following symptoms:  Weight gain of 3 pounds or more overnight or 5 pounds in a week, increased swelling in our hands or feet or shortness of breath while lying flat in bed. Please call your doctor as soon as you notice any of these symptoms; do not wait until your next office visit. Recognize signs and symptoms of STROKE:    F-face looks uneven    A-arms unable to move or move unevenly    S-speech slurred or non-existent    T-time-call 911 as soon as signs and symptoms begin-DO NOT go       Back to bed or wait to see if you get better-TIME IS BRAIN. Warning Signs of HEART ATTACK     Call 911 if you have these symptoms:   Chest discomfort. Most heart attacks involve discomfort in the center of the chest that lasts more than a few minutes, or that goes away and comes back. It can feel like uncomfortable pressure, squeezing, fullness, or pain.  Discomfort in other areas of the upper body. Symptoms can include pain or discomfort in one or both arms, the back, neck, jaw, or stomach.  Shortness of breath with or without chest discomfort.  Other signs may include breaking out in a cold sweat, nausea, or lightheadedness. Don't wait more than five minutes to call 911 - MINUTES MATTER! Fast action can save your life. Calling 911 is almost always the fastest way to get lifesaving treatment. Emergency Medical Services staff can begin treatment when they arrive -- up to an hour sooner than if someone gets to the hospital by car. The discharge information has been reviewed with the patient. The patient verbalized understanding. Discharge medications reviewed with the patient and appropriate educational materials and side effects teaching were provided.

## 2017-09-30 ENCOUNTER — HOME CARE VISIT (OUTPATIENT)
Dept: SCHEDULING | Facility: HOME HEALTH | Age: 57
End: 2017-09-30
Payer: MEDICAID

## 2017-09-30 PROCEDURE — G0299 HHS/HOSPICE OF RN EA 15 MIN: HCPCS

## 2017-09-30 PROCEDURE — 400013 HH SOC

## 2017-10-01 VITALS
HEART RATE: 77 BPM | TEMPERATURE: 99.1 F | RESPIRATION RATE: 20 BRPM | DIASTOLIC BLOOD PRESSURE: 60 MMHG | OXYGEN SATURATION: 98 % | SYSTOLIC BLOOD PRESSURE: 140 MMHG

## 2017-10-02 ENCOUNTER — HOME CARE VISIT (OUTPATIENT)
Dept: SCHEDULING | Facility: HOME HEALTH | Age: 57
End: 2017-10-02
Payer: MEDICAID

## 2017-10-02 VITALS
DIASTOLIC BLOOD PRESSURE: 60 MMHG | RESPIRATION RATE: 20 BRPM | SYSTOLIC BLOOD PRESSURE: 120 MMHG | HEART RATE: 72 BPM | OXYGEN SATURATION: 97 % | TEMPERATURE: 97.7 F

## 2017-10-02 PROCEDURE — G0299 HHS/HOSPICE OF RN EA 15 MIN: HCPCS

## 2017-10-03 ENCOUNTER — HOME CARE VISIT (OUTPATIENT)
Dept: HOME HEALTH SERVICES | Facility: HOME HEALTH | Age: 57
End: 2017-10-03
Payer: MEDICAID

## 2017-10-06 ENCOUNTER — HOME CARE VISIT (OUTPATIENT)
Dept: SCHEDULING | Facility: HOME HEALTH | Age: 57
End: 2017-10-06
Payer: MEDICAID

## 2017-10-06 ENCOUNTER — HOSPITAL ENCOUNTER (OUTPATIENT)
Dept: LAB | Age: 57
Discharge: HOME | End: 2017-10-06
Payer: MEDICAID

## 2017-10-06 VITALS
DIASTOLIC BLOOD PRESSURE: 64 MMHG | TEMPERATURE: 98.5 F | HEART RATE: 75 BPM | OXYGEN SATURATION: 99 % | SYSTOLIC BLOOD PRESSURE: 116 MMHG | RESPIRATION RATE: 18 BRPM

## 2017-10-06 LAB — ALBUMIN SERPL-MCNC: 3.2 G/DL (ref 3.5–5)

## 2017-10-06 PROCEDURE — G0299 HHS/HOSPICE OF RN EA 15 MIN: HCPCS

## 2017-10-06 PROCEDURE — 82040 ASSAY OF SERUM ALBUMIN: CPT

## 2017-10-10 ENCOUNTER — HOME CARE VISIT (OUTPATIENT)
Dept: SCHEDULING | Facility: HOME HEALTH | Age: 57
End: 2017-10-10
Payer: MEDICAID

## 2017-10-10 VITALS
RESPIRATION RATE: 16 BRPM | OXYGEN SATURATION: 98 % | SYSTOLIC BLOOD PRESSURE: 132 MMHG | HEART RATE: 70 BPM | TEMPERATURE: 97.8 F | DIASTOLIC BLOOD PRESSURE: 76 MMHG

## 2017-10-10 PROCEDURE — G0299 HHS/HOSPICE OF RN EA 15 MIN: HCPCS

## 2017-10-13 ENCOUNTER — HOME CARE VISIT (OUTPATIENT)
Dept: SCHEDULING | Facility: HOME HEALTH | Age: 57
End: 2017-10-13
Payer: MEDICAID

## 2017-10-13 PROCEDURE — G0299 HHS/HOSPICE OF RN EA 15 MIN: HCPCS

## 2017-10-15 VITALS
RESPIRATION RATE: 16 BRPM | HEART RATE: 78 BPM | TEMPERATURE: 98.8 F | SYSTOLIC BLOOD PRESSURE: 118 MMHG | DIASTOLIC BLOOD PRESSURE: 74 MMHG | OXYGEN SATURATION: 98 %

## 2017-10-15 PROCEDURE — A4385 OST SKN BARRIER SLD EXT WEAR: HCPCS

## 2017-10-15 PROCEDURE — A4455 ADHESIVE REMOVER PER OUNCE: HCPCS

## 2017-10-17 ENCOUNTER — HOME CARE VISIT (OUTPATIENT)
Dept: SCHEDULING | Facility: HOME HEALTH | Age: 57
End: 2017-10-17
Payer: MEDICAID

## 2017-10-17 VITALS
HEART RATE: 78 BPM | DIASTOLIC BLOOD PRESSURE: 76 MMHG | OXYGEN SATURATION: 98 % | TEMPERATURE: 98.8 F | SYSTOLIC BLOOD PRESSURE: 122 MMHG | RESPIRATION RATE: 16 BRPM

## 2017-10-17 PROCEDURE — G0299 HHS/HOSPICE OF RN EA 15 MIN: HCPCS

## 2017-10-20 ENCOUNTER — HOME CARE VISIT (OUTPATIENT)
Dept: SCHEDULING | Facility: HOME HEALTH | Age: 57
End: 2017-10-20
Payer: MEDICAID

## 2017-10-20 VITALS
RESPIRATION RATE: 16 BRPM | TEMPERATURE: 98.6 F | HEART RATE: 78 BPM | SYSTOLIC BLOOD PRESSURE: 134 MMHG | DIASTOLIC BLOOD PRESSURE: 66 MMHG | OXYGEN SATURATION: 98 %

## 2017-10-20 PROCEDURE — G0299 HHS/HOSPICE OF RN EA 15 MIN: HCPCS

## 2017-10-24 ENCOUNTER — HOME CARE VISIT (OUTPATIENT)
Dept: SCHEDULING | Facility: HOME HEALTH | Age: 57
End: 2017-10-24
Payer: MEDICAID

## 2017-10-24 VITALS
SYSTOLIC BLOOD PRESSURE: 100 MMHG | OXYGEN SATURATION: 98 % | HEART RATE: 67 BPM | TEMPERATURE: 97.2 F | DIASTOLIC BLOOD PRESSURE: 52 MMHG | RESPIRATION RATE: 22 BRPM

## 2017-10-24 PROCEDURE — G0299 HHS/HOSPICE OF RN EA 15 MIN: HCPCS

## 2017-10-27 ENCOUNTER — HOME CARE VISIT (OUTPATIENT)
Dept: SCHEDULING | Facility: HOME HEALTH | Age: 57
End: 2017-10-27
Payer: MEDICAID

## 2017-10-27 VITALS
SYSTOLIC BLOOD PRESSURE: 116 MMHG | TEMPERATURE: 98.6 F | RESPIRATION RATE: 16 BRPM | HEART RATE: 72 BPM | DIASTOLIC BLOOD PRESSURE: 78 MMHG | OXYGEN SATURATION: 99 %

## 2017-10-27 PROCEDURE — G0299 HHS/HOSPICE OF RN EA 15 MIN: HCPCS

## 2017-10-31 ENCOUNTER — HOME CARE VISIT (OUTPATIENT)
Dept: SCHEDULING | Facility: HOME HEALTH | Age: 57
End: 2017-10-31
Payer: MEDICAID

## 2017-10-31 VITALS
OXYGEN SATURATION: 98 % | SYSTOLIC BLOOD PRESSURE: 138 MMHG | HEART RATE: 76 BPM | TEMPERATURE: 98.2 F | DIASTOLIC BLOOD PRESSURE: 72 MMHG | RESPIRATION RATE: 16 BRPM

## 2017-10-31 PROCEDURE — A6022 COLLAGEN DRSG>16<=48 SQ IN: HCPCS

## 2017-10-31 PROCEDURE — G0299 HHS/HOSPICE OF RN EA 15 MIN: HCPCS

## 2017-10-31 PROCEDURE — A6222 GAUZE <=16 IN NO W/SAL W/O B: HCPCS

## 2017-11-03 ENCOUNTER — HOME CARE VISIT (OUTPATIENT)
Dept: SCHEDULING | Facility: HOME HEALTH | Age: 57
End: 2017-11-03
Payer: MEDICAID

## 2017-11-03 PROCEDURE — G0299 HHS/HOSPICE OF RN EA 15 MIN: HCPCS

## 2017-11-04 VITALS
RESPIRATION RATE: 16 BRPM | TEMPERATURE: 98.4 F | OXYGEN SATURATION: 98 % | SYSTOLIC BLOOD PRESSURE: 135 MMHG | HEART RATE: 78 BPM | DIASTOLIC BLOOD PRESSURE: 76 MMHG

## 2017-11-06 PROCEDURE — A4455 ADHESIVE REMOVER PER OUNCE: HCPCS

## 2017-11-07 ENCOUNTER — HOME CARE VISIT (OUTPATIENT)
Dept: SCHEDULING | Facility: HOME HEALTH | Age: 57
End: 2017-11-07
Payer: MEDICAID

## 2017-11-07 VITALS
HEART RATE: 76 BPM | OXYGEN SATURATION: 98 % | TEMPERATURE: 97.1 F | RESPIRATION RATE: 20 BRPM | DIASTOLIC BLOOD PRESSURE: 72 MMHG | SYSTOLIC BLOOD PRESSURE: 137 MMHG

## 2017-11-07 PROCEDURE — G0299 HHS/HOSPICE OF RN EA 15 MIN: HCPCS

## 2017-11-07 PROCEDURE — A4649 SURGICAL SUPPLIES: HCPCS

## 2017-11-07 PROCEDURE — A6222 GAUZE <=16 IN NO W/SAL W/O B: HCPCS

## 2017-11-07 PROCEDURE — A4452 WATERPROOF TAPE: HCPCS

## 2017-11-07 PROCEDURE — A6260 WOUND CLEANSER ANY TYPE/SIZE: HCPCS

## 2017-11-07 PROCEDURE — A5120 SKIN BARRIER, WIPE OR SWAB: HCPCS

## 2017-11-08 ENCOUNTER — HOME CARE VISIT (OUTPATIENT)
Dept: HOME HEALTH SERVICES | Facility: HOME HEALTH | Age: 57
End: 2017-11-08
Payer: MEDICAID

## 2017-11-10 ENCOUNTER — HOME CARE VISIT (OUTPATIENT)
Dept: SCHEDULING | Facility: HOME HEALTH | Age: 57
End: 2017-11-10
Payer: MEDICAID

## 2017-11-10 VITALS
OXYGEN SATURATION: 98 % | TEMPERATURE: 97.9 F | RESPIRATION RATE: 16 BRPM | SYSTOLIC BLOOD PRESSURE: 142 MMHG | DIASTOLIC BLOOD PRESSURE: 78 MMHG | HEART RATE: 79 BPM

## 2017-11-10 PROCEDURE — G0299 HHS/HOSPICE OF RN EA 15 MIN: HCPCS

## 2017-11-13 ENCOUNTER — HOSPITAL ENCOUNTER (OUTPATIENT)
Dept: LAB | Age: 57
Discharge: HOME | End: 2017-11-13
Payer: MEDICAID

## 2017-11-13 DIAGNOSIS — I99.8 ISCHEMIA OF LEFT LOWER EXTREMITY: ICD-10-CM

## 2017-11-13 DIAGNOSIS — I70.0 OCCLUSION OF AORTA (HCC): ICD-10-CM

## 2017-11-13 LAB — PROT SERPL-MCNC: 6.7 G/DL (ref 6.3–8.2)

## 2017-11-13 PROCEDURE — A4452 WATERPROOF TAPE: HCPCS

## 2017-11-13 PROCEDURE — 36415 COLL VENOUS BLD VENIPUNCTURE: CPT | Performed by: NURSE PRACTITIONER

## 2017-11-13 PROCEDURE — A6222 GAUZE <=16 IN NO W/SAL W/O B: HCPCS

## 2017-11-13 PROCEDURE — A4456 ADHESIVE REMOVER, WIPES: HCPCS

## 2017-11-13 PROCEDURE — 84155 ASSAY OF PROTEIN SERUM: CPT | Performed by: NURSE PRACTITIONER

## 2017-11-14 ENCOUNTER — HOME CARE VISIT (OUTPATIENT)
Dept: SCHEDULING | Facility: HOME HEALTH | Age: 57
End: 2017-11-14
Payer: MEDICAID

## 2017-11-14 VITALS
TEMPERATURE: 98.4 F | HEART RATE: 70 BPM | OXYGEN SATURATION: 98 % | DIASTOLIC BLOOD PRESSURE: 84 MMHG | SYSTOLIC BLOOD PRESSURE: 148 MMHG | RESPIRATION RATE: 18 BRPM

## 2017-11-14 PROCEDURE — G0299 HHS/HOSPICE OF RN EA 15 MIN: HCPCS

## 2017-11-17 ENCOUNTER — HOME CARE VISIT (OUTPATIENT)
Dept: SCHEDULING | Facility: HOME HEALTH | Age: 57
End: 2017-11-17
Payer: MEDICAID

## 2017-11-17 PROCEDURE — G0299 HHS/HOSPICE OF RN EA 15 MIN: HCPCS

## 2017-11-18 VITALS
RESPIRATION RATE: 16 BRPM | OXYGEN SATURATION: 98 % | DIASTOLIC BLOOD PRESSURE: 64 MMHG | TEMPERATURE: 98.6 F | HEART RATE: 70 BPM | SYSTOLIC BLOOD PRESSURE: 138 MMHG

## 2017-11-21 ENCOUNTER — HOME CARE VISIT (OUTPATIENT)
Dept: SCHEDULING | Facility: HOME HEALTH | Age: 57
End: 2017-11-21
Payer: MEDICAID

## 2017-11-21 VITALS
HEART RATE: 61 BPM | DIASTOLIC BLOOD PRESSURE: 70 MMHG | RESPIRATION RATE: 18 BRPM | TEMPERATURE: 97.3 F | OXYGEN SATURATION: 99 % | SYSTOLIC BLOOD PRESSURE: 158 MMHG

## 2017-11-21 PROCEDURE — A5120 SKIN BARRIER, WIPE OR SWAB: HCPCS

## 2017-11-21 PROCEDURE — A6260 WOUND CLEANSER ANY TYPE/SIZE: HCPCS

## 2017-11-21 PROCEDURE — G0299 HHS/HOSPICE OF RN EA 15 MIN: HCPCS

## 2017-11-21 PROCEDURE — A4452 WATERPROOF TAPE: HCPCS

## 2017-11-21 PROCEDURE — A4455 ADHESIVE REMOVER PER OUNCE: HCPCS

## 2017-11-21 PROCEDURE — A6216 NON-STERILE GAUZE<=16 SQ IN: HCPCS

## 2017-11-21 PROCEDURE — A6402 STERILE GAUZE <= 16 SQ IN: HCPCS

## 2017-11-24 ENCOUNTER — HOME CARE VISIT (OUTPATIENT)
Dept: SCHEDULING | Facility: HOME HEALTH | Age: 57
End: 2017-11-24
Payer: MEDICAID

## 2017-11-24 VITALS
OXYGEN SATURATION: 98 % | RESPIRATION RATE: 16 BRPM | SYSTOLIC BLOOD PRESSURE: 132 MMHG | HEART RATE: 78 BPM | DIASTOLIC BLOOD PRESSURE: 74 MMHG | TEMPERATURE: 98.4 F

## 2017-11-24 PROCEDURE — A4452 WATERPROOF TAPE: HCPCS

## 2017-11-24 PROCEDURE — G0299 HHS/HOSPICE OF RN EA 15 MIN: HCPCS

## 2017-11-24 PROCEDURE — A4649 SURGICAL SUPPLIES: HCPCS

## 2017-11-27 ENCOUNTER — HOME CARE VISIT (OUTPATIENT)
Dept: SCHEDULING | Facility: HOME HEALTH | Age: 57
End: 2017-11-27
Payer: COMMERCIAL

## 2017-11-27 VITALS
TEMPERATURE: 98.4 F | RESPIRATION RATE: 16 BRPM | DIASTOLIC BLOOD PRESSURE: 70 MMHG | SYSTOLIC BLOOD PRESSURE: 138 MMHG | HEART RATE: 86 BPM | OXYGEN SATURATION: 98 %

## 2017-11-27 PROCEDURE — G0299 HHS/HOSPICE OF RN EA 15 MIN: HCPCS

## 2017-11-30 ENCOUNTER — HOME CARE VISIT (OUTPATIENT)
Dept: SCHEDULING | Facility: HOME HEALTH | Age: 57
End: 2017-11-30
Payer: COMMERCIAL

## 2017-11-30 VITALS
HEART RATE: 76 BPM | OXYGEN SATURATION: 98 % | TEMPERATURE: 98.4 F | DIASTOLIC BLOOD PRESSURE: 62 MMHG | RESPIRATION RATE: 16 BRPM | SYSTOLIC BLOOD PRESSURE: 126 MMHG

## 2017-11-30 PROCEDURE — 400014 HH F/U

## 2017-11-30 PROCEDURE — G0299 HHS/HOSPICE OF RN EA 15 MIN: HCPCS

## 2017-12-05 ENCOUNTER — HOME CARE VISIT (OUTPATIENT)
Dept: SCHEDULING | Facility: HOME HEALTH | Age: 57
End: 2017-12-05
Payer: COMMERCIAL

## 2017-12-05 VITALS
DIASTOLIC BLOOD PRESSURE: 88 MMHG | RESPIRATION RATE: 20 BRPM | HEART RATE: 72 BPM | SYSTOLIC BLOOD PRESSURE: 157 MMHG | TEMPERATURE: 98.1 F | OXYGEN SATURATION: 98 %

## 2017-12-05 PROCEDURE — A4456 ADHESIVE REMOVER, WIPES: HCPCS

## 2017-12-05 PROCEDURE — A4452 WATERPROOF TAPE: HCPCS

## 2017-12-05 PROCEDURE — G0299 HHS/HOSPICE OF RN EA 15 MIN: HCPCS

## 2017-12-08 ENCOUNTER — HOME CARE VISIT (OUTPATIENT)
Dept: SCHEDULING | Facility: HOME HEALTH | Age: 57
End: 2017-12-08
Payer: COMMERCIAL

## 2017-12-08 VITALS
TEMPERATURE: 98.7 F | SYSTOLIC BLOOD PRESSURE: 136 MMHG | DIASTOLIC BLOOD PRESSURE: 81 MMHG | OXYGEN SATURATION: 98 % | HEART RATE: 75 BPM

## 2017-12-08 PROCEDURE — A6260 WOUND CLEANSER ANY TYPE/SIZE: HCPCS

## 2017-12-08 PROCEDURE — G0299 HHS/HOSPICE OF RN EA 15 MIN: HCPCS

## 2017-12-08 PROCEDURE — A6216 NON-STERILE GAUZE<=16 SQ IN: HCPCS

## 2017-12-08 PROCEDURE — A4456 ADHESIVE REMOVER, WIPES: HCPCS

## 2017-12-08 PROCEDURE — A4452 WATERPROOF TAPE: HCPCS

## 2017-12-11 ENCOUNTER — HOME CARE VISIT (OUTPATIENT)
Dept: SCHEDULING | Facility: HOME HEALTH | Age: 57
End: 2017-12-11
Payer: COMMERCIAL

## 2017-12-11 VITALS
TEMPERATURE: 98.8 F | OXYGEN SATURATION: 98 % | HEART RATE: 72 BPM | DIASTOLIC BLOOD PRESSURE: 80 MMHG | SYSTOLIC BLOOD PRESSURE: 140 MMHG

## 2017-12-11 PROCEDURE — G0299 HHS/HOSPICE OF RN EA 15 MIN: HCPCS

## 2017-12-14 ENCOUNTER — HOME CARE VISIT (OUTPATIENT)
Dept: SCHEDULING | Facility: HOME HEALTH | Age: 57
End: 2017-12-14
Payer: COMMERCIAL

## 2017-12-14 VITALS
OXYGEN SATURATION: 97 % | TEMPERATURE: 98.3 F | DIASTOLIC BLOOD PRESSURE: 88 MMHG | HEART RATE: 62 BPM | SYSTOLIC BLOOD PRESSURE: 147 MMHG

## 2017-12-14 PROCEDURE — G0299 HHS/HOSPICE OF RN EA 15 MIN: HCPCS

## 2017-12-19 ENCOUNTER — HOME CARE VISIT (OUTPATIENT)
Dept: SCHEDULING | Facility: HOME HEALTH | Age: 57
End: 2017-12-19
Payer: COMMERCIAL

## 2017-12-19 ENCOUNTER — HOME CARE VISIT (OUTPATIENT)
Dept: HOME HEALTH SERVICES | Facility: HOME HEALTH | Age: 57
End: 2017-12-19
Payer: COMMERCIAL

## 2017-12-19 VITALS
HEART RATE: 72 BPM | SYSTOLIC BLOOD PRESSURE: 150 MMHG | RESPIRATION RATE: 20 BRPM | OXYGEN SATURATION: 97 % | DIASTOLIC BLOOD PRESSURE: 60 MMHG

## 2017-12-19 PROCEDURE — G0299 HHS/HOSPICE OF RN EA 15 MIN: HCPCS

## 2017-12-19 PROCEDURE — A6260 WOUND CLEANSER ANY TYPE/SIZE: HCPCS

## 2017-12-19 PROCEDURE — A9270 NON-COVERED ITEM OR SERVICE: HCPCS

## 2017-12-19 PROCEDURE — A6402 STERILE GAUZE <= 16 SQ IN: HCPCS

## 2017-12-21 ENCOUNTER — HOME CARE VISIT (OUTPATIENT)
Dept: SCHEDULING | Facility: HOME HEALTH | Age: 57
End: 2017-12-21
Payer: COMMERCIAL

## 2017-12-21 VITALS
TEMPERATURE: 98.5 F | SYSTOLIC BLOOD PRESSURE: 127 MMHG | HEART RATE: 66 BPM | OXYGEN SATURATION: 99 % | DIASTOLIC BLOOD PRESSURE: 71 MMHG

## 2017-12-21 PROCEDURE — G0299 HHS/HOSPICE OF RN EA 15 MIN: HCPCS

## 2017-12-26 ENCOUNTER — HOME CARE VISIT (OUTPATIENT)
Dept: HOME HEALTH SERVICES | Facility: HOME HEALTH | Age: 57
End: 2017-12-26
Payer: COMMERCIAL

## 2017-12-26 ENCOUNTER — HOME CARE VISIT (OUTPATIENT)
Dept: SCHEDULING | Facility: HOME HEALTH | Age: 57
End: 2017-12-26
Payer: COMMERCIAL

## 2017-12-26 VITALS
RESPIRATION RATE: 18 BRPM | DIASTOLIC BLOOD PRESSURE: 62 MMHG | SYSTOLIC BLOOD PRESSURE: 118 MMHG | TEMPERATURE: 98.6 F | HEART RATE: 72 BPM | OXYGEN SATURATION: 98 %

## 2017-12-26 PROCEDURE — G0299 HHS/HOSPICE OF RN EA 15 MIN: HCPCS

## 2017-12-29 ENCOUNTER — HOME CARE VISIT (OUTPATIENT)
Dept: SCHEDULING | Facility: HOME HEALTH | Age: 57
End: 2017-12-29
Payer: COMMERCIAL

## 2017-12-29 VITALS
TEMPERATURE: 98.2 F | SYSTOLIC BLOOD PRESSURE: 147 MMHG | OXYGEN SATURATION: 98 % | HEART RATE: 66 BPM | DIASTOLIC BLOOD PRESSURE: 81 MMHG

## 2017-12-29 PROCEDURE — G0299 HHS/HOSPICE OF RN EA 15 MIN: HCPCS

## 2018-01-02 ENCOUNTER — HOME CARE VISIT (OUTPATIENT)
Dept: SCHEDULING | Facility: HOME HEALTH | Age: 58
End: 2018-01-02
Payer: COMMERCIAL

## 2018-01-02 VITALS
DIASTOLIC BLOOD PRESSURE: 71 MMHG | HEART RATE: 70 BPM | TEMPERATURE: 97.7 F | SYSTOLIC BLOOD PRESSURE: 114 MMHG | OXYGEN SATURATION: 98 %

## 2018-01-02 PROCEDURE — G0299 HHS/HOSPICE OF RN EA 15 MIN: HCPCS

## 2018-01-04 ENCOUNTER — HOME CARE VISIT (OUTPATIENT)
Dept: SCHEDULING | Facility: HOME HEALTH | Age: 58
End: 2018-01-04
Payer: COMMERCIAL

## 2018-01-04 PROCEDURE — G0299 HHS/HOSPICE OF RN EA 15 MIN: HCPCS

## 2018-01-05 VITALS
OXYGEN SATURATION: 98 % | SYSTOLIC BLOOD PRESSURE: 130 MMHG | HEART RATE: 70 BPM | RESPIRATION RATE: 18 BRPM | TEMPERATURE: 97.4 F | DIASTOLIC BLOOD PRESSURE: 64 MMHG

## 2018-01-07 PROCEDURE — A9270 NON-COVERED ITEM OR SERVICE: HCPCS

## 2018-01-07 PROCEDURE — A4452 WATERPROOF TAPE: HCPCS

## 2018-01-07 PROCEDURE — A6216 NON-STERILE GAUZE<=16 SQ IN: HCPCS

## 2018-01-08 ENCOUNTER — HOME CARE VISIT (OUTPATIENT)
Dept: SCHEDULING | Facility: HOME HEALTH | Age: 58
End: 2018-01-08
Payer: COMMERCIAL

## 2018-01-08 VITALS
DIASTOLIC BLOOD PRESSURE: 77 MMHG | OXYGEN SATURATION: 98 % | SYSTOLIC BLOOD PRESSURE: 126 MMHG | TEMPERATURE: 98.2 F | HEART RATE: 64 BPM

## 2018-01-08 PROCEDURE — G0299 HHS/HOSPICE OF RN EA 15 MIN: HCPCS

## 2018-01-08 PROCEDURE — A4455 ADHESIVE REMOVER PER OUNCE: HCPCS

## 2018-01-09 PROBLEM — I70.0 OCCLUSION OF AORTA (HCC): Status: RESOLVED | Noted: 2017-03-03 | Resolved: 2018-01-09

## 2018-01-09 PROBLEM — I73.9 CLAUDICATION (HCC): Status: RESOLVED | Noted: 2017-09-08 | Resolved: 2018-01-09

## 2018-01-11 ENCOUNTER — HOME CARE VISIT (OUTPATIENT)
Dept: SCHEDULING | Facility: HOME HEALTH | Age: 58
End: 2018-01-11
Payer: COMMERCIAL

## 2018-01-11 VITALS
DIASTOLIC BLOOD PRESSURE: 68 MMHG | OXYGEN SATURATION: 98 % | RESPIRATION RATE: 18 BRPM | HEART RATE: 70 BPM | TEMPERATURE: 97.5 F | SYSTOLIC BLOOD PRESSURE: 138 MMHG

## 2018-01-11 PROCEDURE — G0299 HHS/HOSPICE OF RN EA 15 MIN: HCPCS

## 2018-01-12 ENCOUNTER — HOME CARE VISIT (OUTPATIENT)
Dept: HOME HEALTH SERVICES | Facility: HOME HEALTH | Age: 58
End: 2018-01-12
Payer: COMMERCIAL

## 2018-01-16 ENCOUNTER — HOME CARE VISIT (OUTPATIENT)
Dept: SCHEDULING | Facility: HOME HEALTH | Age: 58
End: 2018-01-16
Payer: COMMERCIAL

## 2018-01-16 VITALS
RESPIRATION RATE: 20 BRPM | OXYGEN SATURATION: 98 % | SYSTOLIC BLOOD PRESSURE: 125 MMHG | DIASTOLIC BLOOD PRESSURE: 78 MMHG | HEART RATE: 62 BPM | TEMPERATURE: 97.6 F

## 2018-01-16 PROCEDURE — A4455 ADHESIVE REMOVER PER OUNCE: HCPCS

## 2018-01-16 PROCEDURE — G0299 HHS/HOSPICE OF RN EA 15 MIN: HCPCS

## 2018-01-19 ENCOUNTER — HOME CARE VISIT (OUTPATIENT)
Dept: SCHEDULING | Facility: HOME HEALTH | Age: 58
End: 2018-01-19
Payer: COMMERCIAL

## 2018-01-19 VITALS
RESPIRATION RATE: 18 BRPM | TEMPERATURE: 97.6 F | SYSTOLIC BLOOD PRESSURE: 120 MMHG | HEART RATE: 62 BPM | OXYGEN SATURATION: 99 % | DIASTOLIC BLOOD PRESSURE: 60 MMHG

## 2018-01-19 PROCEDURE — G0299 HHS/HOSPICE OF RN EA 15 MIN: HCPCS

## 2018-01-23 ENCOUNTER — HOME CARE VISIT (OUTPATIENT)
Dept: SCHEDULING | Facility: HOME HEALTH | Age: 58
End: 2018-01-23
Payer: COMMERCIAL

## 2018-01-23 VITALS
OXYGEN SATURATION: 99 % | HEART RATE: 64 BPM | SYSTOLIC BLOOD PRESSURE: 128 MMHG | DIASTOLIC BLOOD PRESSURE: 68 MMHG | RESPIRATION RATE: 18 BRPM | TEMPERATURE: 97.9 F

## 2018-01-23 PROCEDURE — G0299 HHS/HOSPICE OF RN EA 15 MIN: HCPCS

## 2018-01-30 ENCOUNTER — HOSPITAL ENCOUNTER (OUTPATIENT)
Dept: LAB | Age: 58
Discharge: HOME | End: 2018-01-30
Payer: COMMERCIAL

## 2018-01-30 DIAGNOSIS — E78.5 DYSLIPIDEMIA: Chronic | ICD-10-CM

## 2018-01-30 LAB
CHOLEST SERPL-MCNC: 231 MG/DL
HDLC SERPL-MCNC: 40 MG/DL (ref 40–60)
HDLC SERPL: 5.8 {RATIO}
LDLC SERPL CALC-MCNC: 150 MG/DL
LIPID PROFILE,FLP: ABNORMAL
TRIGL SERPL-MCNC: 205 MG/DL (ref 35–150)
VLDLC SERPL CALC-MCNC: 41 MG/DL (ref 6–23)

## 2018-01-30 PROCEDURE — 36415 COLL VENOUS BLD VENIPUNCTURE: CPT | Performed by: INTERNAL MEDICINE

## 2018-01-30 PROCEDURE — 80061 LIPID PANEL: CPT | Performed by: INTERNAL MEDICINE

## 2018-04-06 PROBLEM — E66.01 SEVERE OBESITY (BMI 35.0-39.9) WITH COMORBIDITY (HCC): Status: ACTIVE | Noted: 2018-04-06

## 2018-05-08 PROBLEM — E66.01 SEVERE OBESITY (BMI 35.0-39.9) WITH COMORBIDITY (HCC): Status: RESOLVED | Noted: 2018-04-06 | Resolved: 2018-05-08

## 2018-10-02 ENCOUNTER — APPOINTMENT (OUTPATIENT)
Dept: GENERAL RADIOLOGY | Age: 58
End: 2018-10-02
Attending: EMERGENCY MEDICINE
Payer: COMMERCIAL

## 2018-10-02 ENCOUNTER — HOSPITAL ENCOUNTER (EMERGENCY)
Age: 58
Discharge: HOME | End: 2018-10-02
Attending: EMERGENCY MEDICINE
Payer: COMMERCIAL

## 2018-10-02 VITALS
RESPIRATION RATE: 17 BRPM | SYSTOLIC BLOOD PRESSURE: 136 MMHG | HEIGHT: 66 IN | OXYGEN SATURATION: 95 % | DIASTOLIC BLOOD PRESSURE: 63 MMHG | TEMPERATURE: 98.1 F | HEART RATE: 76 BPM | WEIGHT: 217 LBS | BODY MASS INDEX: 34.87 KG/M2

## 2018-10-02 DIAGNOSIS — G89.29 CHRONIC LEFT-SIDED LOW BACK PAIN WITH LEFT-SIDED SCIATICA: ICD-10-CM

## 2018-10-02 DIAGNOSIS — R20.0 HAND NUMBNESS: ICD-10-CM

## 2018-10-02 DIAGNOSIS — M54.42 CHRONIC LEFT-SIDED LOW BACK PAIN WITH LEFT-SIDED SCIATICA: ICD-10-CM

## 2018-10-02 DIAGNOSIS — S93.401A SPRAIN OF RIGHT ANKLE, UNSPECIFIED LIGAMENT, INITIAL ENCOUNTER: Primary | ICD-10-CM

## 2018-10-02 PROCEDURE — L4350 ANKLE CONTROL ORTHO PRE OTS: HCPCS

## 2018-10-02 PROCEDURE — 99283 EMERGENCY DEPT VISIT LOW MDM: CPT | Performed by: EMERGENCY MEDICINE

## 2018-10-02 PROCEDURE — 73610 X-RAY EXAM OF ANKLE: CPT

## 2018-10-02 PROCEDURE — 75810000053 HC SPLINT APPLICATION: Performed by: EMERGENCY MEDICINE

## 2018-10-02 RX ORDER — IBUPROFEN 600 MG/1
600 TABLET ORAL
Qty: 21 TAB | Refills: 0 | Status: SHIPPED | OUTPATIENT
Start: 2018-10-02 | End: 2019-01-11

## 2018-10-02 RX ORDER — HYDROCODONE BITARTRATE AND ACETAMINOPHEN 7.5; 325 MG/1; MG/1
1 TABLET ORAL
Qty: 10 TAB | Refills: 0 | Status: SHIPPED | OUTPATIENT
Start: 2018-10-02 | End: 2018-11-09 | Stop reason: SDUPTHER

## 2018-10-02 NOTE — ED TRIAGE NOTES
Twisted right ankle and fell off roll back truck. Denies any other injury. Arrived using cane to assist with ambulation.

## 2018-10-03 NOTE — ED NOTES
I have reviewed discharge instructions with the patient. The patient verbalized understanding. Patient left ED via Discharge Method: ambulatory to Home with self. Opportunity for questions and clarification provided. Patient given 2 scripts. To continue your aftercare when you leave the hospital, you may receive an automated call from our care team to check in on how you are doing. This is a free service and part of our promise to provide the best care and service to meet your aftercare needs.  If you have questions, or wish to unsubscribe from this service please call [phone]. Thank you for Choosing our [facility] Emergency Department.

## 2018-10-03 NOTE — ED NOTES
Pt sitting in bed, A+O x4. NAD. Denies cp but reports sob, but states that this is normal for him. Tolerating room air at this time. Pt reports falling today and twisting his right ankle. Reports pain in this ankle at 9/10. POC discussed.

## 2018-10-03 NOTE — DISCHARGE INSTRUCTIONS
Ankle Sprain: Care Instructions  Your Care Instructions    An ankle sprain can happen when you twist your ankle. The ligaments that support the ankle can get stretched and torn. Often the ankle is swollen and painful. Ankle sprains may take from several weeks to several months to heal. Usually, the more pain and swelling you have, the more severe your ankle sprain is and the longer it will take to heal. You can heal faster and regain strength in your ankle with good home treatment. It is very important to give your ankle time to heal completely, so that you do not easily hurt your ankle again. Follow-up care is a key part of your treatment and safety. Be sure to make and go to all appointments, and call your doctor if you are having problems. It's also a good idea to know your test results and keep a list of the medicines you take. How can you care for yourself at home? · Prop up your foot on pillows as much as possible for the next 3 days. Try to keep your ankle above the level of your heart. This will help reduce the swelling. · Follow your doctor's directions for wearing a splint or elastic bandage. Wrapping the ankle may help reduce or prevent swelling. · Your doctor may give you a splint, a brace, an air stirrup, or another form of ankle support to protect your ankle until it is healed. Wear it as directed while your ankle is healing. Do not remove it unless your doctor tells you to. After your ankle has healed, ask your doctor whether you should wear the brace when you exercise. · Put ice or cold packs on your injured ankle for 10 to 20 minutes at a time. Try to do this every 1 to 2 hours for the next 3 days (when you are awake) or until the swelling goes down. Put a thin cloth between the ice and your skin. · You may need to use crutches until you can walk without pain. If you do use crutches, try to bear some weight on your injured ankle if you can do so without pain.  This helps the ankle heal.  · Take pain medicines exactly as directed. ¨ If the doctor gave you a prescription medicine for pain, take it as prescribed. ¨ If you are not taking a prescription pain medicine, ask your doctor if you can take an over-the-counter medicine. · If you have been given ankle exercises to do at home, do them exactly as instructed. These can promote healing and help prevent lasting weakness. When should you call for help? Call your doctor now or seek immediate medical care if:    · Your pain is getting worse.     · Your swelling is getting worse.     · Your splint feels too tight or you are unable to loosen it.    Watch closely for changes in your health, and be sure to contact your doctor if:    · You are not getting better after 1 week. Where can you learn more? Go to http://jamarcus-luis.info/. Enter V591 in the search box to learn more about \"Ankle Sprain: Care Instructions. \"  Current as of: November 29, 2017  Content Version: 11.7  © 3633-5606 [employer]. Care instructions adapted under license by [employer] (which disclaims liability or warranty for this information). If you have questions about a medical condition or this instruction, always ask your healthcare professional. [place] [address] any warranty or liability for your use of this information. Ankle Sprain: Rehab Exercises  Your Care Instructions  Here are some examples of typical rehabilitation exercises for your condition. Start each exercise slowly. Ease off the exercise if you start to have pain. Your doctor or physical therapist will tell you when you can start these exercises and which ones will work best for you. How to do the exercises  \"Alphabet\" exercise    1. Trace the alphabet with your toe. This helps your ankle move in all directions. Side-to-side knee swing exercise    1. Sit in a chair with your foot flat on the floor.   2. Slowly move your knee from side to side. Keep your foot pressed flat. 3. Continue this exercise for 2 to 3 minutes. Towel curl    1. While sitting, place your foot on a towel on the floor. Scrunch the towel toward you with your toes. 2. Then use your toes to push the towel away from you. 3. To make this exercise more challenging you can put something on the other end of the towel. A can of soup is about the right weight for this. Towel stretch    1. Sit with your legs extended and knees straight. 2. Place a towel around your foot just under the toes. 3. Hold each end of the towel in each hand, with your hands above your knees. 4. Pull back with the towel so that your foot stretches toward you. 5. Hold the position for at least 15 to 30 seconds. 6. Repeat 2 to 4 times a session. Do up to 5 sessions a day. Ankle eversion exercise    1. Start by sitting with your foot flat on the floor. Push your foot outward against a wall or a piece of furniture that doesn't move. Hold for about 6 seconds, and relax. Repeat 8 to 12 times. 2. After you feel comfortable with this, try using rubber tubing looped around the outside of your feet for resistance. Push your foot out to the side against the tubing, and then count to 10 as you slowly bring your foot back to the middle. Repeat 8 to 12 times. Isometric opposition exercises    1. While sitting, put your feet together flat on the floor. 2. Press your injured foot inward against your other foot. Hold for about 6 seconds, and relax. Repeat 8 to 12 times. 3. Then place the heel of your other foot on top of the injured one. Push down with the top heel while trying to push up with your injured foot. Hold for about 6 seconds, and relax. Repeat 8 to 12 times. Resisted ankle inversion    1. Sit on the floor with your good leg crossed over your other leg. 2. Hold both ends of an exercise band and loop the band around the inside of your affected foot.  Then press your other foot against the band.  3. Keeping your legs crossed, slowly push your affected foot against the band so that foot moves away from your other foot. Then slowly relax. 4. Repeat 8 to 12 times. Resisted ankle eversion    1. Sit on the floor with your legs straight. 2. Hold both ends of an exercise band and loop the band around the outside of your affected foot. Then press your other foot against the band. 3. Keeping your leg straight, slowly push your affected foot outward against the band and away from your other foot without letting your leg rotate. Then slowly relax. 4. Repeat 8 to 12 times. Resisted ankle dorsiflexion    1. Tie the ends of an exercise band together to form a loop. Attach one end of the loop to a secure object or shut a door on it to hold it in place. (Or you can have someone hold one end of the loop to provide resistance.)  2. While sitting on the floor or in a chair, loop the other end of the band over the top of your affected foot. 3. Keeping your knee and leg straight, slowly flex your foot to pull back on the exercise band, and then slowly relax. 4. Repeat 8 to 12 times. Single-leg balance    1. Stand on a flat surface with your arms stretched out to your sides like you are making the letter \"T. \" Then lift your good leg off the floor, bending it at the knee. If you are not steady on your feet, use one hand to hold on to a chair, counter, or wall. 2. Standing on the leg with your affected ankle, keep that knee straight. Try to balance on that leg for up to 30 seconds. Then rest for up to 10 seconds. 3. Repeat 6 to 8 times. 4. When you can balance on your affected leg for 30 seconds with your eyes open, try to balance on it with your eyes closed. 5. When you can do this exercise with your eyes closed for 30 seconds and with ease and no pain, try standing on a pillow or piece of foam, and repeat steps 1 through 4. Follow-up care is a key part of your treatment and safety.  Be sure to make and go to all appointments, and call your doctor if you are having problems. It's also a good idea to know your test results and keep a list of the medicines you take. Where can you learn more? Go to http://jamarcus-luis.info/. Christina Blackwell in the search box to learn more about \"Ankle Sprain: Rehab Exercises. \"  Current as of: November 29, 2017  Content Version: 11.7  © 3069-6563 [email or web address], [employer]. Care instructions adapted under license by [employer] (which disclaims liability or warranty for this information). If you have questions about a medical condition or this instruction, always ask your healthcare professional. [address] any warranty or liability for your use of this information.

## 2018-10-03 NOTE — ED PROVIDER NOTES
Patient is a 60 y.o. male presenting with ankle problem. The history is provided by the patient. Ankle Injury This is a new problem. The current episode started 6 to 12 hours ago. The problem occurs constantly. The problem has not changed since onset. The pain is present in the right ankle. The quality of the pain is described as aching and constant. The pain is at a severity of 9/10. Associated symptoms include limited range of motion and stiffness. Pertinent negatives include no numbness, no tingling, no itching, no back pain and no neck pain. The symptoms are aggravated by palpation, standing and movement. He has tried rest for the symptoms. The treatment provided no relief. There has been a history of trauma (inversion injury). Past Medical History:  
Diagnosis Date  Acute diastolic (congestive) heart failure ([address] 12/4/2016 LVEF 55-55% 12/4/16  CAD (coronary artery disease)   
 in one vessel, unable to stent 12/2016- \"collateral vessels\" per heart cath  Chronic kidney disease  Chronic kidney disease, stage 3 (HCC)  Chronic pain   
 low back and bilateral lower extremeties  Ex-smoker QUIT 11/2016    1 pk/day x 40 yrs  GERD (gastroesophageal reflux disease)   
 takes an occassional zantac, OTC  Hx-TIA (transient ischemic attack) 2 mild strokes, patient states it was \"yrs ago\" , no residual weakness  Hypercholesteremia   
 managed with medication  Hypertension   
 managed with meds  Old MI (myocardial infarction) 11/2016  Osteoarthritis   
 generalized  Stroke ([facility] [address] Past Surgical History:  
Procedure Laterality Date  COLONOSCOPY N/A 6/22/2017 COLONOSCOPY  BMI 31 performed by Shaye Pascal MD at [facility] ENDOSCOPY 2021 Alisson Lucas N/A 9/9/2017 SIGMOIDOSCOPY FLEXIBLE performed by Cliff Baker MD at [place] [address]  12/2016  
 unable to stent- 1 ves blockage  HX KNEE ARTHROSCOPY Left L knee surgery x 2  
 VASCULAR SURGERY PROCEDURE UNLIST  09/07/2017  
 aortofemoral bypass  VASCULAR SURGERY PROCEDURE UNLIST Left 09/08/2017  
 arteriogram, fem embolectomy Family History:  
Problem Relation Age of Onset  Stroke Mother   
  multiple  No Known Problems Father  Diabetes Sister  Hypertension Brother  Hypertension Sister  Heart Disease Sister Social History Social History  Marital status: LEGALLY  Spouse name: N/A  
 Number of children: N/A  
 Years of education: N/A Occupational History  Not on file. Social History Main Topics  Smoking status: Former Smoker Packs/day: 1.00 Years: 42.00 Quit date: 11/25/2016  Smokeless tobacco: Never Used  Alcohol use No  
 Drug use: No  
   Comment: none since prior to 2007  Sexual activity: Not Currently Other Topics Concern  Not on file Social History Narrative ALLERGIES: Review of patient's allergies indicates no known allergies. Review of Systems Constitutional: Negative for chills and fever. Musculoskeletal: Positive for arthralgias and stiffness. Negative for back pain and neck pain. Skin: Negative for itching. Neurological: Negative for tingling and numbness. All other systems reviewed and are negative. Vitals:  
 10/02/18 1927 BP: 133/75 Pulse: 93 Resp: 18 Temp: 98 °F (36.7 °C) SpO2: 97% Weight: 98.4 kg (217 lb) Height: 5' 6\" (1.676 m) Physical Exam  
Constitutional: He is oriented to person, place, and time. He appears well-developed and well-nourished. No distress. HENT:  
Head: Normocephalic and atraumatic. Right Ear: External ear normal.  
Left Ear: External ear normal.  
Eyes: Conjunctivae and EOM are normal. Pupils are equal, round, and reactive to light.   
Musculoskeletal:  
     Right ankle: He exhibits decreased range of motion and swelling (slight). He exhibits no ecchymosis, no deformity, no laceration and normal pulse. Tenderness. Lateral malleolus tenderness found. No medial malleolus, no AITFL, no CF ligament, no posterior TFL, no head of 5th metatarsal and no proximal fibula tenderness found. Achilles tendon normal.  
Neurological: He is alert and oriented to person, place, and time. He has normal strength. No sensory deficit. Skin: He is not diaphoretic. Psychiatric: He has a normal mood and affect. His speech is normal.  
Nursing note and vitals reviewed. MDM Number of Diagnoses or Management Options Sprain of right ankle, unspecified ligament, initial encounter: new and requires workup Amount and/or Complexity of Data Reviewed Tests in the radiology section of CPT®: ordered and reviewed Review and summarize past medical records: yes Risk of Complications, Morbidity, and/or Mortality Presenting problems: low Diagnostic procedures: low Management options: low Patient Progress Patient progress: stable ED Course Procedures

## 2018-11-09 PROBLEM — E66.01 SEVERE OBESITY (HCC): Status: ACTIVE | Noted: 2018-11-09

## 2019-01-11 PROBLEM — I73.9 PAD (PERIPHERAL ARTERY DISEASE) (HCC): Status: ACTIVE | Noted: 2019-01-11

## 2019-12-09 NOTE — PROGRESS NOTES
December 9, 2019      Angely Das  [address] [facility] [address]      NORMAL PAP Smear      Dear Angely,    I am glad to inform you that the results of your recent PAP and HPV test were reported normal. There was no evidence of cancer.     Recently, recommendations have changed for how often women should get a pap smear and HPV test. In your case, studies show that you do not need to get a pap smear and HPV test for another five (5) years or sooner if recommended by your provider.      Sincerely,       Krystyna Martínez, ISAAC  [address]  [facility] [address]           Notified of patient's lactic acid of 2.6 by lab. Primary RN Kristopher Huerta notified.

## 2021-06-14 PROBLEM — Z79.899 ENCOUNTER FOR LONG-TERM (CURRENT) USE OF MEDICATIONS: Status: ACTIVE | Noted: 2021-06-14

## 2022-03-18 PROBLEM — Z79.899 ENCOUNTER FOR LONG-TERM (CURRENT) USE OF MEDICATIONS: Status: ACTIVE | Noted: 2021-06-14

## 2022-03-18 PROBLEM — E66.01 SEVERE OBESITY (HCC): Status: ACTIVE | Noted: 2018-11-09

## 2022-03-18 PROBLEM — I73.9 PAD (PERIPHERAL ARTERY DISEASE) (HCC): Status: ACTIVE | Noted: 2019-01-11

## 2022-03-19 PROBLEM — G89.29 CHRONIC LEFT-SIDED LOW BACK PAIN: Status: ACTIVE | Noted: 2017-01-03

## 2022-03-19 PROBLEM — M54.50 CHRONIC LEFT-SIDED LOW BACK PAIN: Status: ACTIVE | Noted: 2017-01-03

## 2022-03-19 PROBLEM — I99.8 ISCHEMIA OF LEFT LOWER EXTREMITY: Status: ACTIVE | Noted: 2017-09-08

## 2022-03-28 PROBLEM — E11.29 TYPE 2 DIABETES MELLITUS WITH KIDNEY COMPLICATION, WITHOUT LONG-TERM CURRENT USE OF INSULIN (HCC): Status: ACTIVE | Noted: 2022-01-03

## 2022-03-28 PROBLEM — I15.0 RENOVASCULAR HYPERTENSION: Status: ACTIVE | Noted: 2022-01-03

## 2022-03-28 PROBLEM — G89.29 CHRONIC PAIN OF RIGHT KNEE: Status: ACTIVE | Noted: 2019-12-10

## 2022-03-28 PROBLEM — M94.261 CHONDROMALACIA OF KNEE, RIGHT: Status: ACTIVE | Noted: 2019-12-10

## 2022-03-28 PROBLEM — M25.561 CHRONIC PAIN OF RIGHT KNEE: Status: ACTIVE | Noted: 2019-12-10

## 2022-03-28 PROBLEM — M17.11 LOCALIZED OSTEOARTHRITIS OF RIGHT KNEE: Status: ACTIVE | Noted: 2020-01-07

## 2022-06-28 ENCOUNTER — OFFICE VISIT (OUTPATIENT)
Dept: INTERNAL MEDICINE CLINIC | Facility: CLINIC | Age: 62
End: 2022-06-28
Payer: COMMERCIAL

## 2022-06-28 VITALS
HEIGHT: 66 IN | HEART RATE: 75 BPM | BODY MASS INDEX: 36.16 KG/M2 | DIASTOLIC BLOOD PRESSURE: 80 MMHG | RESPIRATION RATE: 18 BRPM | SYSTOLIC BLOOD PRESSURE: 134 MMHG | WEIGHT: 225 LBS

## 2022-06-28 DIAGNOSIS — I73.9 PAD (PERIPHERAL ARTERY DISEASE) (HCC): ICD-10-CM

## 2022-06-28 DIAGNOSIS — E11.22 TYPE 2 DIABETES MELLITUS WITH STAGE 3A CHRONIC KIDNEY DISEASE, WITHOUT LONG-TERM CURRENT USE OF INSULIN (HCC): ICD-10-CM

## 2022-06-28 DIAGNOSIS — E66.01 SEVERE OBESITY (HCC): ICD-10-CM

## 2022-06-28 DIAGNOSIS — I10 ESSENTIAL HYPERTENSION: ICD-10-CM

## 2022-06-28 DIAGNOSIS — G89.29 CHRONIC PAIN OF RIGHT KNEE: ICD-10-CM

## 2022-06-28 DIAGNOSIS — M17.11 LOCALIZED OSTEOARTHRITIS OF RIGHT KNEE: Primary | ICD-10-CM

## 2022-06-28 DIAGNOSIS — T14.8XXA SKIN WOUND FROM SURGICAL INCISION: ICD-10-CM

## 2022-06-28 DIAGNOSIS — S21.009A INCISIONAL BREAST WOUND: ICD-10-CM

## 2022-06-28 DIAGNOSIS — M25.561 CHRONIC PAIN OF RIGHT KNEE: ICD-10-CM

## 2022-06-28 DIAGNOSIS — N18.31 TYPE 2 DIABETES MELLITUS WITH STAGE 3A CHRONIC KIDNEY DISEASE, WITHOUT LONG-TERM CURRENT USE OF INSULIN (HCC): ICD-10-CM

## 2022-06-28 DIAGNOSIS — M54.50 CHRONIC LEFT-SIDED LOW BACK PAIN WITHOUT SCIATICA: ICD-10-CM

## 2022-06-28 DIAGNOSIS — G89.29 CHRONIC LEFT-SIDED LOW BACK PAIN WITHOUT SCIATICA: ICD-10-CM

## 2022-06-28 LAB — TSH W FREE THYROID IF ABNORMAL: 1.81 UIU/ML (ref 0.36–3.74)

## 2022-06-28 PROCEDURE — 99214 OFFICE O/P EST MOD 30 MIN: CPT | Performed by: INTERNAL MEDICINE

## 2022-06-28 PROCEDURE — 3051F HG A1C>EQUAL 7.0%<8.0%: CPT | Performed by: INTERNAL MEDICINE

## 2022-06-28 RX ORDER — HYDROCODONE BITARTRATE AND ACETAMINOPHEN 7.5; 325 MG/1; MG/1
1 TABLET ORAL 2 TIMES DAILY PRN
Qty: 42 TABLET | Refills: 0 | Status: SHIPPED | OUTPATIENT
Start: 2022-07-12 | End: 2022-08-11

## 2022-06-28 RX ORDER — HYDROCODONE BITARTRATE AND ACETAMINOPHEN 7.5; 325 MG/1; MG/1
1 TABLET ORAL 2 TIMES DAILY PRN
Qty: 42 TABLET | Refills: 0 | Status: SHIPPED | OUTPATIENT
Start: 2022-08-11 | End: 2022-09-10

## 2022-06-28 RX ORDER — HYDROCODONE BITARTRATE AND ACETAMINOPHEN 7.5; 325 MG/1; MG/1
TABLET ORAL
COMMUNITY
Start: 2022-06-12 | End: 2022-06-28 | Stop reason: SDUPTHER

## 2022-06-28 RX ORDER — HYDROCODONE BITARTRATE AND ACETAMINOPHEN 7.5; 325 MG/1; MG/1
1 TABLET ORAL 2 TIMES DAILY PRN
Qty: 42 TABLET | Refills: 0 | Status: SHIPPED | OUTPATIENT
Start: 2022-09-10 | End: 2022-09-21 | Stop reason: SDUPTHER

## 2022-06-28 SDOH — ECONOMIC STABILITY: FOOD INSECURITY: WITHIN THE PAST 12 MONTHS, THE FOOD YOU BOUGHT JUST DIDN'T LAST AND YOU DIDN'T HAVE MONEY TO GET MORE.: PATIENT DECLINED

## 2022-06-28 SDOH — ECONOMIC STABILITY: FOOD INSECURITY: WITHIN THE PAST 12 MONTHS, YOU WORRIED THAT YOUR FOOD WOULD RUN OUT BEFORE YOU GOT MONEY TO BUY MORE.: PATIENT DECLINED

## 2022-06-28 ASSESSMENT — PATIENT HEALTH QUESTIONNAIRE - PHQ9
SUM OF ALL RESPONSES TO PHQ QUESTIONS 1-9: 0
1. LITTLE INTEREST OR PLEASURE IN DOING THINGS: 0
SUM OF ALL RESPONSES TO PHQ QUESTIONS 1-9: 0
SUM OF ALL RESPONSES TO PHQ9 QUESTIONS 1 & 2: 0
SUM OF ALL RESPONSES TO PHQ QUESTIONS 1-9: 0
SUM OF ALL RESPONSES TO PHQ QUESTIONS 1-9: 0
2. FEELING DOWN, DEPRESSED OR HOPELESS: 0

## 2022-06-28 ASSESSMENT — ENCOUNTER SYMPTOMS
VOMITING: 0
CONSTIPATION: 0
BACK PAIN: 1
NAUSEA: 0
WHEEZING: 0
DIARRHEA: 0
COUGH: 0

## 2022-06-28 ASSESSMENT — SOCIAL DETERMINANTS OF HEALTH (SDOH): HOW HARD IS IT FOR YOU TO PAY FOR THE VERY BASICS LIKE FOOD, HOUSING, MEDICAL CARE, AND HEATING?: PATIENT DECLINED

## 2022-06-28 NOTE — PATIENT INSTRUCTIONS
Patient Education        DASH Diet: Care Instructions  Your Care Instructions     The DASH diet is an eating plan that can help lower your blood pressure. DASH stands for Dietary Approaches to Stop Hypertension. Hypertension is high bloodpressure. The DASH diet focuses on eating foods that are high in calcium, potassium, and magnesium. These nutrients can lower blood pressure. The foods that are highest in these nutrients are fruits, vegetables, low-fat dairy products, nuts, seeds, and legumes. But taking calcium, potassium, and magnesium supplements instead of eating foods that are high in those nutrients does not have the same effect. The DASH diet also includes whole grains, fish, and poultry. The DASH diet is one of several lifestyle changes your doctor may recommend to lower your high blood pressure. Your doctor may also want you to decrease the amount of sodium in your diet. Lowering sodium while following the DASH dietcan lower blood pressure even further than just the DASH diet alone. Follow-up care is a key part of your treatment and safety. Be sure to make and go to all appointments, and call your doctor if you are having problems. It's also a good idea to know your test results and keep alist of the medicines you take. How can you care for yourself at home? Following the DASH diet   Eat 4 to 5 servings of fruit each day. A serving is 1 medium-sized piece of fruit, ½ cup chopped or canned fruit, 1/4 cup dried fruit, or 4 ounces (½ cup) of fruit juice. Choose fruit more often than fruit juice.  Eat 4 to 5 servings of vegetables each day. A serving is 1 cup of lettuce or raw leafy vegetables, ½ cup of chopped or cooked vegetables, or 4 ounces (½ cup) of vegetable juice. Choose vegetables more often than vegetable juice.  Get 2 to 3 servings of low-fat and fat-free dairy each day. A serving is 8 ounces of milk, 1 cup of yogurt, or 1 ½ ounces of cheese.  Eat 6 to 8 servings of grains each day.  A serving is 1 slice of bread, 1 ounce of dry cereal, or ½ cup of cooked rice, pasta, or cooked cereal. Try to choose whole-grain products as much as possible.  Limit lean meat, poultry, and fish to 2 servings each day. A serving is 3 ounces, about the size of a deck of cards.  Eat 4 to 5 servings of nuts, seeds, and legumes (cooked dried beans, lentils, and split peas) each week. A serving is 1/3 cup of nuts, 2 tablespoons of seeds, or ½ cup of cooked beans or peas.  Limit fats and oils to 2 to 3 servings each day. A serving is 1 teaspoon of vegetable oil or 2 tablespoons of salad dressing.  Limit sweets and added sugars to 5 servings or less a week. A serving is 1 tablespoon jelly or jam, ½ cup sorbet, or 1 cup of lemonade.  Eat less than 2,300 milligrams (mg) of sodium a day. If you limit your sodium to 1,500 mg a day, you can lower your blood pressure even more.  Be aware that all of these are the suggested number of servings for people who eat 1,800 to 2,000 calories a day. Your recommended number of servings may be different if you need more or fewer calories. Tips for success   Start small. Do not try to make dramatic changes to your diet all at once. You might feel that you are missing out on your favorite foods and then be more likely to not follow the plan. Make small changes, and stick with them. Once those changes become habit, add a few more changes.  Try some of the following:  ? Make it a goal to eat a fruit or vegetable at every meal and at snacks. This will make it easy to get the recommended amount of fruits and vegetables each day. ? Try yogurt topped with fruit and nuts for a snack or healthy dessert. ? Add lettuce, tomato, cucumber, and onion to sandwiches. ? Combine a ready-made pizza crust with low-fat mozzarella cheese and lots of vegetable toppings. Try using tomatoes, squash, spinach, broccoli, carrots, cauliflower, and onions. ?  Have a variety of cut-up vegetables with a low-fat dip as an appetizer instead of chips and dip. ? Sprinkle sunflower seeds or chopped almonds over salads. Or try adding chopped walnuts or almonds to cooked vegetables. ? Try some vegetarian meals using beans and peas. Add garbanzo or kidney beans to salads. Make burritos and tacos with mashed guthrie beans or black beans. Where can you learn more? Go to https://[employer]TwinPlace. org and sign in to your [employer]! account. Enter B658 in the "[employer] box to learn more about \"DASH Diet: Care Instructions. \"     If you do not have an account, please click on the \"Sign Up Now\" link. Current as of: January 10, 2022               Content Version: 13.3  © 5483-6191 [employer]. Care instructions adapted under license by [facility] ([facility]). If you have questions about a medical condition or this instruction, always ask your healthcare professional. [address] any warranty or liability for your use of this information.

## 2022-06-28 NOTE — PROGRESS NOTES
6/28/2022 5:26 PM  Location:[facility] [address] [facility]  [place]  Patient #:  [id number]  YOB: 1960            History of Present Illness     Chief Complaint   Patient presents with    Follow-up     3 mnth f/u    Medication Reaction     Stopped taking the Ozempic - took 6 doses - caused nausea and dizinziness with increased dose     Skin Exam     has a sore on his abdomen x 4 months - not healing up         Mr. Anatoily Pugh is a 60 y.o. male  who presents for follow up on chronic medical problems. Notes that wound on his abdomen is persistent. Has a hard time not picking at it. Wonders about seeing a pain doctor. Other  Associated symptoms include arthralgias. Pertinent negatives include no chest pain, chills, coughing, fever, nausea, numbness, vomiting or weakness.           No Known Allergies  Past Medical History:   Diagnosis Date    Acute diastolic (congestive) heart failure (HCC) 12/4/2016    LVEF 55-55% 12/4/16    CAD (coronary artery disease)     in one vessel, unable to stent 12/2016- \"collateral vessels\" per heart cath    Chronic kidney disease     Chronic kidney disease, stage 3 ([facility] [address]     Chronic pain     low back and bilateral lower extremeties    Ex-smoker     QUIT 11/2016    1 pk/day x 40 yrs    GERD (gastroesophageal reflux disease)     takes an occassional zantac, OTC    Hx-TIA (transient ischemic attack)     2 mild strokes, patient states it was \"yrs ago\" , no residual weakness    Hypercholesteremia     managed with medication     Hypertension     managed with meds    Old MI (myocardial infarction) 11/2016    Osteoarthritis     generalized    Stroke [facility])      Social History     Socioeconomic History    Marital status: Legally      Spouse name: None    Number of children: None    Years of education: None    Highest education level: None   Occupational History    None   Tobacco Use    Smoking status: Former Smoker Packs/day: 1.00     Years: 42.00     Pack years: 42.00     Quit date: 2016     Years since quittin.5    Smokeless tobacco: Never Used   Substance and Sexual Activity    Alcohol use: No    Drug use: No    Sexual activity: None   Other Topics Concern    None   Social History Narrative    None     Social Determinants of Health     Financial Resource Strain: Unknown    Difficulty of Paying Living Expenses: Patient refused   Food Insecurity: Unknown    Worried About Running Out of Food in the Last Year: Patient refused    Ran Out of Food in the Last Year: Patient refused   Transportation Needs:     Lack of Transportation (Medical): Not on file    Lack of Transportation (Non-Medical):  Not on file   Physical Activity:     Days of Exercise per Week: Not on file    Minutes of Exercise per Session: Not on file   Stress:     Feeling of Stress : Not on file   Social Connections:     Frequency of Communication with Friends and Family: Not on file    Frequency of Social Gatherings with Friends and Family: Not on file    Attends Buddhist Services: Not on file    Active Member of [address] [place] [address] or Organizations: Not on file    Attends Club or Organization Meetings: Not on file    Marital Status: Not on file   Intimate Partner Violence:     Fear of Current or Ex-Partner: Not on file    Emotionally Abused: Not on file    Physically Abused: Not on file    Sexually Abused: Not on file   Housing Stability:     Unable to Pay for Housing in the Last Year: Not on file    Number of [place] in the Last Year: Not on file    Unstable Housing in the Last Year: Not on file     Past Surgical History:   Procedure Laterality Date   [address]  2016    unable to stent- 1 ves blockage    COLONOSCOPY N/A 2017    COLONOSCOPY  BMI 31 performed by Vicki Tay MD at [address] [place] [address] N/A 2017    SIGMOIDOSCOPY FLEXIBLE performed by Aaron Morales MD at [facility] ENDOSCOPY  KNEE ARTHROSCOPY Left     L knee surgery x 2    VASCULAR SURGERY Left 09/08/2017    arteriogram, fem embolectomy    VASCULAR SURGERY  09/07/2017    aortofemoral bypass     Current Outpatient Medications   Medication Sig Dispense Refill    [START ON 7/12/2022] HYDROcodone-acetaminophen (NORCO) 7.5-325 MG per tablet Take 1 tablet by mouth 2 times daily as needed for Pain for up to 30 days. 42 tablet 0    [START ON 8/11/2022] HYDROcodone-acetaminophen (NORCO) 7.5-325 MG per tablet Take 1 tablet by mouth 2 times daily as needed for Pain for up to 30 days. Intended supply: 3 days. Take lowest dose possible to manage pain 42 tablet 0    [START ON 9/10/2022] HYDROcodone-acetaminophen (NORCO) 7.5-325 MG per tablet Take 1 tablet by mouth 2 times daily as needed for Pain for up to 30 days. Intended supply: 3 days. Take lowest dose possible to manage pain 42 tablet 0    empagliflozin (JARDIANCE) 25 MG tablet Take 1 tablet by mouth daily 30 tablet 3    amLODIPine (NORVASC) 10 MG tablet Take 10 mg by mouth daily      aspirin 81 MG EC tablet Take 81 mg by mouth      carvedilol (COREG) 25 MG tablet Twice a day      cetirizine (ZYRTEC) 10 MG tablet Take 10 mg by mouth daily      Cholecalciferol 50 MCG (2000 UT) TABS Take by mouth      Evolocumab (REPATHA SURECLICK) 772 MG/ML SOAJ Inject 140 mg into the skin every 14 days      famotidine (PEPCID) 40 MG tablet Take 40 mg by mouth daily      hydroCHLOROthiazide (HYDRODIURIL) 25 MG tablet Take 25 mg by mouth daily      traMADol (ULTRAM) 50 MG tablet Take 50 mg by mouth every 8 hours as needed. No current facility-administered medications for this visit.      Health Maintenance   Topic Date Due    COVID-19 Vaccine (1) Never done    Pneumococcal 0-64 years Vaccine (1 - PCV) Never done    Diabetic foot exam  Never done    HIV screen  Never done    Diabetic microalbuminuria test  Never done    Diabetic retinal exam  Never done    DTaP/Tdap/Td vaccine (1 - 7.5-325 MG per tablet Take 1 tablet by mouth 2 times daily as needed for Pain for up to 30 days. Intended supply: 3 days. Take lowest dose possible to manage pain 42 tablet 0    [START ON 9/10/2022] HYDROcodone-acetaminophen (NORCO) 7.5-325 MG per tablet Take 1 tablet by mouth 2 times daily as needed for Pain for up to 30 days. Intended supply: 3 days. Take lowest dose possible to manage pain 42 tablet 0    empagliflozin (JARDIANCE) 25 MG tablet Take 1 tablet by mouth daily 30 tablet 3    amLODIPine (NORVASC) 10 MG tablet Take 10 mg by mouth daily      aspirin 81 MG EC tablet Take 81 mg by mouth      carvedilol (COREG) 25 MG tablet Twice a day      cetirizine (ZYRTEC) 10 MG tablet Take 10 mg by mouth daily      Cholecalciferol 50 MCG (2000 UT) TABS Take by mouth      Evolocumab (REPATHA SURECLICK) 723 MG/ML SOAJ Inject 140 mg into the skin every 14 days      famotidine (PEPCID) 40 MG tablet Take 40 mg by mouth daily      hydroCHLOROthiazide (HYDRODIURIL) 25 MG tablet Take 25 mg by mouth daily      traMADol (ULTRAM) 50 MG tablet Take 50 mg by mouth every 8 hours as needed. No current facility-administered medications for this visit.        Orders Placed This Encounter   Procedures    Culture, Wound     Standing Status:   Future     Number of Occurrences:   1     Standing Expiration Date:   6/28/2023    TSH with Reflex     Standing Status:   Future     Number of Occurrences:   1     Standing Expiration Date:   6/28/2023    Hemoglobin A1C     Standing Status:   Future     Number of Occurrences:   1     Standing Expiration Date:   6/28/2023    External Referral To Pain Medicine     Referral Priority:   Routine     Referral Type:   Eval and Treat     Referral Reason:   Specialty Services Required     Requested Specialty:   Pain Medicine     Number of Visits Requested:   1       Orders Placed This Encounter   Medications    HYDROcodone-acetaminophen (NORCO) 7.5-325 MG per tablet     Sig: Take 1 tablet threshold for contacting the office with worsening symptoms. Advised him to stop manipulating the above area. Wash with warm soapy water daily. Can also use peroxide on the area. Follow up as documented or earlier as needed. I have reviewed the patients controlled substance prescription history, as maintained in the [place] prescription monitoring program, so that the prescription(s) for a  controlled substance can be given. Return in about 3 months (around 9/28/2022).           Jacky Sears MD

## 2022-06-29 LAB
EST. AVERAGE GLUCOSE BLD GHB EST-MCNC: 123 MG/DL
HBA1C MFR BLD: 5.9 % (ref 4.2–6.3)

## 2022-07-01 ENCOUNTER — TELEPHONE (OUTPATIENT)
Dept: INTERNAL MEDICINE CLINIC | Facility: CLINIC | Age: 62
End: 2022-07-01

## 2022-07-01 LAB
BACTERIA SPEC CULT: ABNORMAL
GRAM STN SPEC: ABNORMAL
GRAM STN SPEC: ABNORMAL
SERVICE CMNT-IMP: ABNORMAL

## 2022-07-01 RX ORDER — DOXYCYCLINE HYCLATE 100 MG
100 TABLET ORAL 2 TIMES DAILY
Qty: 14 TABLET | Refills: 0 | Status: SHIPPED | OUTPATIENT
Start: 2022-07-01 | End: 2022-07-08

## 2022-07-01 NOTE — TELEPHONE ENCOUNTER
Take the antibiotics sent to your pharmacy for the infection in your wound. Eat yogurt every day while you are on antibiotics. Thanks.   Tea Xavier

## 2022-08-23 ENCOUNTER — OFFICE VISIT (OUTPATIENT)
Dept: VASCULAR SURGERY | Age: 62
End: 2022-08-23
Payer: COMMERCIAL

## 2022-08-23 VITALS
WEIGHT: 226 LBS | DIASTOLIC BLOOD PRESSURE: 93 MMHG | BODY MASS INDEX: 36.32 KG/M2 | HEIGHT: 66 IN | HEART RATE: 69 BPM | OXYGEN SATURATION: 96 % | SYSTOLIC BLOOD PRESSURE: 163 MMHG | TEMPERATURE: 97.3 F

## 2022-08-23 DIAGNOSIS — I73.9 PVD (PERIPHERAL VASCULAR DISEASE) WITH CLAUDICATION (HCC): Primary | ICD-10-CM

## 2022-08-23 PROCEDURE — 99213 OFFICE O/P EST LOW 20 MIN: CPT | Performed by: SURGERY

## 2022-08-23 ASSESSMENT — PATIENT HEALTH QUESTIONNAIRE - PHQ9
1. LITTLE INTEREST OR PLEASURE IN DOING THINGS: 0
SUM OF ALL RESPONSES TO PHQ QUESTIONS 1-9: 0
SUM OF ALL RESPONSES TO PHQ QUESTIONS 1-9: 0
2. FEELING DOWN, DEPRESSED OR HOPELESS: 0
SUM OF ALL RESPONSES TO PHQ QUESTIONS 1-9: 0
SUM OF ALL RESPONSES TO PHQ QUESTIONS 1-9: 0
SUM OF ALL RESPONSES TO PHQ9 QUESTIONS 1 & 2: 0

## 2022-08-23 NOTE — PROGRESS NOTES
[address]   [address] [facility] FAX: [phone]    Edmundo Porter  : 1960    Chief Complaint:     History of Present Illness:   Patient follows up today for follow-up status post aortobifem several years back. Patient denies any lower extremity claudication symptoms. CURRENT MEDICATIONS:  Current Outpatient Medications   Medication Sig Dispense Refill    HYDROcodone-acetaminophen (NORCO) 7.5-325 MG per tablet Take 1 tablet by mouth 2 times daily as needed for Pain for up to 30 days. Intended supply: 3 days. Take lowest dose possible to manage pain 42 tablet 0    [START ON 9/10/2022] HYDROcodone-acetaminophen (NORCO) 7.5-325 MG per tablet Take 1 tablet by mouth 2 times daily as needed for Pain for up to 30 days. Intended supply: 3 days. Take lowest dose possible to manage pain 42 tablet 0    empagliflozin (JARDIANCE) 25 MG tablet Take 1 tablet by mouth daily 30 tablet 3    amLODIPine (NORVASC) 10 MG tablet Take 10 mg by mouth daily      aspirin 81 MG EC tablet Take 81 mg by mouth      carvedilol (COREG) 25 MG tablet Twice a day      cetirizine (ZYRTEC) 10 MG tablet Take 10 mg by mouth daily      Cholecalciferol 50 MCG (2000) TABS Take by mouth      Evolocumab (REPATHA SURECLICK) 210 MG/ML SOAJ Inject 140 mg into the skin every 14 days      famotidine (PEPCID) 40 MG tablet Take 40 mg by mouth daily      hydroCHLOROthiazide (HYDRODIURIL) 25 MG tablet Take 25 mg by mouth daily      traMADol (ULTRAM) 50 MG tablet Take 50 mg by mouth every 8 hours as needed. No current facility-administered medications for this visit.        Past Medical History:   Diagnosis Date    Acute diastolic (congestive) heart failure ([facility] [address] 2016    LVEF 55-55% 16    CAD (coronary artery disease)     in one vessel, unable to stent 2016- \"collateral vessels\" per heart cath    Chronic kidney disease     Chronic kidney disease, stage 3 (HCC)     Chronic pain     low back and bilateral lower extremeties    Ex-smoker     QUIT 11/2016    1 pk/day x 40 yrs    GERD (gastroesophageal reflux disease)     takes an occassional zantac, OTC    Hx-TIA (transient ischemic attack)     2 mild strokes, patient states it was \"yrs ago\" , no residual weakness    Hypercholesteremia     managed with medication     Hypertension     managed with meds    Old MI (myocardial infarction) 11/2016    Osteoarthritis     generalized    Stroke [facility])        Physical Examination:   Height: 5' 6\" (1.676 m), Weight: 226 lb (102.5 kg), BP: (!) 163/93    Constitutional: he appears well-developed. No distress. HENT:   Head: Atraumatic. Eyes: Pupils are equal, round, and reactive to light. Neck: Normal range of motion. Cardiovascular: Regular rhythm. Pulmonary/Chest: Effort normal and breath sounds normal. No respiratory distress. Abdominal: Soft. Bowel sounds are normal. he exhibits no distension. There is no tenderness. There is no guarding. No hernia. Musculoskeletal: Normal range of motion. Neurological: He is alert. CN II- XII grossly intact   Vascular: Emerald pulses palp    Imaging:  ABIs of 1 bilaterally with patent aortobifem no evidence of any pseudoaneurysm. Recommendations/Plans:   Mr. Alem Goldsmith is a 60y.o. year old male status post aortobifem with patent lower extremity flow follow-up in 6 months with a duplex study. Roselinda Homans, MD    Elements of this note have been dictated using speech recognition software. As a result, errors of speech recognition may have occurred.     20 minutes of time was spent on this encounter including chart review, assessment, evaluation and coordination of patient care

## 2022-08-25 ENCOUNTER — OFFICE VISIT (OUTPATIENT)
Dept: CARDIOLOGY CLINIC | Age: 62
End: 2022-08-25
Payer: COMMERCIAL

## 2022-08-25 VITALS
HEART RATE: 68 BPM | BODY MASS INDEX: 36.8 KG/M2 | WEIGHT: 229 LBS | SYSTOLIC BLOOD PRESSURE: 138 MMHG | DIASTOLIC BLOOD PRESSURE: 80 MMHG | HEIGHT: 66 IN

## 2022-08-25 DIAGNOSIS — E78.5 HYPERLIPIDEMIA, UNSPECIFIED HYPERLIPIDEMIA TYPE: Primary | ICD-10-CM

## 2022-08-25 DIAGNOSIS — I25.119 ATHEROSCLEROSIS OF NATIVE CORONARY ARTERY OF NATIVE HEART WITH ANGINA PECTORIS (HCC): ICD-10-CM

## 2022-08-25 DIAGNOSIS — I10 ESSENTIAL (PRIMARY) HYPERTENSION: ICD-10-CM

## 2022-08-25 PROCEDURE — 99214 OFFICE O/P EST MOD 30 MIN: CPT | Performed by: INTERNAL MEDICINE

## 2022-08-25 RX ORDER — CARVEDILOL 25 MG/1
25 TABLET ORAL 2 TIMES DAILY
Qty: 180 TABLET | Refills: 3 | Status: SHIPPED | OUTPATIENT
Start: 2022-08-25 | End: 2022-11-23

## 2022-08-25 RX ORDER — HYDROCHLOROTHIAZIDE 25 MG/1
25 TABLET ORAL DAILY
Qty: 90 TABLET | Refills: 3 | Status: SHIPPED | OUTPATIENT
Start: 2022-08-25 | End: 2022-11-23

## 2022-08-25 RX ORDER — AMLODIPINE BESYLATE 10 MG/1
10 TABLET ORAL DAILY
Qty: 30 TABLET | Refills: 3 | Status: SHIPPED | OUTPATIENT
Start: 2022-08-25 | End: 2022-11-23

## 2022-08-25 NOTE — PROGRESS NOTES
[address] [place], [place]  [address], [address] [facility], [address] [place] [address]  PHONE: [phone]    SUBJECTIVE:   Dirk Mullen is a 60 y.o. male 1960   seen for a follow up visit regarding the following:     Chief Complaint   Patient presents with    Hypertension         History of present illness: 60 y.o. male presented for follow-up 8/25/22        Interval Hx   Male presented for followup 06/05/2018 doing well. Previous aortofem bypass surgery with redo groin exploration. Wound is improving. He does complain of shortness of breath that has improved since our last appointment. He has been tried on Ranexa,  which had no improvement of his symptoms. In light of his , he has very stable symptoms at this time with occasional chest discomfort. He is on Amlodipine. He has stopped smoking. He was placed on Repatha therapy by Dr. Jitendra Villar. Additionally,  recent modifications were made to his blood pressure due to hypotension and he is doing very well today. Cardiac History:     Admission for hypertensive emergency flash pulmonary edema 12/2016. EKG 12/06/2016, normal sinus rhythm, left atrial enlargement, nonspecific T-wave abnormality. NSTEMI 12/2016 due to hypertensive urgency in the setting of underlying coronary artery disease. Cardiac catheterization 12/2016, coronary anatomy with LAD, nonobstructive disease, circumflex 30 - 40% nonobstructive disease, right coronary artery 40% mid vessel stenosis with chronic total  occlusion of PDA with collateral filling, preserved left ventricular systolic function. Echocardiogram 12/2016, preserved left ventricular systolic function, mild to moderate mitral regurgitation with borderline mitral valve prolapse. Lower extremity dopplers 1/2017 Elevated velocity in the mid aorta measuring 267 cm/s. 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 Monophasic waveforms throughout the right and left lower extremity.  Occluded right posterior tibial artery. Previous statin intolerance on Lipitor. Crestor low dose added 05/02/2017. Trial of Ranexa 01/10/2018.      02/01/2018 the patient was prescribed Zetia. The patient was initiated on [address] [place] [address] in 2018 by Dr. Brendan Amin. 2/8/2022 sinus rhythm, normal rate, normal [place] intervals, ST wave normal, normal axis,         Assessment and Plan:     Hypertension, controlled. Continue current therapies. Key CAD CHF Meds            amLODIPine (NORVASC) 10 MG tablet (Taking)    Sig - Route: Take 1 tablet by mouth daily - Oral    carvedilol (COREG) 25 MG tablet (Taking)    Sig - Route: Take 1 tablet by mouth 2 times daily Twice a day - Oral    hydroCHLOROthiazide (HYDRODIURIL) 25 MG tablet (Taking)    Sig - Route: Take 1 tablet by mouth daily - Oral    Evolocumab (REPATHA SURECLICK) 040 MG/ML SOAJ (Taking)    Class: Historical Med             Coronary artery disease, stable symptoms. Worsening shortness of breath at previous appointment, now stable.  sx stable     Chronic kidney disease  Estimated Creatinine Clearance: 42 mL/min (A) (based on SCr of 2.02 mg/dL (H)). Peripheral vascular disease. Followed by Dr. Marian York. Statin intolerance. Previous intolerance to Crestor. Now on reaptha       Current Outpatient Medications   Medication Sig    amLODIPine (NORVASC) 10 MG tablet Take 1 tablet by mouth daily    carvedilol (COREG) 25 MG tablet Take 1 tablet by mouth 2 times daily Twice a day    hydroCHLOROthiazide (HYDRODIURIL) 25 MG tablet Take 1 tablet by mouth daily    HYDROcodone-acetaminophen (NORCO) 7.5-325 MG per tablet Take 1 tablet by mouth 2 times daily as needed for Pain for up to 30 days. Intended supply: 3 days. Take lowest dose possible to manage pain    [START ON 9/10/2022] HYDROcodone-acetaminophen (NORCO) 7.5-325 MG per tablet Take 1 tablet by mouth 2 times daily as needed for Pain for up to 30 days. Intended supply: 3 days.  Take lowest SURGERY  2017    aortofemoral bypass     Family History   Problem Relation Age of Onset    Diabetes Sister     Hypertension Brother     Hypertension Sister     Heart Disease Sister     Stroke Mother         multiple    No Known Problems Father       Social History     Tobacco Use    Smoking status: Former     Packs/day: 1.00     Years: 42.00     Pack years: 42.00     Types: Cigarettes     Quit date: 2016     Years since quittin.7    Smokeless tobacco: Never   Substance Use Topics    Alcohol use: No       ROS:    ROS        PHYSICAL EXAM:    /80   Pulse 68   Ht 5' 6\" (1.676 m)   Wt 229 lb (103.9 kg)   BMI 36.96 kg/m²        Wt Readings from Last 3 Encounters:   22 229 lb (103.9 kg)   22 226 lb (102.5 kg)   22 226 lb (102.5 kg)     BP Readings from Last 3 Encounters:   22 138/80   22 (!) 163/93   22 (!) 140/81         Physical Exam    Medical problems and test results were reviewed with the patient today.            Recent Results (from the past 672 hour(s))   Vascular duplex lower extremity arteries bilateral with JOÃO    Collection Time: 22 10:09 AM   Result Value Ref Range    Left JOÃO 0.95 ratio    Left dorsalis pedis  mmHg    Left posterior tibial 140 mmHg    Left toe pressure 106 mmHg    Left TBI 0.72 ratio    Right JOÃO 1.02 ratio    Right dorsalis pedis  mmHg    Right posterior tibial 144 mmHg    Right toe pressure 107 mmHg    Right TBI 0.72 ratio    Left arm  mmHg    Right arm  mmHg    Left CFA prox .0 cm/s    Left GONZÁLEZ dist PSV -13.2 cm/s    Left Pop A dist PSV 78.0 cm/s    Left Pop A prox PSV 85.2 cm/s    Left PTA dist PSV 78.9 cm/s    Left peroneal dist PSV 47.9 cm/s    Left SFA dist PSV 84.0 cm/s    Left SFA mid .0 cm/s    Left SFA prox .0 cm/s    Left PFA prox .0 cm/s    Right CFA prox .0 cm/s    Right GONZÁLEZ dist PSV 95.7 cm/s    Right Pop A dist PSV 75.8 cm/s    Right Pop A prox PSV 83.4 cm/s    Right PTA dist PSV 46.4 cm/s    Right peronal dist PSV 38.1 cm/s    Right SFA dist PSV 90.0 cm/s    Right SFA mid .0 cm/s    Right SFA prox .0 cm/s    Right CFA prox .0 cm/s    Left Prox Outflow PSV 83.8 cm/s    Right Prox Outflow PSV 91.1 cm/s    Left Mid Outflow .0 cm/s    Right Mid Outflow PSV 96.6 cm/s    Left Outflow Vessel .0 cm/s    Left Outflow Vessel EDV 0.0 cm/s    Right Outflow Vessel .0 cm/s    Right Outflow Vessel EDV 0.0 cm/s    Left Dist Anastomosis .0 cm/s    Left Dist Anastomosis EDV 0.0 cm/s    Right Dist Anastomosis .0 cm/s    Right Dist Anastomosis EDV 0.0 cm/s    Left Dist Outflow .0 cm/s    Right Dist Outflow .0 cm/s    Right Inflow Artery PSV 89.0 cm/s    Left Prox Anastomosis PSV 53.5 cm/s    Body Surface Area 2.18 m2    Right SFA prox hilaria ratio 1.0     Right SFA mid hilaria ratio 0.6     Right SFA dist hilaria ratio 0.88     Right Pop A prox hilaria ratio 0.93     Left SFA prox hilaria ratio 1.26     Left SFA mid hilaria ratio 0.68     Left SFA dist hilaria ratio 0.68     Left Pop A prox hilaria ratio 1.01     Right Graft 1 Aorta- Rt CFA     Right Prox Anastomosis PSV 53.5 cm/s    Left Graft 1 Aorta- Lt CFA     Left Inflow Artery PSV 89.0 cm/s     Lab Results   Component Value Date/Time    CHOL 105 03/28/2022 12:24 PM    HDL 37 03/28/2022 12:24 PM    VLDL 35 03/28/2022 12:24 PM       No results found for any visits on 08/25/22. Rhonda Rciks was seen today for hypertension. Diagnoses and all orders for this visit:    Hyperlipidemia, unspecified hyperlipidemia type    Essential (primary) hypertension    Atherosclerosis of native coronary artery of native heart with angina pectoris ([facility] [address]    Other orders  -     amLODIPine (NORVASC) 10 MG tablet; Take 1 tablet by mouth daily  -     carvedilol (COREG) 25 MG tablet; Take 1 tablet by mouth 2 times daily Twice a day  -     hydroCHLOROthiazide (HYDRODIURIL) 25 MG tablet;  Take 1 tablet by mouth daily    Return in about 6 months (around 2/25/2023).        Farrukh Mckeon MD  8/25/2022  9:59 AM

## 2022-09-21 ENCOUNTER — OFFICE VISIT (OUTPATIENT)
Dept: INTERNAL MEDICINE CLINIC | Facility: CLINIC | Age: 62
End: 2022-09-21
Payer: COMMERCIAL

## 2022-09-21 VITALS
BODY MASS INDEX: 36.71 KG/M2 | RESPIRATION RATE: 18 BRPM | DIASTOLIC BLOOD PRESSURE: 80 MMHG | SYSTOLIC BLOOD PRESSURE: 144 MMHG | HEIGHT: 66 IN | WEIGHT: 228.4 LBS | HEART RATE: 73 BPM | TEMPERATURE: 97.9 F

## 2022-09-21 DIAGNOSIS — Z79.891 LONG TERM PRESCRIPTION OPIATE USE: ICD-10-CM

## 2022-09-21 DIAGNOSIS — I10 ESSENTIAL HYPERTENSION: Primary | ICD-10-CM

## 2022-09-21 DIAGNOSIS — G89.29 CHRONIC LEFT-SIDED LOW BACK PAIN WITHOUT SCIATICA: ICD-10-CM

## 2022-09-21 DIAGNOSIS — M54.50 CHRONIC LEFT-SIDED LOW BACK PAIN WITHOUT SCIATICA: ICD-10-CM

## 2022-09-21 DIAGNOSIS — M17.11 LOCALIZED OSTEOARTHRITIS OF RIGHT KNEE: ICD-10-CM

## 2022-09-21 DIAGNOSIS — I73.9 PAD (PERIPHERAL ARTERY DISEASE) (HCC): ICD-10-CM

## 2022-09-21 DIAGNOSIS — E11.21 TYPE 2 DIABETES MELLITUS WITH DIABETIC NEPHROPATHY, WITHOUT LONG-TERM CURRENT USE OF INSULIN (HCC): ICD-10-CM

## 2022-09-21 PROCEDURE — 99214 OFFICE O/P EST MOD 30 MIN: CPT | Performed by: INTERNAL MEDICINE

## 2022-09-21 PROCEDURE — 3044F HG A1C LEVEL LT 7.0%: CPT | Performed by: INTERNAL MEDICINE

## 2022-09-21 RX ORDER — HYDROCODONE BITARTRATE AND ACETAMINOPHEN 7.5; 325 MG/1; MG/1
1 TABLET ORAL 2 TIMES DAILY PRN
Qty: 42 TABLET | Refills: 0 | Status: SHIPPED | OUTPATIENT
Start: 2022-10-10 | End: 2022-11-09

## 2022-09-21 RX ORDER — FAMOTIDINE 40 MG/1
40 TABLET, FILM COATED ORAL DAILY
Qty: 90 TABLET | Refills: 3 | Status: SHIPPED | OUTPATIENT
Start: 2022-09-21

## 2022-09-21 RX ORDER — HYDROCODONE BITARTRATE AND ACETAMINOPHEN 7.5; 325 MG/1; MG/1
1 TABLET ORAL 2 TIMES DAILY
Qty: 42 TABLET | Refills: 0 | Status: SHIPPED | OUTPATIENT
Start: 2022-11-09 | End: 2022-12-09

## 2022-09-21 RX ORDER — CETIRIZINE HYDROCHLORIDE 10 MG/1
10 TABLET ORAL DAILY
Qty: 90 TABLET | Refills: 3 | Status: SHIPPED | OUTPATIENT
Start: 2022-09-21

## 2022-09-21 ASSESSMENT — ENCOUNTER SYMPTOMS
DIARRHEA: 0
SHORTNESS OF BREATH: 1
WHEEZING: 0
NAUSEA: 0
CONSTIPATION: 0
COUGH: 0
VOMITING: 0
BACK PAIN: 1

## 2022-09-21 ASSESSMENT — PATIENT HEALTH QUESTIONNAIRE - PHQ9
SUM OF ALL RESPONSES TO PHQ QUESTIONS 1-9: 0
2. FEELING DOWN, DEPRESSED OR HOPELESS: 0
SUM OF ALL RESPONSES TO PHQ QUESTIONS 1-9: 0
SUM OF ALL RESPONSES TO PHQ9 QUESTIONS 1 & 2: 0
1. LITTLE INTEREST OR PLEASURE IN DOING THINGS: 0

## 2022-09-21 NOTE — PROGRESS NOTES
2022 2:11 PM  Location:[facility] [address] [facility]  [place]  Patient #:  [id number]  YOB: 1960            History of Present Illness     Chief Complaint   Patient presents with    3 Month Follow-Up     Pt presents to the office today for a 3 month follow-up      Diabetes     Due for a foot exam       Mr. Marissa Flor is a 60 y.o. male  who presents for the above.    HPI       No Known Allergies  Past Medical History:   Diagnosis Date    Acute diastolic (congestive) heart failure (HCC) 2016    LVEF 55-55% 16    CAD (coronary artery disease)     in one vessel, unable to stent 2016- \"collateral vessels\" per heart cath    Chronic kidney disease     Chronic kidney disease, stage 3 (HCC)     Chronic pain     low back and bilateral lower extremeties    Ex-smoker     QUIT 2016    1 pk/day x 40 yrs    GERD (gastroesophageal reflux disease)     takes an occassional zantac, OTC    Hx-TIA (transient ischemic attack)     2 mild strokes, patient states it was \"yrs ago\" , no residual weakness    Hypercholesteremia     managed with medication     Hypertension     managed with meds    Old MI (myocardial infarction) 2016    Osteoarthritis     generalized    Stroke [facility])      Social History     Socioeconomic History    Marital status: Legally      Spouse name: None    Number of children: None    Years of education: None    Highest education level: None   Tobacco Use    Smoking status: Former     Packs/day: 1.00     Years: 42.00     Pack years: 42.00     Types: Cigarettes     Quit date: 2016     Years since quittin.8    Smokeless tobacco: Never   Substance and Sexual Activity    Alcohol use: No    Drug use: No     Social Determinants of Health     Financial Resource Strain: Unknown    Difficulty of Paying Living Expenses: Patient refused   Food Insecurity: Unknown    Worried About Running Out of Food in the Last Year: Patient refused    Ran Out of Food in the Last Year: Patient refused     Past Surgical History:   Procedure Laterality Date    CARDIAC CATHETERIZATION  12/2016    unable to stent- 1 ves blockage    COLONOSCOPY N/A 6/22/2017    COLONOSCOPY  BMI 31 performed by Ananya Wilder MD at [address] N/A 9/9/2017    SIGMOIDOSCOPY FLEXIBLE performed by Ishmael Vázquez MD at [facility] ENDOSCOPY    KNEE ARTHROSCOPY Left     L knee surgery x 2    VASCULAR SURGERY Left 09/08/2017    arteriogram, fem embolectomy    VASCULAR SURGERY  09/07/2017    aortofemoral bypass     Current Outpatient Medications   Medication Sig Dispense Refill    cetirizine (ZYRTEC) 10 MG tablet Take 1 tablet by mouth daily 90 tablet 3    famotidine (PEPCID) 40 MG tablet Take 1 tablet by mouth daily 90 tablet 3    [START ON 10/10/2022] HYDROcodone-acetaminophen (NORCO) 7.5-325 MG per tablet Take 1 tablet by mouth 2 times daily as needed for Pain for up to 30 days. Intended supply: 3 days. Take lowest dose possible to manage pain 42 tablet 0    [START ON 11/9/2022] HYDROcodone-acetaminophen (NORCO) 7.5-325 MG per tablet Take 1 tablet by mouth 2 times daily for 30 days. 42 tablet 0    amLODIPine (NORVASC) 10 MG tablet Take 1 tablet by mouth daily 30 tablet 3    carvedilol (COREG) 25 MG tablet Take 1 tablet by mouth 2 times daily Twice a day 180 tablet 3    hydroCHLOROthiazide (HYDRODIURIL) 25 MG tablet Take 1 tablet by mouth daily 90 tablet 3    aspirin 81 MG EC tablet Take 81 mg by mouth daily      Cholecalciferol 50 MCG (2000 UT) TABS Take by mouth daily      Evolocumab (REPATHA SURECLICK) 871 MG/ML SOAJ Inject 140 mg into the skin every 14 days      traMADol (ULTRAM) 50 MG tablet Take 50 mg by mouth every 8 hours as needed. No current facility-administered medications for this visit.      Health Maintenance   Topic Date Due    Pneumococcal 0-64 years Vaccine (1 - PCV) Never done    Diabetic foot exam  Never done    HIV screen  Never done    Diabetic microalbuminuria test  Never done    DTaP/Tdap/Td vaccine (1 - Tdap) Never done    Shingles vaccine (1 of 2) Never done    Low dose CT lung screening  Never done    Flu vaccine (1) 09/21/2023 (Originally 9/1/2022)    COVID-19 Vaccine (1) 09/21/2023 (Originally 1960)    Lipids  03/28/2023    A1C test (Diabetic or Prediabetic)  06/28/2023    Diabetic retinal exam  08/23/2023    Depression Screen  08/23/2023    Colorectal Cancer Screen  06/22/2027    Hepatitis C screen  Completed    Hepatitis A vaccine  Aged Out    Hib vaccine  Aged Out    Meningococcal (ACWY) vaccine  Aged Out     Family History   Problem Relation Age of Onset    Diabetes Sister     Hypertension Brother     Hypertension Sister     Heart Disease Sister     Stroke Mother         multiple    No Known Problems Father              Review of Systems  Review of Systems   Constitutional:  Positive for fatigue. Respiratory:  Positive for shortness of breath (at baseline). Negative for cough and wheezing. Cardiovascular:  Negative for chest pain and palpitations. Gastrointestinal:  Negative for constipation, diarrhea, nausea and vomiting. Musculoskeletal:  Positive for arthralgias and back pain. Neurological:  Negative for weakness and numbness. BP (!) 144/80 (Site: Left Upper Arm, Position: Sitting, Cuff Size: Large Adult)   Pulse 73   Temp 97.9 °F (36.6 °C) (Temporal)   Resp 18   Ht 5' 6\" (1.676 m)   Wt 228 lb 6.4 oz (103.6 kg)   BMI 36.86 kg/m²       Physical Exam    Physical Exam  Constitutional:       Appearance: Normal appearance. He is obese. HENT:      Head: Normocephalic and atraumatic. Neck:      Vascular: Carotid bruit present. Cardiovascular:      Rate and Rhythm: Normal rate and regular rhythm. Pulmonary:      Effort: Pulmonary effort is normal.      Breath sounds: Normal breath sounds. Abdominal:      General: Abdomen is flat. There is no distension. Palpations: Abdomen is soft. Tenderness:  There is no abdominal tenderness. Musculoskeletal:      Right lower leg: No edema. Left lower leg: No edema. Neurological:      General: No focal deficit present. Mental Status: He is alert and oriented to person, place, and time. Gait: Gait abnormal (antalgic). Psychiatric:         Mood and Affect: Mood normal.         Behavior: Behavior normal.     Diabetic foot exam:   Left Foot:   Visual Exam: normal   Pulse DP: 2+ (normal)   Filament test: normal sensation     Right Foot:   Visual Exam: normal   Pulse DP: 2+ (normal)   Filament test: normal sensation        Assessment & Plan    Current Outpatient Medications   Medication Sig Dispense Refill    cetirizine (ZYRTEC) 10 MG tablet Take 1 tablet by mouth daily 90 tablet 3    famotidine (PEPCID) 40 MG tablet Take 1 tablet by mouth daily 90 tablet 3    [START ON 10/10/2022] HYDROcodone-acetaminophen (NORCO) 7.5-325 MG per tablet Take 1 tablet by mouth 2 times daily as needed for Pain for up to 30 days. Intended supply: 3 days. Take lowest dose possible to manage pain 42 tablet 0    [START ON 11/9/2022] HYDROcodone-acetaminophen (NORCO) 7.5-325 MG per tablet Take 1 tablet by mouth 2 times daily for 30 days. 42 tablet 0    amLODIPine (NORVASC) 10 MG tablet Take 1 tablet by mouth daily 30 tablet 3    carvedilol (COREG) 25 MG tablet Take 1 tablet by mouth 2 times daily Twice a day 180 tablet 3    hydroCHLOROthiazide (HYDRODIURIL) 25 MG tablet Take 1 tablet by mouth daily 90 tablet 3    aspirin 81 MG EC tablet Take 81 mg by mouth daily      Cholecalciferol 50 MCG (2000 UT) TABS Take by mouth daily      Evolocumab (REPATHA SURECLICK) 464 MG/ML SOAJ Inject 140 mg into the skin every 14 days      traMADol (ULTRAM) 50 MG tablet Take 50 mg by mouth every 8 hours as needed. No current facility-administered medications for this visit.        Orders Placed This Encounter   Procedures    Miscellaneous Sendout     Standing Status:   Future     Standing Expiration Date: 9/21/2023     Order Specific Question:   Specify Req. Test (1 Test/Order)     Answer:   Test: 108227  CPT 09520    Lipid Panel     Standing Status:   Future     Standing Expiration Date:   9/21/2023    Comprehensive Metabolic Panel     Standing Status:   Future     Standing Expiration Date:   9/21/2023    Hemoglobin A1C     Standing Status:   Future     Standing Expiration Date:   9/21/2023    External Referral To Pain Medicine     Referral Priority:   Routine     Referral Type:   Eval and Treat     Referral Reason:   Specialty Services Required     Requested Specialty:   Pain Medicine     Number of Visits Requested:   1       Orders Placed This Encounter   Medications    cetirizine (ZYRTEC) 10 MG tablet     Sig: Take 1 tablet by mouth daily     Dispense:  90 tablet     Refill:  3    famotidine (PEPCID) 40 MG tablet     Sig: Take 1 tablet by mouth daily     Dispense:  90 tablet     Refill:  3    HYDROcodone-acetaminophen (NORCO) 7.5-325 MG per tablet     Sig: Take 1 tablet by mouth 2 times daily as needed for Pain for up to 30 days. Intended supply: 3 days. Take lowest dose possible to manage pain     Dispense:  42 tablet     Refill:  0    HYDROcodone-acetaminophen (NORCO) 7.5-325 MG per tablet     Sig: Take 1 tablet by mouth 2 times daily for 30 days. Dispense:  42 tablet     Refill:  0       Medications Discontinued During This Encounter   Medication Reason    HYDROcodone-acetaminophen (NORCO) 7.5-325 MG per tablet LIST CLEANUP    HYDROcodone-acetaminophen (NORCO) 7.5-325 MG per tablet LIST CLEANUP    HYDROcodone-acetaminophen (NORCO) 7.5-325 MG per tablet LIST CLEANUP    cetirizine (ZYRTEC) 10 MG tablet REORDER    famotidine (PEPCID) 40 MG tablet REORDER    HYDROcodone-acetaminophen (NORCO) 7.5-325 MG per tablet REORDER        Diagnosis Orders   1. Essential hypertension        2.  Localized osteoarthritis of right knee  HYDROcodone-acetaminophen (NORCO) 7.5-325 MG per tablet    HYDROcodone-acetaminophen (NORCO) 7.5-325 MG per tablet      3. Long term prescription opiate use  Miscellaneous Sendout      4. Chronic left-sided low back pain without sciatica  External Referral To Pain Medicine      5. PAD (peripheral artery disease) ([facility] [address]        6. Type 2 diabetes mellitus with diabetic nephropathy, without long-term current use of insulin ([facility])  Lipid Panel    Comprehensive Metabolic Panel    Hemoglobin A1C         I have reviewed the patients controlled substance prescription history, as maintained in the [address] [place] [address] prescription monitoring program, so that the prescription(s) for a  controlled substance can be given. Will refer as above for injections. Will come in for labs. Signed contract for controlled meds today. Knows to keep a low threshold for contacting the office with worsening symptoms. Recommended 30 minutes of aerobic exercise at least 4-5 days weekly. Want patient to shed at least 5% of current weight prior to next office visit. Follow up as documented or earlier as needed. Return in about 3 months (around 12/21/2022).           Vee Camacho MD

## 2022-09-23 ENCOUNTER — NURSE ONLY (OUTPATIENT)
Dept: INTERNAL MEDICINE CLINIC | Facility: CLINIC | Age: 62
End: 2022-09-23

## 2022-09-23 DIAGNOSIS — E11.21 TYPE 2 DIABETES MELLITUS WITH DIABETIC NEPHROPATHY, WITHOUT LONG-TERM CURRENT USE OF INSULIN (HCC): ICD-10-CM

## 2022-09-23 LAB
ALBUMIN SERPL-MCNC: 3.9 G/DL (ref 3.2–4.6)
ALBUMIN/GLOB SERPL: 1.3 {RATIO} (ref 1.2–3.5)
ALP SERPL-CCNC: 98 U/L (ref 50–136)
ALT SERPL-CCNC: 37 U/L (ref 12–65)
ANION GAP SERPL CALC-SCNC: 8 MMOL/L (ref 4–13)
AST SERPL-CCNC: 21 U/L (ref 15–37)
BILIRUB SERPL-MCNC: 0.6 MG/DL (ref 0.2–1.1)
BUN SERPL-MCNC: 21 MG/DL (ref 8–23)
CALCIUM SERPL-MCNC: 10.1 MG/DL (ref 8.3–10.4)
CHLORIDE SERPL-SCNC: 103 MMOL/L (ref 101–110)
CHOLEST SERPL-MCNC: 107 MG/DL
CO2 SERPL-SCNC: 30 MMOL/L (ref 21–32)
CREAT SERPL-MCNC: 1.6 MG/DL (ref 0.8–1.5)
EST. AVERAGE GLUCOSE BLD GHB EST-MCNC: 126 MG/DL
GLOBULIN SER CALC-MCNC: 3 G/DL (ref 2.3–3.5)
GLUCOSE SERPL-MCNC: 172 MG/DL (ref 65–100)
HBA1C MFR BLD: 6 % (ref 4.8–5.6)
HDLC SERPL-MCNC: 35 MG/DL (ref 40–60)
HDLC SERPL: 3.1 {RATIO}
LDLC SERPL CALC-MCNC: 18.6 MG/DL
POTASSIUM SERPL-SCNC: 3.4 MMOL/L (ref 3.5–5.1)
PROT SERPL-MCNC: 6.9 G/DL (ref 6.3–8.2)
SODIUM SERPL-SCNC: 141 MMOL/L (ref 138–145)
TRIGL SERPL-MCNC: 267 MG/DL (ref 35–150)
VLDLC SERPL CALC-MCNC: 53.4 MG/DL (ref 6–23)

## 2022-09-26 DIAGNOSIS — E87.6 HYPOKALEMIA: Primary | ICD-10-CM

## 2022-09-26 RX ORDER — POTASSIUM CHLORIDE 20 MEQ/1
20 TABLET, EXTENDED RELEASE ORAL DAILY
Qty: 30 TABLET | Refills: 5 | Status: SHIPPED | OUTPATIENT
Start: 2022-09-26

## 2022-09-27 ENCOUNTER — TELEPHONE (OUTPATIENT)
Dept: INTERNAL MEDICINE CLINIC | Facility: CLINIC | Age: 62
End: 2022-09-27

## 2022-09-27 DIAGNOSIS — E87.6 HYPOKALEMIA: Primary | ICD-10-CM

## 2022-09-27 NOTE — TELEPHONE ENCOUNTER
----- Message from Lee Torres MD sent at 9/26/2022  2:18 PM EDT -----  Patient's potassium is low. Other labs are stable. Sent in potassium replacement. Needs to repeat his BMP and a magnesium in 2 weeks after starting this. I ordered the above just needs to be scheduled. Thanks.

## 2022-10-05 LAB
Lab: NORMAL
Lab: NORMAL
REFERENCE LAB: NORMAL

## 2022-10-17 DIAGNOSIS — E87.6 HYPOKALEMIA: ICD-10-CM

## 2022-10-18 LAB
ANION GAP SERPL CALC-SCNC: 9 MMOL/L (ref 2–11)
BUN SERPL-MCNC: 24 MG/DL (ref 8–23)
CALCIUM SERPL-MCNC: 9.1 MG/DL (ref 8.3–10.4)
CHLORIDE SERPL-SCNC: 106 MMOL/L (ref 101–110)
CO2 SERPL-SCNC: 25 MMOL/L (ref 21–32)
CREAT SERPL-MCNC: 1.5 MG/DL (ref 0.8–1.5)
GLUCOSE SERPL-MCNC: 152 MG/DL (ref 65–100)
MAGNESIUM SERPL-MCNC: 2.1 MG/DL (ref 1.8–2.4)
POTASSIUM SERPL-SCNC: 3.9 MMOL/L (ref 3.5–5.1)
SODIUM SERPL-SCNC: 140 MMOL/L (ref 133–143)

## 2022-11-07 ENCOUNTER — TELEPHONE (OUTPATIENT)
Dept: CARDIOLOGY CLINIC | Age: 62
End: 2022-11-07

## 2022-11-07 NOTE — TELEPHONE ENCOUNTER
Dana with [facility] needs prior auth for this pt. Her contact number is [phone]. Fax number is [phone].

## 2022-11-11 ENCOUNTER — TELEPHONE (OUTPATIENT)
Dept: CARDIOLOGY CLINIC | Age: 62
End: 2022-11-11

## 2022-11-11 NOTE — TELEPHONE ENCOUNTER
Patient has called reporting he is having a problem getting his Repatha approved. Patient has changed [employer] and is having a problem. Please call him at [phone].

## 2022-11-11 NOTE — TELEPHONE ENCOUNTER
Spoke with patient. Patient's insurance updated. Elena Vale [id number]    Informed patient I would re submit Nik Broderick with new insurance information. Completed PA and faxed to [facility].

## 2022-12-19 RX ORDER — EVOLOCUMAB 140 MG/ML
INJECTION, SOLUTION SUBCUTANEOUS
Qty: 2 ADJUSTABLE DOSE PRE-FILLED PEN SYRINGE | Refills: 11 | Status: SHIPPED | OUTPATIENT
Start: 2022-12-19

## 2022-12-21 ENCOUNTER — TELEPHONE (OUTPATIENT)
Dept: CARDIOLOGY CLINIC | Age: 62
End: 2022-12-21

## 2022-12-21 NOTE — TELEPHONE ENCOUNTER
Spoke with patient. Informed patient that Sylvia Chambers was approved for a short time till 02/10/2023. Requested patient to call me when he picks up his last refill and I will submit a new PA.

## 2023-01-30 RX ORDER — AMLODIPINE BESYLATE 10 MG/1
10 TABLET ORAL DAILY
Qty: 90 TABLET | Refills: 1 | Status: SHIPPED | OUTPATIENT
Start: 2023-01-30 | End: 2023-07-29

## 2023-01-30 RX ORDER — EVOLOCUMAB 140 MG/ML
INJECTION, SOLUTION SUBCUTANEOUS
Qty: 2 ADJUSTABLE DOSE PRE-FILLED PEN SYRINGE | Refills: 11 | Status: SHIPPED | OUTPATIENT
Start: 2023-01-30

## 2023-01-30 RX ORDER — AMLODIPINE BESYLATE 10 MG/1
TABLET ORAL
Qty: 90 TABLET | Refills: 1 | Status: SHIPPED | OUTPATIENT
Start: 2023-01-30

## 2023-01-30 NOTE — TELEPHONE ENCOUNTER
Requested Prescriptions     Pending Prescriptions Disp Refills    Evolocumab (REPATHA SURECLICK) 546 MG/ML SOAJ 2 Adjustable Dose Pre-filled Pen Syringe 11     Sig: Inject 140mg (1 Pen) subcutaneously every 14 days.

## 2023-01-30 NOTE — TELEPHONE ENCOUNTER
MEDICATION REFILL REQUEST      Name of Medication:  Repatha  Dose:  140 mg/ml  Frequency:  injects 140mg (1 pen) subcutaneously every 14 days  Quantity:  ?  Days' supply:  ? Pharmacy Name/Location:  [facility] his insurance company adivsed him to ask for a year of refills for insurance purposes.

## 2023-02-02 ENCOUNTER — TELEPHONE (OUTPATIENT)
Dept: CARDIOLOGY CLINIC | Age: 63
End: 2023-02-02

## 2023-02-02 NOTE — TELEPHONE ENCOUNTER
Please call perform pharmacy ,they are calling saying they need a PA , Please  call Adrianna Bledsoe at [phone]

## 2023-02-06 NOTE — TELEPHONE ENCOUNTER
Patient has [employer]. Re submitted PA and faxed to [facility] on 02/06/2023.

## 2023-02-10 ENCOUNTER — TELEPHONE (OUTPATIENT)
Dept: CARDIOLOGY CLINIC | Age: 63
End: 2023-02-10

## 2023-02-10 NOTE — TELEPHONE ENCOUNTER
Needs a diagnosis code and also needs to know if he is allergic to anything.  This is for the [address] [place] [address] prior approval Please call

## 2023-02-10 NOTE — TELEPHONE ENCOUNTER
Spoke with patient. Informed patient I spoke with the pharmacy this morning. Patient states he received a call from pharmacy and he will be receiving his Repatha Monday.

## 2023-03-02 ENCOUNTER — OFFICE VISIT (OUTPATIENT)
Dept: VASCULAR SURGERY | Age: 63
End: 2023-03-02
Payer: MEDICAID

## 2023-03-02 VITALS
BODY MASS INDEX: 36.64 KG/M2 | WEIGHT: 228 LBS | SYSTOLIC BLOOD PRESSURE: 152 MMHG | HEART RATE: 64 BPM | OXYGEN SATURATION: 97 % | DIASTOLIC BLOOD PRESSURE: 89 MMHG | HEIGHT: 66 IN

## 2023-03-02 DIAGNOSIS — I73.9 PAD (PERIPHERAL ARTERY DISEASE) (HCC): Primary | ICD-10-CM

## 2023-03-02 PROCEDURE — 99213 OFFICE O/P EST LOW 20 MIN: CPT | Performed by: NURSE PRACTITIONER

## 2023-03-02 PROCEDURE — 3078F DIAST BP <80 MM HG: CPT | Performed by: NURSE PRACTITIONER

## 2023-03-02 PROCEDURE — 3074F SYST BP LT 130 MM HG: CPT | Performed by: NURSE PRACTITIONER

## 2023-03-02 NOTE — PROGRESS NOTES
DATE OF VISIT: 3/2/2023      [facility]  Grecia Lloyd is a 60 y.o. male who follows up for his 6 month arterial duplex study. He reports bilateral knee pain. He has no open ulcerations or rest pain. Previous smoker. He remains on ASA and Repatha. H/O aortobifem years back. MEDICAL HISTORY:   Past Medical History:   Diagnosis Date    Acute diastolic (congestive) heart failure ([address] 12/4/2016    LVEF 55-55% 12/4/16    CAD (coronary artery disease)     in one vessel, unable to stent 12/2016- \"collateral vessels\" per heart cath    Chronic kidney disease     Chronic kidney disease, stage 3 (HCC)     Chronic pain     low back and bilateral lower extremeties    Ex-smoker     QUIT 11/2016    1 pk/day x 40 yrs    GERD (gastroesophageal reflux disease)     takes an occassional zantac, OTC    Hx-TIA (transient ischemic attack)     2 mild strokes, patient states it was \"yrs ago\" , no residual weakness    Hypercholesteremia     managed with medication     Hypertension     managed with meds    Old MI (myocardial infarction) 11/2016    Osteoarthritis     generalized    Stroke [facility])          SURGICAL HISTORY:   Past Surgical History:   Procedure Laterality Date    CARDIAC CATHETERIZATION  12/2016    unable to stent- 1 ves blockage    COLONOSCOPY N/A 6/22/2017    COLONOSCOPY  BMI 31 performed by Paulina Tapia MD at [address] N/A 9/9/2017    SIGMOIDOSCOPY FLEXIBLE performed by Jade Sibley MD at [facility] ENDOSCOPY    KNEE ARTHROSCOPY Left     L knee surgery x 2    VASCULAR SURGERY Left 09/08/2017    arteriogram, fem embolectomy    VASCULAR SURGERY  09/07/2017    aortofemoral bypass       Review of Systems:  Constitutional:   Negative for fevers and unexplained weight loss. Respiratory:   Negative for hemoptysis. Cardiovascular:   Negative except as noted in HPI. Musculoskeletal:  Negative for active, unexplained/severe joint pain.       ALLERGIES: No Known Allergies    CURRENT MEDICATIONS:  Current Outpatient Medications   Medication Sig Dispense Refill    amLODIPine (NORVASC) 10 MG tablet Take 1 tablet by mouth daily 90 tablet 1    Evolocumab (REPATHA SURECLICK) 328 MG/ML SOAJ Inject 140mg (1 Pen) subcutaneously every 14 days. 2 Adjustable Dose Pre-filled Pen Syringe 11    potassium chloride (KLOR-CON M) 20 MEQ extended release tablet Take 1 tablet by mouth daily 30 tablet 5    cetirizine (ZYRTEC) 10 MG tablet Take 1 tablet by mouth daily 90 tablet 3    famotidine (PEPCID) 40 MG tablet Take 1 tablet by mouth daily 90 tablet 3    carvedilol (COREG) 25 MG tablet Take 1 tablet by mouth 2 times daily Twice a day 180 tablet 3    aspirin 81 MG EC tablet Take 81 mg by mouth daily      Cholecalciferol 50 MCG (2000 UT) TABS Take by mouth daily      amLODIPine (NORVASC) 10 MG tablet Take 1 tablet by mouth once daily (Patient not taking: Reported on 3/2/2023) 90 tablet 1    HYDROcodone-acetaminophen (NORCO) 7.5-325 MG per tablet Take 1 tablet by mouth 2 times daily as needed for Pain for up to 30 days. Intended supply: 3 days. Take lowest dose possible to manage pain 42 tablet 0    HYDROcodone-acetaminophen (NORCO) 7.5-325 MG per tablet Take 1 tablet by mouth 2 times daily for 30 days. 42 tablet 0    hydroCHLOROthiazide (HYDRODIURIL) 25 MG tablet Take 1 tablet by mouth daily 90 tablet 3    traMADol (ULTRAM) 50 MG tablet Take 50 mg by mouth every 8 hours as needed. (Patient not taking: Reported on 3/2/2023)       No current facility-administered medications for this visit. IMAGING: Interpretation Summary         Right side findings: Resting JOÃO is 1.09. The lower extremity arterial duplex reveals mild, diffuse atherosclerosis without stenosis or occlusion. The great toe pressure is 164mmHg. Left side findings: Resting JOÃO is 1.09. The lower extremity arterial duplex reveals mild, diffuse atherosclerosis without stenosis or occlusion. The great toe pressure is 151mmHg.     The distal limb of the aorto-bifemoral bypass graft appears patent with an intact anastomosis and no evidence of false aneurysm. Procedure Details    A gray scale, color Doppler imaging and spectral Doppler analysis ultrasound was performed. During the study longitudinal views were obtained. Pulsed wave doppler, pulsed volume recording (PVR) and photo plethysmography was performed. Overall the study quality was good. Study was technically difficult due to: bowel gas. Lower Extremity Arterial Findings      Right Lower Arterial    Proximal Common Femoral Artery: Multiphasic (normal) Doppler waveforms. Profunda Artery: Multiphasic (normal) Doppler waveforms. Proximal Superficial Femoral Artery: Multiphasic (normal) Doppler waveforms. Middle Superficial Femoral Artery: Multiphasic (normal) Doppler waveforms. Distal Superficial Femoral Artery: Multiphasic (normal) Doppler waveforms. Proximal Popliteal Artery: Multiphasic (normal) Doppler waveforms. Distal Popliteal Artery: Multiphasic (normal) Doppler waveforms. Anterior Tibial Artery: Multiphasic (normal) Doppler waveforms. Posterior Tibial Artery: Multiphasic (normal) Doppler waveforms. Peroneal Artery: multiphasic (normal) Doppler waveforms. Left Lower Arterial    Proximal Common Femoral Artery: Multiphasic (normal) Doppler waveforms. Profunda Artery: Multiphasic (normal) Doppler waveforms. .   Proximal Superficial Femoral Artery: Multiphasic (normal) Doppler waveforms. Middle Superficial Femoral Artery: Multiphasic (normal) Doppler waveforms. Distal Superficial Femoral Artery: Multiphasic (normal) Doppler waveforms. Proximal Popliteal Artery: Multiphasic (normal) Doppler waveforms. Distal Popliteal Artery: Multiphasic (normal) Doppler waveforms. Anterior Tibial Artery: Monophasic Doppler waveforms. Posterior Tibial Artery: Multiphasic (normal) Doppler waveforms. Peroneal Artery: Multiphasic (normal) Doppler waveforms.        Right Lower Arterial Measurements     PSV Jovanny Ratio   CFA Prox 191 cm/s           PFA Prox 139 cm/s           SFA Prox 171 cm/s        0.9          SFA Mid 118 cm/s        0.7          SFA Dist 123 cm/s        1.04          Pop Prox 85.8 cm/s        0.7          Pop Dist 112 cm/s              PTA Dist 58.4 cm/s           GONZÁLEZ Dist 78.8 cm/s           Rocky Dist 65.9 cm/s             Left Lower Arterial Measurements     PSV Jovanny Ratio   CFA Prox 154 cm/s           PFA Prox 339 cm/s           SFA Prox 176 cm/s        1.14          SFA Mid 146 cm/s        0.83          SFA Dist 113 cm/s        0.77          Pop Prox 111 cm/s        0.98          Pop Dist 87 cm/s              PTA Dist 101 cm/s           GONZÁLEZ Dist 22.4 cm/s           Rokcy Dist 70.7 cm/s             Arterial Pressure Measurements     Right Left   Brachial  mmHg        161 mmHg          Post Tibial  mmHg        176 mmHg          Dorslis Pedis  mmHg        173 mmHg          JOÃO 1.09 ratio        1.09 ratio          Toe Pressure 164 mmHg        151 mmHg          TBI 1.02 ratio        0.94 ratio            Right Graft/Stent 1 Measurements     PSV EDV   Name ABFG Right Limb           Inflow Artery 62.6 cm/s              Prox Anast 69.1 cm/s              Prox 122 cm/s              Mid 115 cm/s              Dist 133 cm/s              Dist Anast 135 cm/s              Outflow Vessel 180 cm/s        0 cm/s                      Left Graft/Stent 1 Measurements     PSV EDV   Name ABFG Left Limb           Inflow Artery 62.6 cm/s              Prox Anast 69.1 cm/s              Prox 90.6 cm/s              Mid 114 cm/s              Dist 118 cm/s              Dist Anast 127 cm/s        0 cm/s          Outflow Vessel 138 cm/s        0 cm/s                      Procedure Staff    Technologist/Clinician: Hong Helton  Supporting Staff: Kyler Sales  Performing Physician/Midlevel: None     Exam Completion Date/Time: 2/23/23  8:30 AM      PHYSICAL EXAM  VITALS: BP (!) 152/89 (Site: Left Upper Arm, Position: Sitting, Cuff Size: Medium Adult)   Pulse 64   Ht 5' 6\" (1.676 m)   Wt 228 lb (103.4 kg)   SpO2 97%   BMI 36.80 kg/m²       GENERAL: Well developed, well nourished, in NAD  HEAD/NECK: normocephalic, atraumatic, neck supple  HEART: Regular rate and rhythm  ABDOMEN: soft, nontender, nondistended  EXTREMITIES: upper without CCE with palpable radial and brachial pulses. Lower extremities: palpable distal pulses  MUSCULOSKELETAL: normal gait  NEURO: sensation and strength grossly intact and symmetrical  PSYCH: alert and oriented to person, place and time      Assessment/Plan: Mr. Randall Verma is a 60y.o. year old male status post aortobifem with stable lower extremity duplex. Bypass graft is patent. Continue ASA and Repatha. Return in 1 year for a repeat arterial duplex study, sooner should any lifestyle limiting claudication, rest pain or open ulcerations. Given his smoking history, I will recheck a carotid duplex when he returns.          Claudia Guerrero, APRN - CNP    20 minutes of time was spent on this encounter including chart review, assessment and evaluation

## 2023-03-06 ENCOUNTER — OFFICE VISIT (OUTPATIENT)
Dept: INTERNAL MEDICINE CLINIC | Facility: CLINIC | Age: 63
End: 2023-03-06
Payer: MEDICAID

## 2023-03-06 VITALS
DIASTOLIC BLOOD PRESSURE: 78 MMHG | BODY MASS INDEX: 36.32 KG/M2 | RESPIRATION RATE: 18 BRPM | HEIGHT: 66 IN | HEART RATE: 75 BPM | SYSTOLIC BLOOD PRESSURE: 139 MMHG | WEIGHT: 226 LBS

## 2023-03-06 DIAGNOSIS — G89.29 CHRONIC LEFT-SIDED LOW BACK PAIN WITHOUT SCIATICA: ICD-10-CM

## 2023-03-06 DIAGNOSIS — M54.50 CHRONIC LEFT-SIDED LOW BACK PAIN WITHOUT SCIATICA: ICD-10-CM

## 2023-03-06 DIAGNOSIS — I15.0 RENOVASCULAR HYPERTENSION: ICD-10-CM

## 2023-03-06 DIAGNOSIS — N18.31 STAGE 3A CHRONIC KIDNEY DISEASE (HCC): Primary | ICD-10-CM

## 2023-03-06 DIAGNOSIS — I73.9 PAD (PERIPHERAL ARTERY DISEASE) (HCC): ICD-10-CM

## 2023-03-06 DIAGNOSIS — F34.1 DYSTHYMIA: ICD-10-CM

## 2023-03-06 DIAGNOSIS — E66.01 SEVERE OBESITY (HCC): ICD-10-CM

## 2023-03-06 DIAGNOSIS — E11.21 TYPE 2 DIABETES MELLITUS WITH DIABETIC NEPHROPATHY, WITHOUT LONG-TERM CURRENT USE OF INSULIN (HCC): ICD-10-CM

## 2023-03-06 PROBLEM — M54.16 LUMBAR RADICULOPATHY: Status: ACTIVE | Noted: 2023-03-06

## 2023-03-06 PROBLEM — M19.91 PRIMARY OSTEOARTHRITIS: Status: ACTIVE | Noted: 2023-03-06

## 2023-03-06 LAB
ALBUMIN SERPL-MCNC: 3.8 G/DL (ref 3.2–4.6)
ALBUMIN/GLOB SERPL: 1.1 (ref 0.4–1.6)
ALP SERPL-CCNC: 89 U/L (ref 50–136)
ALT SERPL-CCNC: 34 U/L (ref 12–65)
ANION GAP SERPL CALC-SCNC: 5 MMOL/L (ref 2–11)
AST SERPL-CCNC: 16 U/L (ref 15–37)
BILIRUB SERPL-MCNC: 0.6 MG/DL (ref 0.2–1.1)
BUN SERPL-MCNC: 23 MG/DL (ref 8–23)
CALCIUM SERPL-MCNC: 9.1 MG/DL (ref 8.3–10.4)
CHLORIDE SERPL-SCNC: 105 MMOL/L (ref 101–110)
CHOLEST SERPL-MCNC: 111 MG/DL
CO2 SERPL-SCNC: 27 MMOL/L (ref 21–32)
CREAT SERPL-MCNC: 1.6 MG/DL (ref 0.8–1.5)
EST. AVERAGE GLUCOSE BLD GHB EST-MCNC: 128 MG/DL
GLOBULIN SER CALC-MCNC: 3.4 G/DL (ref 2.8–4.5)
GLUCOSE SERPL-MCNC: 211 MG/DL (ref 65–100)
HBA1C MFR BLD: 6.1 % (ref 4.8–5.6)
HDLC SERPL-MCNC: 41 MG/DL (ref 40–60)
HDLC SERPL: 2.7
LDLC SERPL CALC-MCNC: 32.4 MG/DL
POTASSIUM SERPL-SCNC: 3.9 MMOL/L (ref 3.5–5.1)
PROT SERPL-MCNC: 7.2 G/DL (ref 6.3–8.2)
SODIUM SERPL-SCNC: 137 MMOL/L (ref 133–143)
TRIGL SERPL-MCNC: 188 MG/DL (ref 35–150)
TSH W FREE THYROID IF ABNORMAL: 1.35 UIU/ML (ref 0.36–3.74)
VLDLC SERPL CALC-MCNC: 37.6 MG/DL (ref 6–23)

## 2023-03-06 PROCEDURE — 3078F DIAST BP <80 MM HG: CPT | Performed by: INTERNAL MEDICINE

## 2023-03-06 PROCEDURE — 3075F SYST BP GE 130 - 139MM HG: CPT | Performed by: INTERNAL MEDICINE

## 2023-03-06 PROCEDURE — 99214 OFFICE O/P EST MOD 30 MIN: CPT | Performed by: INTERNAL MEDICINE

## 2023-03-06 RX ORDER — HYLAN G-F 20 16MG/2ML
SYRINGE (ML) INTRAARTICULAR
COMMUNITY

## 2023-03-06 RX ORDER — DULOXETIN HYDROCHLORIDE 20 MG/1
20 CAPSULE, DELAYED RELEASE ORAL DAILY
Qty: 30 CAPSULE | Refills: 3 | Status: SHIPPED | OUTPATIENT
Start: 2023-03-06

## 2023-03-06 SDOH — ECONOMIC STABILITY: INCOME INSECURITY: HOW HARD IS IT FOR YOU TO PAY FOR THE VERY BASICS LIKE FOOD, HOUSING, MEDICAL CARE, AND HEATING?: PATIENT DECLINED

## 2023-03-06 SDOH — ECONOMIC STABILITY: FOOD INSECURITY: WITHIN THE PAST 12 MONTHS, YOU WORRIED THAT YOUR FOOD WOULD RUN OUT BEFORE YOU GOT MONEY TO BUY MORE.: PATIENT DECLINED

## 2023-03-06 SDOH — ECONOMIC STABILITY: HOUSING INSECURITY
IN THE LAST 12 MONTHS, WAS THERE A TIME WHEN YOU DID NOT HAVE A STEADY PLACE TO SLEEP OR SLEPT IN A SHELTER (INCLUDING NOW)?: PATIENT REFUSED

## 2023-03-06 SDOH — ECONOMIC STABILITY: FOOD INSECURITY: WITHIN THE PAST 12 MONTHS, THE FOOD YOU BOUGHT JUST DIDN'T LAST AND YOU DIDN'T HAVE MONEY TO GET MORE.: PATIENT DECLINED

## 2023-03-06 ASSESSMENT — PATIENT HEALTH QUESTIONNAIRE - PHQ9
SUM OF ALL RESPONSES TO PHQ QUESTIONS 1-9: 0
SUM OF ALL RESPONSES TO PHQ9 QUESTIONS 1 & 2: 0
SUM OF ALL RESPONSES TO PHQ QUESTIONS 1-9: 0
SUM OF ALL RESPONSES TO PHQ QUESTIONS 1-9: 0
1. LITTLE INTEREST OR PLEASURE IN DOING THINGS: 0
SUM OF ALL RESPONSES TO PHQ QUESTIONS 1-9: 0
2. FEELING DOWN, DEPRESSED OR HOPELESS: 0

## 2023-03-06 ASSESSMENT — ENCOUNTER SYMPTOMS
WHEEZING: 0
COUGH: 0
VOMITING: 0
BLOOD IN STOOL: 0
CONSTIPATION: 0
DIARRHEA: 0
SHORTNESS OF BREATH: 0
NAUSEA: 0

## 2023-03-06 NOTE — PROGRESS NOTES
3/6/2023 2:30 PM  Location:[facility] [address] [facility]  [place]  Patient #:  [id number]  YOB: 1960            History of Present Illness     Chief Complaint   Patient presents with    Follow-up    Insomnia       Mr. Bryan South is a 60 y.o. male  who presents for the above. Patient notes that he is caring for an elderly neighbor since she had surgery in the summer. She is living in his house. He also notes that he lost his sister this past year. Is not able to do as much as he would like secondary to his arthritis.          No Known Allergies  Past Medical History:   Diagnosis Date    Acute diastolic (congestive) heart failure (HCC) 2016    LVEF 55-55% 16    CAD (coronary artery disease)     in one vessel, unable to stent 2016- \"collateral vessels\" per heart cath    Chronic kidney disease     Chronic kidney disease, stage 3 (HCC)     Chronic pain     low back and bilateral lower extremeties    Ex-smoker     QUIT 2016    1 pk/day x 40 yrs    GERD (gastroesophageal reflux disease)     takes an occassional zantac, OTC    Hx-TIA (transient ischemic attack)     2 mild strokes, patient states it was \"yrs ago\" , no residual weakness    Hypercholesteremia     managed with medication     Hypertension     managed with meds    Old MI (myocardial infarction) 2016    Osteoarthritis     generalized    Stroke [facility])      Social History     Socioeconomic History    Marital status: Legally      Spouse name: None    Number of children: None    Years of education: None    Highest education level: None   Tobacco Use    Smoking status: Former     Packs/day: 1.00     Years: 42.00     Pack years: 42.00     Types: Cigarettes     Quit date: 2016     Years since quittin.2    Smokeless tobacco: Never   Substance and Sexual Activity    Alcohol use: No    Drug use: No     Social Determinants of Health     Financial Resource Strain: Unknown    Difficulty of Paying Living Expenses: Patient refused   Food Insecurity: Unknown    Worried About Running Out of Food in the Last Year: Patient refused    [address] in the Last Year: Patient refused   Transportation Needs: Unknown    Lack of Transportation (Non-Medical): Patient refused   Housing Stability: Unknown    Unstable Housing in the Last Year: Patient refused     Past Surgical History:   Procedure Laterality Date    CARDIAC CATHETERIZATION  12/2016    unable to stent- 1 ves blockage    COLONOSCOPY N/A 6/22/2017    COLONOSCOPY  BMI 31 performed by Lyndsey Barron MD at [address] N/A 9/9/2017    SIGMOIDOSCOPY FLEXIBLE performed [facility] J[facility] [address] [facility] ARTHROSCOPY Left     L knee surgery x 2    VASCULAR SURGERY Left 09/08/2017    arteriogram, fem embolectomy    VASCULAR SURGERY  09/07/2017    aortofemoral bypass     Current Outpatient Medications   Medication Sig Dispense Refill    DULoxetine (CYMBALTA) 20 MG extended release capsule Take 1 capsule by mouth daily 30 capsule 3    amLODIPine (NORVASC) 10 MG tablet Take 1 tablet by mouth once daily 90 tablet 1    Evolocumab (REPATHA SURECLICK) 890 MG/ML SOAJ Inject 140mg (1 Pen) subcutaneously every 14 days. 2 Adjustable Dose Pre-filled Pen Syringe 11    cetirizine (ZYRTEC) 10 MG tablet Take 1 tablet by mouth daily 90 tablet 3    famotidine (PEPCID) 40 MG tablet Take 1 tablet by mouth daily 90 tablet 3    HYDROcodone-acetaminophen (NORCO) 7.5-325 MG per tablet Take 1 tablet by mouth 2 times daily as needed for Pain for up to 30 days. Intended supply: 3 days.  Take lowest dose possible to manage pain 42 tablet 0    carvedilol (COREG) 25 MG tablet Take 1 tablet by mouth 2 times daily Twice a day 180 tablet 3    hydroCHLOROthiazide (HYDRODIURIL) 25 MG tablet Take 1 tablet by mouth daily 90 tablet 3    aspirin 81 MG EC tablet Take 81 mg by mouth daily      Cholecalciferol 50 MCG (2000 UT) TABS Take by mouth daily Hylan (SYNVISC ONE) 48 MG/6ML injection as directed      HYDROcodone-acetaminophen (NORCO) 7.5-325 MG per tablet Take 1 tablet by mouth 2 times daily for 30 days. 42 tablet 0     No current facility-administered medications for this visit. Health Maintenance   Topic Date Due    Pneumococcal 0-64 years Vaccine (1 - PCV) Never done    HIV screen  Never done    DTaP/Tdap/Td vaccine (1 - Tdap) Never done    Shingles vaccine (1 of 2) Never done    Low dose CT lung screening  Never done    Diabetic Alb to Cr ratio (uACR) test  05/11/2021    Flu vaccine (1) 09/21/2023 (Originally 8/1/2022)    COVID-19 Vaccine (1) 09/21/2023 (Originally 1960)    Diabetic retinal exam  08/23/2023    Diabetic foot exam  09/21/2023    A1C test (Diabetic or Prediabetic)  09/23/2023    Lipids  09/23/2023    GFR test (Diabetes, CKD 3-4, OR last GFR 15-59)  10/17/2023    Depression Screen  03/06/2024    Colorectal Cancer Screen  06/22/2027    Hepatitis C screen  Completed    Hepatitis A vaccine  Aged Out    Hib vaccine  Aged Out    Meningococcal (ACWY) vaccine  Aged Out     Family History   Problem Relation Age of Onset    Stroke Mother         multiple    No Known Problems Father     Diabetes Sister     Hypertension Sister     Heart Disease Sister     Cancer Sister         was in her blood    Hypertension Brother              Review of Systems  Review of Systems   Constitutional:  Positive for fatigue. Negative for chills and fever. Respiratory:  Negative for cough, shortness of breath and wheezing. Cardiovascular:  Negative for chest pain, palpitations and leg swelling. Gastrointestinal:  Negative for blood in stool, constipation, diarrhea, nausea and vomiting. Genitourinary:  Negative for difficulty urinating, dysuria and hematuria. Musculoskeletal:  Positive for arthralgias. Neurological:  Negative for weakness and numbness. Psychiatric/Behavioral:  Positive for sleep disturbance.       /78 (Site: Right Upper Arm, Position: Sitting, Cuff Size: Large Adult)   Pulse 75   Resp 18   Ht 5' 6\" (1.676 m)   Wt 226 lb (102.5 kg)   BMI 36.48 kg/m²       Physical Exam    Physical Exam  Constitutional:       Appearance: Normal appearance. He is obese. HENT:      Head: Normocephalic and atraumatic. Neck:      Vascular: Carotid bruit present. Cardiovascular:      Rate and Rhythm: Normal rate and regular rhythm. Pulmonary:      Effort: Pulmonary effort is normal.      Breath sounds: Normal breath sounds. Abdominal:      General: Abdomen is flat. There is no distension. Palpations: Abdomen is soft. Tenderness: There is no abdominal tenderness. Musculoskeletal:      Right lower leg: No edema. Left lower leg: No edema. Neurological:      General: No focal deficit present. Mental Status: He is alert and oriented to person, place, and time. Gait: Gait abnormal (antalgic). Psychiatric:         Mood and Affect: Mood is depressed (mildly so). Behavior: Behavior normal.         Assessment & Plan    Current Outpatient Medications   Medication Sig Dispense Refill    DULoxetine (CYMBALTA) 20 MG extended release capsule Take 1 capsule by mouth daily 30 capsule 3    amLODIPine (NORVASC) 10 MG tablet Take 1 tablet by mouth once daily 90 tablet 1    Evolocumab (REPATHA SURECLICK) 714 MG/ML SOAJ Inject 140mg (1 Pen) subcutaneously every 14 days. 2 Adjustable Dose Pre-filled Pen Syringe 11    cetirizine (ZYRTEC) 10 MG tablet Take 1 tablet by mouth daily 90 tablet 3    famotidine (PEPCID) 40 MG tablet Take 1 tablet by mouth daily 90 tablet 3    HYDROcodone-acetaminophen (NORCO) 7.5-325 MG per tablet Take 1 tablet by mouth 2 times daily as needed for Pain for up to 30 days. Intended supply: 3 days.  Take lowest dose possible to manage pain 42 tablet 0    carvedilol (COREG) 25 MG tablet Take 1 tablet by mouth 2 times daily Twice a day 180 tablet 3    hydroCHLOROthiazide (HYDRODIURIL) 25 MG tablet Take 1 tablet by mouth daily 90 tablet 3    aspirin 81 MG EC tablet Take 81 mg by mouth daily      Cholecalciferol 50 MCG (2000 UT) TABS Take by mouth daily      Hylan (SYNVISC ONE) 48 MG/6ML injection as directed      HYDROcodone-acetaminophen (NORCO) 7.5-325 MG per tablet Take 1 tablet by mouth 2 times daily for 30 days. 42 tablet 0     No current facility-administered medications for this visit. Orders Placed This Encounter   Procedures    Microalbumin / Creatinine Urine Ratio     Standing Status:   Future     Standing Expiration Date:   3/6/2024    Hemoglobin A1C     Standing Status:   Future     Standing Expiration Date:   3/6/2024    Comprehensive Metabolic Panel     Standing Status:   Future     Standing Expiration Date:   3/6/2024    TSH with Reflex     Standing Status:   Future     Standing Expiration Date:   3/6/2024    Lipid Panel     Standing Status:   Future     Standing Expiration Date:   3/6/2024    CBC with Auto Differential     Standing Status:   Future     Standing Expiration Date:   3/6/2024         Orders Placed This Encounter   Medications    DULoxetine (CYMBALTA) 20 MG extended release capsule     Sig: Take 1 capsule by mouth daily     Dispense:  30 capsule     Refill:  3         Medications Discontinued During This Encounter   Medication Reason    amLODIPine (NORVASC) 10 MG tablet DUPLICATE    traMADol (ULTRAM) 50 MG tablet LIST CLEANUP    potassium chloride (KLOR-CON M) 20 MEQ extended release tablet LIST CLEANUP        Diagnosis Orders   1. Stage 3a chronic kidney disease ([facility]        2. Type 2 diabetes mellitus with diabetic nephropathy, without long-term current use of insulin (HCC)  Microalbumin / Creatinine Urine Ratio    Hemoglobin A1C    Comprehensive Metabolic Panel    TSH with Reflex      3. PAD (peripheral artery disease) ([facility])  Lipid Panel    CBC with Auto Differential      4. Severe obesity ([facility]        5. Renovascular hypertension        6.  Chronic left-sided low back pain without sciatica        7. Dysthymia           Vitals look good outside of weight. Recommended 30 minutes of aerobic exercise at least 4-5 days weekly for physical as well as emotional reasons. Will discuss labs over the phone. Advised him to try the above to help with mood as well as chronic pain as he grieves losing his sister as well as deals with caring for his elderly neighbor. Will discuss labs over the phone. Knows to keep a low threshold for contacting the office with worsening symptoms. Follow up as documented or earlier as needed. Return in about 1 month (around 4/6/2023) for NP to follow up on starting duloxetine--for pain control.           Christina Craig MD

## 2023-03-07 LAB
BASOPHILS # BLD: 0 K/UL (ref 0–0.2)
BASOPHILS NFR BLD: 0 % (ref 0–2)
CREAT UR-MCNC: 251 MG/DL
DIFFERENTIAL METHOD BLD: NORMAL
EOSINOPHIL # BLD: 0.2 K/UL (ref 0–0.8)
EOSINOPHIL NFR BLD: 2 % (ref 0.5–7.8)
ERYTHROCYTE [DISTWIDTH] IN BLOOD BY AUTOMATED COUNT: 13.1 % (ref 11.9–14.6)
HCT VFR BLD AUTO: 49.5 % (ref 41.1–50.3)
HGB BLD-MCNC: 17.1 G/DL (ref 13.6–17.2)
IMM GRANULOCYTES # BLD AUTO: 0 K/UL (ref 0–0.5)
IMM GRANULOCYTES NFR BLD AUTO: 0 % (ref 0–5)
LYMPHOCYTES # BLD: 2 K/UL (ref 0.5–4.6)
LYMPHOCYTES NFR BLD: 23 % (ref 13–44)
MCH RBC QN AUTO: 31.2 PG (ref 26.1–32.9)
MCHC RBC AUTO-ENTMCNC: 34.5 G/DL (ref 31.4–35)
MCV RBC AUTO: 90.3 FL (ref 82–102)
MICROALBUMIN UR-MCNC: 3.69 MG/DL (ref 0–3)
MICROALBUMIN/CREAT UR-RTO: 15 MG/G (ref 0–30)
MONOCYTES # BLD: 0.4 K/UL (ref 0.1–1.3)
MONOCYTES NFR BLD: 5 % (ref 4–12)
NEUTS SEG # BLD: 6.3 K/UL (ref 1.7–8.2)
NEUTS SEG NFR BLD: 70 % (ref 43–78)
NRBC # BLD: 0 K/UL (ref 0–0.2)
PLATELET # BLD AUTO: 280 K/UL (ref 150–450)
PMV BLD AUTO: 10.9 FL (ref 9.4–12.3)
RBC # BLD AUTO: 5.48 M/UL (ref 4.23–5.6)
WBC # BLD AUTO: 9 K/UL (ref 4.3–11.1)

## 2023-03-07 NOTE — RESULT ENCOUNTER NOTE
Labs are stable. Continue your current doses of medications. Please continue to work on diet, exercise and weight loss. Maintain a low fat high fiber diet and make sure you are getting 30 minutes of aerobic exercise at least 4-5 days weekly. Hope you are feeling well.

## 2023-03-15 ENCOUNTER — OFFICE VISIT (OUTPATIENT)
Dept: CARDIOLOGY CLINIC | Age: 63
End: 2023-03-15
Payer: MEDICAID

## 2023-03-15 VITALS
SYSTOLIC BLOOD PRESSURE: 140 MMHG | WEIGHT: 226 LBS | DIASTOLIC BLOOD PRESSURE: 82 MMHG | HEIGHT: 66 IN | HEART RATE: 68 BPM | BODY MASS INDEX: 36.32 KG/M2

## 2023-03-15 DIAGNOSIS — I25.119 ATHEROSCLEROSIS OF NATIVE CORONARY ARTERY OF NATIVE HEART WITH ANGINA PECTORIS (HCC): Primary | ICD-10-CM

## 2023-03-15 DIAGNOSIS — I10 ESSENTIAL (PRIMARY) HYPERTENSION: ICD-10-CM

## 2023-03-15 DIAGNOSIS — E78.5 HYPERLIPIDEMIA, UNSPECIFIED HYPERLIPIDEMIA TYPE: ICD-10-CM

## 2023-03-15 PROCEDURE — 99214 OFFICE O/P EST MOD 30 MIN: CPT | Performed by: INTERNAL MEDICINE

## 2023-03-15 PROCEDURE — 3079F DIAST BP 80-89 MM HG: CPT | Performed by: INTERNAL MEDICINE

## 2023-03-15 PROCEDURE — 3077F SYST BP >= 140 MM HG: CPT | Performed by: INTERNAL MEDICINE

## 2023-03-15 RX ORDER — ICOSAPENT ETHYL 1000 MG/1
2 CAPSULE ORAL 2 TIMES DAILY
Qty: 180 CAPSULE | Refills: 3 | Status: SHIPPED | OUTPATIENT
Start: 2023-03-15 | End: 2023-06-13

## 2023-03-15 ASSESSMENT — ENCOUNTER SYMPTOMS
APHONIA: 0
COUGH: 0
STRIDOR: 0
ABDOMINAL PAIN: 0
NAIL CHANGES: 0
EYE PAIN: 0

## 2023-03-15 NOTE — PROGRESS NOTES
[address], [address] [place] [address], [address] [place] [address]  PHONE: [phone]    SUBJECTIVE:   Pam Rowell is a 60 y.o. male 1960   seen for a follow up visit regarding the following:     Chief Complaint   Patient presents with    Hypertension         History of present illness: 60 y.o. male presented for follow-up 3/15/23 no complaints today. Labs reviewed creatinine is improved to 1.6. Blood pressure should be measured in left arm due to peripheral vascular disease and lower pressures in his right arm. Patient has had recent lab work LDL is under excellent control with Repatha triglycerides are elevated. Patient is doing better with salt. Lower extremity Dopplers have been performed recently by vascular surgery results reviewed. Patient with palpable pulses in his lower extremities no lower extremity claudication symptoms that are changed from previous      Interval Hx   Male presented for followup 06/05/2018 doing well. Previous aortofem bypass surgery with redo groin exploration. Wound is improving. He does complain of shortness of breath that has improved since our last appointment. He has been tried on Ranexa,  which had no improvement of his symptoms. In light of his , he has very stable symptoms at this time with occasional chest discomfort. He is on Amlodipine. He has stopped smoking. He was placed on Repatha therapy by Dr. Toya Juárez. Additionally,  recent modifications were made to his blood pressure due to hypotension and he is doing very well today. Cardiac History:     Admission for hypertensive emergency flash pulmonary edema 12/2016. EKG 12/06/2016, normal sinus rhythm, left atrial enlargement, nonspecific T-wave abnormality. NSTEMI 12/2016 due to hypertensive urgency in the setting of underlying coronary artery disease.      Cardiac catheterization 12/2016, coronary anatomy with LAD, nonobstructive disease, circumflex 30 - 40% nonobstructive disease, right coronary artery 40% mid vessel stenosis with chronic total  occlusion of PDA with collateral filling, preserved left ventricular systolic function. Echocardiogram 12/2016, preserved left ventricular systolic function, mild to moderate mitral regurgitation with borderline mitral valve prolapse. Lower extremity dopplers 1/2017 Elevated velocity in the mid aorta measuring 267 cm/s. 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 Monophasic waveforms throughout the right and left lower extremity. Occluded right posterior tibial artery. Previous statin intolerance on Lipitor. Crestor low dose added 05/02/2017. Trial of Ranexa 01/10/2018.      02/01/2018 the patient was prescribed Zetia. The patient was initiated on [address] [place] [address] in 2018 by Dr. Michael Gonzáles. 2/8/2022 sinus rhythm, normal rate, normal MS intervals, ST wave normal, normal axis,         Assessment and Plan:      Hypertension, controlled. Continue current therapies. Key CAD CHF Meds            Icosapent Ethyl (VASCEPA) 1 g CAPS capsule (Taking)    Sig - Route: Take 2 capsules by mouth 2 times daily - Oral    amLODIPine (NORVASC) 10 MG tablet (Taking)    Sig: Take 1 tablet by mouth once daily    Evolocumab (REPATHA SURECLICK) 006 MG/ML SOAJ (Taking)    Sig: Inject 140mg (1 Pen) subcutaneously every 14 days. carvedilol (COREG) 25 MG tablet    Sig - Route: Take 1 tablet by mouth 2 times daily Twice a day - Oral    hydroCHLOROthiazide (HYDRODIURIL) 25 MG tablet    Sig - Route: Take 1 tablet by mouth daily - Oral             Coronary artery disease, stable symptoms. Controlled  History of  with stable symptoms    Chronic kidney disease  Estimated Creatinine Clearance: 53 mL/min (A) (based on SCr of 1.6 mg/dL (H)). Peripheral vascular disease. Hx of  aortobifem recent dopplers with adequate flow 2023   Followed by Dr. Wendy Arauz.     Hyperlipidemia  LDL controlled however triglycerides elevated  Start Vascepa    Statin intolerance. Previous intolerance to Crestor. Now on reaptha       Current Outpatient Medications   Medication Sig    Icosapent Ethyl (VASCEPA) 1 g CAPS capsule Take 2 capsules by mouth 2 times daily    amLODIPine (NORVASC) 10 MG tablet Take 1 tablet by mouth once daily    Evolocumab (REPATHA SURECLICK) 200 MG/ML SOAJ Inject 140mg (1 Pen) subcutaneously every 14 days. cetirizine (ZYRTEC) 10 MG tablet Take 1 tablet by mouth daily    famotidine (PEPCID) 40 MG tablet Take 1 tablet by mouth daily    aspirin 81 MG EC tablet Take 81 mg by mouth daily    Cholecalciferol 50 MCG (2000 UT) TABS Take by mouth daily    Hylan (SYNVISC ONE) 48 MG/6ML injection as directed (Patient not taking: Reported on 3/15/2023)    DULoxetine (CYMBALTA) 20 MG extended release capsule Take 1 capsule by mouth daily (Patient not taking: Reported on 3/15/2023)    HYDROcodone-acetaminophen (NORCO) 7.5-325 MG per tablet Take 1 tablet by mouth 2 times daily as needed for Pain for up to 30 days. Intended supply: 3 days. Take lowest dose possible to manage pain    HYDROcodone-acetaminophen (NORCO) 7.5-325 MG per tablet Take 1 tablet by mouth 2 times daily for 30 days. carvedilol (COREG) 25 MG tablet Take 1 tablet by mouth 2 times daily Twice a day    hydroCHLOROthiazide (HYDRODIURIL) 25 MG tablet Take 1 tablet by mouth daily     No current facility-administered medications for this visit. Past Medical History, Past Surgical History, Family history, Social History, and Medications were all reviewed with the patient today and updated as necessary.        No Known Allergies  Past Medical History:   Diagnosis Date    Acute diastolic (congestive) heart failure ([facility] [address] 12/4/2016    LVEF 55-55% 12/4/16    CAD (coronary artery disease)     in one vessel, unable to stent 12/2016- \"collateral vessels\" per heart cath    Chronic kidney disease     Chronic kidney disease, stage 3 (HCC)     Chronic pain     low back and bilateral lower extremeties    Ex-smoker     QUIT 2016    1 pk/day x 40 yrs    GERD (gastroesophageal reflux disease)     takes an occassional zantac, OTC    Hx-TIA (transient ischemic attack)     2 mild strokes, patient states it was \"yrs ago\" , no residual weakness    Hypercholesteremia     managed with medication     Hypertension     managed with meds    Old MI (myocardial infarction) 2016    Osteoarthritis     generalized    Stroke [facility])      Past Surgical History:   Procedure Laterality Date    CARDIAC CATHETERIZATION  2016    unable to stent- 1 ves blockage    COLONOSCOPY N/A 2017    COLONOSCOPY  BMI 31 performed by Sheila Saenz MD at [address] N/A 2017    SIGMOIDOSCOPY FLEXIBLE performed by Valentina Cortez MD at [facility] ENDOSCOPY    KNEE ARTHROSCOPY Left     L knee surgery x 2    VASCULAR SURGERY Left 2017    arteriogram, fem embolectomy    VASCULAR SURGERY  2017    aortofemoral bypass     Family History   Problem Relation Age of Onset    Stroke Mother         multiple    No Known Problems Father     Diabetes Sister     Hypertension Sister     Heart Disease Sister     Cancer Sister         was in her blood    Hypertension Brother       Social History     Tobacco Use    Smoking status: Former     Packs/day: 1.00     Years: 42.00     Pack years: 42.00     Types: Cigarettes     Quit date: 2016     Years since quittin.3    Smokeless tobacco: Never   Substance Use Topics    Alcohol use: No       ROS:    Review of Systems   Constitutional: Negative for fever. HENT:  Negative for stridor. Eyes:  Negative for pain. Cardiovascular:  Negative for chest pain. Respiratory:  Negative for cough. Endocrine: Negative for cold intolerance. Skin:  Negative for nail changes. Musculoskeletal:  Negative for arthritis. Gastrointestinal:  Negative for abdominal pain. Genitourinary:  Negative for dysuria. Neurological:  Negative for aphonia. Psychiatric/Behavioral:  Negative for altered mental status. Allergic/Immunologic: Negative for hives. PHYSICAL EXAM:    BP (!) 140/82   Pulse 68   Ht 5' 6\" (1.676 m)   Wt 226 lb (102.5 kg)   BMI 36.48 kg/m²        Wt Readings from Last 3 Encounters:   03/15/23 226 lb (102.5 kg)   03/06/23 226 lb (102.5 kg)   03/02/23 228 lb (103.4 kg)     BP Readings from Last 3 Encounters:   03/15/23 (!) 140/82   03/06/23 139/78   03/02/23 (!) 152/89         Physical Exam    Medical problems and test results were reviewed with the patient today.            Recent Results (from the past 672 hour(s))   Vascular duplex lower extremity arteries bilateral with JOÃO    Collection Time: 02/23/23  8:25 AM   Result Value Ref Range    Left JOÃO 1.09 ratio    Left dorsalis pedis  mmHg    Left posterior tibial 176 mmHg    Left toe pressure 151 mmHg    Left TBI 0.94 ratio    Right JOÃO 1.09 ratio    Right dorsalis pedis  mmHg    Right posterior tibial 173 mmHg    Right toe pressure 164 mmHg    Right TBI 1.02 ratio    Left arm  mmHg    Right arm  mmHg    Left CFA prox .0 cm/s    Left GONZÁLEZ dist PSV 22.4 cm/s    Left Pop A dist PSV 87.0 cm/s    Left Pop A prox .0 cm/s    Left PTA dist .0 cm/s    Left peroneal dist PSV 70.7 cm/s    Left SFA dist .0 cm/s    Left SFA mid .0 cm/s    Left SFA prox .0 cm/s    Left PFA prox .0 cm/s    Right CFA prox .0 cm/s    Right GONZÁLEZ dist PSV 78.8 cm/s    Right Pop A dist .0 cm/s    Right Pop A prox PSV 85.8 cm/s    Right PTA dist PSV 58.4 cm/s    Right peronal dist PSV 65.9 cm/s    Right SFA dist .0 cm/s    Right SFA mid .0 cm/s    Right SFA prox .0 cm/s    Right CFA prox .0 cm/s    Left Prox Outflow PSV 90.6 cm/s    Left Mid Outflow .0 cm/s    Right Mid Outflow .0 cm/s    Left Outflow Vessel .0 cm/s    Left Outflow Vessel EDV 0.0 cm/s    Right Outflow Vessel .0 cm/s    Right Outflow Vessel EDV 0.0 cm/s    Left Dist Anastomosis .0 cm/s    Left Dist Anastomosis EDV 0.0 cm/s    Right Dist Anastomosis .0 cm/s    Left Dist Outflow .0 cm/s    Right Dist Outflow .0 cm/s    Left Inflow Artery PSV 62.6 cm/s    Left Prox Anastomosis PSV 69.1 cm/s    Body Surface Area 2.2 m2    Right SFA prox hilaria ratio 0.9     Right SFA mid hilaria ratio 0.7     Right SFA dist hilaria ratio 1.04     Right Pop A prox hilaria ratio 0.70     Left SFA prox hilaria ratio 1.14     Left SFA mid hilaria ratio 0.83     Left SFA dist hilaria ratio 0.77     Left Pop A prox hilaria ratio 0.98     Right Graft 1 ABFG Right Limb     Right Inflow Artery PSV 62.6 cm/s    Right Prox Anastomosis PSV 69.1 cm/s    Right Prox Outflow .0 cm/s    Left Graft 1 ABFG Left Limb    CBC with Auto Differential    Collection Time: 03/06/23  2:33 PM   Result Value Ref Range    WBC 9.0 4.3 - 11.1 K/uL    RBC 5.48 4.23 - 5.6 M/uL    Hemoglobin 17.1 13.6 - 17.2 g/dL    Hematocrit 49.5 41.1 - 50.3 %    MCV 90.3 82 - 102 FL    MCH 31.2 26.1 - 32.9 PG    MCHC 34.5 31.4 - 35.0 g/dL    RDW 13.1 11.9 - 14.6 %    Platelets 627 585 - 821 K/uL    MPV 10.9 9.4 - 12.3 FL    nRBC 0.00 0.0 - 0.2 K/uL    Differential Type AUTOMATED      Seg Neutrophils 70 43 - 78 %    Lymphocytes 23 13 - 44 %    Monocytes 5 4.0 - 12.0 %    Eosinophils % 2 0.5 - 7.8 %    Basophils 0 0.0 - 2.0 %    Immature Granulocytes 0 0.0 - 5.0 %    Segs Absolute 6.3 1.7 - 8.2 K/UL    Absolute Lymph # 2.0 0.5 - 4.6 K/UL    Absolute Mono # 0.4 0.1 - 1.3 K/UL    Absolute Eos # 0.2 0.0 - 0.8 K/UL    Basophils Absolute 0.0 0.0 - 0.2 K/UL    Absolute Immature Granulocyte 0.0 0.0 - 0.5 K/UL   Lipid Panel    Collection Time: 03/06/23  2:33 PM   Result Value Ref Range    Cholesterol, Total 111 <200 MG/DL    Triglycerides 188 (H) 35 - 150 MG/DL    HDL 41 40 - 60 MG/DL    LDL Calculated 32.4 <100 MG/DL    VLDL Cholesterol Calculated 37.6 (H) 6.0 - 23.0 MG/DL    Chol/HDL Ratio 2.7 TSH with Reflex    Collection Time: 03/06/23  2:33 PM   Result Value Ref Range    TSH w Free Thyroid if Abnormal 1.35 0.358 - 3.740 UIU/ML   Comprehensive Metabolic Panel    Collection Time: 03/06/23  2:33 PM   Result Value Ref Range    Sodium 137 133 - 143 mmol/L    Potassium 3.9 3.5 - 5.1 mmol/L    Chloride 105 101 - 110 mmol/L    CO2 27 21 - 32 mmol/L    Anion Gap 5 2 - 11 mmol/L    Glucose 211 (H) 65 - 100 mg/dL    BUN 23 8 - 23 MG/DL    Creatinine 1.60 (H) 0.8 - 1.5 MG/DL    Est, Glom Filt Rate 48 (L) >60 ml/min/1.73m2    Calcium 9.1 8.3 - 10.4 MG/DL    Total Bilirubin 0.6 0.2 - 1.1 MG/DL    ALT 34 12 - 65 U/L    AST 16 15 - 37 U/L    Alk Phosphatase 89 50 - 136 U/L    Total Protein 7.2 6.3 - 8.2 g/dL    Albumin 3.8 3.2 - 4.6 g/dL    Globulin 3.4 2.8 - 4.5 g/dL    Albumin/Globulin Ratio 1.1 0.4 - 1.6     Hemoglobin A1C    Collection Time: 03/06/23  2:33 PM   Result Value Ref Range    Hemoglobin A1C 6.1 (H) 4.8 - 5.6 %    eAG 128 mg/dL   Microalbumin / Creatinine Urine Ratio    Collection Time: 03/06/23  2:33 PM   Result Value Ref Range    Microalbumin, Random Urine 3.69 (H) 0.0 - 3.0 MG/DL    Creatinine, Ur 251.00 mg/dL    Microalbumin Creatinine Ratio 15 0 - 30 mg/g     Lab Results   Component Value Date/Time    CHOL 111 03/06/2023 02:33 PM    HDL 41 03/06/2023 02:33 PM    VLDL 35 03/28/2022 12:24 PM       No results found for any visits on 03/15/23. Rhonda Mclaughlin was seen today for hypertension. Diagnoses and all orders for this visit:    Atherosclerosis of native coronary artery of native heart with angina pectoris ([address]    Essential (primary) hypertension    Hyperlipidemia, unspecified hyperlipidemia type    Other orders  -     Icosapent Ethyl (VASCEPA) 1 g CAPS capsule; Take 2 capsules by mouth 2 times daily    Return in about 6 months (around 9/15/2023).        Arjun Cohen MD  3/15/2023  8:37 AM

## 2023-03-22 ENCOUNTER — CLINICAL DOCUMENTATION (OUTPATIENT)
Dept: CARDIOLOGY CLINIC | Age: 63
End: 2023-03-22

## 2023-04-06 ENCOUNTER — OFFICE VISIT (OUTPATIENT)
Dept: INTERNAL MEDICINE CLINIC | Facility: CLINIC | Age: 63
End: 2023-04-06
Payer: MEDICAID

## 2023-04-06 VITALS
DIASTOLIC BLOOD PRESSURE: 80 MMHG | BODY MASS INDEX: 36.16 KG/M2 | WEIGHT: 225 LBS | HEART RATE: 67 BPM | SYSTOLIC BLOOD PRESSURE: 142 MMHG | TEMPERATURE: 98.1 F | OXYGEN SATURATION: 95 % | RESPIRATION RATE: 17 BRPM | HEIGHT: 66 IN

## 2023-04-06 DIAGNOSIS — M19.90 ARTHRITIS: Primary | ICD-10-CM

## 2023-04-06 DIAGNOSIS — F34.1 DYSTHYMIA: ICD-10-CM

## 2023-04-06 PROCEDURE — 3079F DIAST BP 80-89 MM HG: CPT | Performed by: NURSE PRACTITIONER

## 2023-04-06 PROCEDURE — 3077F SYST BP >= 140 MM HG: CPT | Performed by: NURSE PRACTITIONER

## 2023-04-06 PROCEDURE — 99213 OFFICE O/P EST LOW 20 MIN: CPT | Performed by: NURSE PRACTITIONER

## 2023-04-06 RX ORDER — DULOXETIN HYDROCHLORIDE 30 MG/1
30 CAPSULE, DELAYED RELEASE ORAL DAILY
Qty: 90 CAPSULE | Refills: 1 | Status: SHIPPED | OUTPATIENT
Start: 2023-04-06

## 2023-04-06 ASSESSMENT — ENCOUNTER SYMPTOMS
SHORTNESS OF BREATH: 0
NAUSEA: 0
VOMITING: 0

## 2023-04-06 NOTE — PROGRESS NOTES
residual weakness    Hypercholesteremia     managed with medication     Hypertension     managed with meds    Old MI (myocardial infarction) 2016    Osteoarthritis     generalized    Stroke [facility])      Social History     Socioeconomic History    Marital status: Legally      Spouse name: None    Number of children: None    Years of education: None    Highest education level: None   Tobacco Use    Smoking status: Former     Packs/day: 1.00     Years: 42.00     Pack years: 42.00     Types: Cigarettes     Quit date: 2016     Years since quittin.3    Smokeless tobacco: Never   Substance and Sexual Activity    Alcohol use: No    Drug use: No     Social Determinants of Health     Financial Resource Strain: Unknown    Difficulty of Paying Living Expenses: Patient refused   Food Insecurity: Unknown    Worried About Running Out of Food in the Last Year: Patient refused    [address] in the Last Year: Patient refused   Transportation Needs: Unknown    Lack of Transportation (Non-Medical): Patient refused   Housing Stability: Unknown    Unstable Housing in the Last Year: Patient refused     Past Surgical History:   Procedure Laterality Date    CARDIAC CATHETERIZATION  2016    unable to stent- 1 ves blockage    COLONOSCOPY N/A 2017    COLONOSCOPY  BMI 31 performed by Samantha Estrada MD at [address] N/A 2017    SIGMOIDOSCOPY FLEXIBLE performed by Aldair Louis MD at [facility] ARTHROSCOPY Left     L knee surgery x 2    VASCULAR SURGERY Left 2017    arteriogram, fem embolectomy    VASCULAR SURGERY  2017    aortofemoral bypass     Current Outpatient Medications   Medication Sig Dispense Refill    DULoxetine (CYMBALTA) 20 MG extended release capsule Take 1 capsule by mouth daily 30 capsule 3    amLODIPine (NORVASC) 10 MG tablet Take 1 tablet by mouth once daily 90 tablet 1    Evolocumab (REPATHA SURECLICK) 257 MG/ML SOAJ Inject 140mg (1

## 2023-06-07 ENCOUNTER — OFFICE VISIT (OUTPATIENT)
Dept: INTERNAL MEDICINE CLINIC | Facility: CLINIC | Age: 63
End: 2023-06-07
Payer: MEDICAID

## 2023-06-07 VITALS
HEART RATE: 71 BPM | BODY MASS INDEX: 36.48 KG/M2 | RESPIRATION RATE: 18 BRPM | HEIGHT: 66 IN | WEIGHT: 227 LBS | SYSTOLIC BLOOD PRESSURE: 133 MMHG | DIASTOLIC BLOOD PRESSURE: 79 MMHG

## 2023-06-07 DIAGNOSIS — E66.01 CLASS 2 SEVERE OBESITY DUE TO EXCESS CALORIES WITH SERIOUS COMORBIDITY AND BODY MASS INDEX (BMI) OF 36.0 TO 36.9 IN ADULT (HCC): ICD-10-CM

## 2023-06-07 DIAGNOSIS — E87.6 HYPOKALEMIA: ICD-10-CM

## 2023-06-07 DIAGNOSIS — E11.21 TYPE 2 DIABETES MELLITUS WITH DIABETIC NEPHROPATHY, WITHOUT LONG-TERM CURRENT USE OF INSULIN (HCC): ICD-10-CM

## 2023-06-07 DIAGNOSIS — E78.5 DYSLIPIDEMIA: ICD-10-CM

## 2023-06-07 DIAGNOSIS — M54.16 LUMBAR RADICULOPATHY: ICD-10-CM

## 2023-06-07 DIAGNOSIS — M15.9 PRIMARY OSTEOARTHRITIS INVOLVING MULTIPLE JOINTS: ICD-10-CM

## 2023-06-07 DIAGNOSIS — N18.31 STAGE 3A CHRONIC KIDNEY DISEASE (HCC): Primary | ICD-10-CM

## 2023-06-07 DIAGNOSIS — I10 ESSENTIAL HYPERTENSION: ICD-10-CM

## 2023-06-07 DIAGNOSIS — I73.9 PAD (PERIPHERAL ARTERY DISEASE) (HCC): ICD-10-CM

## 2023-06-07 LAB
ALBUMIN SERPL-MCNC: 3.7 G/DL (ref 3.2–4.6)
ALBUMIN/GLOB SERPL: 1.1 (ref 0.4–1.6)
ALP SERPL-CCNC: 96 U/L (ref 50–136)
ALT SERPL-CCNC: 28 U/L (ref 12–65)
ANION GAP SERPL CALC-SCNC: 7 MMOL/L (ref 2–11)
AST SERPL-CCNC: 16 U/L (ref 15–37)
BILIRUB SERPL-MCNC: 0.5 MG/DL (ref 0.2–1.1)
BUN SERPL-MCNC: 22 MG/DL (ref 8–23)
CALCIUM SERPL-MCNC: 9.3 MG/DL (ref 8.3–10.4)
CHLORIDE SERPL-SCNC: 103 MMOL/L (ref 101–110)
CO2 SERPL-SCNC: 27 MMOL/L (ref 21–32)
CREAT SERPL-MCNC: 1.5 MG/DL (ref 0.8–1.5)
GLOBULIN SER CALC-MCNC: 3.4 G/DL (ref 2.8–4.5)
GLUCOSE SERPL-MCNC: 198 MG/DL (ref 65–100)
POTASSIUM SERPL-SCNC: 3.4 MMOL/L (ref 3.5–5.1)
PROT SERPL-MCNC: 7.1 G/DL (ref 6.3–8.2)
SODIUM SERPL-SCNC: 137 MMOL/L (ref 133–143)

## 2023-06-07 PROCEDURE — 99214 OFFICE O/P EST MOD 30 MIN: CPT | Performed by: INTERNAL MEDICINE

## 2023-06-07 PROCEDURE — 3044F HG A1C LEVEL LT 7.0%: CPT | Performed by: INTERNAL MEDICINE

## 2023-06-07 PROCEDURE — 3078F DIAST BP <80 MM HG: CPT | Performed by: INTERNAL MEDICINE

## 2023-06-07 PROCEDURE — 3075F SYST BP GE 130 - 139MM HG: CPT | Performed by: INTERNAL MEDICINE

## 2023-06-07 RX ORDER — HYALURONATE SODIUM 20 MG/2 ML
SYRINGE (ML) INTRAARTICULAR
COMMUNITY
Start: 2023-04-26

## 2023-06-07 ASSESSMENT — ENCOUNTER SYMPTOMS
WHEEZING: 0
COUGH: 0
BLOOD IN STOOL: 0
NAUSEA: 0
VOMITING: 0
SHORTNESS OF BREATH: 1
DIARRHEA: 0
CONSTIPATION: 0

## 2023-06-07 ASSESSMENT — PATIENT HEALTH QUESTIONNAIRE - PHQ9
SUM OF ALL RESPONSES TO PHQ QUESTIONS 1-9: 0
SUM OF ALL RESPONSES TO PHQ QUESTIONS 1-9: 0
1. LITTLE INTEREST OR PLEASURE IN DOING THINGS: 0
SUM OF ALL RESPONSES TO PHQ QUESTIONS 1-9: 0
2. FEELING DOWN, DEPRESSED OR HOPELESS: 0
SUM OF ALL RESPONSES TO PHQ9 QUESTIONS 1 & 2: 0
SUM OF ALL RESPONSES TO PHQ QUESTIONS 1-9: 0

## 2023-06-07 NOTE — PROGRESS NOTES
his level of activity. Talked with him at length about the possibility of doing an in-home sleep study related to his memory and sleep problems. He prefers not to move forward with this. Also talked with him about possibility of getting a CT of his head to assess for evidence of vascular dementia. He prefers to hold off on that. My suspicion is that he does have untreated obstructive sleep apnea. Urged him to reconsider sleep study. He will reach out if he changes his mind, otherwise he will follow-up as documented. Will discuss labs over the phone. Also talked about the possibility of medication that could help with weight loss that is indicated for prediabetes. He prefers not to take another medication. Return in about 6 months (around 12/7/2023) for YPE.           Becky Alford MD

## 2023-06-23 RX ORDER — DULOXETIN HYDROCHLORIDE 20 MG/1
CAPSULE, DELAYED RELEASE ORAL
Qty: 30 CAPSULE | Refills: 5 | Status: SHIPPED | OUTPATIENT
Start: 2023-06-23

## 2023-08-25 NOTE — TELEPHONE ENCOUNTER
Requested Prescriptions     Pending Prescriptions Disp Refills    Evolocumab (REPATHA SURECLICK) 189 MG/ML SOAJ 2 Adjustable Dose Pre-filled Pen Syringe 11     Sig: Inject 140mg (1 Pen) subcutaneously every 14 days.

## 2023-08-28 ENCOUNTER — TELEPHONE (OUTPATIENT)
Age: 63
End: 2023-08-28

## 2023-08-28 RX ORDER — EVOLOCUMAB 140 MG/ML
INJECTION, SOLUTION SUBCUTANEOUS
Qty: 2 ADJUSTABLE DOSE PRE-FILLED PEN SYRINGE | Refills: 11 | Status: SHIPPED | OUTPATIENT
Start: 2023-08-28

## 2023-08-29 NOTE — TELEPHONE ENCOUNTER
Pt called requesting update on repatha injection. Sent in RX 08/25. Called specialty pharmacy to inquire about rx, and pharmacist stated that prior authorization was faxed and needs to be completed. Called pt and lvm. Sent pt [id number].

## 2023-08-30 ENCOUNTER — TELEPHONE (OUTPATIENT)
Dept: INTERNAL MEDICINE CLINIC | Facility: CLINIC | Age: 63
End: 2023-08-30

## 2023-08-30 ENCOUNTER — TELEPHONE (OUTPATIENT)
Age: 63
End: 2023-08-30

## 2023-08-30 RX ORDER — AMLODIPINE BESYLATE 10 MG/1
TABLET ORAL
Qty: 90 TABLET | Refills: 3 | Status: SHIPPED | OUTPATIENT
Start: 2023-08-30 | End: 2023-09-21 | Stop reason: SDUPTHER

## 2023-08-30 RX ORDER — HYDROCHLOROTHIAZIDE 25 MG/1
TABLET ORAL
Qty: 90 TABLET | Refills: 3 | Status: SHIPPED | OUTPATIENT
Start: 2023-08-30 | End: 2023-09-21 | Stop reason: ALTCHOICE

## 2023-08-30 NOTE — TELEPHONE ENCOUNTER
Patient called stating he would like refills for the following prescriptions :    amLODIPine (NORVASC) 10 MG tablet  # 30 Day Supply    hydroCHLOROthiazide (HYDRODIURIL) 25 MG tablet ()  # [facility]  #[id number] [address]  [phone]    Fax   [phone]

## 2023-08-30 NOTE — TELEPHONE ENCOUNTER
Spoke with pt and he stated that Dr. David Dunne normally fills those medications for him. Pt stated that he would call Dr. David Dunne for refills.

## 2023-08-31 ENCOUNTER — TELEPHONE (OUTPATIENT)
Age: 63
End: 2023-08-31

## 2023-09-21 ENCOUNTER — TELEPHONE (OUTPATIENT)
Age: 63
End: 2023-09-21

## 2023-09-21 ENCOUNTER — OFFICE VISIT (OUTPATIENT)
Age: 63
End: 2023-09-21
Payer: MEDICAID

## 2023-09-21 VITALS
HEART RATE: 66 BPM | BODY MASS INDEX: 35.84 KG/M2 | WEIGHT: 223 LBS | HEIGHT: 66 IN | SYSTOLIC BLOOD PRESSURE: 116 MMHG | DIASTOLIC BLOOD PRESSURE: 74 MMHG

## 2023-09-21 DIAGNOSIS — I10 ESSENTIAL (PRIMARY) HYPERTENSION: ICD-10-CM

## 2023-09-21 DIAGNOSIS — E78.5 HYPERLIPIDEMIA, UNSPECIFIED HYPERLIPIDEMIA TYPE: ICD-10-CM

## 2023-09-21 DIAGNOSIS — I25.119 ATHEROSCLEROSIS OF NATIVE CORONARY ARTERY OF NATIVE HEART WITH ANGINA PECTORIS (HCC): Primary | ICD-10-CM

## 2023-09-21 DIAGNOSIS — N18.30 STAGE 3 CHRONIC KIDNEY DISEASE, UNSPECIFIED WHETHER STAGE 3A OR 3B CKD (HCC): ICD-10-CM

## 2023-09-21 PROCEDURE — 3074F SYST BP LT 130 MM HG: CPT | Performed by: INTERNAL MEDICINE

## 2023-09-21 PROCEDURE — 93000 ELECTROCARDIOGRAM COMPLETE: CPT | Performed by: INTERNAL MEDICINE

## 2023-09-21 PROCEDURE — 3078F DIAST BP <80 MM HG: CPT | Performed by: INTERNAL MEDICINE

## 2023-09-21 PROCEDURE — 99214 OFFICE O/P EST MOD 30 MIN: CPT | Performed by: INTERNAL MEDICINE

## 2023-09-21 RX ORDER — AMLODIPINE BESYLATE 10 MG/1
10 TABLET ORAL DAILY
Qty: 90 TABLET | Refills: 3 | Status: SHIPPED | OUTPATIENT
Start: 2023-09-21

## 2023-09-21 RX ORDER — CARVEDILOL 25 MG/1
25 TABLET ORAL 2 TIMES DAILY
Qty: 180 TABLET | Refills: 3 | Status: SHIPPED | OUTPATIENT
Start: 2023-09-21 | End: 2023-12-20

## 2023-09-21 ASSESSMENT — ENCOUNTER SYMPTOMS
NAIL CHANGES: 0
APHONIA: 0
STRIDOR: 0
EYE PAIN: 0
COUGH: 0
ABDOMINAL PAIN: 0

## 2023-09-21 NOTE — TELEPHONE ENCOUNTER
Blessing with [facility] in [facility] [place] would like to know if a PA can be done for farxiga or if a different medication can be used.

## 2023-09-21 NOTE — PROGRESS NOTES
[address] [place]  [address] [facility], [address] [place] [address], [address] [place] [address]  PHONE: [phone]    SUBJECTIVE:   Carol Ghosh is a 58 y.o. male 1960   seen for a follow up visit regarding the following:     Chief Complaint   Patient presents with    Hypertension    Coronary Artery Disease         History of present illness: 58 y.o. male presented for follow-up 9/21/23 hypokalemia on hydrochlorothiazide therapy unable to tolerate oral potassium therapy. Last A1c 6.1      Interval Hx   Male presented for followup 06/05/2018 doing well. Previous aortofem bypass surgery with redo groin exploration. Wound is improving. He does complain of shortness of breath that has improved since our last appointment. He has been tried on Ranexa,  which had no improvement of his symptoms. In light of his , he has very stable symptoms at this time with occasional chest discomfort. He is on Amlodipine. He has stopped smoking. He was placed on Repatha therapy by Dr. Gabby Wheat. Additionally,  recent modifications were made to his blood pressure due to hypotension and he is doing very well today. Cardiac History:     Admission for hypertensive emergency flash pulmonary edema 12/2016. EKG 12/06/2016, normal sinus rhythm, left atrial enlargement, nonspecific T-wave abnormality. NSTEMI 12/2016 due to hypertensive urgency in the setting of underlying coronary artery disease. Cardiac catheterization 12/2016, coronary anatomy with LAD, nonobstructive disease, circumflex 30 - 40% nonobstructive disease, right coronary artery 40% mid vessel stenosis with chronic total  occlusion of PDA with collateral filling, preserved left ventricular systolic function. Echocardiogram 12/2016, preserved left ventricular systolic function, mild to moderate mitral regurgitation with borderline mitral valve prolapse. Lower extremity dopplers 1/2017 Elevated velocity in the mid aorta measuring 267 cm/s.

## 2023-10-02 RX ORDER — FAMOTIDINE 40 MG/1
40 TABLET, FILM COATED ORAL DAILY
Qty: 30 TABLET | Refills: 11 | Status: SHIPPED | OUTPATIENT
Start: 2023-10-02

## 2023-10-02 RX ORDER — CETIRIZINE HYDROCHLORIDE 10 MG/1
10 TABLET ORAL DAILY
Qty: 30 TABLET | Refills: 11 | Status: SHIPPED | OUTPATIENT
Start: 2023-10-02

## 2023-10-09 DIAGNOSIS — N18.30 STAGE 3 CHRONIC KIDNEY DISEASE, UNSPECIFIED WHETHER STAGE 3A OR 3B CKD (HCC): ICD-10-CM

## 2023-10-09 LAB
ANION GAP SERPL CALC-SCNC: 6 MMOL/L (ref 2–11)
BUN SERPL-MCNC: 16 MG/DL (ref 8–23)
CALCIUM SERPL-MCNC: 9.5 MG/DL (ref 8.3–10.4)
CHLORIDE SERPL-SCNC: 109 MMOL/L (ref 101–110)
CO2 SERPL-SCNC: 27 MMOL/L (ref 21–32)
CREAT SERPL-MCNC: 1.5 MG/DL (ref 0.8–1.5)
GLUCOSE SERPL-MCNC: 170 MG/DL (ref 65–100)
POTASSIUM SERPL-SCNC: 4.3 MMOL/L (ref 3.5–5.1)
SODIUM SERPL-SCNC: 142 MMOL/L (ref 133–143)

## 2023-10-10 NOTE — RESULT ENCOUNTER NOTE
A personalized result message was sent to Maxwell Urena with results via [address] [place] [address].

## 2023-10-12 ENCOUNTER — TELEPHONE (OUTPATIENT)
Dept: INTERNAL MEDICINE CLINIC | Facility: CLINIC | Age: 63
End: 2023-10-12

## 2023-10-12 NOTE — TELEPHONE ENCOUNTER
Please call 10/13/23 and ask if he would be willing to be worked in to address this. Concerned about him. Thanks.   [address]

## 2023-10-12 NOTE — TELEPHONE ENCOUNTER
Jennifer from [address] [place] [address] called to report pt had scored high on depression assessment.      PHQ2 - 4  PHQ9 - 16

## 2023-10-16 ENCOUNTER — TELEPHONE (OUTPATIENT)
Dept: INTERNAL MEDICINE CLINIC | Facility: CLINIC | Age: 63
End: 2023-10-16

## 2023-10-16 ENCOUNTER — OFFICE VISIT (OUTPATIENT)
Dept: INTERNAL MEDICINE CLINIC | Facility: CLINIC | Age: 63
End: 2023-10-16
Payer: MEDICAID

## 2023-10-16 VITALS
WEIGHT: 224 LBS | HEIGHT: 66 IN | OXYGEN SATURATION: 97 % | HEART RATE: 69 BPM | RESPIRATION RATE: 18 BRPM | SYSTOLIC BLOOD PRESSURE: 151 MMHG | TEMPERATURE: 97.3 F | BODY MASS INDEX: 36 KG/M2 | DIASTOLIC BLOOD PRESSURE: 84 MMHG

## 2023-10-16 DIAGNOSIS — F32.2 AGITATED DEPRESSION (HCC): ICD-10-CM

## 2023-10-16 DIAGNOSIS — N18.31 STAGE 3A CHRONIC KIDNEY DISEASE (HCC): Primary | ICD-10-CM

## 2023-10-16 DIAGNOSIS — M25.561 CHRONIC PAIN OF RIGHT KNEE: ICD-10-CM

## 2023-10-16 DIAGNOSIS — E11.21 TYPE 2 DIABETES MELLITUS WITH DIABETIC NEPHROPATHY, WITHOUT LONG-TERM CURRENT USE OF INSULIN (HCC): ICD-10-CM

## 2023-10-16 DIAGNOSIS — G89.29 CHRONIC PAIN OF RIGHT KNEE: ICD-10-CM

## 2023-10-16 PROCEDURE — 3077F SYST BP >= 140 MM HG: CPT | Performed by: INTERNAL MEDICINE

## 2023-10-16 PROCEDURE — 3079F DIAST BP 80-89 MM HG: CPT | Performed by: INTERNAL MEDICINE

## 2023-10-16 PROCEDURE — 99421 OL DIG E/M SVC 5-10 MIN: CPT | Performed by: INTERNAL MEDICINE

## 2023-10-16 RX ORDER — DULOXETIN HYDROCHLORIDE 20 MG/1
40 CAPSULE, DELAYED RELEASE ORAL DAILY
Qty: 180 CAPSULE | Refills: 3 | Status: SHIPPED | OUTPATIENT
Start: 2023-10-16

## 2023-10-16 NOTE — PROGRESS NOTES
10/16/2023 3:34 PM  Location:[address] [place]NAL [place]ICINE  [place]  Patient #:  [id number]  YOB: 1960            History of Present Illness     Chief Complaint   Patient presents with    Depression       Mr. Latisha Sanchez is a 58 y.o. male  who presents for the above. Patient notes that he is out of money and out of patient's. He also has been waiting on getting additional injections in his knees. He feels like he is back to [place] [address] as far as his pain control is concerned. Our office was contacted by [facility] out of concern for how he was doing emotionally. The patient reports having a very low level of tolerance for stupid people including that particular nurse. Evidently he was free with his language with that provider as well as with someone else from the healthcare profession that he dealt with over the phone.            No Known Allergies  Past Medical History:   Diagnosis Date    Acute diastolic (congestive) heart failure (HCC) 12/4/2016    LVEF 55-55% 12/4/16    CAD (coronary artery disease)     in one vessel, unable to stent 12/2016- \"collateral vessels\" per heart cath    Chronic kidney disease     Chronic kidney disease, stage 3 (HCC)     Chronic pain     low back and bilateral lower extremeties    Ex-smoker     QUIT 11/2016    1 pk/day x 40 yrs    GERD (gastroesophageal reflux disease)     takes an occassional zantac, OTC    Hx-TIA (transient ischemic attack)     2 mild strokes, patient states it was \"yrs ago\" , no residual weakness    Hypercholesteremia     managed with medication     Hypertension     managed with meds    Old MI (myocardial infarction) 11/2016    Osteoarthritis     generalized    Stroke [facility])      Social History     Socioeconomic History    Marital status: Legally      Spouse name: None    Number of children: None    Years of education: None    Highest education level: None   Tobacco Use    Smoking status: Former

## 2023-10-17 NOTE — TELEPHONE ENCOUNTER
Pt can contact the maker of the Medication ( [employer]) to see if he can get some assistance  with the cost of the Bartholomew Spatz. [phone]. Left message for pt to return call.

## 2023-10-26 ENCOUNTER — TELEPHONE (OUTPATIENT)
Age: 63
End: 2023-10-26

## 2023-10-26 NOTE — TELEPHONE ENCOUNTER
Pt came into office requesting a sample for Repatha because his pen malfunctioned and the company is sending a replacement but it will not arrive for another 2 weeks. One Repatha 140 mg/ml pen given to pt. Lot# [id number]. Exp- 12-31-25.

## 2023-10-26 NOTE — TELEPHONE ENCOUNTER
Patient stated that his insurance needs someone to call the pharmacy to confirm he needs to be on dapagliflozin 10mg    [facility] in [place]

## 2023-11-06 ENCOUNTER — TELEPHONE (OUTPATIENT)
Dept: INTERNAL MEDICINE CLINIC | Facility: CLINIC | Age: 63
End: 2023-11-06

## 2023-11-06 NOTE — TELEPHONE ENCOUNTER
Elizabeth Lyn, has spoken to  Mr. Bridger Garcia and she has completed his depression screening, scored high. Phq2 =3  Phq9=10 today. Last month the Phq2=4, Phq9=16. He is upset  because he has been unable to get his injections for his knees and he was also denied for his [place]. He feels like giving up.     Please call Davian Andrade for questions at [phone]

## 2023-11-06 NOTE — TELEPHONE ENCOUNTER
Can we get him samples of Tamika Claudy as well as patient assistance paperwork? Thanks.   [address]

## 2023-11-08 NOTE — TELEPHONE ENCOUNTER
Spoke with pt  - samples up front and patient assistance paperwork for him to fill out for some assistance.

## 2023-11-13 ENCOUNTER — TELEPHONE (OUTPATIENT)
Dept: INTERNAL MEDICINE CLINIC | Facility: CLINIC | Age: 63
End: 2023-11-13

## 2023-11-22 NOTE — PROGRESS NOTES
Medication: metformin (GLUCOPHAGE) 1000 MG tablet  Last office visit date:10/03/2023  Next appointment scheduled?: Yes   Number of refills given: [address]   [facility] [address] FAX: [phone]         VASCULAR SURGERY FLOOR PROGRESS NOTE    Admit Date: 2017  POD: 14 Days Post-Op    Procedure:  Procedure(s):  SIGMOIDOSCOPY FLEXIBLE    Subjective:     Patient has no new complaints. Objective:     Vitals:  Blood pressure 140/73, pulse 75, temperature 98.2 °F (36.8 °C), resp. rate 18, height 5' 6\" (1.676 m), weight 198 lb 3.2 oz (89.9 kg), SpO2 98 %. Temp (24hrs), Av.7 °F (36.5 °C), Min:96.9 °F (36.1 °C), Max:98.2 °F (36.8 °C)      Intake / Output:    Intake/Output Summary (Last 24 hours) at 17 1139  Last data filed at 17 0849   Gross per 24 hour   Intake              240 ml   Output             1995 ml   Net            -1755 ml       Physical Exam:    Constitutional: he appears well-developed. No distress. Cardiovascular: Regular rhythm. Pulmonary/Chest: Effort normal and breath sounds normal. No respiratory distress. Abdominal: Soft. Bowel sounds are normal. he exhibits no distension. There is no tenderness. There is no guarding. No hernia. Musculoskeletal: Normal range of motion. Neurological: He is alert.  CN II- XII grossly intact  Vascular: Bilateral feet warm    Labs:   Recent Labs      17   0548  17   0544   HGB  10.3*   --    WBC  9.5   --    K   --   4.5   GLU   --   118*       Data Review     Assessment:     Patient Active Problem List    Diagnosis Date Noted    Ischemia of left lower extremity 2017    Claudication ([facility] 2017    Occlusion of aorta ([facility] [facility] 2017    Chronic left-sided low back pain 2017    Essential hypertension 2016    Renal insufficiency 2016    Dyslipidemia 2016       Plan/Recommendations/Medical Decision Making:     Patient clinically stable left groin wound VAC intact  -CBC stable no more labs needed  Continue p.o. antibiotics-with left groin wound open plan on wound VAC change on Monday Awaiting insurance authorization for [facility] placement    Elements of this note have been dictated using speech recognition software. As a result, errors of speech recognition may have occurred. 9/24/2017     11:15 AM    I have personally seen and examined Jonn Rashid with  MARGOT Gonzalez. I agree and confirm findings in history, physical exam, and assessment/plan as outlined above, except as noted below.     Crescencio Morgan MD

## 2023-11-30 NOTE — PROGRESS NOTES
Bedside report to Genia Carrion and iDego Santos, RUBIN. Doppler signal to bilateral DP/PT. Extremities warm. Received beta x2 this week, negative FFN, repeat CL with 4 hour work restriction improved  Tdap today, will give RSV vaccine at 32w0d  RTC in 2 weeks, earlier if concerns    Pregnancy complications:   1. Dichorionic-diamniotic twin gestation             - monthly growth after anatomy sono             - weekly BPP 32+ weeks   - received betamethasone course 11/27, 11/28  2. Obesity in pregnancy, prepregnancy BMI 45     Recommendations:  1. comanaged with MFM

## 2023-12-19 RX ORDER — DULOXETIN HYDROCHLORIDE 20 MG/1
20 CAPSULE, DELAYED RELEASE ORAL DAILY
Qty: 30 CAPSULE | Refills: 0 | OUTPATIENT
Start: 2023-12-19

## 2023-12-22 ENCOUNTER — TELEPHONE (OUTPATIENT)
Age: 63
End: 2023-12-22

## 2023-12-27 ENCOUNTER — TELEPHONE (OUTPATIENT)
Age: 63
End: 2023-12-27

## 2023-12-27 ENCOUNTER — TELEPHONE (OUTPATIENT)
Dept: INTERNAL MEDICINE CLINIC | Facility: CLINIC | Age: 63
End: 2023-12-27

## 2023-12-27 RX ORDER — EVOLOCUMAB 140 MG/ML
INJECTION, SOLUTION SUBCUTANEOUS
Qty: 2 ADJUSTABLE DOSE PRE-FILLED PEN SYRINGE | Refills: 11 | Status: SHIPPED | OUTPATIENT
Start: 2023-12-27

## 2023-12-27 NOTE — TELEPHONE ENCOUNTER
Talked to the patient. Sent in silvadene cream.  Advised him to go to ER if signs of secondary infection. Work in first available. Thanks.   [address]
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
## 2023-12-27 NOTE — TELEPHONE ENCOUNTER
Nubia from [facility],  called and is requesting this medication be sent to [address] [place] [address] in [place].

## 2023-12-27 NOTE — TELEPHONE ENCOUNTER
PA from 10/2023 refaxed to [facility].  Will notify pt when determination is made

## 2023-12-27 NOTE — TELEPHONE ENCOUNTER
Prior authorization for dapagliflozin (FARXIGA) 10 MG tablet. With attached clinical notes with medical data to show why med is needed    Fax: [phone]    [facility] 'Expedite\" to be reviewed in 3 days    ________________________________________    Also said that the Sisto Bracket was sent to pharmacy in [place] and needs to be sent urgently to:      [address] [place] [address]  Phone: [phone]

## 2023-12-27 NOTE — TELEPHONE ENCOUNTER
Previous PA reprinted and faxed with last office note to [facility].     Repatha sent today by Dr. Mirza Schooling

## 2023-12-28 NOTE — TELEPHONE ENCOUNTER
I have talked with Mr. Brina Segal and have given him this message. I have also put him on the NP's schedule for January 10th. First available.

## 2024-01-10 ENCOUNTER — OFFICE VISIT (OUTPATIENT)
Dept: INTERNAL MEDICINE CLINIC | Facility: CLINIC | Age: 64
End: 2024-01-10
Payer: MEDICAID

## 2024-01-10 VITALS
HEIGHT: 66 IN | WEIGHT: 220.4 LBS | DIASTOLIC BLOOD PRESSURE: 60 MMHG | OXYGEN SATURATION: 94 % | HEART RATE: 82 BPM | SYSTOLIC BLOOD PRESSURE: 130 MMHG | TEMPERATURE: 97.2 F | RESPIRATION RATE: 18 BRPM | BODY MASS INDEX: 35.42 KG/M2

## 2024-01-10 DIAGNOSIS — M25.512 ACUTE PAIN OF LEFT SHOULDER: ICD-10-CM
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
## 2024-01-10 DIAGNOSIS — T24.232A PARTIAL THICKNESS BURN OF LEFT LOWER LEG, INITIAL ENCOUNTER: Primary | ICD-10-CM

## 2024-01-10 PROCEDURE — 3078F DIAST BP <80 MM HG: CPT | Performed by: NURSE PRACTITIONER

## 2024-01-10 PROCEDURE — 99213 OFFICE O/P EST LOW 20 MIN: CPT | Performed by: NURSE PRACTITIONER

## 2024-01-10 PROCEDURE — 3075F SYST BP GE 130 - 139MM HG: CPT | Performed by: NURSE PRACTITIONER

## 2024-01-10 ASSESSMENT — PATIENT HEALTH QUESTIONNAIRE - PHQ9
5. POOR APPETITE OR OVEREATING: 0
6. FEELING BAD ABOUT YOURSELF - OR THAT YOU ARE A FAILURE OR HAVE LET YOURSELF OR YOUR FAMILY DOWN: 0
SUM OF ALL RESPONSES TO PHQ QUESTIONS 1-9: 0
2. FEELING DOWN, DEPRESSED OR HOPELESS: 0
7. TROUBLE CONCENTRATING ON THINGS, SUCH AS READING THE NEWSPAPER OR WATCHING TELEVISION: 0
SUM OF ALL RESPONSES TO PHQ QUESTIONS 1-9: 0
9. THOUGHTS THAT YOU WOULD BE BETTER OFF DEAD, OR OF HURTING YOURSELF: 0
SUM OF ALL RESPONSES TO PHQ QUESTIONS 1-9: 0
1. LITTLE INTEREST OR PLEASURE IN DOING THINGS: 0
SUM OF ALL RESPONSES TO PHQ QUESTIONS 1-9: 0
SUM OF ALL RESPONSES TO PHQ9 QUESTIONS 1 & 2: 0
3. TROUBLE FALLING OR STAYING ASLEEP: 0
10. IF YOU CHECKED OFF ANY PROBLEMS, HOW DIFFICULT HAVE THESE PROBLEMS MADE IT FOR YOU TO DO YOUR WORK, TAKE CARE OF THINGS AT HOME, OR GET ALONG WITH OTHER PEOPLE: 0
4. FEELING TIRED OR HAVING LITTLE ENERGY: 0
8. MOVING OR SPEAKING SO SLOWLY THAT OTHER PEOPLE COULD HAVE NOTICED. OR THE OPPOSITE, BEING SO FIGETY OR RESTLESS THAT YOU HAVE BEEN MOVING AROUND A LOT MORE THAN USUAL: 0

## 2024-01-10 ASSESSMENT — ENCOUNTER SYMPTOMS
VOMITING: 0
NAUSEA: 0
BACK PAIN: 0
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
## 2024-03-06 ENCOUNTER — OFFICE VISIT (OUTPATIENT)
Dept: VASCULAR SURGERY | Age: 64
End: 2024-03-06
Payer: MEDICAID

## 2024-03-06 VITALS
OXYGEN SATURATION: 98 % | WEIGHT: 227 LBS | HEART RATE: 65 BPM | DIASTOLIC BLOOD PRESSURE: 75 MMHG | HEIGHT: 66 IN | BODY MASS INDEX: 36.48 KG/M2 | SYSTOLIC BLOOD PRESSURE: 156 MMHG

## 2024-03-06 DIAGNOSIS — I73.9 PAD (PERIPHERAL ARTERY DISEASE) (HCC): Primary | ICD-10-CM

## 2024-03-06 DIAGNOSIS — I65.23 CAROTID STENOSIS, ASYMPTOMATIC, BILATERAL: ICD-10-CM

## 2024-03-06 DIAGNOSIS — Z95.828 H/O AORTO-FEMORAL BYPASS: ICD-10-CM

## 2024-03-06 PROCEDURE — 3078F DIAST BP <80 MM HG: CPT | Performed by: NURSE PRACTITIONER

## 2024-03-06 PROCEDURE — 3077F SYST BP >= 140 MM HG: CPT | Performed by: NURSE PRACTITIONER

## 2024-03-06 PROCEDURE — 99214 OFFICE O/P EST MOD 30 MIN: CPT | Performed by: NURSE PRACTITIONER

## 2024-03-06 NOTE — PROGRESS NOTES
DATE OF VISIT: 3/6/2024      HPI  Hiram Fong is a 63 y.o. male who follows up for his arterial and carotid duplex study. No new, lateralizing neurological symptoms. No claudication. He has no open ulcerations or rest pain. He remains on ASA and Repatha. H/O aortobifem years back.         MEDICAL HISTORY:   Past Medical History:   Diagnosis Date    Acute diastolic (congestive) heart failure (HCC) 12/4/2016    LVEF 55-55% 12/4/16    CAD (coronary artery disease)     in one vessel, unable to stent 12/2016- \"collateral vessels\" per heart cath    Chronic kidney disease     Chronic kidney disease, stage 3 (HCC)     Chronic pain     low back and bilateral lower extremeties    Ex-smoker     QUIT 11/2016    1 pk/day x 40 yrs    GERD (gastroesophageal reflux disease)     takes an occassional zantac, OTC    Hx-TIA (transient ischemic attack)     2 mild strokes, patient states it was \"yrs ago\" , no residual weakness    Hypercholesteremia     managed with medication     Hypertension     managed with meds    Old MI (myocardial infarction) 11/2016    Osteoarthritis     generalized    Stroke ([facility])          SURGICAL HISTORY:   Past Surgical History:   Procedure Laterality Date    CARDIAC CATHETERIZATION  12/2016    unable to stent- 1 ves blockage    COLONOSCOPY N/A 6/22/2017    COLONOSCOPY  BMI 31 performed by Kelvin Walden MD at [facility] ENDOSCOPY    FLEXIBLE SIGMOIDOSCOPY N/A 9/9/2017    SIGMOIDOSCOPY FLEXIBLE performed by Carmen Villanueva MD at [facility] ENDOSCOPY    KNEE ARTHROSCOPY Left     L knee surgery x 2    VASCULAR SURGERY Left 09/08/2017    arteriogram, fem embolectomy    VASCULAR SURGERY  09/07/2017    aortofemoral bypass       Review of Systems:  Constitutional:   Negative for fevers and unexplained weight loss.  Respiratory:   Negative for hemoptysis.  Cardiovascular:   Negative except as noted in HPI.  Musculoskeletal:  Negative for active, unexplained/severe joint pain.      ALLERGIES: No Known

## 2024-03-27 ENCOUNTER — OFFICE VISIT (OUTPATIENT)
Age: 64
End: 2024-03-27
Payer: MEDICARE

## 2024-03-27 VITALS
BODY MASS INDEX: 36.32 KG/M2 | HEIGHT: 66 IN | HEART RATE: 60 BPM | SYSTOLIC BLOOD PRESSURE: 154 MMHG | WEIGHT: 226 LBS | DIASTOLIC BLOOD PRESSURE: 90 MMHG

## 2024-03-27 DIAGNOSIS — E78.5 HYPERLIPIDEMIA, UNSPECIFIED HYPERLIPIDEMIA TYPE: ICD-10-CM

## 2024-03-27 DIAGNOSIS — I25.119 ATHEROSCLEROSIS OF NATIVE CORONARY ARTERY OF NATIVE HEART WITH ANGINA PECTORIS (HCC): ICD-10-CM

## 2024-03-27 DIAGNOSIS — I10 ESSENTIAL (PRIMARY) HYPERTENSION: Primary | ICD-10-CM

## 2024-03-27 DIAGNOSIS — Z78.9 STATIN INTOLERANCE: ICD-10-CM

## 2024-03-27 PROCEDURE — G8484 FLU IMMUNIZE NO ADMIN: HCPCS | Performed by: INTERNAL MEDICINE

## 2024-03-27 PROCEDURE — 3077F SYST BP >= 140 MM HG: CPT | Performed by: INTERNAL MEDICINE

## 2024-03-27 PROCEDURE — 1036F TOBACCO NON-USER: CPT | Performed by: INTERNAL MEDICINE

## 2024-03-27 PROCEDURE — G8417 CALC BMI ABV UP PARAM F/U: HCPCS | Performed by: INTERNAL MEDICINE

## 2024-03-27 PROCEDURE — 3017F COLORECTAL CA SCREEN DOC REV: CPT | Performed by: INTERNAL MEDICINE

## 2024-03-27 PROCEDURE — 1123F ACP DISCUSS/DSCN MKR DOCD: CPT | Performed by: INTERNAL MEDICINE

## 2024-03-27 PROCEDURE — G8427 DOCREV CUR MEDS BY ELIG CLIN: HCPCS | Performed by: INTERNAL MEDICINE

## 2024-03-27 PROCEDURE — 99214 OFFICE O/P EST MOD 30 MIN: CPT | Performed by: INTERNAL MEDICINE

## 2024-03-27 PROCEDURE — 3080F DIAST BP >= 90 MM HG: CPT | Performed by: INTERNAL MEDICINE

## 2024-03-27 RX ORDER — CARVEDILOL 25 MG/1
25 TABLET ORAL 2 TIMES DAILY
Qty: 180 TABLET | Refills: 3 | Status: SHIPPED | OUTPATIENT
Start: 2024-03-27 | End: 2025-03-22

## 2024-03-27 RX ORDER — LOSARTAN POTASSIUM 25 MG/1
25 TABLET ORAL DAILY
Qty: 90 TABLET | Refills: 1 | Status: SHIPPED | OUTPATIENT
Start: 2024-03-27

## 2024-03-27 RX ORDER — DAPAGLIFLOZIN 10 MG/1
10 TABLET, FILM COATED ORAL EVERY MORNING
Qty: 90 TABLET | Refills: 3 | Status: SHIPPED | OUTPATIENT
Start: 2024-03-27

## 2024-03-27 RX ORDER — AMLODIPINE BESYLATE 10 MG/1
10 TABLET ORAL DAILY
Qty: 90 TABLET | Refills: 3 | Status: SHIPPED | OUTPATIENT
Start: 2024-03-27

## 2024-03-27 ASSESSMENT — ENCOUNTER SYMPTOMS
NAIL CHANGES: 0
EYE PAIN: 0
STRIDOR: 0
APHONIA: 0
COUGH: 0
ABDOMINAL PAIN: 0

## 2024-03-27 NOTE — PROGRESS NOTES
[facility] CARDIOLOGY, [address], [address]  [place] [address]  PHONE: [phone]    SUBJECTIVE:   Hiram Fong is a 64 y.o. male 1960   seen for a follow up visit regarding the following:     Chief Complaint   Patient presents with    Coronary Artery Disease    Hyperlipidemia         History of present illness: 64 y.o. male presented for follow-up 3/27/24 history of hypokalemia on hydrochlorothiazide additional history of statin intolerance.  He has a  of his right coronary artery. BP elevated having trouble sleeping due to knee pain. CAD sx controlled.       Interval Hx   Male presented for followup 06/05/2018 doing well.  Previous aortofem bypass surgery with redo groin exploration.  Wound is improving.  He does complain of shortness of breath that has improved since our last appointment.  He has been tried on Ranexa,  which had no improvement of his symptoms.  In light of his , he has very stable symptoms at this time with occasional chest discomfort.  He is on Amlodipine.  He has stopped smoking.  He was placed on Repatha therapy by Dr. Elizabeth.  Additionally,  recent modifications were made to his blood pressure due to hypotension and he is doing very well today.           Cardiac History:     Admission for hypertensive emergency flash pulmonary edema 12/2016.     EKG 12/06/2016, normal sinus rhythm, left atrial enlargement, nonspecific T-wave abnormality.     NSTEMI 12/2016 due to hypertensive urgency in the setting of underlying coronary artery disease.     Cardiac catheterization 12/2016, coronary anatomy with LAD, nonobstructive disease, circumflex 30 - 40% nonobstructive disease, right coronary artery 40% mid vessel stenosis with chronic total  occlusion of PDA with collateral filling, preserved left ventricular systolic function.     Echocardiogram 12/2016, preserved left ventricular systolic function, mild to moderate mitral regurgitation with borderline mitral

## 2024-06-24 RX ORDER — CETIRIZINE HYDROCHLORIDE 10 MG/1
10 TABLET, FILM COATED ORAL DAILY
Qty: 90 TABLET | Refills: 3 | Status: SHIPPED | OUTPATIENT
Start: 2024-06-24

## 2024-08-05 ENCOUNTER — OFFICE VISIT (OUTPATIENT)
Dept: INTERNAL MEDICINE CLINIC | Facility: CLINIC | Age: 64
End: 2024-08-05
Payer: MEDICARE

## 2024-08-05 VITALS
RESPIRATION RATE: 18 BRPM | BODY MASS INDEX: 36.8 KG/M2 | DIASTOLIC BLOOD PRESSURE: 74 MMHG | HEART RATE: 69 BPM | SYSTOLIC BLOOD PRESSURE: 132 MMHG | WEIGHT: 229 LBS | HEIGHT: 66 IN

## 2024-08-05 DIAGNOSIS — E11.21 TYPE 2 DIABETES MELLITUS WITH DIABETIC NEPHROPATHY, WITHOUT LONG-TERM CURRENT USE OF INSULIN (HCC): Primary | ICD-10-CM

## 2024-08-05 DIAGNOSIS — M54.50 CHRONIC LEFT-SIDED LOW BACK PAIN WITHOUT SCIATICA: ICD-10-CM

## 2024-08-05 DIAGNOSIS — I10 ESSENTIAL HYPERTENSION: ICD-10-CM

## 2024-08-05 DIAGNOSIS — Z87.891 PERSONAL HISTORY OF TOBACCO USE: ICD-10-CM

## 2024-08-05 DIAGNOSIS — I73.9 PAD (PERIPHERAL ARTERY DISEASE) (HCC): ICD-10-CM

## 2024-08-05 DIAGNOSIS — N18.31 STAGE 3A CHRONIC KIDNEY DISEASE (HCC): ICD-10-CM

## 2024-08-05 DIAGNOSIS — Z00.00 MEDICARE ANNUAL WELLNESS VISIT, SUBSEQUENT: ICD-10-CM

## 2024-08-05 DIAGNOSIS — G89.29 CHRONIC LEFT-SIDED LOW BACK PAIN WITHOUT SCIATICA: ICD-10-CM

## 2024-08-05 DIAGNOSIS — E11.21 TYPE 2 DIABETES MELLITUS WITH DIABETIC NEPHROPATHY, WITHOUT LONG-TERM CURRENT USE OF INSULIN (HCC): ICD-10-CM

## 2024-08-05 DIAGNOSIS — E66.01 SEVERE OBESITY (BMI 35.0-39.9) WITH COMORBIDITY (HCC): ICD-10-CM

## 2024-08-05 LAB
ALBUMIN SERPL-MCNC: 3.7 G/DL (ref 3.2–4.6)
ALBUMIN/GLOB SERPL: 1.2 (ref 1–1.9)
ALP SERPL-CCNC: 110 U/L (ref 40–129)
ALT SERPL-CCNC: 16 U/L (ref 12–65)
ANION GAP SERPL CALC-SCNC: 11 MMOL/L (ref 9–18)
APPEARANCE UR: CLEAR
AST SERPL-CCNC: 19 U/L (ref 15–37)
BACTERIA URNS QL MICRO: NEGATIVE /HPF
BASOPHILS # BLD: 0.1 K/UL (ref 0–0.2)
BASOPHILS NFR BLD: 1 % (ref 0–2)
BILIRUB SERPL-MCNC: 0.4 MG/DL (ref 0–1.2)
BILIRUB UR QL: NEGATIVE
BUN SERPL-MCNC: 13 MG/DL (ref 8–23)
CALCIUM SERPL-MCNC: 9.1 MG/DL (ref 8.8–10.2)
CHLORIDE SERPL-SCNC: 108 MMOL/L (ref 98–107)
CHOLEST SERPL-MCNC: 100 MG/DL (ref 0–200)
CO2 SERPL-SCNC: 24 MMOL/L (ref 20–28)
COLOR UR: ABNORMAL
CREAT SERPL-MCNC: 1.67 MG/DL (ref 0.8–1.3)
CREAT UR-MCNC: 117 MG/DL (ref 39–259)
DIFFERENTIAL METHOD BLD: NORMAL
EOSINOPHIL # BLD: 0.3 K/UL (ref 0–0.8)
EOSINOPHIL NFR BLD: 4 % (ref 0.5–7.8)
EPI CELLS #/AREA URNS HPF: ABNORMAL /HPF (ref 0–5)
ERYTHROCYTE [DISTWIDTH] IN BLOOD BY AUTOMATED COUNT: 13.6 % (ref 11.9–14.6)
EST. AVERAGE GLUCOSE BLD GHB EST-MCNC: 135 MG/DL
GLOBULIN SER CALC-MCNC: 3.2 G/DL (ref 2.3–3.5)
GLUCOSE SERPL-MCNC: 121 MG/DL (ref 70–99)
GLUCOSE UR STRIP.AUTO-MCNC: >1000 MG/DL
HBA1C MFR BLD: 6.3 % (ref 0–5.6)
HCT VFR BLD AUTO: 49.8 % (ref 41.1–50.3)
HDLC SERPL-MCNC: 41 MG/DL (ref 40–60)
HDLC SERPL: 2.4 (ref 0–5)
HGB BLD-MCNC: 16.4 G/DL (ref 13.6–17.2)
HGB UR QL STRIP: ABNORMAL
HYALINE CASTS URNS QL MICRO: ABNORMAL /LPF
IMM GRANULOCYTES # BLD AUTO: 0 K/UL (ref 0–0.5)
IMM GRANULOCYTES NFR BLD AUTO: 0 % (ref 0–5)
KETONES UR QL STRIP.AUTO: NEGATIVE MG/DL
LDLC SERPL CALC-MCNC: 28 MG/DL (ref 0–100)
LEUKOCYTE ESTERASE UR QL STRIP.AUTO: NEGATIVE
LYMPHOCYTES # BLD: 1.9 K/UL (ref 0.5–4.6)
LYMPHOCYTES NFR BLD: 25 % (ref 13–44)
MCH RBC QN AUTO: 30.7 PG (ref 26.1–32.9)
MCHC RBC AUTO-ENTMCNC: 32.9 G/DL (ref 31.4–35)
MCV RBC AUTO: 93.3 FL (ref 82–102)
MICROALBUMIN UR-MCNC: 3.8 MG/DL (ref 0–20)
MICROALBUMIN/CREAT UR-RTO: 32 MG/G (ref 0–30)
MONOCYTES # BLD: 0.6 K/UL (ref 0.1–1.3)
MONOCYTES NFR BLD: 8 % (ref 4–12)
NEUTS SEG # BLD: 4.6 K/UL (ref 1.7–8.2)
NEUTS SEG NFR BLD: 62 % (ref 43–78)
NITRITE UR QL STRIP.AUTO: NEGATIVE
NRBC # BLD: 0 K/UL (ref 0–0.2)
PH UR STRIP: 5.5 (ref 5–9)
PLATELET # BLD AUTO: 263 K/UL (ref 150–450)
PMV BLD AUTO: 10.8 FL (ref 9.4–12.3)
POTASSIUM SERPL-SCNC: 4.7 MMOL/L (ref 3.5–5.1)
PROT SERPL-MCNC: 6.8 G/DL (ref 6.3–8.2)
PROT UR STRIP-MCNC: NEGATIVE MG/DL
RBC # BLD AUTO: 5.34 M/UL (ref 4.23–5.6)
RBC #/AREA URNS HPF: ABNORMAL /HPF (ref 0–5)
SODIUM SERPL-SCNC: 142 MMOL/L (ref 136–145)
SP GR UR REFRACTOMETRY: 1.03 (ref 1–1.02)
TRIGL SERPL-MCNC: 152 MG/DL (ref 0–150)
TSH W FREE THYROID IF ABNORMAL: 2.01 UIU/ML (ref 0.27–4.2)
UROBILINOGEN UR QL STRIP.AUTO: 0.2 EU/DL (ref 0.2–1)
VLDLC SERPL CALC-MCNC: 30 MG/DL (ref 6–23)
WBC # BLD AUTO: 7.5 K/UL (ref 4.3–11.1)
WBC URNS QL MICRO: ABNORMAL /HPF (ref 0–4)

## 2024-08-05 PROCEDURE — 3078F DIAST BP <80 MM HG: CPT | Performed by: INTERNAL MEDICINE

## 2024-08-05 PROCEDURE — 99213 OFFICE O/P EST LOW 20 MIN: CPT | Performed by: INTERNAL MEDICINE

## 2024-08-05 PROCEDURE — 3046F HEMOGLOBIN A1C LEVEL >9.0%: CPT | Performed by: INTERNAL MEDICINE

## 2024-08-05 PROCEDURE — G8417 CALC BMI ABV UP PARAM F/U: HCPCS | Performed by: INTERNAL MEDICINE

## 2024-08-05 PROCEDURE — G8427 DOCREV CUR MEDS BY ELIG CLIN: HCPCS | Performed by: INTERNAL MEDICINE

## 2024-08-05 PROCEDURE — 1036F TOBACCO NON-USER: CPT | Performed by: INTERNAL MEDICINE

## 2024-08-05 PROCEDURE — G0439 PPPS, SUBSEQ VISIT: HCPCS | Performed by: INTERNAL MEDICINE

## 2024-08-05 PROCEDURE — 3075F SYST BP GE 130 - 139MM HG: CPT | Performed by: INTERNAL MEDICINE

## 2024-08-05 PROCEDURE — 1123F ACP DISCUSS/DSCN MKR DOCD: CPT | Performed by: INTERNAL MEDICINE

## 2024-08-05 PROCEDURE — 2022F DILAT RTA XM EVC RTNOPTHY: CPT | Performed by: INTERNAL MEDICINE

## 2024-08-05 PROCEDURE — 3017F COLORECTAL CA SCREEN DOC REV: CPT | Performed by: INTERNAL MEDICINE

## 2024-08-05 SDOH — ECONOMIC STABILITY: FOOD INSECURITY: WITHIN THE PAST 12 MONTHS, YOU WORRIED THAT YOUR FOOD WOULD RUN OUT BEFORE YOU GOT MONEY TO BUY MORE.: PATIENT DECLINED

## 2024-08-05 SDOH — ECONOMIC STABILITY: FOOD INSECURITY: WITHIN THE PAST 12 MONTHS, THE FOOD YOU BOUGHT JUST DIDN'T LAST AND YOU DIDN'T HAVE MONEY TO GET MORE.: PATIENT DECLINED

## 2024-08-05 SDOH — ECONOMIC STABILITY: INCOME INSECURITY: HOW HARD IS IT FOR YOU TO PAY FOR THE VERY BASICS LIKE FOOD, HOUSING, MEDICAL CARE, AND HEATING?: PATIENT DECLINED

## 2024-08-05 SDOH — ECONOMIC STABILITY: HOUSING INSECURITY
IN THE LAST 12 MONTHS, WAS THERE A TIME WHEN YOU DID NOT HAVE A STEADY PLACE TO SLEEP OR SLEPT IN A SHELTER (INCLUDING NOW)?: PATIENT DECLINED

## 2024-08-05 ASSESSMENT — ENCOUNTER SYMPTOMS
VOMITING: 0
CONSTIPATION: 0
NAUSEA: 0
COUGH: 0
SHORTNESS OF BREATH: 1
WHEEZING: 0
DIARRHEA: 0
BLOOD IN STOOL: 0

## 2024-08-05 ASSESSMENT — PATIENT HEALTH QUESTIONNAIRE - PHQ9
9. THOUGHTS THAT YOU WOULD BE BETTER OFF DEAD, OR OF HURTING YOURSELF: NOT AT ALL
5. POOR APPETITE OR OVEREATING: NOT AT ALL
6. FEELING BAD ABOUT YOURSELF - OR THAT YOU ARE A FAILURE OR HAVE LET YOURSELF OR YOUR FAMILY DOWN: NOT AT ALL
10. IF YOU CHECKED OFF ANY PROBLEMS, HOW DIFFICULT HAVE THESE PROBLEMS MADE IT FOR YOU TO DO YOUR WORK, TAKE CARE OF THINGS AT HOME, OR GET ALONG WITH OTHER PEOPLE: SOMEWHAT DIFFICULT
4. FEELING TIRED OR HAVING LITTLE ENERGY: SEVERAL DAYS
SUM OF ALL RESPONSES TO PHQ QUESTIONS 1-9: 1
2. FEELING DOWN, DEPRESSED OR HOPELESS: NOT AT ALL
SUM OF ALL RESPONSES TO PHQ QUESTIONS 1-9: 1
SUM OF ALL RESPONSES TO PHQ QUESTIONS 1-9: 1
SUM OF ALL RESPONSES TO PHQ QUESTIONS 1-9: 3
3. TROUBLE FALLING OR STAYING ASLEEP: SEVERAL DAYS
7. TROUBLE CONCENTRATING ON THINGS, SUCH AS READING THE NEWSPAPER OR WATCHING TELEVISION: NOT AT ALL
SUM OF ALL RESPONSES TO PHQ QUESTIONS 1-9: 3
SUM OF ALL RESPONSES TO PHQ QUESTIONS 1-9: 1
SUM OF ALL RESPONSES TO PHQ QUESTIONS 1-9: 3
SUM OF ALL RESPONSES TO PHQ QUESTIONS 1-9: 3
2. FEELING DOWN, DEPRESSED OR HOPELESS: NOT AT ALL
1. LITTLE INTEREST OR PLEASURE IN DOING THINGS: SEVERAL DAYS
8. MOVING OR SPEAKING SO SLOWLY THAT OTHER PEOPLE COULD HAVE NOTICED. OR THE OPPOSITE, BEING SO FIGETY OR RESTLESS THAT YOU HAVE BEEN MOVING AROUND A LOT MORE THAN USUAL: NOT AT ALL
SUM OF ALL RESPONSES TO PHQ9 QUESTIONS 1 & 2: 1
1. LITTLE INTEREST OR PLEASURE IN DOING THINGS: SEVERAL DAYS
SUM OF ALL RESPONSES TO PHQ9 QUESTIONS 1 & 2: 1

## 2024-08-05 ASSESSMENT — LIFESTYLE VARIABLES
HOW MANY STANDARD DRINKS CONTAINING ALCOHOL DO YOU HAVE ON A TYPICAL DAY: 1 OR 2
HOW OFTEN DO YOU HAVE A DRINK CONTAINING ALCOHOL: MONTHLY OR LESS

## 2024-08-05 NOTE — PROGRESS NOTES
(around 2/5/2025).     Subjective       Patient's complete Health Risk Assessment and screening values have been reviewed and are found in Flowsheets. The following problems were reviewed today and where indicated follow up appointments were made and/or referrals ordered.    Positive Risk Factor Screenings with Interventions:    Fall Risk:  Do you feel unsteady or are you worried about falling? : no  2 or more falls in past year?: (!) yes  Fall with injury in past year?: (!) yes     Interventions:    Reviewed medications, home hazards, visual acuity, and co-morbidities that can increase risk for falls  See AVS for additional education material         Controlled Medication Review:    Today's Pain Level: No data recorded   Opioid Risk: (Low risk score <55) Opioid risk score: 14    Patient is low risk for opioid use disorder or overdose.    Last PDMP [place] as Reviewed:  Review User Review Instant Review Result   MANDEEP MEALRA 3/6/2023  2:13 PM     Reviewed PDMP [1]        Self-assessment of health:  In general, how would you say your health is?: (!) Poor  General HRA Questions:  Select all that apply: (!) New or Increased Pain, Anger     Poor Eating Habits/Diet:  Do you eat balanced/healthy meals regularly?: (!) No    Abnormal BMI (obese):  Body mass index is 36.96 kg/m². (!) Abnormal  Interventions:  See A/P for plan and any pertinent orders        Dentist Screen:  Have you seen the dentist within the past year?: (!) No    Safety:  Do you have working smoke detectors?: (!) No  Do you always fasten your seatbelt when you are in a car?: (!) No    ADL's:   Patient reports needing help with:  Select all that apply: (!) Housekeeping    Advanced Directives:  Do you have a Living Will?: (!) No                     Objective   Vitals:    08/05/24 1048 08/05/24 1101   BP: (!) 154/82 132/74   Site: Left Upper Arm    Position: Sitting    Cuff Size: Large Adult    Pulse: 69    Resp: 18    Weight: 103.9 kg (229 lb)    Height:

## 2024-08-05 NOTE — PATIENT INSTRUCTIONS
upper belly or in one or both shoulders or arms.     Lightheadedness or sudden weakness.     A fast or irregular heartbeat.   After you call 911, the  may tell you to chew 1 adult-strength or 2 to 4 low-dose aspirin. Wait for an ambulance. Do not try to drive yourself.  Watch closely for changes in your health, and be sure to contact your doctor if you have any problems.  Where can you learn more?  Go to https://www.[employer].net/patientEd and enter F075 to learn more about \"A Healthy Heart: Care Instructions.\"  Current as of: June 24, 2023  Content Version: 14.1  © 0367-0235 [employer].   Care instructions adapted under license by [employer]. If you have questions about a medical condition or this instruction, always ask your healthcare professional. [employer] disclaims any warranty or liability for your use of this information.      Personalized Preventive Plan for Hiram Fong - 8/5/2024  Medicare offers a range of preventive health benefits. Some of the tests and screenings are paid in full while other may be subject to a deductible, co-insurance, and/or copay.    Some of these benefits include a comprehensive review of your medical history including lifestyle, illnesses that may run in your family, and various assessments and screenings as appropriate.    After reviewing your medical record and screening and assessments performed today your provider may have ordered immunizations, labs, imaging, and/or referrals for you.  A list of these orders (if applicable) as well as your Preventive Care list are included within your After Visit Summary for your review.    Other Preventive Recommendations:    A preventive eye exam performed by an eye specialist is recommended every 1-2 years to screen for glaucoma; cataracts, macular degeneration, and other eye disorders.  A preventive dental visit is recommended every 6 months.  Try to get at least 150 minutes of exercise

## 2024-08-07 NOTE — RESULT ENCOUNTER NOTE
Cholesterol is better.  Kidney function and sugars are a little worse.  Maintain a low fat high fiber diet and make sure you are getting 30 minutes of aerobic exercise at least 4-5 days weekly. Otherwise, Continue your current doses of medications. Keep up the good work.  Thanks.  [facility]

## 2024-08-12 NOTE — RESULT ENCOUNTER NOTE
Cholesterol is better.  Kidney function and sugars are a little worse.  Maintain a low fat high fiber diet and make sure you are getting 30 minutes of aerobic exercise at least 4-5 days weekly. Otherwise, Continue your current doses of medications. Keep up the good work.  Thanks.  [facility]

## 2024-09-24 DIAGNOSIS — I10 ESSENTIAL HYPERTENSION: Primary | ICD-10-CM

## 2024-09-24 RX ORDER — LOSARTAN POTASSIUM 25 MG/1
25 TABLET ORAL DAILY
Qty: 90 TABLET | Refills: 3 | Status: SHIPPED | OUTPATIENT
Start: 2024-09-24

## 2024-10-01 ENCOUNTER — TELEPHONE (OUTPATIENT)
Age: 64
End: 2024-10-01

## 2024-10-01 NOTE — TELEPHONE ENCOUNTER
Called patient and informed him that it did not appear that he needed any refills before follow up appointment

## 2024-10-01 NOTE — TELEPHONE ENCOUNTER
Had to reschedule pt due to weather and internet being out in [place]. Pt did not want to go to [facility]. He is rescheduled for December and asked if a nurse could make sure meds don't need a refill and if they do send them to [facility] in [place]. Pt would like a call back from the nurse.

## 2024-10-30 RX ORDER — FAMOTIDINE 40 MG/1
40 TABLET, FILM COATED ORAL DAILY
Qty: 90 TABLET | Refills: 0 | OUTPATIENT
Start: 2024-10-30

## 2024-10-30 RX ORDER — FAMOTIDINE 40 MG/1
40 TABLET, FILM COATED ORAL DAILY
Qty: 90 TABLET | Refills: 3 | Status: SHIPPED | OUTPATIENT
Start: 2024-10-30

## 2024-12-04 ENCOUNTER — OFFICE VISIT (OUTPATIENT)
Age: 64
End: 2024-12-04
Payer: MEDICARE

## 2024-12-04 VITALS
HEART RATE: 72 BPM | DIASTOLIC BLOOD PRESSURE: 66 MMHG | HEIGHT: 66 IN | BODY MASS INDEX: 37.25 KG/M2 | WEIGHT: 231.8 LBS | SYSTOLIC BLOOD PRESSURE: 118 MMHG

## 2024-12-04 DIAGNOSIS — Z78.9 STATIN INTOLERANCE: ICD-10-CM

## 2024-12-04 DIAGNOSIS — I10 ESSENTIAL HYPERTENSION: Primary | ICD-10-CM

## 2024-12-04 DIAGNOSIS — I25.119 ATHEROSCLEROSIS OF NATIVE CORONARY ARTERY OF NATIVE HEART WITH ANGINA PECTORIS (HCC): ICD-10-CM

## 2024-12-04 DIAGNOSIS — E78.5 HYPERLIPIDEMIA LDL GOAL <70: ICD-10-CM

## 2024-12-04 DIAGNOSIS — E74.39 GLUCOSE INTOLERANCE: ICD-10-CM

## 2024-12-04 PROCEDURE — 99214 OFFICE O/P EST MOD 30 MIN: CPT | Performed by: INTERNAL MEDICINE

## 2024-12-04 PROCEDURE — 1123F ACP DISCUSS/DSCN MKR DOCD: CPT | Performed by: INTERNAL MEDICINE

## 2024-12-04 PROCEDURE — G8484 FLU IMMUNIZE NO ADMIN: HCPCS | Performed by: INTERNAL MEDICINE

## 2024-12-04 PROCEDURE — G8417 CALC BMI ABV UP PARAM F/U: HCPCS | Performed by: INTERNAL MEDICINE

## 2024-12-04 PROCEDURE — 93000 ELECTROCARDIOGRAM COMPLETE: CPT | Performed by: INTERNAL MEDICINE

## 2024-12-04 PROCEDURE — 3078F DIAST BP <80 MM HG: CPT | Performed by: INTERNAL MEDICINE

## 2024-12-04 PROCEDURE — 1036F TOBACCO NON-USER: CPT | Performed by: INTERNAL MEDICINE

## 2024-12-04 PROCEDURE — 3017F COLORECTAL CA SCREEN DOC REV: CPT | Performed by: INTERNAL MEDICINE

## 2024-12-04 PROCEDURE — 3074F SYST BP LT 130 MM HG: CPT | Performed by: INTERNAL MEDICINE

## 2024-12-04 PROCEDURE — G8427 DOCREV CUR MEDS BY ELIG CLIN: HCPCS | Performed by: INTERNAL MEDICINE

## 2024-12-04 RX ORDER — AMLODIPINE BESYLATE 10 MG/1
10 TABLET ORAL DAILY
Qty: 90 TABLET | Refills: 3 | Status: SHIPPED | OUTPATIENT
Start: 2024-12-04

## 2024-12-04 RX ORDER — DAPAGLIFLOZIN 10 MG/1
10 TABLET, FILM COATED ORAL EVERY MORNING
Qty: 90 TABLET | Refills: 3 | Status: SHIPPED | OUTPATIENT
Start: 2024-12-04

## 2024-12-04 RX ORDER — CARVEDILOL 25 MG/1
25 TABLET ORAL DAILY
Qty: 90 TABLET | Refills: 3 | Status: SHIPPED | OUTPATIENT
Start: 2024-12-04 | End: 2025-11-29

## 2024-12-04 RX ORDER — LOSARTAN POTASSIUM 25 MG/1
25 TABLET ORAL DAILY
Qty: 90 TABLET | Refills: 3 | Status: SHIPPED | OUTPATIENT
Start: 2024-12-04

## 2024-12-04 NOTE — PROGRESS NOTES
[facility], [address], [address]  [place] [address]  PHONE: [phone]    SUBJECTIVE:   Hiram Fong is a 64 y.o. male 1960   seen for a follow up visit regarding the following:     Chief Complaint   Patient presents with    Essential (primary) hypertension    Atherosclerosis of native coronary artery of native heart w         History of Present Illness  The patient is a 64-year-old male who presents for evaluation of multiple medical concerns including ASCVD, hypertension, chronic kidney disease, hyperlipidemia, statin intolerance, and glucose intolerance.    He reports experiencing shortness of breath, which he describes as not being more severe than usual. . His current medication regimen includes amlodipine 10 mg and carvedilol 25 mg, both taken once daily.        Interval history:  Male presented for followup 06/05/2018 doing well.  Previous aortofem bypass surgery with redo groin exploration.  Wound is improving.  He does complain of shortness of breath that has improved since our last appointment.  He has been tried on Ranexa,  which had no improvement of his symptoms.  In light of his , he has very stable symptoms at this time with occasional chest discomfort.  He is on Amlodipine.  He has stopped smoking.  He was placed on Repatha therapy by Dr. Elizabeth.  Additionally,  recent modifications were made to his blood pressure due to hypotension and he is doing very well today.           Cardiac History:     Admission for hypertensive emergency flash pulmonary edema 12/2016.     EKG 12/06/2016, normal sinus rhythm, left atrial enlargement, nonspecific T-wave abnormality.     NSTEMI 12/2016 due to hypertensive urgency in the setting of underlying coronary artery disease.     Cardiac catheterization 12/2016, coronary anatomy with LAD, nonobstructive disease, circumflex 30 - 40% nonobstructive disease, right coronary artery 40% mid vessel stenosis with chronic

## 2024-12-09 ENCOUNTER — TELEPHONE (OUTPATIENT)
Age: 64
End: 2024-12-09

## 2024-12-09 RX ORDER — EVOLOCUMAB 140 MG/ML
INJECTION, SOLUTION SUBCUTANEOUS
Qty: 2 ML | Refills: 5 | Status: SHIPPED | OUTPATIENT
Start: 2024-12-09

## 2024-12-09 NOTE — TELEPHONE ENCOUNTER
MEDICATION REFILL REQUEST      Name of Medication:  Repatha   Dose:  140mg  Frequency:   Quantity:    Days' supply:        Pharmacy Name/Location:  [facility]

## 2024-12-11 NOTE — TELEPHONE ENCOUNTER
Pt will need to call pharmacy for information. Refill was sent 12/9/2024. Tried calling pt no answer.

## 2025-02-26 ENCOUNTER — OFFICE VISIT (OUTPATIENT)
Dept: INTERNAL MEDICINE CLINIC | Facility: CLINIC | Age: 65
End: 2025-02-26

## 2025-02-26 VITALS
HEART RATE: 71 BPM | WEIGHT: 228 LBS | RESPIRATION RATE: 18 BRPM | BODY MASS INDEX: 36.64 KG/M2 | HEIGHT: 66 IN | DIASTOLIC BLOOD PRESSURE: 73 MMHG | SYSTOLIC BLOOD PRESSURE: 148 MMHG

## 2025-02-26 DIAGNOSIS — E11.21 TYPE 2 DIABETES MELLITUS WITH DIABETIC NEPHROPATHY, WITHOUT LONG-TERM CURRENT USE OF INSULIN (HCC): ICD-10-CM

## 2025-02-26 DIAGNOSIS — E78.5 DYSLIPIDEMIA: ICD-10-CM

## 2025-02-26 DIAGNOSIS — I73.9 PAD (PERIPHERAL ARTERY DISEASE): ICD-10-CM

## 2025-02-26 DIAGNOSIS — N18.31 STAGE 3A CHRONIC KIDNEY DISEASE (HCC): ICD-10-CM

## 2025-02-26 DIAGNOSIS — Z00.00 MEDICARE ANNUAL WELLNESS VISIT, SUBSEQUENT: ICD-10-CM

## 2025-02-26 DIAGNOSIS — I10 ESSENTIAL HYPERTENSION: Primary | ICD-10-CM

## 2025-02-26 DIAGNOSIS — Z87.891 PERSONAL HISTORY OF TOBACCO USE: ICD-10-CM

## 2025-02-26 DIAGNOSIS — I15.0 RENOVASCULAR HYPERTENSION: ICD-10-CM

## 2025-02-26 DIAGNOSIS — E66.01 SEVERE OBESITY: ICD-10-CM

## 2025-02-26 LAB
ALBUMIN SERPL-MCNC: 3.9 G/DL (ref 3.2–4.6)
ALBUMIN/GLOB SERPL: 1.2 (ref 1–1.9)
ALP SERPL-CCNC: 107 U/L (ref 40–129)
ALT SERPL-CCNC: 18 U/L (ref 8–55)
ANION GAP SERPL CALC-SCNC: 12 MMOL/L (ref 7–16)
AST SERPL-CCNC: 22 U/L (ref 15–37)
BILIRUB SERPL-MCNC: 0.4 MG/DL (ref 0–1.2)
BUN SERPL-MCNC: 20 MG/DL (ref 8–23)
CALCIUM SERPL-MCNC: 9.1 MG/DL (ref 8.8–10.2)
CHLORIDE SERPL-SCNC: 105 MMOL/L (ref 98–107)
CHOLEST SERPL-MCNC: 110 MG/DL (ref 0–200)
CO2 SERPL-SCNC: 22 MMOL/L (ref 20–29)
CREAT SERPL-MCNC: 1.59 MG/DL (ref 0.8–1.3)
EST. AVERAGE GLUCOSE BLD GHB EST-MCNC: 129 MG/DL
GLOBULIN SER CALC-MCNC: 3.3 G/DL (ref 2.3–3.5)
GLUCOSE SERPL-MCNC: 184 MG/DL (ref 70–99)
HBA1C MFR BLD: 6.1 % (ref 0–5.6)
HDLC SERPL-MCNC: 40 MG/DL (ref 40–60)
HDLC SERPL: 2.7 (ref 0–5)
LDLC SERPL CALC-MCNC: 41 MG/DL (ref 0–100)
POTASSIUM SERPL-SCNC: 4.6 MMOL/L (ref 3.5–5.1)
PROT SERPL-MCNC: 7.1 G/DL (ref 6.3–8.2)
SODIUM SERPL-SCNC: 138 MMOL/L (ref 136–145)
TRIGL SERPL-MCNC: 141 MG/DL (ref 0–150)
VLDLC SERPL CALC-MCNC: 28 MG/DL (ref 6–23)

## 2025-02-26 SDOH — ECONOMIC STABILITY: FOOD INSECURITY: WITHIN THE PAST 12 MONTHS, THE FOOD YOU BOUGHT JUST DIDN'T LAST AND YOU DIDN'T HAVE MONEY TO GET MORE.: PATIENT DECLINED

## 2025-02-26 SDOH — ECONOMIC STABILITY: FOOD INSECURITY: WITHIN THE PAST 12 MONTHS, YOU WORRIED THAT YOUR FOOD WOULD RUN OUT BEFORE YOU GOT MONEY TO BUY MORE.: PATIENT DECLINED

## 2025-02-26 ASSESSMENT — PATIENT HEALTH QUESTIONNAIRE - PHQ9
9. THOUGHTS THAT YOU WOULD BE BETTER OFF DEAD, OR OF HURTING YOURSELF: NOT AT ALL
SUM OF ALL RESPONSES TO PHQ QUESTIONS 1-9: 3
6. FEELING BAD ABOUT YOURSELF - OR THAT YOU ARE A FAILURE OR HAVE LET YOURSELF OR YOUR FAMILY DOWN: NOT AT ALL
8. MOVING OR SPEAKING SO SLOWLY THAT OTHER PEOPLE COULD HAVE NOTICED. OR THE OPPOSITE, BEING SO FIGETY OR RESTLESS THAT YOU HAVE BEEN MOVING AROUND A LOT MORE THAN USUAL: NOT AT ALL
SUM OF ALL RESPONSES TO PHQ QUESTIONS 1-9: 3
1. LITTLE INTEREST OR PLEASURE IN DOING THINGS: NOT AT ALL
7. TROUBLE CONCENTRATING ON THINGS, SUCH AS READING THE NEWSPAPER OR WATCHING TELEVISION: NOT AT ALL
10. IF YOU CHECKED OFF ANY PROBLEMS, HOW DIFFICULT HAVE THESE PROBLEMS MADE IT FOR YOU TO DO YOUR WORK, TAKE CARE OF THINGS AT HOME, OR GET ALONG WITH OTHER PEOPLE: NOT DIFFICULT AT ALL
4. FEELING TIRED OR HAVING LITTLE ENERGY: MORE THAN HALF THE DAYS
SUM OF ALL RESPONSES TO PHQ QUESTIONS 1-9: 3
SUM OF ALL RESPONSES TO PHQ QUESTIONS 1-9: 3
SUM OF ALL RESPONSES TO PHQ9 QUESTIONS 1 & 2: 0
3. TROUBLE FALLING OR STAYING ASLEEP: SEVERAL DAYS
2. FEELING DOWN, DEPRESSED OR HOPELESS: NOT AT ALL
5. POOR APPETITE OR OVEREATING: NOT AT ALL

## 2025-02-26 ASSESSMENT — LIFESTYLE VARIABLES
HOW OFTEN DO YOU HAVE A DRINK CONTAINING ALCOHOL: NEVER
HOW MANY STANDARD DRINKS CONTAINING ALCOHOL DO YOU HAVE ON A TYPICAL DAY: PATIENT DOES NOT DRINK

## 2025-02-26 ASSESSMENT — ENCOUNTER SYMPTOMS
VOMITING: 0
SHORTNESS OF BREATH: 0
CONSTIPATION: 0
NAUSEA: 0
DIARRHEA: 0
WHEEZING: 0
COUGH: 0

## 2025-02-26 NOTE — PROGRESS NOTES
aortofemoral bypass     Current Outpatient Medications   Medication Sig Dispense Refill    Evolocumab (REPATHA SURECLICK) 140 MG/ML SOAJ INJECT 140 MG SUBCUTANEOUSLY EVERY 14 DAYS 2 mL 5    amLODIPine (NORVASC) 10 MG tablet Take 1 tablet by mouth daily 90 tablet 3    carvedilol (COREG) 25 MG tablet Take 1 tablet by mouth daily 90 tablet 3    dapagliflozin (FARXIGA) 10 MG tablet Take 1 tablet by mouth every morning 90 tablet 3    losartan (COZAAR) 25 MG tablet Take 1 tablet by mouth daily 90 tablet 3    famotidine (PEPCID) 40 MG tablet Take 1 tablet by mouth daily 90 tablet 3    EQ ALLERGY RELIEF, CETIRIZINE, 10 MG tablet Take 1 tablet by mouth once daily 90 tablet 3    HYDROcodone-acetaminophen (NORCO) 7.5-325 MG per tablet Take 1 tablet by mouth 2 times daily as needed for Pain for up to 30 days. Intended supply: 3 days. Take lowest dose possible to manage pain 42 tablet 0    aspirin 81 MG EC tablet Take 1 tablet by mouth daily      Cholecalciferol 50 MCG (2000 UT) TABS Take by mouth daily      EUFLEXXA 20 MG/2ML SOSY injection  (Patient not taking: Reported on 2/26/2025)      HYDROcodone-acetaminophen (NORCO) 7.5-325 MG per tablet Take 1 tablet by mouth 2 times daily for 30 days. 42 tablet 0     No current facility-administered medications for this visit.     Health Maintenance   Topic Date Due    Lung Cancer Screening &/or Counseling  Never done    DTaP/Tdap/Td vaccine (1 - Tdap) 08/05/2025 (Originally 3/19/1979)    Shingles vaccine (1 of 2) 08/05/2025 (Originally 3/19/2009)    Pneumococcal 50+ years Vaccine (1 of 2 - PCV) 08/05/2025 (Originally 3/19/1978)    Respiratory Syncytial Virus (RSV) Pregnant or age 60 yrs+ (1 - Risk 60-74 years 1-dose series) 08/05/2025 (Originally 3/19/2019)    Flu vaccine (1) 02/26/2026 (Originally 8/1/2024)    COVID-19 Vaccine (1 - 2024-25 season) 02/26/2026 (Originally 9/1/2024)    Diabetic foot exam  08/05/2025    A1C test (Diabetic or Prediabetic)  08/05/2025    Diabetic Alb to Cr

## 2025-02-27 NOTE — RESULT ENCOUNTER NOTE
Labs are stable.  Continue your current doses of medications. Keep up the good work.  Thanks.  [facility]

## 2025-03-05 NOTE — RESULT ENCOUNTER NOTE
Labs are stable.  Continue your current doses of medications. Keep up the good work.  Thanks.  [facility]

## 2025-03-12 ENCOUNTER — OFFICE VISIT (OUTPATIENT)
Dept: VASCULAR SURGERY | Age: 65
End: 2025-03-12

## 2025-03-12 VITALS
BODY MASS INDEX: 36.64 KG/M2 | DIASTOLIC BLOOD PRESSURE: 68 MMHG | OXYGEN SATURATION: 96 % | HEIGHT: 66 IN | SYSTOLIC BLOOD PRESSURE: 133 MMHG | WEIGHT: 228 LBS | HEART RATE: 68 BPM

## 2025-03-12 DIAGNOSIS — Z95.828 S/P BYPASS GRAFT OF EXTREMITY: ICD-10-CM

## 2025-03-12 DIAGNOSIS — I65.23 CAROTID STENOSIS, ASYMPTOMATIC, BILATERAL: ICD-10-CM

## 2025-03-12 DIAGNOSIS — I73.9 PAD (PERIPHERAL ARTERY DISEASE): Primary | ICD-10-CM

## 2025-03-12 NOTE — PROGRESS NOTES
DATE OF VISIT: 3/12/2025      [facility]  Hiram Fong is a 64 y.o. male who follows up for his arterial and carotid duplex study. No new, lateralizing neurological symptoms. No claudication. He has no open ulcerations or rest pain. He remains on ASA and Repatha. H/O aortobifem years back. He denies any fever or chills. He does have a fungal rash in his bilateral groins.         MEDICAL HISTORY:   Past Medical History:   Diagnosis Date    Acute diastolic (congestive) heart failure (HCC) 12/4/2016    LVEF 55-55% 12/4/16    CAD (coronary artery disease)     in one vessel, unable to stent 12/2016- \"collateral vessels\" per heart cath    Chronic kidney disease     Chronic kidney disease, stage 3 (HCC)     Chronic pain     low back and bilateral lower extremeties    Ex-smoker     QUIT 11/2016    1 pk/day x 40 yrs    GERD (gastroesophageal reflux disease)     takes an occassional zantac, OTC    Hx-TIA (transient ischemic attack)     2 mild strokes, patient states it was \"yrs ago\" , no residual weakness    Hypercholesteremia     managed with medication     Hypertension     managed with meds    Old MI (myocardial infarction) 11/2016    Osteoarthritis     generalized    Stroke ([facility])          SURGICAL HISTORY:   Past Surgical History:   Procedure Laterality Date    CARDIAC CATHETERIZATION  12/2016    unable to stent- 1 ves blockage    COLONOSCOPY N/A 6/22/2017    COLONOSCOPY  BMI 31 performed by Kelvin Wladen MD at [facility] ENDOSCOPY    FLEXIBLE SIGMOIDOSCOPY N/A 9/9/2017    SIGMOIDOSCOPY FLEXIBLE performed by Carmen Villanueva MD at [facility] ENDOSCOPY    KNEE ARTHROSCOPY Left     L knee surgery x 2    VASCULAR SURGERY Left 09/08/2017    arteriogram, fem embolectomy    VASCULAR SURGERY  09/07/2017    aortofemoral bypass       Review of Systems:  Constitutional:   Negative for fevers and unexplained weight loss.  Respiratory:   Negative for hemoptysis.  Cardiovascular:   Negative except as noted in HPI.  Musculoskeletal:

## 2025-03-13 ENCOUNTER — HOSPITAL ENCOUNTER (OUTPATIENT)
Dept: CT IMAGING | Age: 65
Discharge: HOME | End: 2025-03-15
Payer: MEDICARE

## 2025-03-13 DIAGNOSIS — Z95.828 S/P BYPASS GRAFT OF EXTREMITY: ICD-10-CM

## 2025-03-13 DIAGNOSIS — I73.9 PAD (PERIPHERAL ARTERY DISEASE): ICD-10-CM

## 2025-03-13 LAB — CREAT BLD-MCNC: 1.56 MG/DL (ref 0.8–1.5)

## 2025-03-13 PROCEDURE — 75635 CT ANGIO ABDOMINAL ARTERIES: CPT

## 2025-03-13 PROCEDURE — 82565 ASSAY OF CREATININE: CPT

## 2025-03-13 PROCEDURE — 6360000004 HC RX CONTRAST MEDICATION: Performed by: NURSE PRACTITIONER

## 2025-03-13 RX ORDER — IOPAMIDOL 755 MG/ML
100 INJECTION, SOLUTION INTRAVASCULAR
Status: COMPLETED | OUTPATIENT
Start: 2025-03-13 | End: 2025-03-13

## 2025-03-13 RX ADMIN — IOPAMIDOL 100 ML: 755 INJECTION, SOLUTION INTRAVENOUS at 09:17

## 2025-03-14 ENCOUNTER — OFFICE VISIT (OUTPATIENT)
Dept: VASCULAR SURGERY | Age: 65
End: 2025-03-14

## 2025-03-14 VITALS
HEIGHT: 66 IN | HEART RATE: 68 BPM | WEIGHT: 230 LBS | DIASTOLIC BLOOD PRESSURE: 73 MMHG | BODY MASS INDEX: 36.96 KG/M2 | OXYGEN SATURATION: 97 % | SYSTOLIC BLOOD PRESSURE: 142 MMHG

## 2025-03-14 DIAGNOSIS — I73.9 PVD (PERIPHERAL VASCULAR DISEASE) WITH CLAUDICATION: Primary | ICD-10-CM

## 2025-03-14 NOTE — PROGRESS NOTES
[address] [address]  [phone] FAX: [phone]    Hiram Fong  : 1960    Chief Complaint:     History of Present Illness:   Patient follows up today for follow-up CTA.  Patient status post aortobifem done about 8 years ago.  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    CURRENT MEDICATIONS:  Current Outpatient Medications   Medication Sig Dispense Refill    Evolocumab (REPATHA SURECLICK) 140 MG/ML SOAJ INJECT 140 MG SUBCUTANEOUSLY EVERY 14 DAYS 2 mL 5    carvedilol (COREG) 25 MG tablet Take 1 tablet by mouth daily 90 tablet 3    dapagliflozin (FARXIGA) 10 MG tablet Take 1 tablet by mouth every morning 90 tablet 3    losartan (COZAAR) 25 MG tablet Take 1 tablet by mouth daily 90 tablet 3    famotidine (PEPCID) 40 MG tablet Take 1 tablet by mouth daily 90 tablet 3    EQ ALLERGY RELIEF, CETIRIZINE, 10 MG tablet Take 1 tablet by mouth once daily 90 tablet 3    aspirin 81 MG EC tablet Take 1 tablet by mouth daily      Cholecalciferol 50 MCG (2000) TABS Take by mouth daily      amLODIPine (NORVASC) 10 MG tablet Take 1 tablet by mouth daily (Patient not taking: Reported on 3/14/2025) 90 tablet 3    EUFLEXXA 20 MG/2ML SOSY injection  (Patient not taking: Reported on 3/14/2025)      HYDROcodone-acetaminophen (NORCO) 7.5-325 MG per tablet Take 1 tablet by mouth 2 times daily as needed for Pain for up to 30 days. Intended supply: 3 days. Take lowest dose possible to manage pain 42 tablet 0    HYDROcodone-acetaminophen (NORCO) 7.5-325 MG per tablet Take 1 tablet by mouth 2 times daily for 30 days. 42 tablet 0     No current facility-administered medications for this visit.       Past Medical History:   Diagnosis Date    Acute diastolic (congestive) heart failure (HCC) 2016    LVEF 55-55% 16    CAD (coronary artery disease)     in one vessel, unable to stent 2016- \"collateral vessels\" per heart cath    Chronic kidney disease

## 2025-05-21 RX ORDER — EVOLOCUMAB 140 MG/ML
INJECTION, SOLUTION SUBCUTANEOUS
Qty: 6 ML | Refills: 5 | Status: SHIPPED | OUTPATIENT
Start: 2025-05-21

## 2025-06-09 RX ORDER — CETIRIZINE HYDROCHLORIDE 10 MG/1
10 TABLET, FILM COATED ORAL DAILY
Qty: 90 TABLET | Refills: 3 | Status: SHIPPED | OUTPATIENT
Start: 2025-06-09

## 2025-06-19 ENCOUNTER — OFFICE VISIT (OUTPATIENT)
Age: 65
End: 2025-06-19
Payer: MEDICARE

## 2025-06-19 VITALS
WEIGHT: 231 LBS | HEIGHT: 66 IN | SYSTOLIC BLOOD PRESSURE: 126 MMHG | BODY MASS INDEX: 37.12 KG/M2 | DIASTOLIC BLOOD PRESSURE: 66 MMHG | HEART RATE: 72 BPM

## 2025-06-19 DIAGNOSIS — E78.5 HYPERLIPIDEMIA LDL GOAL <70: ICD-10-CM

## 2025-06-19 DIAGNOSIS — I10 ESSENTIAL HYPERTENSION: Primary | ICD-10-CM

## 2025-06-19 DIAGNOSIS — Z78.9 STATIN INTOLERANCE: ICD-10-CM

## 2025-06-19 DIAGNOSIS — I10 ESSENTIAL (PRIMARY) HYPERTENSION: ICD-10-CM

## 2025-06-19 DIAGNOSIS — N18.30 STAGE 3 CHRONIC KIDNEY DISEASE, UNSPECIFIED WHETHER STAGE 3A OR 3B CKD (HCC): ICD-10-CM

## 2025-06-19 DIAGNOSIS — E74.39 GLUCOSE INTOLERANCE: ICD-10-CM

## 2025-06-19 DIAGNOSIS — I25.119 ATHEROSCLEROSIS OF NATIVE CORONARY ARTERY OF NATIVE HEART WITH ANGINA PECTORIS: ICD-10-CM

## 2025-06-19 PROCEDURE — G8427 DOCREV CUR MEDS BY ELIG CLIN: HCPCS | Performed by: INTERNAL MEDICINE

## 2025-06-19 PROCEDURE — 1036F TOBACCO NON-USER: CPT | Performed by: INTERNAL MEDICINE

## 2025-06-19 PROCEDURE — G8417 CALC BMI ABV UP PARAM F/U: HCPCS | Performed by: INTERNAL MEDICINE

## 2025-06-19 PROCEDURE — 3017F COLORECTAL CA SCREEN DOC REV: CPT | Performed by: INTERNAL MEDICINE

## 2025-06-19 PROCEDURE — 1159F MED LIST DOCD IN RCRD: CPT | Performed by: INTERNAL MEDICINE

## 2025-06-19 PROCEDURE — 1123F ACP DISCUSS/DSCN MKR DOCD: CPT | Performed by: INTERNAL MEDICINE

## 2025-06-19 PROCEDURE — 3078F DIAST BP <80 MM HG: CPT | Performed by: INTERNAL MEDICINE

## 2025-06-19 PROCEDURE — 99214 OFFICE O/P EST MOD 30 MIN: CPT | Performed by: INTERNAL MEDICINE

## 2025-06-19 PROCEDURE — 3074F SYST BP LT 130 MM HG: CPT | Performed by: INTERNAL MEDICINE

## 2025-06-19 NOTE — PROGRESS NOTES
function, mild to moderate mitral regurgitation with borderline mitral valve prolapse.         Lower extremity dopplers 1/2017 Elevated velocity in the mid aorta measuring 267 cm/s. 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 Monophasic waveforms throughout the right and left lower extremity. Occluded right posterior tibial artery.      Previous statin intolerance on Lipitor.        Crestor low dose added 05/02/2017.       Trial of Ranexa 01/10/2018.        02/01/2018 the patient was prescribed Zetia.        The patient was initiated on Repatha in 2018 by Dr. Elizabeth.       2/8/2022 sinus rhythm, normal rate, normal TX intervals, ST wave normal, normal axis    9/21/2023 sinus rhythm, normal rate, normal TX intervals, ST wave normal, normal axis,  12/4/2024 EKG Sinus Rhythm Low voltage -      - Labs:    - Lipid Panel (02/26/2025): LDL 41    - Blood Glucose: 184    - Hemoglobin A1c: 6.1    - Kidney Function: Normal    - Imaging:    - Echocardiogram (06/06/2025): EF 65-70%, normal RV function, mild aortic sclerosis, mild mitral calcification with mild regurgitation       Assessment & Plan  Hypertension  - Managed with amlodipine 10 mg daily, carvedilol 25 mg daily, losartan 25 mg daily    ASCVD  - Echo (06/06/2025): normal heart function  - Cholesterol managed with Repatha  - On aspirin  - Asymptomatic  -  medially managed     Stage III CKD  - On Farxiga maintaining kidney function  - Avoid nephrotoxic meds like Aleve and ibuprofen  - Tylenol as alternative    Glucose intolerance  - A1c <7, controlled with Farxiga  - Ozempic and Mounjaro caused nausea, vomiting, stomach issues    Hyperlipidemia  - Target LDL <70  - Managed with Repatha    Statin intolerance  - Adverse effects from statins    Mild carotid disease  - Noted    Aortic sclerosis   - controlled     Discussed risks, benefits, and alternatives of treatment, including avoiding nephrotoxic meds and potential side effects of diabetes meds like Ozempic and

## 2025-07-24 RX ORDER — FAMOTIDINE 40 MG/1
40 TABLET, FILM COATED ORAL DAILY
Qty: 90 TABLET | Refills: 3 | Status: SHIPPED | OUTPATIENT
Start: 2025-07-24

## 2025-07-24 NOTE — TELEPHONE ENCOUNTER
I have talked with the pharmacy at [facility]. The associate is saying that Mr. Fong doesn't have any refills left on the famotidine 40 mg.  He is needing this medication refilled.

## (undated) DEVICE — CANNULA NSL ORAL AD FOR CAPNOFLEX CO2 O2 AIRLFE

## (undated) DEVICE — CARDINAL HEALTH FLEXIBLE LIGHT HANDLE COVER: Brand: CARDINAL HEALTH

## (undated) DEVICE — FORCEPS BX 240CM JAW 3.2MM L CAP NDL MIC MESH TTH M00513372

## (undated) DEVICE — PAD THRM L UNIV NONSLIP HYDRGEL RECT PROVIDE OPTIMAL ENERGY

## (undated) DEVICE — DERMABOND SKIN ADH 0.7ML -- DERMABOND ADVANCED 12/BX

## (undated) DEVICE — INTENDED USED TO PROTECT, TAG AND HELP LOCATED SUTURES DURING SURGERY: Brand: STERION®SUTURE AID BOOTIES

## (undated) DEVICE — SYR 3ML LL TIP 1/10ML GRAD --

## (undated) DEVICE — INTENDED FOR TISSUE SEPARATION, AND OTHER PROCEDURES THAT REQUIRE A SHARP SURGICAL BLADE TO PUNCTURE OR CUT.: Brand: BARD-PARKER SAFETY BLADES SIZE 10, STERILE

## (undated) DEVICE — SUTURE NONABSORBABLE MONOFILAMENT 5-0 C-1 1X24 IN PROLENE 8725H

## (undated) DEVICE — BASIC SINGLE BASIN-LF: Brand: MEDLINE INDUSTRIES, INC.

## (undated) DEVICE — APPLIER LIG CLP L SHFT 13IN ATTACH HNDL CONTAIN 15 OR 20 TI

## (undated) DEVICE — Device

## (undated) DEVICE — TRAY CATH 16F URIN MTR LTX -- CONVERT TO ITEM 363111

## (undated) DEVICE — SYRINGE WITH HYPODERMIC SAFETY NEEDLE: Brand: MAGELLAN

## (undated) DEVICE — SUTURE VCRL SZ 3-0 L27IN ABSRB UD L26MM SH 1/2 CIR J416H

## (undated) DEVICE — SYR 5ML 1/5 GRAD LL NSAF LF --

## (undated) DEVICE — STOCKINETTE TUBE 6X48 -- MEDICHOICE

## (undated) DEVICE — X-RAY SPONGES,12 PLY: Brand: DERMACEA

## (undated) DEVICE — NDL PRT INJ NSAF BLNT 18GX1.5 --

## (undated) DEVICE — DRAPE TWL SURG 16X26IN BLU ORB04] ALLCARE INC]

## (undated) DEVICE — 2000CC GUARDIAN II: Brand: GUARDIAN

## (undated) DEVICE — BENTSON WIRE GUIDE 20CM DISTAL FLEXIBILITY WITH SOFTENED TIP: Brand: BENTSON

## (undated) DEVICE — APPLIER CLP SM BLK TI AUTO HNDL DISP W/ 20 CLP PREM SURGICLP

## (undated) DEVICE — AMD ANTIMICROBIAL NON-ADHERENT PAD,0.2% POLYHEXAMETHYLENE BIGUANIDE HCI (PHMB): Brand: TELFA

## (undated) DEVICE — SLIM BODY SKIN STAPLER: Brand: APPOSE ULC

## (undated) DEVICE — GOWN,REINFORCED,POLY,AURORA,XXLARGE,STR: Brand: MEDLINE

## (undated) DEVICE — WIPE INSTR HIGH ABSORBENT FAST WICKING LINT FREE COUNT 20

## (undated) DEVICE — AGENT HEMSTAT W4XL4IN OXIDIZED REGENERATED CELOS ABSRB SFT

## (undated) DEVICE — DRAPE IRRIG FLD WRM W44XL44IN W/ AORN STD PRTBL INTRATEMP

## (undated) DEVICE — 3M™ IOBAN™ 2 ANTIMICROBIAL INCISE DRAPE 6651EZ: Brand: IOBAN™ 2

## (undated) DEVICE — SUTURE PERMAHAND SZ 2-0 L30IN NONABSORBABLE BLK SH L26MM C016D

## (undated) DEVICE — (D)PREP SKN CHLRAPRP APPL 26ML -- CONVERT TO ITEM 371833

## (undated) DEVICE — CONNECTOR TBNG OD5-7MM O2 END DISP

## (undated) DEVICE — CATHETER ETER URETH 16FR INTMIT ROB MOD BARDX

## (undated) DEVICE — TOWEL SURG W17XL27IN STD BLU COT NONFENESTRATED PREWASHED

## (undated) DEVICE — STOCKINETTE,DOUBLE PLY,6X48,STERILE: Brand: MEDLINE

## (undated) DEVICE — SUTURE PDS II SZ 1 L96IN ABSRB VLT TP-1 L65MM 1/2 CIR Z880G

## (undated) DEVICE — SYR LR LCK 1ML GRAD NSAF 30ML --

## (undated) DEVICE — PLEDGET VASC W3/16XL3/8IN THK1/16IN PTFE SFT

## (undated) DEVICE — FOGARTY ARTERIAL EMBOLECTOMY CATHETER 4F 80CM: Brand: FOGARTY

## (undated) DEVICE — INTENDED TO BE USED TO OCCLUDE, RETRACT AND IDENTIFY ARTERIES, VEINS, TENDONS AND NERVES IN SURGICAL PROCEDURES: Brand: STERION®  VESSEL LOOP

## (undated) DEVICE — SUTURE PROL SZ 5-0 L36IN NONABSORBABLE BLU L13MM C-1 3/8 8720H

## (undated) DEVICE — INTRODUCER SHTH 8FR CANN L5.5CM DIL TIP 35MM BLU TUNGSTEN

## (undated) DEVICE — SUTURE PROL SZ 3-0 L48IN NONABSORBABLE BLU SH L26MM 1/2 CIR 8534H

## (undated) DEVICE — GOWN,REINF,POLY,ECL,PP SLV,XL: Brand: MEDLINE

## (undated) DEVICE — DRAPE XR C ARM 41X74IN LF --

## (undated) DEVICE — SET TBNG SUCT LN ASPIR ANTICOAG FOR CATS + ATS

## (undated) DEVICE — PAD,NON-ADHERENT,3X8,STERILE,LF,1/PK: Brand: MEDLINE

## (undated) DEVICE — SUTURE PERMAHAND SZ 2-0 L12X18IN NONABSORBABLE BLK SILK A185H

## (undated) DEVICE — SUTURE PERMAHAND SZ 3-0 L18IN NONABSORBABLE BLK SILK BRAID A184H

## (undated) DEVICE — COVER,TABLE,44X76,STERILE: Brand: MEDLINE

## (undated) DEVICE — DRAPE SURG EQUIP DISC 66X44 -- USE ITEM 141557

## (undated) DEVICE — BLADE ELECTRODE: Brand: EDGE

## (undated) DEVICE — ABSORBENT, WATERPROOF, BACTERIA PROOF FILM DRESSING: Brand: OPSITE POST OP 10X35CM CTN 20

## (undated) DEVICE — SPONGE: LAP 18X36 W XR 100/CS: Brand: MEDICAL ACTION INDUSTRIES

## (undated) DEVICE — SUTURE PROL SZ 4-0 L30IN NONABSORBABLE BLU SH L26MM 1/2 CIR 8831H

## (undated) DEVICE — REM POLYHESIVE ADULT PATIENT RETURN ELECTRODE: Brand: VALLEYLAB

## (undated) DEVICE — PVC URETHRAL CATHETER: Brand: DOVER

## (undated) DEVICE — GEL US 20GM NONIRRITATING OVERWRAPPED FILE PCH TRNSMIT

## (undated) DEVICE — APPLIER LIG CLP 115IN AUTO W SUP INTLOK 30 M TI CLP PREM

## (undated) DEVICE — 3M™ TEGADERM™ TRANSPARENT FILM DRESSING FRAME STYLE, 1626W, 4 IN X 4-3/4 IN (10 CM X 12 CM), 50/CT 4CT/CASE: Brand: 3M™ TEGADERM™

## (undated) DEVICE — GOWN,PREVENTION PLUS,2XL,ST,22/CS: Brand: MEDLINE

## (undated) DEVICE — TAPE UMB 1/8X30IN MP COT WHT --

## (undated) DEVICE — KENDALL RADIOLUCENT FOAM MONITORING ELECTRODE RECTANGULAR SHAPE: Brand: KENDALL

## (undated) DEVICE — DISH PTRI STRL --

## (undated) DEVICE — SUT PROL 5-0 36IN RB1 DA BLU --

## (undated) DEVICE — SUTURE MCRYL SZ 4-0 L27IN ABSRB UD L19MM PS-2 1/2 CIR PRIM Y426H

## (undated) DEVICE — ABSORBENT, WATERPROOF, BACTERIA PROOF FILM DRESSING: Brand: OPSITE POST OP 9.5X8.5CM CTN 20

## (undated) DEVICE — UNIVERSAL DRAPES: Brand: MEDLINE INDUSTRIES, INC.

## (undated) DEVICE — SUTURE PROL SZ 5-0 L24IN NONABSORBABLE BLU L13MM C-1 3/8 M8725

## (undated) DEVICE — SPONGE LAP 18X18IN STRL -- 5/PK

## (undated) DEVICE — SUTURE PERMAHAND SZ 2-0 L30IN 10X30IN TIE NONABSORBABLE BLK SA85H

## (undated) DEVICE — 3M™ IOBAN™ 2 ANTIMICROBIAL INCISE DRAPE 6648EZ: Brand: IOBAN™ 2

## (undated) DEVICE — BLOCK BITE AD 60FR W/ VELC STRP ADDRESSES MOST PT AND

## (undated) DEVICE — DISH PETRI W/LID STRL -- MIN CASE ORDER IS 2 CA

## (undated) DEVICE — BLADE ASSEMB CLP HAIR FINE --

## (undated) DEVICE — SUTURE PERMAHAND SZ 3-0 L30IN NONABSORBABLE BLK W/O NDL SA84H